# Patient Record
Sex: MALE | Race: BLACK OR AFRICAN AMERICAN | NOT HISPANIC OR LATINO | Employment: OTHER | URBAN - METROPOLITAN AREA
[De-identification: names, ages, dates, MRNs, and addresses within clinical notes are randomized per-mention and may not be internally consistent; named-entity substitution may affect disease eponyms.]

---

## 2017-11-13 ENCOUNTER — APPOINTMENT (INPATIENT)
Dept: RADIOLOGY | Facility: HOSPITAL | Age: 57
DRG: 640 | End: 2017-11-13
Payer: MEDICARE

## 2017-11-13 ENCOUNTER — APPOINTMENT (INPATIENT)
Dept: RADIOLOGY | Facility: HOSPITAL | Age: 57
DRG: 640 | End: 2017-11-13
Attending: PSYCHIATRY & NEUROLOGY
Payer: MEDICARE

## 2017-11-13 ENCOUNTER — APPOINTMENT (INPATIENT)
Dept: NON INVASIVE DIAGNOSTICS | Facility: HOSPITAL | Age: 57
DRG: 640 | End: 2017-11-13
Payer: MEDICARE

## 2017-11-13 ENCOUNTER — HOSPITAL ENCOUNTER (INPATIENT)
Facility: HOSPITAL | Age: 57
LOS: 3 days | Discharge: LEFT BEFORE DISCHARGE | DRG: 640 | End: 2017-11-16
Attending: EMERGENCY MEDICINE | Admitting: HOSPITALIST
Payer: MEDICARE

## 2017-11-13 ENCOUNTER — APPOINTMENT (INPATIENT)
Dept: DIALYSIS | Facility: HOSPITAL | Age: 57
DRG: 640 | End: 2017-11-13
Payer: MEDICARE

## 2017-11-13 ENCOUNTER — APPOINTMENT (EMERGENCY)
Dept: RADIOLOGY | Facility: HOSPITAL | Age: 57
DRG: 640 | End: 2017-11-13
Payer: MEDICARE

## 2017-11-13 DIAGNOSIS — Z99.2 ESRD ON HEMODIALYSIS (HCC): ICD-10-CM

## 2017-11-13 DIAGNOSIS — N18.6 ESRD ON HEMODIALYSIS (HCC): ICD-10-CM

## 2017-11-13 DIAGNOSIS — G93.40 ENCEPHALOPATHY: ICD-10-CM

## 2017-11-13 DIAGNOSIS — R06.00 DYSPNEA: ICD-10-CM

## 2017-11-13 DIAGNOSIS — J96.01 ACUTE RESPIRATORY FAILURE WITH HYPOXIA (HCC): ICD-10-CM

## 2017-11-13 DIAGNOSIS — E87.70 VOLUME OVERLOAD: Primary | ICD-10-CM

## 2017-11-13 PROBLEM — R77.8 ELEVATED TROPONIN: Status: ACTIVE | Noted: 2017-11-13

## 2017-11-13 PROBLEM — E87.5 HYPERKALEMIA: Status: ACTIVE | Noted: 2017-11-13

## 2017-11-13 PROBLEM — I16.0 HYPERTENSIVE URGENCY: Status: ACTIVE | Noted: 2017-11-13

## 2017-11-13 LAB
ALBUMIN SERPL BCP-MCNC: 3.4 G/DL (ref 3.5–5)
ALBUMIN SERPL BCP-MCNC: 3.6 G/DL (ref 3.5–5)
ALP SERPL-CCNC: 256 U/L (ref 46–116)
ALP SERPL-CCNC: 260 U/L (ref 46–116)
ALT SERPL W P-5'-P-CCNC: 425 U/L (ref 12–78)
ALT SERPL W P-5'-P-CCNC: 449 U/L (ref 12–78)
ANION GAP SERPL CALCULATED.3IONS-SCNC: 12 MMOL/L (ref 4–13)
ANION GAP SERPL CALCULATED.3IONS-SCNC: 15 MMOL/L (ref 4–13)
ANION GAP SERPL CALCULATED.3IONS-SCNC: 16 MMOL/L (ref 4–13)
APTT PPP: 45 SECONDS (ref 23–35)
APTT PPP: 47 SECONDS (ref 23–35)
ARTERIAL PATENCY WRIST A: YES
AST SERPL W P-5'-P-CCNC: 169 U/L (ref 5–45)
AST SERPL W P-5'-P-CCNC: 192 U/L (ref 5–45)
ATRIAL RATE: 98 BPM
BASE EXCESS BLDA CALC-SCNC: 4.8 MMOL/L
BASOPHILS # BLD AUTO: 0.02 THOUSANDS/ΜL (ref 0–0.1)
BASOPHILS # BLD AUTO: 0.04 THOUSANDS/ΜL (ref 0–0.1)
BASOPHILS NFR BLD AUTO: 0 % (ref 0–1)
BASOPHILS NFR BLD AUTO: 1 % (ref 0–1)
BILIRUB SERPL-MCNC: 1.21 MG/DL (ref 0.2–1)
BILIRUB SERPL-MCNC: 1.55 MG/DL (ref 0.2–1)
BUN SERPL-MCNC: 141 MG/DL (ref 5–25)
BUN SERPL-MCNC: 146 MG/DL (ref 5–25)
BUN SERPL-MCNC: 67 MG/DL (ref 5–25)
CALCIUM SERPL-MCNC: 10.1 MG/DL (ref 8.3–10.1)
CHLORIDE SERPL-SCNC: 100 MMOL/L (ref 100–108)
CHLORIDE SERPL-SCNC: 96 MMOL/L (ref 100–108)
CHLORIDE SERPL-SCNC: 99 MMOL/L (ref 100–108)
CO2 SERPL-SCNC: 23 MMOL/L (ref 21–32)
CO2 SERPL-SCNC: 24 MMOL/L (ref 21–32)
CO2 SERPL-SCNC: 28 MMOL/L (ref 21–32)
CREAT SERPL-MCNC: 18.6 MG/DL (ref 0.6–1.3)
CREAT SERPL-MCNC: 18.8 MG/DL (ref 0.6–1.3)
CREAT SERPL-MCNC: 9.88 MG/DL (ref 0.6–1.3)
EOSINOPHIL # BLD AUTO: 0.1 THOUSAND/ΜL (ref 0–0.61)
EOSINOPHIL # BLD AUTO: 0.34 THOUSAND/ΜL (ref 0–0.61)
EOSINOPHIL NFR BLD AUTO: 2 % (ref 0–6)
EOSINOPHIL NFR BLD AUTO: 5 % (ref 0–6)
ERYTHROCYTE [DISTWIDTH] IN BLOOD BY AUTOMATED COUNT: 19.6 % (ref 11.6–15.1)
ERYTHROCYTE [DISTWIDTH] IN BLOOD BY AUTOMATED COUNT: 19.7 % (ref 11.6–15.1)
EST. AVERAGE GLUCOSE BLD GHB EST-MCNC: 97 MG/DL
GFR SERPL CREATININE-BSD FRML MDRD: 3 ML/MIN/1.73SQ M
GFR SERPL CREATININE-BSD FRML MDRD: 3 ML/MIN/1.73SQ M
GFR SERPL CREATININE-BSD FRML MDRD: 6 ML/MIN/1.73SQ M
GLUCOSE SERPL-MCNC: 102 MG/DL (ref 65–140)
GLUCOSE SERPL-MCNC: 107 MG/DL (ref 65–140)
GLUCOSE SERPL-MCNC: 110 MG/DL (ref 65–140)
GLUCOSE SERPL-MCNC: 139 MG/DL (ref 65–140)
GLUCOSE SERPL-MCNC: 183 MG/DL (ref 65–140)
GLUCOSE SERPL-MCNC: 226 MG/DL (ref 65–140)
HBA1C MFR BLD: 5 % (ref 4.2–6.3)
HCO3 BLDA-SCNC: 27.1 MMOL/L (ref 22–28)
HCT VFR BLD AUTO: 31.4 % (ref 36.5–49.3)
HCT VFR BLD AUTO: 32.3 % (ref 36.5–49.3)
HGB BLD-MCNC: 10.3 G/DL (ref 12–17)
HGB BLD-MCNC: 10.5 G/DL (ref 12–17)
INR PPP: 1.06 (ref 0.86–1.16)
INR PPP: 1.15 (ref 0.86–1.16)
LYMPHOCYTES # BLD AUTO: 0.89 THOUSANDS/ΜL (ref 0.6–4.47)
LYMPHOCYTES # BLD AUTO: 1.03 THOUSANDS/ΜL (ref 0.6–4.47)
LYMPHOCYTES NFR BLD AUTO: 13 % (ref 14–44)
LYMPHOCYTES NFR BLD AUTO: 14 % (ref 14–44)
MAGNESIUM SERPL-MCNC: 3.3 MG/DL (ref 1.6–2.6)
MCH RBC QN AUTO: 29 PG (ref 26.8–34.3)
MCH RBC QN AUTO: 29.2 PG (ref 26.8–34.3)
MCHC RBC AUTO-ENTMCNC: 31.9 G/DL (ref 31.4–37.4)
MCHC RBC AUTO-ENTMCNC: 33.4 G/DL (ref 31.4–37.4)
MCV RBC AUTO: 88 FL (ref 82–98)
MCV RBC AUTO: 91 FL (ref 82–98)
MONOCYTES # BLD AUTO: 0.34 THOUSAND/ΜL (ref 0.17–1.22)
MONOCYTES # BLD AUTO: 0.43 THOUSAND/ΜL (ref 0.17–1.22)
MONOCYTES NFR BLD AUTO: 5 % (ref 4–12)
MONOCYTES NFR BLD AUTO: 6 % (ref 4–12)
NASAL CANNULA: 4
NEUTROPHILS # BLD AUTO: 5.5 THOUSANDS/ΜL (ref 1.85–7.62)
NEUTROPHILS # BLD AUTO: 5.66 THOUSANDS/ΜL (ref 1.85–7.62)
NEUTS SEG NFR BLD AUTO: 74 % (ref 43–75)
NEUTS SEG NFR BLD AUTO: 80 % (ref 43–75)
NRBC BLD AUTO-RTO: 0 /100 WBCS
NRBC BLD AUTO-RTO: 0 /100 WBCS
NT-PROBNP SERPL-MCNC: ABNORMAL PG/ML
O2 CT BLDA-SCNC: 13.9 ML/DL (ref 16–23)
OXYHGB MFR BLDA: 92 % (ref 94–97)
P AXIS: 82 DEGREES
PCO2 BLDA: 32 MM HG (ref 36–44)
PH BLDA: 7.55 [PH] (ref 7.35–7.45)
PHOSPHATE SERPL-MCNC: 4.4 MG/DL (ref 2.7–4.5)
PLATELET # BLD AUTO: 238 THOUSANDS/UL (ref 149–390)
PLATELET # BLD AUTO: 245 THOUSANDS/UL (ref 149–390)
PLATELET # BLD AUTO: 254 THOUSANDS/UL (ref 149–390)
PMV BLD AUTO: 10.5 FL (ref 8.9–12.7)
PMV BLD AUTO: 11 FL (ref 8.9–12.7)
PMV BLD AUTO: 11 FL (ref 8.9–12.7)
PO2 BLDA: 66.4 MM HG (ref 75–129)
POTASSIUM SERPL-SCNC: 3.9 MMOL/L (ref 3.5–5.3)
POTASSIUM SERPL-SCNC: 5.9 MMOL/L (ref 3.5–5.3)
POTASSIUM SERPL-SCNC: 6.5 MMOL/L (ref 3.5–5.3)
PR INTERVAL: 162 MS
PROT SERPL-MCNC: 7.9 G/DL (ref 6.4–8.2)
PROT SERPL-MCNC: 8.1 G/DL (ref 6.4–8.2)
PROTHROMBIN TIME: 13.8 SECONDS (ref 12.1–14.4)
PROTHROMBIN TIME: 14.7 SECONDS (ref 12.1–14.4)
QRS AXIS: 64 DEGREES
QRSD INTERVAL: 92 MS
QT INTERVAL: 342 MS
QTC INTERVAL: 436 MS
RBC # BLD AUTO: 3.55 MILLION/UL (ref 3.88–5.62)
RBC # BLD AUTO: 3.59 MILLION/UL (ref 3.88–5.62)
SODIUM SERPL-SCNC: 136 MMOL/L (ref 136–145)
SODIUM SERPL-SCNC: 138 MMOL/L (ref 136–145)
SODIUM SERPL-SCNC: 139 MMOL/L (ref 136–145)
SPECIMEN SOURCE: ABNORMAL
SPECIMEN SOURCE: NORMAL
T WAVE AXIS: 82 DEGREES
TROPONIN I BLD-MCNC: 0.07 NG/ML (ref 0–0.08)
TROPONIN I SERPL-MCNC: 0.09 NG/ML
TROPONIN I SERPL-MCNC: 0.09 NG/ML
TSH SERPL DL<=0.05 MIU/L-ACNC: 0.41 UIU/ML (ref 0.36–3.74)
VENTRICULAR RATE: 98 BPM
VIT B12 SERPL-MCNC: 1660 PG/ML (ref 100–900)
WBC # BLD AUTO: 6.86 THOUSAND/UL (ref 4.31–10.16)
WBC # BLD AUTO: 7.52 THOUSAND/UL (ref 4.31–10.16)

## 2017-11-13 PROCEDURE — 82607 VITAMIN B-12: CPT | Performed by: PHYSICIAN ASSISTANT

## 2017-11-13 PROCEDURE — 80053 COMPREHEN METABOLIC PANEL: CPT | Performed by: PHYSICIAN ASSISTANT

## 2017-11-13 PROCEDURE — 84443 ASSAY THYROID STIM HORMONE: CPT | Performed by: PHYSICIAN ASSISTANT

## 2017-11-13 PROCEDURE — 82948 REAGENT STRIP/BLOOD GLUCOSE: CPT

## 2017-11-13 PROCEDURE — 83880 ASSAY OF NATRIURETIC PEPTIDE: CPT | Performed by: EMERGENCY MEDICINE

## 2017-11-13 PROCEDURE — 84484 ASSAY OF TROPONIN QUANT: CPT

## 2017-11-13 PROCEDURE — 70498 CT ANGIOGRAPHY NECK: CPT

## 2017-11-13 PROCEDURE — 70551 MRI BRAIN STEM W/O DYE: CPT

## 2017-11-13 PROCEDURE — 71010 HB CHEST X-RAY 1 VIEW FRONTAL: CPT

## 2017-11-13 PROCEDURE — 84100 ASSAY OF PHOSPHORUS: CPT | Performed by: HOSPITALIST

## 2017-11-13 PROCEDURE — 70496 CT ANGIOGRAPHY HEAD: CPT

## 2017-11-13 PROCEDURE — 36415 COLL VENOUS BLD VENIPUNCTURE: CPT | Performed by: EMERGENCY MEDICINE

## 2017-11-13 PROCEDURE — 82805 BLOOD GASES W/O2 SATURATION: CPT | Performed by: PHYSICIAN ASSISTANT

## 2017-11-13 PROCEDURE — 85025 COMPLETE CBC W/AUTO DIFF WBC: CPT | Performed by: EMERGENCY MEDICINE

## 2017-11-13 PROCEDURE — 36415 COLL VENOUS BLD VENIPUNCTURE: CPT

## 2017-11-13 PROCEDURE — 85730 THROMBOPLASTIN TIME PARTIAL: CPT | Performed by: PHYSICIAN ASSISTANT

## 2017-11-13 PROCEDURE — 80053 COMPREHEN METABOLIC PANEL: CPT | Performed by: EMERGENCY MEDICINE

## 2017-11-13 PROCEDURE — 85025 COMPLETE CBC W/AUTO DIFF WBC: CPT | Performed by: PHYSICIAN ASSISTANT

## 2017-11-13 PROCEDURE — 80048 BASIC METABOLIC PNL TOTAL CA: CPT | Performed by: HOSPITALIST

## 2017-11-13 PROCEDURE — 92610 EVALUATE SWALLOWING FUNCTION: CPT

## 2017-11-13 PROCEDURE — 36600 WITHDRAWAL OF ARTERIAL BLOOD: CPT

## 2017-11-13 PROCEDURE — 85610 PROTHROMBIN TIME: CPT | Performed by: PHYSICIAN ASSISTANT

## 2017-11-13 PROCEDURE — 83735 ASSAY OF MAGNESIUM: CPT | Performed by: HOSPITALIST

## 2017-11-13 PROCEDURE — 83036 HEMOGLOBIN GLYCOSYLATED A1C: CPT | Performed by: HOSPITALIST

## 2017-11-13 PROCEDURE — 74000 HB X-RAY EXAM OF ABDOMEN (SINGLE ANTEROPOSTERIOR VIEW): CPT

## 2017-11-13 PROCEDURE — 84484 ASSAY OF TROPONIN QUANT: CPT | Performed by: HOSPITALIST

## 2017-11-13 PROCEDURE — 70450 CT HEAD/BRAIN W/O DYE: CPT

## 2017-11-13 PROCEDURE — 96365 THER/PROPH/DIAG IV INF INIT: CPT

## 2017-11-13 PROCEDURE — 71020 HB CHEST X-RAY 2VW FRONTAL&LATL: CPT | Performed by: EMERGENCY MEDICINE

## 2017-11-13 PROCEDURE — 85730 THROMBOPLASTIN TIME PARTIAL: CPT | Performed by: EMERGENCY MEDICINE

## 2017-11-13 PROCEDURE — 93005 ELECTROCARDIOGRAM TRACING: CPT | Performed by: EMERGENCY MEDICINE

## 2017-11-13 PROCEDURE — 93306 TTE W/DOPPLER COMPLETE: CPT

## 2017-11-13 PROCEDURE — 99285 EMERGENCY DEPT VISIT HI MDM: CPT

## 2017-11-13 PROCEDURE — 85049 AUTOMATED PLATELET COUNT: CPT | Performed by: HOSPITALIST

## 2017-11-13 PROCEDURE — 85610 PROTHROMBIN TIME: CPT | Performed by: EMERGENCY MEDICINE

## 2017-11-13 PROCEDURE — 84484 ASSAY OF TROPONIN QUANT: CPT | Performed by: EMERGENCY MEDICINE

## 2017-11-13 RX ORDER — ONDANSETRON 2 MG/ML
4 INJECTION INTRAMUSCULAR; INTRAVENOUS EVERY 6 HOURS PRN
Status: DISCONTINUED | OUTPATIENT
Start: 2017-11-13 | End: 2017-11-16 | Stop reason: HOSPADM

## 2017-11-13 RX ORDER — HYDRALAZINE HYDROCHLORIDE 20 MG/ML
5 INJECTION INTRAMUSCULAR; INTRAVENOUS EVERY 6 HOURS PRN
Status: DISCONTINUED | OUTPATIENT
Start: 2017-11-13 | End: 2017-11-16 | Stop reason: HOSPADM

## 2017-11-13 RX ORDER — HEPARIN SODIUM 5000 [USP'U]/ML
5000 INJECTION, SOLUTION INTRAVENOUS; SUBCUTANEOUS EVERY 8 HOURS SCHEDULED
Status: DISCONTINUED | OUTPATIENT
Start: 2017-11-13 | End: 2017-11-16 | Stop reason: HOSPADM

## 2017-11-13 RX ORDER — DEXTROSE MONOHYDRATE 25 G/50ML
25 INJECTION, SOLUTION INTRAVENOUS ONCE
Status: COMPLETED | OUTPATIENT
Start: 2017-11-13 | End: 2017-11-13

## 2017-11-13 RX ORDER — LABETALOL HYDROCHLORIDE 5 MG/ML
10 INJECTION, SOLUTION INTRAVENOUS EVERY 6 HOURS PRN
Status: DISCONTINUED | OUTPATIENT
Start: 2017-11-13 | End: 2017-11-16 | Stop reason: HOSPADM

## 2017-11-13 RX ORDER — ACETAMINOPHEN 325 MG/1
650 TABLET ORAL EVERY 8 HOURS SCHEDULED
Status: DISCONTINUED | OUTPATIENT
Start: 2017-11-13 | End: 2017-11-16 | Stop reason: HOSPADM

## 2017-11-13 RX ORDER — AMLODIPINE BESYLATE 10 MG/1
10 TABLET ORAL DAILY
Status: DISCONTINUED | OUTPATIENT
Start: 2017-11-13 | End: 2017-11-16 | Stop reason: HOSPADM

## 2017-11-13 RX ORDER — NICOTINE 21 MG/24HR
1 PATCH, TRANSDERMAL 24 HOURS TRANSDERMAL DAILY
Status: DISCONTINUED | OUTPATIENT
Start: 2017-11-13 | End: 2017-11-16 | Stop reason: HOSPADM

## 2017-11-13 RX ORDER — CLONIDINE HYDROCHLORIDE 0.1 MG/1
0.2 TABLET ORAL ONCE
Status: DISCONTINUED | OUTPATIENT
Start: 2017-11-13 | End: 2017-11-13

## 2017-11-13 RX ORDER — LABETALOL HYDROCHLORIDE 5 MG/ML
10 INJECTION, SOLUTION INTRAVENOUS ONCE
Status: COMPLETED | OUTPATIENT
Start: 2017-11-13 | End: 2017-11-13

## 2017-11-13 RX ORDER — CALCITRIOL 0.25 UG/1
1.5 CAPSULE, LIQUID FILLED ORAL
Status: DISCONTINUED | OUTPATIENT
Start: 2017-11-13 | End: 2017-11-16 | Stop reason: HOSPADM

## 2017-11-13 RX ORDER — AMLODIPINE BESYLATE 10 MG/1
10 TABLET ORAL DAILY
COMMUNITY

## 2017-11-13 RX ORDER — SENNOSIDES 8.6 MG
1 TABLET ORAL DAILY
Status: DISCONTINUED | OUTPATIENT
Start: 2017-11-13 | End: 2017-11-16 | Stop reason: HOSPADM

## 2017-11-13 RX ORDER — DOCUSATE SODIUM 100 MG/1
100 CAPSULE, LIQUID FILLED ORAL 2 TIMES DAILY
Status: DISCONTINUED | OUTPATIENT
Start: 2017-11-13 | End: 2017-11-16 | Stop reason: HOSPADM

## 2017-11-13 RX ORDER — NITROGLYCERIN 20 MG/100ML
5-200 INJECTION INTRAVENOUS
Status: DISCONTINUED | OUTPATIENT
Start: 2017-11-13 | End: 2017-11-13

## 2017-11-13 RX ADMIN — INSULIN LISPRO 1 UNITS: 100 INJECTION, SOLUTION INTRAVENOUS; SUBCUTANEOUS at 07:42

## 2017-11-13 RX ADMIN — LABETALOL HYDROCHLORIDE 10 MG: 5 INJECTION, SOLUTION INTRAVENOUS at 14:30

## 2017-11-13 RX ADMIN — HEPARIN SODIUM 5000 UNITS: 5000 INJECTION, SOLUTION INTRAVENOUS; SUBCUTANEOUS at 17:32

## 2017-11-13 RX ADMIN — NICOTINE 1 PATCH: 21 PATCH, EXTENDED RELEASE TRANSDERMAL at 17:32

## 2017-11-13 RX ADMIN — ACETAMINOPHEN 650 MG: 325 TABLET, FILM COATED ORAL at 06:17

## 2017-11-13 RX ADMIN — NITROGLYCERIN 50 MCG/MIN: 20 INJECTION INTRAVENOUS at 03:48

## 2017-11-13 RX ADMIN — CALCIUM ACETATE 2001 MG: 667 CAPSULE ORAL at 17:33

## 2017-11-13 RX ADMIN — ACETAMINOPHEN 650 MG: 325 TABLET, FILM COATED ORAL at 21:19

## 2017-11-13 RX ADMIN — HYDRALAZINE HYDROCHLORIDE 5 MG: 20 INJECTION INTRAMUSCULAR; INTRAVENOUS at 14:12

## 2017-11-13 RX ADMIN — CALCIUM GLUCONATE 1 G: 94 INJECTION, SOLUTION INTRAVENOUS at 06:15

## 2017-11-13 RX ADMIN — DEXTROSE MONOHYDRATE 25 G: 25 INJECTION, SOLUTION INTRAVENOUS at 06:11

## 2017-11-13 RX ADMIN — INSULIN HUMAN 10 UNITS: 100 INJECTION, SOLUTION PARENTERAL at 06:19

## 2017-11-13 RX ADMIN — FUROSEMIDE 100 MG: 10 INJECTION, SOLUTION INTRAMUSCULAR; INTRAVENOUS at 05:55

## 2017-11-13 RX ADMIN — CALCIUM ACETATE 2001 MG: 667 CAPSULE ORAL at 07:43

## 2017-11-13 RX ADMIN — AMLODIPINE BESYLATE 10 MG: 10 TABLET ORAL at 06:17

## 2017-11-13 RX ADMIN — HYDRALAZINE HYDROCHLORIDE 5 MG: 20 INJECTION INTRAMUSCULAR; INTRAVENOUS at 06:56

## 2017-11-13 RX ADMIN — HEPARIN SODIUM 5000 UNITS: 5000 INJECTION, SOLUTION INTRAVENOUS; SUBCUTANEOUS at 21:20

## 2017-11-13 RX ADMIN — HEPARIN SODIUM 5000 UNITS: 5000 INJECTION, SOLUTION INTRAVENOUS; SUBCUTANEOUS at 06:20

## 2017-11-13 NOTE — SPEECH THERAPY NOTE
Speech Language/Pathology  Speech/Language Pathology  Assessment    Patient Name: Dottie Neves  DKSTZ'A Date: 11/13/2017     Problem List  Patient Active Problem List   Diagnosis    Fluid overload    Hypertensive urgency    Hyperkalemia    Dyspnea    Acute hypoxemic respiratory failure (HCC)    DM [facility]di[facility] mellitus) ([facility] [address] [facility]    Elevated troponin    Encephalopathy     Past Medical History  Past Medical History:   Diagnosis Date    Dialysis patient ([facility] [address] )     Hypertension     Renal disorder      Dottie Neves is a 56 y o   male with a pertinent PMH of ESRD on HD and an EF of 45-50% who presents after receiving an emergency dialysis treatment for shortness of breath  The pt drove himself to the [facility] ED this morning with SOB after missing his last dialysis schedule treatment, which he normally has Monday, Wednesday, Friday  The pt began his dialysis treatment this morning at approximately 8:10am and finished at 11:00  The pt recovered in dialysis until 11:45 and was alert and oriented at that time  The pt was transported by a nurse to [facility]  After transport, the nurse noted that the pt could not help move himself from the stretcher to the bed  He was then noted to be confused and aphasic, answering all questions with "yes " A stroke alert was called  NIH stroke scale 6, the patient gaining points for orientation and aphasia as detailed below  The pt was immediately transported and a CT and CTA were obtained  No large vessel occlusion or hemorrhage identified, though pt did demonstrate flow restrictive disease right vertebral artery  Because the patient had high risk versus benefit for tPA, an MRI was performed  The MRI did not demonstrate acute infarction  No tPA was given  The patient was transported to medical step-down unit  Pt made NPO due to decreased mental status, SLP consulted         Reason for consult:  Determine safest and least restrictive diet  Change in mental status    Current diet:  NPO  Premorbid diet[de-identified]  Reg/thin  Previous VBS:  No  O2 requirement:  NC 3L  Voice/Speech:  WNL/ pt only stating "yea" during assessment  Social:  Lives at home  Follows commands: Yes                       Cognitive Status:  impaired  Oral [facility] exam:  Upper plate  Lower plate  Full symmetry    Items administered:  Puree, soft solid, hard solid, honey thick liquid, nectar thick liquid, thin liquids, meds whole/crushed in applesauce/water  Liquids were taken by straw/cup  Oral stage:  Lip closure: adequate  Mastication: mildly prolonged but functional  Bolus formation: adequate  Bolus control: adequate  Transfer: prompt  Oral residue: no  Pocketing: no    Pharyngeal stage:  Swallow promptness: prompt  Laryngeal rise: adequate  Wet voice: no  Throat clear: no  Cough: no  Secondary swallows: no  Audible swallows: no  No s/s aspiration    Esophageal stage:  No s/s reported    Summary:  Pt presents w/ functional oropharyngeal swallow skills for regular diet c thin liquids  Of note, prior to SLP entering, pt's girlfriend fed him lunch and reported he did well  No overt s/s penetration/aspiration noted during assessment  Of note, pt continues to have caial twitching and noted to cause expectoration of juice x1 during assessment  Educated girls friend on holding PO when pt is lethargic as well as if theres an increase in twitching       Recommendations:  Diet: regular  Liquid: thin  Meds: as tolerated  Positioning:Upright  Strategies: feed only when alert  Aspiration precautions    Results d/w:  Pt, family, RN    Consider consult w/:  Nutrition    F/U x1 to ensure cont tolerance

## 2017-11-13 NOTE — PROGRESS NOTES
Patient picked up from HD and taken to room 501  Upon transferring the patient from stretcher to bed, it became aware he was having trouble moving and communicating  Neuro eval done  Assessment included trouble speaking, weakness and aphasia  Dr Arlene Escobedo was notified immediately awaiting orders, possible stoke alert

## 2017-11-13 NOTE — ED PROCEDURE NOTE
PROCEDURE  CriticalCare Time  Performed by: Giovanna Santa  Authorized by: Giovanna Santa     Critical care provider statement:     Critical care time (minutes):  31    Critical care start time:  11/13/2017 2:53 AM    Critical care end time:  11/13/2017 7:00 AM    Critical care time was exclusive of:  Separately billable procedures and treating other patients and teaching time    Critical care was necessary to treat or prevent imminent or life-threatening deterioration of the following conditions:  Renal failure and metabolic crisis (Renal failure chronic-patient missed hemodialysis several sessions; metabolic crisis secondary to hyperkalemia)    Critical care was time spent personally by me on the following activities:  Obtaining history from patient or surrogate, development of treatment plan with patient or surrogate, discussions with consultants, evaluation of patient's response to treatment, examination of patient, ordering and performing treatments and interventions, ordering and review of laboratory studies, ordering and review of radiographic studies, re-evaluation of patient's condition and review of old charts    I assumed direction of critical care for this patient from another provider in my specialty: no    Comments:      Pulmonary edema noted on chest x-ray; patient's blood pressure is elevated in a renal failure patient; plan to administer intravenous nitroglycerin to treat the patient's market hypertension in the setting of pulmonary edema; plan hemodialysis urgently in the a m ; patient noted to be hyperkalemic, plan to treat with calcium gluconate, bicarbonate, insulin, and albuterol

## 2017-11-13 NOTE — CASE MANAGEMENT
Initial Clinical Review    Admission: Date/Time/Statement: 11/13/17 @ 0455     Orders Placed This Encounter   Procedures    Inpatient Admission (expected length of stay for this patient is greater than two midnights)     Standing Status:   Standing     Number of Occurrences:   1     Order Specific Question:   Admitting Physician     Answer:   Cheyenne Ralph     Order Specific Question:   Level of Care     Answer:   Med Surg [16]     Order Specific Question:   Estimated length of stay     Answer:   More than 2 Midnights     Order Specific Question:   Certification     Answer:   I certify that inpatient services are medically necessary for this patient for a duration of greater than two midnights  See H&P and MD Progress Notes for additional information about the patient's course of treatment  ED: Date/Time/Mode of Arrival:   ED Arrival Information     Expected Arrival Acuity Means of Arrival Escorted By Service Admission Type    - 11/13/2017 01:43 Emergent Walk-In Self General Medicine Emergency    Arrival Complaint    Shortness of Breath          Chief Complaint:   Chief Complaint   Patient presents with    Shortness of Breath     Pt present to ed with c/o sob and cp  Pt reports he missed dialysis on Friday  History of Illness: Sera Singh is a 56 y o  male with past medical history ESRD on HD M-W-F, noncompliant with medical management, HTN, DM   who presented to the emergency room with extreme shortness of breath and missed dialysis on Friday  The patient explained to the ER attending he missed dialysis cause he was released on parole, he was partying using drugs and alcohol so he he missed dialysis trying to avoid drug screen , upon my assessment he told me that on Friday when he went to the dialysis center " they did not have a room to take me for HD", anyway looks like the patient missed his dialysis which led to significant fluid overload and hypertensive urgency   He also complained of chest pain which usually happens when he fluid overloaded  He was hypoxic in high 80s on RA, tachypneic, placed on NRB  Blood pressure 206/93, troponin elevated potassium 6 5,   creatinine 18 6, proBNP [id number]  No pleural effusion on chest x-ray  EKG did not show specific hyperkalemia changes  Nephrology contacted they will proceed with dialysis in a m  Patient will be admitted to step-down 2    ER resident  finally obtained IV access and will proceed with hyperkalemia cocktail  10/36 note -  Asked to come to room for possible stroke  Patient is reportedly aphasic  He is moving all 4 extremities  However he is nonverbal   Nursing staff in hemodialysis reports that he was communicative prior to arrival to the floor  This appears to be acute change in his status as per report from nursing staff  Discussed with Neurology  Will activate a stroke alert     ED Vital Signs:   ED Triage Vitals   Temperature Pulse Respirations Blood Pressure SpO2   11/13/17 0153 11/13/17 0153 11/13/17 0153 11/13/17 0153 11/13/17 0153   98 4 °F (36 9 °C) 97 20 (!) 191/88 (!) 87 % RA sat       Temp Source Heart Rate Source Patient Position - Orthostatic VS BP Location FiO2 (%)   11/13/17 0153 11/13/17 0316 11/13/17 0153 11/13/17 0153 --   Oral Monitor Sitting Right arm       Pain Score       11/13/17 0153       Worst Possible Pain        Wt Readings from Last 1 Encounters:   11/13/17 68 kg (150 lb)       Vital Signs (abnormal):  to non rebreather mask - sat to 95% - weaned to 3 liters NC O2 sat 94%     Abnormal Labs/Diagnostic Test Results:      Ref Range & Units 11/13/17 0641 11/13/17 0207   Sodium 136 - 145 mmol/L 138  139CM    Potassium 3 5 - 5 3 mmol/L 5 9   6 5CM     Chloride 100 - 108 mmol/L 99   100    CO2 21 - 32 mmol/L 24  23    Anion Gap 4 - 13 mmol/L 15   16     BUN 5 - 25 mg/dL 146   141     Creatinine 0 60 - 1 30 mg/dL 18 80   18 60CM     Glucose 65 - 140 mg/dL 226   139CM    Calcium 8 3 - 10 1 mg/dL 10 1  10 1 eGFR ml/min/1 73sq m 3  3             Ref Range & Units 11/13/17 0207   WBC 4 31 - 10 16 Thousand/uL 7 52    RBC 3 88 - 5 62 Million/uL 3 55     Hemoglobin 12 0 - 17 0 g/dL 10 3     Hematocrit 36 5 - 49 3 % 32 3     MCV 82 - 98 fL 91    MCH 26 8 - 34 3 pg 29 0    MCHC 31 4 - 37 4 g/dL 31 9    RDW 11 6 - 15 1 % 19 7     MPV 8 9 - 12 7 fL 10 5    Platelets 551 - 085 Thousands/uL 238    nRBC /100 WBCs 0    Neutrophils Relative 43 - 75 % 74    Lymphocytes Relative 14 - 44 % 14    Monocytes Relative 4 - 12 % 6    Eosinophils Relative 0 - 6 % 5    Basophils Relative 0 - 1 % 1    Neutrophils Absolute 1 85 - 7 62 Thousands/µL 5 66    Lymphocytes Absolute 0 60 - 4 47 Thousands/µL 1 03    Monocytes Absolute 0 17 - 1 22 Thousand/µL 0 43    Eosinophils Absolute 0 00 - 0 61 Thousand/µL 0 34    Basophils Absolute 0 00 - 0 10 Thousands/µL 0 04          ,422  Trop 0 07-0 09-0 09  CXR - Bilateral perihilar infiltrates, most compatible with alveolar edema  CT Head - No acute disease  No intracranial hemorrhage or mass  CTA head &  Neck - Flow restrictive disease right vertebral artery  This is a nondominant vessel which does not fill at its origin, multifocal significant stenosis    Findings may be chronic      ECHO  - pending    MRI Brain  On 11/13    ED Treatment:   Medication Administration from 11/13/2017 0143 to 11/13/2017 1208       Date/Time Order Dose Route Action Action by Comments     11/13/2017 0652 nitroGLYcerin (TRIDIL) 50 mg in 250 mL infusion (premix) 0 mcg/min Intravenous Stopped Pablo Olson RN      11/13/2017 0348 nitroGLYcerin (TRIDIL) 50 mg in 250 mL infusion (premix) 50 mcg/min Intravenous [address] [place] [address]      11/13/2017 0555 furosemide (LASIX) 100 mg in dextrose 5 % 50 mL IVPB 100 mg Intravenous [address] [place] [address]      11/13/2017 0617 amLODIPine (NORVASC) tablet 10 mg 10 mg Oral Given Pablo Olson RN      11/13/2017 0743 calcium acetate (PHOSLO) capsule 2,001 mg 2,001 mg Oral Given Giuliano Troy RN      11/13/2017 7404 heparin (porcine) subcutaneous injection 5,000 Units 5,000 Units Subcutaneous Given Deisy Benjamin RN      11/13/2017 0617 acetaminophen (TYLENOL) tablet 650 mg 650 mg Oral Given Deisy Benjamin RN      11/13/2017 7203 hydrALAZINE (APRESOLINE) injection 5 mg 5 mg Intravenous Given Deisy Benjamin RN      11/13/2017 9354 insulin lispro (HumaLOG) 100 units/mL subcutaneous injection 1-5 Units 1 Units Subcutaneous Given Giuliano Troy RN      11/13/2017 0615 calcium gluconate 1 g in sodium chloride 0 9 % 100 mL IVPB 1 g [facility]venous [facility] [address] Deisy Benjamin [facility]      11/13/2017 2856 insulin regular (HumuLIN R,NovoLIN R) injection 10 Units 10 Units Subcutaneous Given Deisy Benjamin RN      11/13/2017 8983 dextrose 50 % IV solution 25 g 25 g Intravenous Given Deisy Benjamin RN           Past Medical/Surgical History:   Past Medical History:   Diagnosis Date    Dialysis patient ([place] [address] )     Hypertension     Renal disorder        Admitting Diagnosis: Shortness of breath [R06 02]  Dyspnea [R06 00]  Volume overload [E87 70]  ESRD on hemodialysis ([place] [address] ) [N18 6, Z99 2]  Acute respiratory failure with hypoxia ([place] [address] ) [J96 01]    Age/Sex: 56 y o  male    Assessment/Plan:   Plan for the Primary Problem(s):  · Fluid overload secondary to medical noncompliance  ? Patient will need urgent dialysis in a   given significant overload and hyperkalemia, nephrology notified  ? Will proceed with hyperkalemia treatment as patient got IV access  ? He will be admitted to step-down 2  ? Continue nitro drip, monitor trend of troponin most likely NSTEMI type 2 due to fluid overload  Will obtain echocardiogram  ? Continue NRB given hypoxia on admission, hope will resolve after dialysis  ? Monitor blood pressure continue amlodipine, hydralazine p r n   ?  Nitro drip was discontinued given it is not helping with blood pressure      Plan for Additional Problems:   · Diabetes  Will check hemoglobin A1c, continue Accu-Cheks, insulin sliding scale       Admission Orders:  Scheduled Meds:   acetaminophen 650 mg Oral Q8H [address]   alteplase      amLODIPine 10 mg Oral Daily   calcium acetate 2,001 mg Oral TID With Meals   docusate sodium 100 mg Oral BID   heparin (porcine) 5,000 Units Subcutaneous Q8H [address]   labetalol 10 mg Intravenous Once   nicotine 1 patch Transdermal Daily   senna 1 tablet Oral Daily     Continuous Infusions:  Tridil gtt - d/c'd on 11/13 @ 06:53  PRN Meds: calcitriol    hydrALAZINE 5 mg iv 11/13 x 1    ondansetron    Nursing orders - Telem - neuro checks q 15 - Continuous Pulse ox monitoring - Up and Oob as tolerated - Sequential compression device to le's  - Diet regular    Consult Nephrology  - Unit/Bed#Victoria Carlin Encounter: [id number]     ASSESSMENT and PLAN:  1  ESRD on HD (at [facility]): last HD missed  Seen and examined by me on HD today and again tomorrow  Will then continue [facility] HD per outpatient orders  He wants to move up here  Consult SW/CM regarding this  2  Access: LUE AVF well functioning  3  Hypertension: elevated on admission, likely d/t volume overload  Goal 3L UF today on HD  BP improving  On prn hydralazine, amlodipine 10mg daily  4  Anemia: Hgb at goal for ESRD  [facility] outpatient  5  Mineral and bone disease: continue calcitriol 1 5mcg after HD sessions, phoslo 3 tabs TID with meals  6  DM2: on insulin per primary team  7  Hyperkalemia - K 6 5 on admission, down to 5 9 with medical management, correct with 2K bath on HD today  He is on 1K bath at outpatient unit with only 3 hrs of HD per session  Studies show 1K bath unsafe and leads to higher risk of arrhythmia  Will avoid 1K bath and prefer more frequent and longer HD sessions instead  8  Azotemia without uremia - likely d/t noncompliance with HD  Correcting on HD  9  Hypermagnesemia - Mag 3 3, correct on HD  10   Shortness of breath - likely d/t volume overload - UF as above    Neurology  - Stroke alert

## 2017-11-13 NOTE — TREATMENT PLAN
Patient's BUN was 140s prior to HD treatment today  Down to 79 on repeat BMP  Dialysis dysequilibrium syndrome is on the differential  Per records, the patient had HD 11/8/17 and only missed HD 11/10/17  I worry that he is either getting incomplete HD at his outpatient unit due to incomplete treatments vs  Access issue or his BUN is elevated for some other reason ie catabolic state/bleed  Hgb stable so doubt bleed  No issues with HD access today  Will assess am labs  If BUN > 100, will plan for short HD session x 2 hrs with Qb 200ml/m, Qd 400ml/m and half amp mannitol prior to treatment  If BUN < 100 will hold off on further HD tomorrow  Will need daily assessment

## 2017-11-13 NOTE — PROGRESS NOTES
Patient back from MRI (unremarkable for acute ischemia) and re-evaluated  Patient able to answer yes/no questions otherwise non-verbal  Intermittent twitching noted  BP again accelerated in the 200's now 190 after hydralazine  Discussed with Dr Johanne Brito:    1 ) Acute encephalopathy of unknown origin suspect hypertensive encephalopathy vs large electrolyte shift vs hepatic encephalopathy vs hypercapnia - check ammonia level, ABG, give labetalol x 1 for blood pressure and if no improvement will start Cardene drip  NPO for now secondary to decreased mental status, speech evaluation  EEG ordered by neurology  Girlfriend updated at bedside  Sister updated via phone

## 2017-11-13 NOTE — PLAN OF CARE
Problem: SLP ADULT - SWALLOWING, IMPAIRED  Goal: Initial SLP swallow eval performed  Outcome: Completed Date Met: 11/13/17

## 2017-11-13 NOTE — H&P
History and Physical - [facility]    Patient Information: Anna Bravo 56 y o  male MRN: [id number]  Unit/Bed#: ED 03 Encounter: [id number]  Admitting Physician: Anish Constantino MD  PCP: Ismael Valles  Date of Admission:  11/13/17    Assessment/Plan:    Hospital Problem List:     Principal Problem:    Fluid overload  Active Problems:    Hypertensive urgency    Hyperkalemia    Dyspnea    Acute hypoxemic respiratory failure (HCC)    DM (diabetes mellitus) ([facility] [address] )    Elevated troponin      Plan for the Primary Problem(s):  · Fluid overload secondary to medical noncompliance  · Patient will need urgent dialysis in a m  given significant overload and hyperkalemia, nephrology notified  · Will proceed with hyperkalemia treatment as patient got IV access  · He will be admitted to step-down 2  · Continue nitro drip, monitor trend of troponin most likely NSTEMI type 2 due to fluid overload  Will obtain echocardiogram  · Continue NRB given hypoxia on admission, hope will resolve after dialysis  · Monitor blood pressure continue amlodipine, hydralazine p r n  · Nitro drip was discontinued given it is not helping with blood pressure  Plan for Additional Problems:   · Diabetes  Will check hemoglobin A1c, continue Accu-Cheks, insulin sliding scale    VTE Prophylaxis: Heparin  / sequential compression device   Code Status: full  POLST: There is no POLST form on file for this patient (pre-hospital)    Anticipated Length of Stay:  Patient will be admitted on an Inpatient basis with an anticipated length of stay of  > 2 midnights  Justification for Hospital Stay:  Nephrology consult    Total Time for Visit, including Counseling / Coordination of Care: 45 minutes  Greater than 50% of this total time spent on direct patient counseling and coordination of care      Chief Complaint:   Shortness of breath    History of Present Illness:    Anna Bravo is a 56 y o  male with past medical history ESRD on HD M-W-F, noncompliant with medical management, HTN, DM   who presented to the emergency room with extreme shortness of breath and missed dialysis on Friday  The patient explained to the ER attending he missed dialysis cause he was released on parole, he was partying using drugs and alcohol so he he missed dialysis trying to avoid drug screen , upon my assessment he told me that on Friday when he went to the dialysis center " they did not have a room to take me for HD", anyway looks like the patient missed his dialysis which led to significant fluid overload and hypertensive urgency   He also complained of chest pain which usually happens when he fluid overloaded  He was hypoxic in high 80s on RA, tachypneic, placed on NRB  Blood pressure 206/93, troponin elevated potassium 6 5,   creatinine 18 6, proBNP [id number]  No pleural effusion on chest x-ray  EKG did not show specific hyperkalemia changes  Nephrology contacted the will proceed with dialysis in a m  Patient will be admitted to step-down 2    ER resident  finally obtained IV access and will proceed with hyperkalemia cocktail  Review of Systems:    Review of Systems   Respiratory: Positive for shortness of breath  Past Medical and Surgical History:     Past Medical History:   Diagnosis Date    [facility]     Hypertension     Renal disorder        History reviewed  No pertinent surgical history  Meds/Allergies:    Prior to Admission medications    Medication Sig Start Date End Date Taking? Authorizing Provider   amLODIPine (NORVASC) 10 mg tablet Take 10 mg by mouth daily   Yes Historical Provider, MD   calcium acetate (PHOSLO) 667 mg capsule Take 2,001 mg by mouth 3 (three) times a day with meals   Yes Historical Provider, MD   UNKNOWN TO PATIENT    Yes Historical Provider, MD     I have reviewed home medications with a medical source (PCP, Pharmacy, other)      Allergies: No Known Allergies    Social History:     Marital Status:  Occupation: none  Patient Pre-hospital Living Situation: home  Patient Pre-hospital Level of Mobility: reg  Patient Pre-hospital Diet Restrictions: reg  Substance Use History:   History   Alcohol Use    Yes     History   Smoking Status    Current Every Day Smoker   Smokeless Tobacco    Current User     History   Drug Use       Family History:    non-contributory    Physical Exam:     Vitals:   Blood Pressure: (!) 191/95 (11/13/17 0445)  Pulse: 100 (11/13/17 0445)  Temperature: 98 4 °F (36 9 °C) (11/13/17 0153)  Temp Source: Oral (11/13/17 0153)  Respirations: (!) 26 (11/13/17 0445)  Height: 5' 6" (167 6 cm) (11/13/17 0153)  Weight - Scale: 68 kg (150 lb) (11/13/17 0153)  SpO2: 97 % (11/13/17 0445)    Physical Exam   Constitutional: He appears well-developed  He appears distressed  HENT:   Head: Normocephalic  Eyes: Pupils are equal, round, and reactive to light  Neck: Normal range of motion  Cardiovascular: Regular rhythm  Pulmonary/Chest: He is in respiratory distress  Abdominal: He exhibits no distension  Musculoskeletal: He exhibits no edema  Neurological: He is alert  Skin: Skin is warm  Psychiatric: He has a normal mood and affect  Additional Data:     Lab Results: I have personally reviewed pertinent reports          Results from last 7 days  Lab Units 11/13/17 0207   WBC Thousand/uL 7 52   HEMOGLOBIN g/dL 10 3*   HEMATOCRIT % 32 3*   PLATELETS Thousands/uL 238   NEUTROS PCT % 74   LYMPHS PCT % 14   MONOS PCT % 6   EOS PCT % 5       Results from last 7 days  Lab Units 11/13/17 0207   SODIUM mmol/L 139   POTASSIUM mmol/L 6 5*   CHLORIDE mmol/L 100   CO2 mmol/L 23   BUN mg/dL 141*   CREATININE mg/dL 18 60*   CALCIUM mg/dL 10 1   TOTAL PROTEIN g/dL 8 1   BILIRUBIN TOTAL mg/dL 1 21*   ALK PHOS U/L 256*   ALT U/L 449*   AST U/L 192*   GLUCOSE RANDOM mg/dL 139       Results from last 7 days  Lab Units 11/13/17  0207   INR  1 15       Imaging: I have personally reviewed pertinent reports  No results found  EKG, Pathology, and Other Studies Reviewed on Admission:   · EKG: sinus    Allscripts / Epic Records Reviewed: Yes     ** Please Note: This note has been constructed using a voice recognition system   **

## 2017-11-13 NOTE — ED NOTES
Pt brought directly to Dialysis, Marii GRADY on P5 made aware     Valeria Armas, YSABEL  11/13/17 4604

## 2017-11-13 NOTE — ED ATTENDING ATTESTATION
Ricky Giron MD, saw and evaluated the patient  I have discussed the patient with the resident/non-physician practitioner and agree with the resident's/non-physician practitioner's findings, Plan of Care, and MDM as documented in the resident's/non-physician practitioner's note, except where noted  All available labs and Radiology studies were reviewed  At this point I agree with the current assessment done in the Emergency Department  I have conducted an independent evaluation of this patient including a focused history of:    Emergency Department Note- Jaime Rothman 56 y o  male MRN: [id number]    Unit/Bed#: [facility] 501-01 Encounter: [id number]    Jaime Rothman is a 56 y o  male who presents with   Chief Complaint   Patient presents with    Shortness of Breath     Pt present to ed with c/o sob and cp  Pt reports he missed dialysis on Friday  History of Present Illness   HPI:  Jaime Rothman is a 56 y o  male who presents for evaluation of:  Having missed his hemodialysis on Friday  Patient notes worsening dyspnea over the last day  Patient denies associated chest pain  Review of Systems    Historical Information   Past Medical History:   Diagnosis Date    [facility]     Hypertension     Renal disorder      History reviewed  No pertinent surgical history    Social History   History   Alcohol Use    Yes     History   Drug Use     History   Smoking Status    Current Every Day Smoker   Smokeless Tobacco    Current User     Family History: non-contributory    Meds/Allergies   all medications and allergies reviewed  No Known Allergies    Objective   First Vitals:   Blood Pressure: (!) 191/88 (11/13/17 0153)  Pulse: 97 (11/13/17 0153)  Temperature: 98 4 °F (36 9 °C) (11/13/17 0153)  Temp Source: Oral (11/13/17 0153)  Respirations: 20 (11/13/17 0153)  Height: 5' 6" (167 6 cm) (11/13/17 0153)  Weight - Scale: 68 kg (150 lb) (11/13/17 0153)  SpO2: (!) 87 % (11/13/17 0153)    Current Vitals:   Blood Pressure: (!) 176/82 (17 1500)  Pulse: 90 (17 1500)  Temperature: 98 3 °F (36 8 °C) (17)  Temp Source: Oral (17)  Respirations: 20 (17 1500)  Height: 5' 6" (167 6 cm) (17 0153)  Weight - Scale: 68 kg (150 lb) (17 0153)  SpO2: 95 % (17)      Intake/Output Summary (Last 24 hours) at 17 1600  Last data filed at 17 1110   Gross per 24 hour   Intake              700 ml   Output             3500 ml   Net            -2800 ml       Invasive Devices     Peripheral Intravenous Line            Peripheral IV 17 Right Arm less than 1 day    Peripheral IV 17 Right Hand less than 1 day          Line            Hemodialysis AV Fistula Left -- days                Physical Exam   Constitutional: He is oriented to person, place, and time  He appears well-developed and well-nourished  HENT:   Head: Normocephalic and atraumatic  Eyes: Conjunctivae are normal  Pupils are equal, round, and reactive to light  Neck: Normal range of motion  Neck supple  Pulmonary/Chest: Respiratory distress: bilaterally  He has rales  Abdominal: Soft  Bowel sounds are normal    Musculoskeletal: Normal range of motion  He exhibits no deformity  Neurological: He is alert and oriented to person, place, and time  Psychiatric: He has a normal mood and affect  His behavior is normal  Judgment and thought content normal    Nursing note and vitals reviewed  Medical Decision Makin  Acute dyspnea: most likely secondary to pulmonary edema from missed HD  Plan to check CXR to r/o pulmonary edema; bmp r/o hyperkalemia  2  Chronic renal failure with HD MWF: missed HD on Friday      Recent Results (from the past 36 hour(s))   ECG 12 lead    Collection Time: 17  2:04 AM   Result Value Ref Range    Ventricular Rate 98 BPM    Atrial Rate 98 BPM    AR Interval 162 ms    QRSD Interval 92 ms    QT Interval 342 ms    QTC Interval 436 ms    P Axis 82 degrees QRS Axis 64 degrees    T Wave Axis 82 degrees   Comprehensive metabolic panel    Collection Time: 11/13/17  2:07 AM   Result Value Ref Range    Sodium 139 136 - 145 mmol/L    Potassium 6 5 (HH) 3 5 - 5 3 mmol/L    Chloride 100 100 - 108 mmol/L    CO2 23 21 - 32 mmol/L    Anion Gap 16 (H) 4 - 13 mmol/L     (H) 5 - 25 mg/dL    Creatinine 18 60 (H) 0 60 - 1 30 mg/dL    Glucose 139 65 - 140 mg/dL    Calcium 10 1 8 3 - 10 1 mg/dL     (H) 5 - 45 U/L     (H) 12 - 78 U/L    Alkaline Phosphatase 256 (H) 46 - 116 U/L    Total Protein 8 1 6 4 - 8 2 g/dL    Albumin 3 6 3 5 - 5 0 g/dL    Total Bilirubin 1 21 (H) 0 20 - 1 00 mg/dL    eGFR 3 ml/min/1 73sq m   CBC and differential    Collection Time: 11/13/17  2:07 AM   Result Value Ref Range    WBC 7 52 4 31 - 10 16 Thousand/uL    RBC 3 55 (L) 3 88 - 5 62 Million/uL    Hemoglobin 10 3 (L) 12 0 - 17 0 g/dL    Hematocrit 32 3 (L) 36 5 - 49 3 %    MCV 91 82 - 98 fL    MCH 29 0 26 8 - 34 3 pg    MCHC 31 9 31 4 - 37 4 g/dL    RDW 19 7 (H) 11 6 - 15 1 %    MPV 10 5 8 9 - 12 7 fL    Platelets 929 722 - 573 Thousands/uL    nRBC 0 /100 WBCs    Neutrophils Relative 74 43 - 75 %    Lymphocytes Relative 14 14 - 44 %    Monocytes Relative 6 4 - 12 %    Eosinophils Relative 5 0 - 6 %    Basophils Relative 1 0 - 1 %    Neutrophils Absolute 5 66 1 85 - 7 62 Thousands/µL    Lymphocytes Absolute 1 03 0 60 - 4 47 Thousands/µL    Monocytes Absolute 0 43 0 17 - 1 22 Thousand/µL    Eosinophils Absolute 0 34 0 00 - 0 61 Thousand/µL    Basophils Absolute 0 04 0 00 - 0 10 Thousands/µL   B-type natriuretic peptide    Collection Time: 11/13/17  2:07 AM   Result Value Ref Range    NT-proBNP 126,422 (H) <125 pg/mL   Protime-INR    Collection Time: 11/13/17  2:07 AM   Result Value Ref Range    Protime 14 7 (H) 12 1 - 14 4 seconds    INR 1 15 0 86 - 1 16   APTT    Collection Time: 11/13/17  2:07 AM   Result Value Ref Range    PTT 45 (H) 23 - 35 seconds   POCT troponin    Collection Time: 11/13/17  2:10 AM   Result Value Ref Range    POC Troponin I 0 07 0 00 - 0 08 ng/ml    Specimen Type VENOUS    Troponin I    Collection Time: 11/13/17  4:40 AM   Result Value Ref Range    Troponin I 0 09 (H) <=0 04 ng/mL   Basic metabolic panel    Collection Time: 11/13/17  6:41 AM   Result Value Ref Range    Sodium 138 136 - 145 mmol/L    Potassium 5 9 (H) 3 5 - 5 3 mmol/L    Chloride 99 (L) 100 - 108 mmol/L    CO2 24 21 - 32 mmol/L    Anion Gap 15 (H) 4 - 13 mmol/L     (H) 5 - 25 mg/dL    Creatinine 18 80 (H) 0 60 - 1 30 mg/dL    Glucose 226 (H) 65 - 140 mg/dL    Calcium 10 1 8 3 - 10 1 mg/dL    eGFR 3 ml/min/1 73sq m   Magnesium    Collection Time: 11/13/17  6:41 AM   Result Value Ref Range    Magnesium 3 3 (H) 1 6 - 2 6 mg/dL   Phosphorus    Collection Time: 11/13/17  6:41 AM   Result Value Ref Range    Phosphorus 4 4 2 7 - 4 5 mg/dL   Platelet count    Collection Time: 11/13/17  6:41 AM   Result Value Ref Range    Platelets 450 569 - 038 Thousands/uL    MPV 11 0 8 9 - 12 7 fL   Hemoglobin A1c    Collection Time: 11/13/17  6:41 AM   Result Value Ref Range    Hemoglobin A1C 5 0 4 2 - 6 3 %    EAG 97 mg/dl   Fingerstick Glucose (POCT)    Collection Time: 11/13/17  6:43 AM   Result Value Ref Range    POC Glucose 183 (H) 65 - 140 mg/dl   Troponin I    Collection Time: 11/13/17  8:17 AM   Result Value Ref Range    Troponin I 0 09 (H) <=0 04 ng/mL   Fingerstick Glucose (POCT)    Collection Time: 11/13/17 11:10 AM   Result Value Ref Range    POC Glucose 102 65 - 140 mg/dl   Fingerstick Glucose (POCT)    Collection Time: 11/13/17 12:31 PM   Result Value Ref Range    POC Glucose 110 65 - 140 mg/dl   APTT    Collection Time: 11/13/17  1:53 PM   Result Value Ref Range    PTT 47 (H) 23 - 35 seconds   Protime-INR    Collection Time: 11/13/17  1:53 PM   Result Value Ref Range    Protime 13 8 12 1 - 14 4 seconds    INR 1 06 0 86 - 1 16   CBC and differential    Collection Time: 11/13/17  1:54 PM   Result Value Ref Range WBC 6 86 4 31 - 10 16 Thousand/uL    RBC 3 59 (L) 3 88 - 5 62 Million/uL    Hemoglobin 10 5 (L) 12 0 - 17 0 g/dL    Hematocrit 31 4 (L) 36 5 - 49 3 %    MCV 88 82 - 98 fL    MCH 29 2 26 8 - 34 3 pg    MCHC 33 4 31 4 - 37 4 g/dL    RDW 19 6 (H) 11 6 - 15 1 %    MPV 11 0 8 9 - 12 7 fL    Platelets 396 256 - 697 Thousands/uL    nRBC 0 /100 WBCs    Neutrophils Relative 80 (H) 43 - 75 %    Lymphocytes Relative 13 (L) 14 - 44 %    Monocytes Relative 5 4 - 12 %    Eosinophils Relative 2 0 - 6 %    Basophils Relative 0 0 - 1 %    Neutrophils Absolute 5 50 1 85 - 7 62 Thousands/µL    Lymphocytes Absolute 0 89 0 60 - 4 47 Thousands/µL    Monocytes Absolute 0 34 0 17 - 1 22 Thousand/µL    Eosinophils Absolute 0 10 0 00 - 0 61 Thousand/µL    Basophils Absolute 0 02 0 00 - 0 10 Thousands/µL   Comprehensive metabolic panel    Collection Time: 11/13/17  1:56 PM   Result Value Ref Range    Sodium 136 136 - 145 mmol/L    Potassium 3 9 3 5 - 5 3 mmol/L    Chloride 96 (L) 100 - 108 mmol/L    CO2 28 21 - 32 mmol/L    Anion Gap 12 4 - 13 mmol/L    BUN 67 (H) 5 - 25 mg/dL    Creatinine 9 88 (H) 0 60 - 1 30 mg/dL    Glucose 107 65 - 140 mg/dL    Calcium 10 1 8 3 - 10 1 mg/dL     (H) 5 - 45 U/L     (H) 12 - 78 U/L    Alkaline Phosphatase 260 (H) 46 - 116 U/L    Total Protein 7 9 6 4 - 8 2 g/dL    Albumin 3 4 (L) 3 5 - 5 0 g/dL    Total Bilirubin 1 55 (H) 0 20 - 1 00 mg/dL    eGFR 6 ml/min/1 73sq m   TSH, 3rd generation with T4 reflex    Collection Time: 11/13/17  1:56 PM   Result Value Ref Range    TSH 3RD GENERATON 0 412 0 358 - 3 740 uIU/mL   Vitamin B12    Collection Time: 11/13/17  1:56 PM   Result Value Ref Range    Vitamin B-12 1,660 (H) 100 - 900 pg/mL   Blood gas, arterial    Collection Time: 11/13/17  3:03 PM   Result Value Ref Range    pH, Arterial 7 546 (H) 7 350 - 7 450    pCO2, Arterial 32 0 (L) 36 0 - 44 0 mm Hg    pO2, Arterial 66 4 (L) 75 0 - 129 0 mm Hg    HCO3, Arterial 27 1 22 0 - 28 0 mmol/L    Base Excess, Arterial 4 8 mmol/L    O2 Content, Arterial 13 9 (L) 16 0 - 23 0 mL/dL    O2 HGB,Arterial  92 0 (L) 94 0 - 97 0 %    SOURCE Radial, Right     MELANIE TEST Yes     Nasal Cannula 4      MRI brain wo contrast   Final Result      Limited diffusion MR demonstrates no evidence for recent ischemia  Study reviewed with Dr Lyubov Woo at time of data acquisition  Workstation performed: [id number]         XR abdomen 1 view kub   Final Result      Unremarkable examination  Workstation performed: [id number]         CTA stroke alert (head/neck)   Final Result      Flow restrictive disease right vertebral artery  This is a nondominant vessel which does not fill at its origin, multifocal significant stenosis  Findings may be chronic  No flow restrictive disease elsewhere  Study was reviewed with Dr Lyubov Woo at time of data acquisition  Workstation performed: [id number]         CT stroke alert brain   Final Result      No acute disease  No intracranial hemorrhage or mass  Findings were directly discussed with Dr Baldemar Vizcaino on 11/13/2017 12:53 PM          Workstation performed: [id number]         X-ray chest 2 views   Final Result   Bilateral perihilar infiltrates, most compatible with alveolar edema  Less likely would be alveolar pneumonia  Workstation performed: [id number]         XR chest 1 view    (Results Pending)         Portions of the record may have been created with voice recognition software  Occasional wrong word or "sound a like" substitutions may have occurred due to the inherent limitations of voice recognition software  Read the chart carefully and recognize, using context, where substitutions have occurred

## 2017-11-13 NOTE — ED NOTES
Per Dr Susanne Pacheco, will order the stoppage of Nitro and will give ordered medications instead       Magali Galaviz RN  11/13/17 [id number]

## 2017-11-13 NOTE — CONSULTS
Consultation - Neurology   Chris Webb 56 y o  male MRN: [id number]  Unit/Bed#: [facility] 501-01 Encounter: [id number]      Assessment/Plan   Assessment:  1)  Encephalopathy - appears toxic metabolic in nature, likely secondary to dialysis disequilibrium syndrome  MRI DWI did not demonstrate acute infarction or abnormality  Metabolic workup pending at this time  Will also obtain EEG to assess for focal seizure risk  2)  ESRD - on hemodialysis  General schedule:  Monday Wednesday Friday  Patient missed previous dialysis appointment prior to arrival at the emergency department this morning  3)  Hypertensive emergency     Stroke Alert Called: [id number]  Neurology Team at Bedside: 1233  NIHSS: 6  TPA: Not administered  MRI DWI negative for acute infarction  Due to patient's baseline platelet dysfunction and s/p dialysis, risk determined to be greater than benefit    Plan:  1)  Routine EEG  2)  BMP, B12, Ammonia, TSH   3)  Monitor respiratory status  4)  Monitor neuro exam and notify with changes       History of Present Illness     Reason for Consult / Principal Problem: Stroke alert   Hx and PE limited by: Aphasia, ence  HPI: Chris Webb is a 56 y o   male with a pertinent PMH of ESRD on HD and an EF of 45-50% who presents after receiving an emergency dialysis treatment for shortness of breath  The pt drove himself to the [facility] ED this morning with SOB after missing his last dialysis schedule treatment, which he normally has Monday, Wednesday, Friday  The pt began his dialysis treatment this morning at approximately 8:10am and finished at 11:00  The pt recovered in dialysis until 11:45 and was alert and oriented at that time  The pt was transported by a nurse to [facility]  After transport, the nurse noted that the pt could not help move himself from the stretcher to the bed  He was then noted to be confused and aphasic, answering all questions with "yes " A stroke alert was called    NIH stroke scale 6, the patient gaining points for orientation and aphasia as detailed below  The pt was immediately transported and a CT and CTA were obtained  No large vessel occlusion or hemorrhage identified, though pt did demonstrate flow restrictive disease right vertebral artery  Because the patient had high risk versus benefit for tPA, an MRI was performed  The MRI did not demonstrate acute infarction  No tPA was given  The patient was transported to medical step-down unit  Of note, the pt's BUN and Creatinine was 146/ 18 8 in the ED today  Inpatient consult to Neurology  Consult performed by: Mary Jane Holland  Consult ordered by: Marisol Mccann          Review of Systems   See HPI    Historical Information   Past Medical History:   Diagnosis Date    Dialysis patient ([facility] [address] )     Hypertension     Renal disorder      History reviewed  No pertinent surgical history  Social History   History   Alcohol Use    Yes     History   Drug Use     History   Smoking Status    Current Every Day Smoker   Smokeless Tobacco    Current User     Family History: non-contributory    Review of previous medical records was completed  Meds/Allergies   Scheduled Meds:  acetaminophen 650 mg Oral Q8H [address]   alteplase      amLODIPine 10 mg Oral Daily   calcium acetate 2,001 mg Oral TID With Meals   docusate sodium 100 mg Oral BID   heparin (porcine) 5,000 Units Subcutaneous Q8H [address]   labetalol 10 mg Intravenous Once   nicotine 1 patch Transdermal Daily   senna 1 tablet Oral Daily     Continuous Infusions:   PRN Meds: calcitriol    hydrALAZINE    ondansetron      No Known Allergies    Objective   Vitals:Blood pressure 148/78, pulse 96, temperature (!) 97 °F (36 1 °C), temperature source Tympanic, resp  rate 22, height 5' 6" (1 676 m), weight 68 kg (150 lb), SpO2 94 %  ,Body mass index is 24 21 kg/m²      Intake/Output Summary (Last 24 hours) at 11/13/17 1347  Last data filed at 11/13/17 1110   Gross per 24 hour   Intake              700 ml   Output 3500 ml   Net            -2800 ml       Invasive Devices: Invasive Devices     Peripheral Intravenous Line            Peripheral IV 11/13/17 Right Arm less than 1 day    Peripheral IV 11/13/17 Right Hand less than 1 day          Line            Hemodialysis AV Fistula Left -- days                Physical Exam   Constitutional: He appears well-developed and well-nourished  He appears distressed  HENT:   Head: Normocephalic and atraumatic  Nose: Nose normal    Mouth/Throat: No oropharyngeal exudate  Eyes: EOM are normal  Pupils are equal, round, and reactive to light  Right eye exhibits no discharge  Left eye exhibits no discharge  Scleral icterus is present  Cardiovascular: Normal rate, regular rhythm and normal heart sounds  Exam reveals no gallop and no friction rub  No murmur heard  Pulmonary/Chest: Effort normal  He has no wheezes  He has no rales  He exhibits no tenderness  Demonstrated mild snoring respirations when lying flat   Abdominal: Soft  Bowel sounds are normal    Musculoskeletal: He exhibits no edema, tenderness or deformity  Skin: Skin is warm and dry  He is not diaphoretic  No erythema  Psychiatric: His speech is normal    Nursing note and vitals reviewed  Neurologic Exam     Mental Status   Oriented to person  Disoriented to place  Disoriented to time  Attention: decreased  Concentration: decreased  Speech: speech is normal   Level of consciousness: arousable by verbal stimuli  Able to name object  Unable to read  Able to repeat  Abnormal comprehension  Cranial Nerves     CN III, IV, VI   Pupils are equal, round, and reactive to light  Extraocular motions are normal    CN III: no CN III palsy  CN VI: no CN VI palsy  Nystagmus: none   Ophthalmoparesis: none    CN VII   Facial expression full, symmetric       CN XII   CN XII normal    Tongue: not atrophic  Fasciculations: absent  Tongue deviation: none  CN VII: pt easily arousable to verbal stimuli   CN Xi: Unable to full strength but no obvious weakness or asymmetry       Motor Exam   Muscle bulk: normal  Overall muscle tone: normalPt moves all extremities equally x4  Sensory Exam   Pt withdraws and grimaces to pain equally on all extremities      Gait, Coordination, and Reflexes     Tremor   Resting tremor: present    Reflexes   Right plantar: normal  Left plantar: normal  Demonstrates frequent UE twitching movements b/l, R>L         NIHSS    1a Level of Consciousness: 2 = Not alert, requires repeated stimulation to attend   1b  LOC Questions: 1 = Answers one correctly   1c  LOC Commands: 2 = Obeys neither correctly   2  Best Gaze: 0 = Normal   3  Visual: 0 = No visual field loss   4  Facial Palsy: 0=Normal symmetric movement   5a  Motor Right Arm: 0=No drift, limb holds 90 (or 45) degrees for full 10 seconds   5b  Motor Left Arm: 0=No drift, limb holds 90 (or 45) degrees for full 10 seconds   6a  Motor Right Le=No drift, limb holds 90 (or 45) degrees for full 10 seconds   6b  Motor Left Le=No drift, limb holds 90 (or 45) degrees for full 10 seconds   7  Limb Ataxia:  0=Absent   8  Sensory: 0=Normal; no sensory loss   9  Best Language:  1=Mild to moderate aphasia; some obvious loss of fluency or facility of comprehension without significant limitation on ideas expressed or form of expression  10  Dysarthria: 0=Normal articulation   11  Extinction and Inattention (formerly Neglect): 0=No abnormality   Total Score: 6         Lab Results: I have personally reviewed pertinent reports       Recent Results (from the past 24 hour(s))   ECG 12 lead    Collection Time: 17  2:04 AM   Result Value Ref Range    Ventricular Rate 98 BPM    Atrial Rate 98 BPM    NM Interval 162 ms    QRSD Interval 92 ms    QT Interval 342 ms    QTC Interval 436 ms    P Axis 82 degrees    QRS Axis 64 degrees    T Wave Axis 82 degrees   Comprehensive metabolic panel    Collection Time: 17  2:07 AM   Result Value Ref Range Sodium 139 136 - 145 mmol/L    Potassium 6 5 (HH) 3 5 - 5 3 mmol/L    Chloride 100 100 - 108 mmol/L    CO2 23 21 - 32 mmol/L    Anion Gap 16 (H) 4 - 13 mmol/L     (H) 5 - 25 mg/dL    Creatinine 18 60 (H) 0 60 - 1 30 mg/dL    Glucose 139 65 - 140 mg/dL    Calcium 10 1 8 3 - 10 1 mg/dL     (H) 5 - 45 U/L     (H) 12 - 78 U/L    Alkaline Phosphatase 256 (H) 46 - 116 U/L    Total Protein 8 1 6 4 - 8 2 g/dL    Albumin 3 6 3 5 - 5 0 g/dL    Total Bilirubin 1 21 (H) 0 20 - 1 00 mg/dL    eGFR 3 ml/min/1 73sq m   CBC and differential    Collection Time: 11/13/17  2:07 AM   Result Value Ref Range    WBC 7 52 4 31 - 10 16 Thousand/uL    RBC 3 55 (L) 3 88 - 5 62 Million/uL    Hemoglobin 10 3 (L) 12 0 - 17 0 g/dL    Hematocrit 32 3 (L) 36 5 - 49 3 %    MCV 91 82 - 98 fL    MCH 29 0 26 8 - 34 3 pg    MCHC 31 9 31 4 - 37 4 g/dL    RDW 19 7 (H) 11 6 - 15 1 %    MPV 10 5 8 9 - 12 7 fL    Platelets 832 709 - 774 Thousands/uL    nRBC 0 /100 WBCs    Neutrophils Relative 74 43 - 75 %    Lymphocytes Relative 14 14 - 44 %    Monocytes Relative 6 4 - 12 %    Eosinophils Relative 5 0 - 6 %    Basophils Relative 1 0 - 1 %    Neutrophils Absolute 5 66 1 85 - 7 62 Thousands/µL    Lymphocytes Absolute 1 03 0 60 - 4 47 Thousands/µL    Monocytes Absolute 0 43 0 17 - 1 22 Thousand/µL    Eosinophils Absolute 0 34 0 00 - 0 61 Thousand/µL    Basophils Absolute 0 04 0 00 - 0 10 Thousands/µL   B-type natriuretic peptide    Collection Time: 11/13/17  2:07 AM   Result Value Ref Range    NT-proBNP 126,422 (H) <125 pg/mL   Protime-INR    Collection Time: 11/13/17  2:07 AM   Result Value Ref Range    Protime 14 7 (H) 12 1 - 14 4 seconds    INR 1 15 0 86 - 1 16   APTT    Collection Time: 11/13/17  2:07 AM   Result Value Ref Range    PTT 45 (H) 23 - 35 seconds   POCT troponin    Collection Time: 11/13/17  2:10 AM   Result Value Ref Range    POC Troponin I 0 07 0 00 - 0 08 ng/ml    Specimen Type VENOUS    Troponin I    Collection Time: 11/13/17  4:40 AM   Result Value Ref Range    Troponin I 0 09 (H) <=0 04 ng/mL   Basic metabolic panel    Collection Time: 11/13/17  6:41 AM   Result Value Ref Range    Sodium 138 136 - 145 mmol/L    Potassium 5 9 (H) 3 5 - 5 3 mmol/L    Chloride 99 (L) 100 - 108 mmol/L    CO2 24 21 - 32 mmol/L    Anion Gap 15 (H) 4 - 13 mmol/L     (H) 5 - 25 mg/dL    Creatinine 18 80 (H) 0 60 - 1 30 mg/dL    Glucose 226 (H) 65 - 140 mg/dL    Calcium 10 1 8 3 - 10 1 mg/dL    eGFR 3 ml/min/1 73sq m   Magnesium    Collection Time: 11/13/17  6:41 AM   Result Value Ref Range    Magnesium 3 3 (H) 1 6 - 2 6 mg/dL   Phosphorus    Collection Time: 11/13/17  6:41 AM   Result Value Ref Range    Phosphorus 4 4 2 7 - 4 5 mg/dL   Platelet count    Collection Time: 11/13/17  6:41 AM   Result Value Ref Range    Platelets 263 870 - 776 Thousands/uL    MPV 11 0 8 9 - 12 7 fL   Hemoglobin A1c    Collection Time: 11/13/17  6:41 AM   Result Value Ref Range    Hemoglobin A1C 5 0 4 2 - 6 3 %    EAG 97 mg/dl   Fingerstick Glucose (POCT)    Collection Time: 11/13/17  6:43 AM   Result Value Ref Range    POC Glucose 183 (H) 65 - 140 mg/dl   Troponin I    Collection Time: 11/13/17  8:17 AM   Result Value Ref Range    Troponin I 0 09 (H) <=0 04 ng/mL   Fingerstick Glucose (POCT)    Collection Time: 11/13/17 11:10 AM   Result Value Ref Range    POC Glucose 102 65 - 140 mg/dl   Fingerstick Glucose (POCT)    Collection Time: 11/13/17 12:31 PM   Result Value Ref Range    POC Glucose 110 65 - 140 mg/dl   APTT    Collection Time: 11/13/17  1:53 PM   Result Value Ref Range    PTT 47 (H) 23 - 35 seconds   Protime-INR    Collection Time: 11/13/17  1:53 PM   Result Value Ref Range    Protime 13 8 12 1 - 14 4 seconds    INR 1 06 0 86 - 1 16   CBC and differential    Collection Time: 11/13/17  1:54 PM   Result Value Ref Range    WBC 6 86 4 31 - 10 16 Thousand/uL    RBC 3 59 (L) 3 88 - 5 62 Million/uL    Hemoglobin 10 5 (L) 12 0 - 17 0 g/dL    Hematocrit 31 4 (L) 36 5 - 49 3 %    MCV 88 82 - 98 fL    MCH 29 2 26 8 - 34 3 pg    MCHC 33 4 31 4 - 37 4 g/dL    RDW 19 6 (H) 11 6 - 15 1 %    MPV 11 0 8 9 - 12 7 fL    Platelets 821 892 - 871 Thousands/uL    nRBC 0 /100 WBCs    Neutrophils Relative 80 (H) 43 - 75 %    Lymphocytes Relative 13 (L) 14 - 44 %    Monocytes Relative 5 4 - 12 %    Eosinophils Relative 2 0 - 6 %    Basophils Relative 0 0 - 1 %    Neutrophils Absolute 5 50 1 85 - 7 62 Thousands/µL    Lymphocytes Absolute 0 89 0 60 - 4 47 Thousands/µL    Monocytes Absolute 0 34 0 17 - 1 22 Thousand/µL    Eosinophils Absolute 0 10 0 00 - 0 61 Thousand/µL    Basophils Absolute 0 02 0 00 - 0 10 Thousands/µL   Comprehensive metabolic panel    Collection Time: 11/13/17  1:56 PM   Result Value Ref Range    Sodium 136 136 - 145 mmol/L    Potassium 3 9 3 5 - 5 3 mmol/L    Chloride 96 (L) 100 - 108 mmol/L    CO2 28 21 - 32 mmol/L    Anion Gap 12 4 - 13 mmol/L    BUN 67 (H) 5 - 25 mg/dL    Creatinine 9 88 (H) 0 60 - 1 30 mg/dL    Glucose 107 65 - 140 mg/dL    Calcium 10 1 8 3 - 10 1 mg/dL     (H) 5 - 45 U/L     (H) 12 - 78 U/L    Alkaline Phosphatase 260 (H) 46 - 116 U/L    Total Protein 7 9 6 4 - 8 2 g/dL    Albumin 3 4 (L) 3 5 - 5 0 g/dL    Total Bilirubin 1 55 (H) 0 20 - 1 00 mg/dL    eGFR 6 ml/min/1 73sq m   ]      Imaging Studies: I have personally reviewed pertinent reports  and I have personally reviewed pertinent films in PACS     MRI B: Limited diffusion MR demonstrates no evidence for recent ischemia  Study reviewed with Dr Claudia Rodriguez at time of data acquisition  CTA Head and Neck: Flow restrictive disease right vertebral artery  This is a nondominant vessel which does not fill at its origin, multifocal significant stenosis  Findings may be chronic  No flow restrictive disease elsewhere  Study was reviewed with Dr Claudia Rodriguez at time of data acquisition      EKG, Pathology, and Other Studies: I have personally reviewed pertinent reports      VTE Prophylaxis: Sequential compression device (Venodyne)  and Heparin    Code Status: Level 1 - Full Code  Advance Directive and Living Will:      Power of :    POLST:

## 2017-11-13 NOTE — ED PROVIDER NOTES
ASSESSMENT AND PLAN    Yola Durant is a 56 y o  male with a history of ESRD who presents with shortness of breath secondary to missed dialysis on Friday  He is currently tachypneic, saturating in low to mid 80s on room air, mildly tachycardic, and hypertensive to the 200s  On exam, he is bibasilar crackles, negative for JVD, no pain edema  The rest was physical exam is benign, including a normal neurologic exam, and he remains alert and oriented x3  Differential diagnosis also includes hypertensive urgency, but at this point I believe the patient should have resolution of his symptoms once he is able to get dialysis  However, based on his clinical exam and reasonable oxygen saturations on nasal cannula oxygen, I do not believe he needs immediate emergent dialysis at this time   -will check CMP, CBC  -EKG, troponin  -chest x-ray  -will hold IV fluids for now as the patient is volume overloaded on exam   -continue cardiac monitor, frequent vital signs  -will re-evaluate, plan is admission    ED COURSE    Labwork and Imaging reviewed - chest x-ray significant for mild signs of volume overload  Elevated creatinine, BNP noted  -I placed an ultrasound-guided 20 gauge IV in the patient's right medial upper arm   -I did speak with the Nephrology attending on call  The plan is to dialyze the patient early in the morning  In the meantime, it was recommended to give the patient 100 of Lasix prior to receiving dialysis  -for the patient's hyperkalemia, potassium is 6 5  Will give the patient calcium gluconate, insulin, as well as an amp of dextrose  Will recheck potassium after administered  -troponin of 0 07 noted  Delta troponin was 0 09  Possibly type 2 NSTEMI secondary to hypertensive urgency, versus possibly related to his chronic renal disease, need for dialysis  Will continue to trend troponins  -on re-evaluation, the patient's oxygenation did improve to the mid 90s initially with 5 L nasal cannula    However the patient continued to drop consistently to the high 80s, so he was started on 15 L non-rebreather, and has remained with an oxygen saturation in the mid 90s with this oxygenation   -at this point, the plan is to admit the patient to Medicine for further management of his volume overload, dialysis in the morning  He may need repeat stat dialysis tomorrow as well, and this is based on his laboratory findings  I discussed this plan with the patient, and he is agreeable to admission  I discussed this case with the slim admitting attending  History  Chief Complaint   Patient presents with    Shortness of Breath     Pt present to ed with c/o sob and cp  Pt reports he missed dialysis on Friday  53-year-old male with history of end-stage renal disease on hemodialysis, hypertension, presents with shortness of breath  Patient states that he skipped his dialysis session on Friday, and is normally has dialysis on M/W/F, and his dyspnea has gotten worse ever since  The patient states that he skipped dialysis because he recently was on parole, and went out partying and using alcohol and some drugs, and did not want his urine to be checked because he was concerned it might be positive for drugs  Patient states that he also has developed a cough over the last few weeks, which is also exacerbated at this time  Prior to arrival, he developed some chest pain, which he states usually happens when his volume overload gets particularly severe  He denies any fevers, chills, lightheadedness, dizziness, syncope, nausea, vomiting, abdominal pain, diarrhea, leg pain  He has no recent travel, no recent hospitalizations, no recent surgeries  Prior to Admission Medications   Prescriptions Last Dose Informant Patient Reported? Taking?    UNKNOWN TO PATIENT   Yes Yes   amLODIPine (NORVASC) 10 mg tablet   Yes Yes   Sig: Take 10 mg by mouth daily   calcium acetate (PHOSLO) 667 mg capsule   Yes Yes   Sig: Take 2,001 mg by mouth 3 (three) times a day with meals      Facility-Administered Medications: None       Past Medical History:   Diagnosis Date    Dialysis patient ([address] )     Hypertension     Renal disorder        History reviewed  No pertinent surgical history  History reviewed  No pertinent family history  I have reviewed and agree with the history as documented  Social History   Substance Use Topics    Smoking status: Current Every Day Smoker    Smokeless tobacco: Current User    Alcohol use Yes        Review of Systems   Constitutional: Negative for chills and fever  HENT: Negative for congestion and ear pain  Eyes: Negative for photophobia and visual disturbance  Respiratory: Positive for cough, chest tightness and shortness of breath  Cardiovascular: Negative for chest pain and palpitations  Gastrointestinal: Negative for abdominal pain, diarrhea, nausea and vomiting  Endocrine: Negative for polyphagia and polyuria  Genitourinary: Positive for decreased urine volume  Negative for flank pain  Musculoskeletal: Negative for neck pain and neck stiffness  Skin: Negative for pallor and rash  Neurological: Positive for tremors  Negative for light-headedness and headaches         Physical Exam  ED Triage Vitals   Temperature Pulse Respirations Blood Pressure SpO2   11/13/17 0153 11/13/17 0153 11/13/17 0153 11/13/17 0153 11/13/17 0153   98 4 °F (36 9 °C) 97 20 (!) 191/88 (!) 87 %      Temp Source Heart Rate Source Patient Position - Orthostatic VS BP Location FiO2 (%)   11/13/17 0153 11/13/17 0316 11/13/17 0153 11/13/17 0153 --   Oral Monitor Sitting Right arm       Pain Score       11/13/17 0153       Worst Possible Pain           Orthostatic Vital Signs  Vitals:    11/13/17 1412 11/13/17 1430 11/13/17 1500 11/13/17 1900   BP: (!) 202/87 (!) 197/91 (!) 176/82 139/62   Pulse:   90 95   Patient Position - Orthostatic VS:           Physical Exam   Constitutional:   Awake and alert, increased respiratory effort, moderate distress  Chronically ill-appearing  HENT:   Head: Atraumatic  Mouth/Throat: No oropharyngeal exudate  Eyes: Pupils are equal, round, and reactive to light  No scleral icterus  Neck: Normal range of motion  No JVD present  Cardiovascular: Normal rate, regular rhythm and normal heart sounds  No murmur heard  Pulmonary/Chest: No respiratory distress  He has no wheezes  He has rales ( bibasilar crackles are present)  Increased respiratory effort, tachypnea, difficulty completing sentences   Abdominal: Soft  He exhibits no distension  There is no tenderness  There is no guarding  Musculoskeletal: Normal range of motion  He exhibits no edema  Neurological:   Awake and alert, anxious appearing, 5/5 strength in all extremities bilaterally, sensation grossly intact bilaterally  There is a fine action tremor   Skin: Skin is warm and dry  No rash noted         ED Medications  Medications   amLODIPine (NORVASC) tablet 10 mg (10 mg Oral Given 11/13/17 0617)   calcium acetate (PHOSLO) capsule 2,001 mg (2,001 mg Oral Given 11/13/17 1733)   docusate sodium (COLACE) capsule 100 mg (100 mg Oral Not Given 11/13/17 1733)   senna (SENOKOT) tablet 8 6 mg (8 6 mg Oral Not Given 11/13/17 1431)   ondansetron (ZOFRAN) injection 4 mg (not administered)   nicotine (NICODERM CQ) 21 mg/24 hr TD 24 hr patch 1 patch (1 patch Transdermal Medication Applied 11/13/17 1732)   heparin (porcine) subcutaneous injection 5,000 Units (5,000 Units Subcutaneous Given 11/13/17 2120)   acetaminophen (TYLENOL) tablet 650 mg (650 mg Oral Given 11/13/17 2119)   hydrALAZINE (APRESOLINE) injection 5 mg (5 mg Intravenous Given 11/13/17 1412)   calcitriol (ROCALTROL) capsule 1 5 mcg (not administered)   alteplase (ACTIVASE) 100 mg injection **AcuDose Override Pull** (  Not Given 11/13/17 1424)   labetalol (NORMODYNE) injection 10 mg (not administered)   furosemide (LASIX) 100 mg in dextrose 5 % 50 mL IVPB (0 mg Intravenous Stopped 11/13/17 9362)   calcium gluconate 1 g in sodium chloride 0 9 % 100 mL IVPB (0 g Intravenous Stopped 11/13/17 4997)   insulin regular (HumuLIN R,NovoLIN R) injection 10 Units (10 Units Subcutaneous Given 11/13/17 0619)   dextrose 50 % IV solution 25 g (25 g Intravenous Given 11/13/17 0611)   labetalol (NORMODYNE) injection 10 mg (10 mg Intravenous Given 11/13/17 1430)       Diagnostic Studies  Results Reviewed     Procedure Component Value Units Date/Time    Fingerstick Glucose (POCT) [[id number]]  (Normal) Collected:  11/13/17 1110    Lab Status:  Final result Updated:  11/13/17 1113     POC Glucose 102 mg/dl     Troponin I [[id number]]  (Abnormal) Collected:  11/13/17 0817    Lab Status:  Final result Specimen:  Blood from Arm, Left Updated:  11/13/17 0849     Troponin I 0 09 (H) ng/mL     Narrative:         [employer] Chemistry analyzer 99% cutoff is > 0 04 ng/mL in network labs    o cTnI 99% cutoff is useful only when applied to patients in the clinical setting of myocardial ischemia  o cTnI 99% cutoff should be interpreted in the context of clinical history, ECG findings and possibly cardiac imaging to establish correct diagnosis  o cTnI 99% cutoff may be suggestive but clearly not indicative of a coronary event without the clinical setting of myocardial ischemia      Hemoglobin A1c [[id number]]  (Normal) Collected:  11/13/17 0641    Lab Status:  Final result Specimen:  Blood from Arm, Right Updated:  11/13/17 0733     Hemoglobin A1C 5 0 %      EAG 97 mg/dl     Basic metabolic panel [[id number]]  (Abnormal) Collected:  11/13/17 0641    Lab Status:  Final result Specimen:  Blood from Arm, Right Updated:  11/13/17 0725     Sodium 138 mmol/L      Potassium 5 9 (H) mmol/L      Chloride 99 (L) mmol/L      CO2 24 mmol/L      Anion Gap 15 (H) mmol/L       (H) mg/dL      Creatinine 18 80 (H) mg/dL      Glucose 226 (H) mg/dL      Calcium 10 1 mg/dL      eGFR 3 ml/min/1 73sq m     Narrative:         National Kidney Disease Education Program recommendations are as follows:  GFR calculation is accurate only with a steady state creatinine  Chronic Kidney disease less than 60 ml/min/1 73 sq  meters  Kidney failure less than 15 ml/min/1 73 sq  meters  Magnesium [[id number]]  (Abnormal) Collected:  11/13/17 0641    Lab Status:  Final result Specimen:  Blood from Arm, Right Updated:  11/13/17 0725     Magnesium 3 3 (H) mg/dL     Phosphorus [[id number]]  (Normal) Collected:  11/13/17 0641    Lab Status:  Final result Specimen:  Blood from Arm, Right Updated:  11/13/17 0725     Phosphorus 4 4 mg/dL     Platelet count [[id number]]  (Normal) Collected:  11/13/17 0641    Lab Status:  Final result Specimen:  Blood from Arm, Right Updated:  11/13/17 0709     Platelets 173 Thousands/uL      MPV 11 0 fL     Fingerstick Glucose (POCT) [[id number]]  (Abnormal) Collected:  11/13/17 0643    Lab Status:  Final result Updated:  11/13/17 0646     POC Glucose 183 (H) mg/dl     Troponin I [[id number]]  (Abnormal) Collected:  11/13/17 0440    Lab Status:  Final result Specimen:  Blood from Arm, Right Updated:  11/13/17 0512     Troponin I 0 09 (H) ng/mL     Narrative:         [employer] Chemistry analyzer 99% cutoff is > 0 04 ng/mL in network labs    o cTnI 99% cutoff is useful only when applied to patients in the clinical setting of myocardial ischemia  o cTnI 99% cutoff should be interpreted in the context of clinical history, ECG findings and possibly cardiac imaging to establish correct diagnosis  o cTnI 99% cutoff may be suggestive but clearly not indicative of a coronary event without the clinical setting of myocardial ischemia      Comprehensive metabolic panel [[id number]]  (Abnormal) Collected:  11/13/17 0207    Lab Status:  Final result Specimen:  Blood from Hand, Right Updated:  11/13/17 0350     Sodium 139 mmol/L      Potassium 6 5 (HH) mmol/L      Chloride 100 mmol/L      CO2 23 mmol/L      Anion Gap 16 (H) mmol/L       (H) mg/dL Creatinine 18 60 (H) mg/dL      Glucose 139 mg/dL      Calcium 10 1 mg/dL       (H) U/L       (H) U/L      Alkaline Phosphatase 256 (H) U/L      Total Protein 8 1 g/dL      Albumin 3 6 g/dL      Total Bilirubin 1 21 (H) mg/dL      eGFR 3 ml/min/1 73sq m     Narrative:         National Kidney Disease Education Program recommendations are as follows:  GFR calculation is accurate only with a steady state creatinine  Chronic Kidney disease less than 60 ml/min/1 73 sq  meters  Kidney failure less than 15 ml/min/1 73 sq  meters  B-type natriuretic peptide [[id number]]  (Abnormal) Collected:  11/13/17 0207    Lab Status:  Final result Specimen:  Blood from Hand, Right Updated:  11/13/17 0318     NT-proBNP 126,422 (H) pg/mL     Protime-INR [[id number]]  (Abnormal) Collected:  11/13/17 0207    Lab Status:  Final result Specimen:  Blood from Hand, Right Updated:  11/13/17 0244     Protime 14 7 (H) seconds      INR 1 15    APTT [[id number]]  (Abnormal) Collected:  11/13/17 0207    Lab Status:  Final result Specimen:  Blood from Hand, Right Updated:  11/13/17 0244     PTT 45 (H) seconds     Narrative: Therapeutic Heparin Range = 60-90 seconds    POCT troponin [[id number]]  (Normal) Collected:  11/13/17 0210    Lab Status:  Final result Updated:  11/13/17 0223     POC Troponin I 0 07 ng/ml      Specimen Type VENOUS    Narrative:         [employer] i-Stat handheld analyzer 99% cutoff is > 0 08ng/mL in [facility] Emergency Departments    o cTnI 99% cutoff is useful only when applied to patients in the clinical setting of myocardial ischemia  o cTnI 99% cutoff should be interpreted in the context of clinical history, ECG findings and possibly cardiac imaging to establish correct diagnosis  o cTnI 99% cutoff may be suggestive but clearly not indicative of a coronary event without the clinical setting of myocardial ischemia      CBC and differential [[id number]]  (Abnormal) Collected:  11/13/17 0207    Lab Status:  Final result Specimen:  Blood from Hand, Right Updated:  11/13/17 0214     WBC 7 52 Thousand/uL      RBC 3 55 (L) Million/uL      Hemoglobin 10 3 (L) g/dL      Hematocrit 32 3 (L) %      MCV 91 fL      MCH 29 0 pg      MCHC 31 9 g/dL      RDW 19 7 (H) %      MPV 10 5 fL      Platelets 163 Thousands/uL      nRBC 0 /100 WBCs      Neutrophils Relative 74 %      Lymphocytes Relative 14 %      Monocytes Relative 6 %      Eosinophils Relative 5 %      Basophils Relative 1 %      Neutrophils Absolute 5 66 Thousands/µL      Lymphocytes Absolute 1 03 Thousands/µL      Monocytes Absolute 0 43 Thousand/µL      Eosinophils Absolute 0 34 Thousand/µL      Basophils Absolute 0 04 Thousands/µL                  MRI brain wo contrast   Final Result by Dorene Spring MD (11/13 1413)      Limited diffusion MR demonstrates no evidence for recent ischemia  Study reviewed with Dr Kianna Alfonso at time of data acquisition  Workstation performed: [id number]         XR abdomen 1 view kub   Final Result by Fide Mcconnell DO (11/13 1348)      Unremarkable examination  Workstation performed: [id number]         CTA stroke alert (head/neck)   Final Result by Dorene Spring MD (11/13 1328)      Flow restrictive disease right vertebral artery  This is a nondominant vessel which does not fill at its origin, multifocal significant stenosis  Findings may be chronic  No flow restrictive disease elsewhere  Study was reviewed with Dr Kianna Alfonso at time of data acquisition  Workstation performed: [id number]         CT stroke alert brain   Final Result by Dorene Spring MD (11/13 130)      No acute disease  No intracranial hemorrhage or mass  Findings were directly discussed with Dr Aj Garcia on 11/13/2017 12:53 PM          Workstation performed: [id number]         X-ray chest 2 views   Final Result by Jas Andrew DO (11/13 4161)   Bilateral perihilar infiltrates, most compatible with alveolar edema  Less likely would be alveolar pneumonia  Workstation performed: [id number]         XR chest 1 view    (Results Pending)         Procedures  ECG 12 Lead Documentation  Date/Time: 11/13/2017 2:59 AM  Performed by: Homero Reddy  Authorized by: Hermann Groves     ECG reviewed by me, the ED Provider: yes    Patient location:  ED  Previous ECG:     Previous ECG:  Unavailable    Comparison to cardiac monitor: No    Interpretation:     Interpretation: abnormal    Comments:      Normal sinus rhythm with a heart rate 98, T-wave inversions in lead aVL, no other ST T wave changes, QRS amplitude in leads V5 and V4 consistent with LVH    There is no prior EKG to compare to            Phone Consults  ED Phone Contact    ED Course  ED Course              NIH Stroke Scale    Flowsheet Row Most Recent Value   Level of Consciousness (1a )  1 Filed at: 11/13/2017 1305   LOC Questions (1b )  0 [month of birth and name] Filed at: 11/13/2017 1305   LOC Commands (1c )  0 [hand grasp and stick out tongue] Filed at: 11/13/2017 1305   Best Gaze (2 )  0 Filed at: 11/13/2017 1305   Visual (3 )  0 Filed at: 11/13/2017 1305   Facial Palsy (4 )  0 Filed at: 11/13/2017 1305   Motor Arm, Left (5a )  1 Filed at: 11/13/2017 1305   Motor Arm, Right (5b )  1 Filed at: 11/13/2017 1305   Motor Leg, Left (6a )  2 Filed at: 11/13/2017 1305   Motor Leg, Right (6b )  2 Filed at: 11/13/2017 1305   Limb Ataxia (7 )  0 Filed at: 11/13/2017 1305   Sensory (8 )  0 [withdraws to painful stimuli] Filed at: 11/13/2017 1305   Best Language (9 )  2 Filed at: 11/13/2017 1305   Dysarthria (10 )  1 Filed at: 11/13/2017 1305   Extinction and Inattention (11 ) (Formerly Neglect)  0 Filed at: 11/13/2017 1305   Total  10 Filed at: 11/13/2017 1305                        MDM  CritCare Time    Disposition  Final diagnoses:   Volume overload   ESRD on hemodialysis (HCC)   Dyspnea   Acute respiratory failure with hypoxia ([address] )     Time reflects when diagnosis was documented in both MDM as applicable and the Disposition within this note     Time User Action Codes Description Comment    11/13/2017  4:56 AM Judy List Add [E87 70] Volume overload     11/13/2017  4:57 AM Judy List Add [N18 6,  Z99 2] ESRD on hemodialysis ([facility] )     11/13/2017  4:57 AM Judy List Add [R06 00] Dyspnea     11/13/2017  4:57 AM Judy List Add [J96 01] Acute respiratory failure with hypoxia ([facility] )     11/13/2017 12:45 PM Elena Clay Add [G93 40] Encephalopathy       ED Disposition     ED Disposition Condition Comment    Admit  Case was discussed with [facility] attending and the patient's admission status was agreed to be Admission Status: inpatient status to the service of Dr Adarsh Devi   Follow-up Information    None       Current Discharge Medication List      CONTINUE these medications which have NOT CHANGED    Details   amLODIPine (NORVASC) 10 mg tablet Take 10 mg by mouth daily      calcium acetate (PHOSLO) 667 mg capsule Take 2,001 mg by mouth 3 (three) times a day with meals      UNKNOWN TO PATIENT            No discharge procedures on file  ED Provider  Attending physically available and evaluated Mildred Cuff  I managed the patient along with the ED Attending      Electronically Signed by         Snow Correa MD  Resident  11/15/17 2034

## 2017-11-13 NOTE — PROGRESS NOTES
Post-Admit check    Patient seen on hemodialysis  Feels better  SOB resolved  Denies chest pain  Gen: NAD  Heart: RRR  Lungs: CTA b/l  Ext: no edema b/l    1 ) SOB secondary to volume overload from missed HD treatments - better on HD  Nephrology following   2 ) Hyperkalemia - corrected on HD  3 ) DM type 2 -  Hb A1c 5 0 - not on meds as outpatient  Will disconitnue insulin coverage  Glucose elevated as patient received dextrose for hyperkalemia treatment  4 ) Troponin elevation likely secondary to volume overload - monitor    5 ) HTN - better with HD

## 2017-11-13 NOTE — PROGRESS NOTES
PRN labetolol added to [facility] patients BP still very high but okay to be in the 115-010'D systolic per Dr Alia Cordova

## 2017-11-13 NOTE — CONSULTS
Consultation - Nephrology   Gerri Gerardo 56 y o  male MRN: [id number]  Unit/Bed#: [facility] Encounter: [id number]    ASSESSMENT and PLAN:  1  ESRD on HD (at [facility]): last HD missed  Seen and examined by me on HD today and again tomorrow  Will then continue MWF HD per outpatient orders  He wants to move up here  Consult SW/CM regarding this  2  Access: LUE AVF well functioning  3  Hypertension: elevated on admission, likely d/t volume overload  Goal 3L UF today on HD  BP improving  On prn hydralazine, amlodipine 10mg daily  4  Anemia: Hgb at goal for ESRD  [facility] outpatient  5  Mineral and bone disease: continue calcitriol 1 5mcg after HD sessions, phoslo 3 tabs TID with meals  6  DM2: on insulin per primary team  7  Hyperkalemia - K 6 5 on admission, down to 5 9 with medical management, correct with 2K bath on HD today  He is on 1K bath at outpatient unit with only 3 hrs of HD per session  Studies show 1K bath unsafe and leads to higher risk of arrhythmia  Will avoid 1K bath and prefer more frequent and longer HD sessions instead  8  Azotemia without uremia - likely d/t noncompliance with HD  Correcting on HD  9  Hypermagnesemia - Mag 3 3, correct on HD  10  Shortness of breath - likely d/t volume overload - UF as above    SUMMARY OF RECOMMENDATIONS:  Being dialyzed today, and again will plan HD tomorrow as appears to be underdialyzed based on history provided by patient and labs  HISTORY OF PRESENT ILLNESS:  Requesting Physician: Pari Ayala MD  Reason for Consult: ESRD on HD    Gerri Gerardo is a 56y o  year old male who was admitted to [facility] after presenting with chest pain and shortness of breath  A renal consultation is requested today for assistance in the management of ESRD  Gerri Gerardo is a known ESRD patient who undergoes maintenance hemodialysis at [facility] on MWF      Per record review, the patient was released on parole and had been using drugs and alcohol leading to missed dialysis this past Friday  Today is Monday  The patient does not admit to this  He states that he was having some financial difficulty and was trying to find a unit "up here"  He complains of shortness of breath and chest pain which she experiences with volume overload  He is not clear with his story and seems to be a somewhat unreliable historian  He states he has been on dialysis since 2004  He thinks that his end-stage renal disease likely related hypertension plus or minus diabetes plus or minus Prilosec use  He goes to dialysis in [place]  He receives dialysis Monday Wednesday Fridays  He is essentially aneuric  He denies any issues with blood pressure control or with his left upper extremity AV fistula  He states he feels a little better than on admission  Regarding his diabetes, his blood sugars have significantly improved since being on dialysis  PAST MEDICAL HISTORY:  Past Medical History:   Diagnosis Date    Dialysis patient ([address] )     Hypertension     Renal disorder        PAST SURGICAL HISTORY:  History reviewed  No pertinent surgical history  ALLERGIES:  No Known Allergies    SOCIAL HISTORY:  History   Alcohol Use    Yes     History   Drug Use     History   Smoking Status    Current Every Day Smoker   Smokeless Tobacco    Current User       FAMILY HISTORY:  History reviewed  No pertinent family history      MEDICATIONS:    Current Facility-Administered Medications:     acetaminophen (TYLENOL) tablet 650 mg, 650 mg, Oral, Q8H [address], Katelyn Real MD, 650 mg at 11/13/17 0617    amLODIPine (NORVASC) tablet 10 mg, 10 mg, Oral, Daily, Katelyn Real MD, 10 mg at 11/13/17 0617    calcitriol (ROCALTROL) capsule 1 5 mcg, 1 5 mcg, Oral, After Dialysis, Karla Kramer DO    calcium acetate (PHOSLO) capsule 2,001 mg, 2,001 mg, Oral, TID With Meals, Katelyn Real MD, 2,001 mg at 11/13/17 0743    docusate sodium (COLACE) capsule 100 mg, 100 mg, Oral, BID, Emely Stallion Alisa Shah MD    heparin (porcine) subcutaneous injection 5,000 Units, 5,000 Units, Subcutaneous, Q8H [address], 5,000 Units at 11/13/17 0620 **AND** Platelet count, , , Once, Evan Veras MD    hydrALAZINE (APRESOLINE) injection 5 mg, 5 mg, Intravenous, Q6H PRN, Evan Veras MD, 5 mg at 11/13/17 0656    insulin lispro (HumaLOG) 100 units/mL subcutaneous injection 1-5 Units, 1-5 Units, Subcutaneous, TID AC, 1 Units at 11/13/17 0742 **AND** Fingerstick Glucose (POCT), , , TID AC, Evan Veras MD    insulin lispro (HumaLOG) 100 units/mL subcutaneous injection 1-5 Units, 1-5 Units, Subcutaneous, HS, Evan Veras MD    nicotine (NICODERM CQ) 21 mg/24 hr TD 24 hr patch 1 patch, 1 patch, Transdermal, Daily, Evan Veras MD    ondansetron [facility]) injection 4 mg, 4 mg, Intravenous, Q6H PRN, Evan Veras MD    senna (SENOKOT) tablet 8 6 mg, 1 tablet, Oral, Daily, Evan Veras MD    Current Outpatient Prescriptions:     amLODIPine (NORVASC) 10 mg tablet, Take 10 mg by mouth daily, Disp: , Rfl:     calcium acetate (PHOSLO) 667 mg capsule, Take 2,001 mg by mouth 3 (three) times a day with meals, Disp: , Rfl:     UNKNOWN TO PATIENT, , Disp: , Rfl:     REVIEW OF SYSTEMS:  General: No fevers, chills  Cardiovascular: +chest pain, shortness of breath  No palpitations, leg edema  Respiratory: No cough, sputum production + shortness of breath  Gastrointestinal: +nausea, vomiting  No abdominal pain, diarrhea  Genitourinary: No hematuria  Makes little urine  Sometimes hurts to urinate      PHYSICAL EXAM:  Current Weight: Weight - Scale: 68 kg (150 lb)  First Weight: Weight - Scale: 68 kg (150 lb)  Vitals:    11/13/17 0645 11/13/17 0800 11/13/17 0830 11/13/17 0900   BP: (!) 195/94  (!) 184/100 148/72   Pulse: 98 105 105 (!) 108   Resp: (!) 25 22     Temp:  (!) 97 1 °F (36 2 °C)     TempSrc:  Tympanic     SpO2: 95% 99%     Weight:       Height:           Intake/Output Summary (Last 24 hours) at 11/13/17 0928  Last data filed at 11/13/17 0810   Gross per 24 hour   Intake              400 ml   Output                0 ml   Net              400 ml     General: NAD, tearful  Skin: warm, dry  Eyes: anicteric  ENT: MMM, +NRB mask  Neck: supple  Chest: clear anteriorly  CVS: +s1s2, no friction rub  Abdomen: soft, ND, NT  Extremities: no pedal edema  Neuro: awake, alert  Psych: pleasant but tearful at times    Invasive Devices:      Lab Results:     Results from last 7 days  Lab Units 11/13/17  0641 11/13/17  0207   WBC Thousand/uL  --  7 52   HEMOGLOBIN g/dL  --  10 3*   HEMATOCRIT %  --  32 3*   PLATELETS Thousands/uL 254 238   SODIUM mmol/L 138 139   POTASSIUM mmol/L 5 9* 6 5*   CHLORIDE mmol/L 99* 100   CO2 mmol/L 24 23   BUN mg/dL 146* 141*   CREATININE mg/dL 18 80* 18 60*   CALCIUM mg/dL 10 1 10 1   MAGNESIUM mg/dL 3 3*  --    PHOSPHORUS mg/dL 4 4  --    ALBUMIN g/dL  --  3 6   TOTAL PROTEIN g/dL  --  8 1   BILIRUBIN TOTAL mg/dL  --  1 21*   ALK PHOS U/L  --  256*   ALT U/L  --  449*   AST U/L  --  192*   GLUCOSE RANDOM mg/dL 226* 139     Lab Results   Component Value Date    CALCIUM 10 1 11/13/2017    PHOS 4 4 11/13/2017

## 2017-11-14 ENCOUNTER — GENERIC CONVERSION - ENCOUNTER (OUTPATIENT)
Dept: OTHER | Facility: OTHER | Age: 57
End: 2017-11-14

## 2017-11-14 ENCOUNTER — APPOINTMENT (INPATIENT)
Dept: NEUROLOGY | Facility: AMBULATORY SURGERY CENTER | Age: 57
DRG: 640 | End: 2017-11-14
Payer: MEDICARE

## 2017-11-14 LAB
AMMONIA PLAS-SCNC: 42 UMOL/L (ref 11–35)
ANION GAP SERPL CALCULATED.3IONS-SCNC: 9 MMOL/L (ref 4–13)
BUN SERPL-MCNC: 84 MG/DL (ref 5–25)
CALCIUM SERPL-MCNC: 9.7 MG/DL (ref 8.3–10.1)
CHLORIDE SERPL-SCNC: 95 MMOL/L (ref 100–108)
CO2 SERPL-SCNC: 31 MMOL/L (ref 21–32)
CREAT SERPL-MCNC: 13.3 MG/DL (ref 0.6–1.3)
ERYTHROCYTE [DISTWIDTH] IN BLOOD BY AUTOMATED COUNT: 19.7 % (ref 11.6–15.1)
GFR SERPL CREATININE-BSD FRML MDRD: 4 ML/MIN/1.73SQ M
GLUCOSE SERPL-MCNC: 156 MG/DL (ref 65–140)
HCT VFR BLD AUTO: 32 % (ref 36.5–49.3)
HGB BLD-MCNC: 10.4 G/DL (ref 12–17)
MAGNESIUM SERPL-MCNC: 2.9 MG/DL (ref 1.6–2.6)
MCH RBC QN AUTO: 29 PG (ref 26.8–34.3)
MCHC RBC AUTO-ENTMCNC: 32.5 G/DL (ref 31.4–37.4)
MCV RBC AUTO: 89 FL (ref 82–98)
PHOSPHATE SERPL-MCNC: 5.6 MG/DL (ref 2.7–4.5)
PLATELET # BLD AUTO: 199 THOUSANDS/UL (ref 149–390)
PMV BLD AUTO: 10.3 FL (ref 8.9–12.7)
POTASSIUM SERPL-SCNC: 5.1 MMOL/L (ref 3.5–5.3)
RBC # BLD AUTO: 3.59 MILLION/UL (ref 3.88–5.62)
SODIUM SERPL-SCNC: 135 MMOL/L (ref 136–145)
WBC # BLD AUTO: 6.78 THOUSAND/UL (ref 4.31–10.16)

## 2017-11-14 PROCEDURE — 80048 BASIC METABOLIC PNL TOTAL CA: CPT | Performed by: INTERNAL MEDICINE

## 2017-11-14 PROCEDURE — 85027 COMPLETE CBC AUTOMATED: CPT | Performed by: PHYSICIAN ASSISTANT

## 2017-11-14 PROCEDURE — 82140 ASSAY OF AMMONIA: CPT | Performed by: PHYSICIAN ASSISTANT

## 2017-11-14 PROCEDURE — C9113 INJ PANTOPRAZOLE SODIUM, VIA: HCPCS | Performed by: INTERNAL MEDICINE

## 2017-11-14 PROCEDURE — 95816 EEG AWAKE AND DROWSY: CPT

## 2017-11-14 PROCEDURE — 83735 ASSAY OF MAGNESIUM: CPT | Performed by: PHYSICIAN ASSISTANT

## 2017-11-14 PROCEDURE — 84100 ASSAY OF PHOSPHORUS: CPT | Performed by: PHYSICIAN ASSISTANT

## 2017-11-14 RX ORDER — SIMETHICONE 80 MG
80 TABLET,CHEWABLE ORAL EVERY 6 HOURS PRN
Status: DISCONTINUED | OUTPATIENT
Start: 2017-11-14 | End: 2017-11-16 | Stop reason: HOSPADM

## 2017-11-14 RX ORDER — PANTOPRAZOLE SODIUM 40 MG/1
40 INJECTION, POWDER, FOR SOLUTION INTRAVENOUS EVERY 12 HOURS SCHEDULED
Status: DISCONTINUED | OUTPATIENT
Start: 2017-11-14 | End: 2017-11-16 | Stop reason: HOSPADM

## 2017-11-14 RX ORDER — DIPHENHYDRAMINE HYDROCHLORIDE 50 MG/ML
25 INJECTION INTRAMUSCULAR; INTRAVENOUS EVERY 6 HOURS PRN
Status: DISCONTINUED | OUTPATIENT
Start: 2017-11-14 | End: 2017-11-16 | Stop reason: HOSPADM

## 2017-11-14 RX ADMIN — HEPARIN SODIUM 5000 UNITS: 5000 INJECTION, SOLUTION INTRAVENOUS; SUBCUTANEOUS at 13:29

## 2017-11-14 RX ADMIN — PANTOPRAZOLE SODIUM 40 MG: 40 INJECTION, POWDER, FOR SOLUTION INTRAVENOUS at 22:41

## 2017-11-14 RX ADMIN — SENNOSIDES 8.6 MG: 8.6 TABLET, FILM COATED ORAL at 08:07

## 2017-11-14 RX ADMIN — AMLODIPINE BESYLATE 10 MG: 10 TABLET ORAL at 08:07

## 2017-11-14 RX ADMIN — CALCIUM ACETATE 2001 MG: 667 CAPSULE ORAL at 07:12

## 2017-11-14 RX ADMIN — DOCUSATE SODIUM 100 MG: 100 CAPSULE, LIQUID FILLED ORAL at 17:00

## 2017-11-14 RX ADMIN — ACETAMINOPHEN 650 MG: 325 TABLET, FILM COATED ORAL at 05:00

## 2017-11-14 RX ADMIN — SIMETHICONE CHEW TAB 80 MG 80 MG: 80 TABLET ORAL at 11:26

## 2017-11-14 RX ADMIN — DOCUSATE SODIUM 100 MG: 100 CAPSULE, LIQUID FILLED ORAL at 08:07

## 2017-11-14 RX ADMIN — ACETAMINOPHEN 650 MG: 325 TABLET, FILM COATED ORAL at 13:29

## 2017-11-14 RX ADMIN — CALCIUM ACETATE 2001 MG: 667 CAPSULE ORAL at 11:26

## 2017-11-14 RX ADMIN — NICOTINE 1 PATCH: 21 PATCH, EXTENDED RELEASE TRANSDERMAL at 08:06

## 2017-11-14 RX ADMIN — DIPHENHYDRAMINE HYDROCHLORIDE 25 MG: 50 INJECTION, SOLUTION INTRAMUSCULAR; INTRAVENOUS at 13:53

## 2017-11-14 RX ADMIN — HEPARIN SODIUM 5000 UNITS: 5000 INJECTION, SOLUTION INTRAVENOUS; SUBCUTANEOUS at 05:01

## 2017-11-14 RX ADMIN — SIMETHICONE CHEW TAB 80 MG 80 MG: 80 TABLET ORAL at 16:50

## 2017-11-14 RX ADMIN — CALCIUM ACETATE 2001 MG: 667 CAPSULE ORAL at 16:47

## 2017-11-14 NOTE — PROGRESS NOTES
Progress Note - Neurology   Lisbeth Munguia 56 y o  male MRN: [id number]  Unit/Bed#: [facility] 501-01 Encounter: [id number]    Assessment:  1)  Encephalopathy - significantly improved  Very likely metabolic in origin  Patient has numerous electrolyte imbalances, as well as metabolic acidosis  Potential for Dialysis Disequilibrium Syndrome  MRI DWI did not demonstrate abnormality  EEG demonstrates recurrent EMG artifact without clear epileptiform discharge suggest frequent myoclonic or muscle jerks without cortical discharge  2)  ESRD on HD - MWF schedule, pt has been intermittently compliant  Per nephrology,  Will not dialyze today 2/2 to improved BUN  3)  R vertebral artery stenosis   4)  HTN - on Amlodipine    Plan:  1)  Metabolic management per Internal Medicine and nephrology  2)  Continue ASA and atorvastatin  3)  Monitor neuro exam and notify with changes       Subjective: Today on exam, the pt's cognition has improved significantly  He is alert, oriented and interactive  He reports continued difficulty with speech and word finding, as well as "shaking in my hands and head, and I can't control my arms and legs " Also reports mild pain in L shoulder and left hip  Pt  denies fever, chills, CP, SOB, abdominal pain, N/V, difficulty with bowel or bladder, HA , changes in vision, and numbness  Pt had notable tic-like movements throughout his body, equally b/l, more noticeable in UE and head  Significantly tremulous in UE,  resting, action and intention tremor noted         ROS:  See Subjective     Medications:   Scheduled Meds:  acetaminophen 650 mg Oral Q8H [address]   amLODIPine 10 mg Oral Daily   calcium acetate 2,001 mg Oral TID With Meals   docusate sodium 100 mg Oral BID   heparin (porcine) 5,000 Units Subcutaneous Q8H [address]   nicotine 1 patch Transdermal Daily   senna 1 tablet Oral Daily     Continuous Infusions:   PRN Meds: calcitriol    hydrALAZINE    labetalol    ondansetron      Vitals: Blood pressure 165/81, pulse 89, temperature 98 8 °F (37 1 °C), resp  rate 18, height 5' 6" (1 676 m), weight 68 kg (150 lb), SpO2 96 %  ,Body mass index is 24 21 kg/m²  Physical Exam:   Physical Exam   Constitutional: He is oriented to person, place, and time  He appears well-developed and well-nourished  No distress  HENT:   Head: Normocephalic and atraumatic  Nose: Nose normal    Mouth/Throat: No oropharyngeal exudate  Eyes: Right eye exhibits no discharge  Left eye exhibits no discharge  Scleral icterus is present  Neck: Normal range of motion  Neck supple  Pulmonary/Chest: Effort normal  No respiratory distress  Musculoskeletal: He exhibits no edema, tenderness or deformity  Neurological: He is oriented to person, place, and time  He has an abnormal Finger-Nose-Finger Test    Skin: Skin is warm and dry  No rash noted  He is not diaphoretic  No erythema  No pallor  Psychiatric: He has a normal mood and affect  His behavior is normal  Judgment and thought content normal    Nursing note and vitals reviewed  Neurologic Exam     Mental Status   Oriented to person, place, and time  Follows 3 step commands  Attention: normal    Level of consciousness: alert  Knowledge: good  Able to perform simple calculations  Able to name object  Able to read  Able to repeat  Normal comprehension  Oriented x3 with exception of stating he was in [place] instead of [place]      Cranial Nerves   Cranial nerves II through XII intact       Motor Exam   Muscle bulk: normal  Overall muscle tone: normal    Strength   Right deltoid: 4/5  Left deltoid: 4/5  Right biceps: 4/5  Left biceps: 4/5  Right triceps: 4/5  Left triceps: 4/5  Right interossei: 4/5  Left interossei: 4/5  Right iliopsoas: 4/5  Left iliopsoas: 4/5  Right quadriceps: 4/5  Left quadriceps: 4/5  Right hamstring: 3/5  Left hamstring: 3/5  Right anterior tibial: 4/5  Left anterior tibial: 4/5  Right gastroc: 3/5  Left gastroc: 3/5    Sensory Exam   Light touch normal      Gait, Coordination, and Reflexes     Coordination   Finger to nose coordination: abnormal    Tremor   Resting tremor: present  Intention tremor: present  Action tremor: left arm and right arm    Reflexes   Right plantar: normal  Left plantar: normal  Hand flapping tremor noted b/l        Lab, Imaging and other studies: I have personally reviewed pertinent reports       Recent Results (from the past 24 hour(s))   Fingerstick Glucose (POCT)    Collection Time: 11/13/17 11:10 AM   Result Value Ref Range    POC Glucose 102 65 - 140 mg/dl   Fingerstick Glucose (POCT)    Collection Time: 11/13/17 12:31 PM   Result Value Ref Range    POC Glucose 110 65 - 140 mg/dl   APTT    Collection Time: 11/13/17  1:53 PM   Result Value Ref Range    PTT 47 (H) 23 - 35 seconds   Protime-INR    Collection Time: 11/13/17  1:53 PM   Result Value Ref Range    Protime 13 8 12 1 - 14 4 seconds    INR 1 06 0 86 - 1 16   CBC and differential    Collection Time: 11/13/17  1:54 PM   Result Value Ref Range    WBC 6 86 4 31 - 10 16 Thousand/uL    RBC 3 59 (L) 3 88 - 5 62 Million/uL    Hemoglobin 10 5 (L) 12 0 - 17 0 g/dL    Hematocrit 31 4 (L) 36 5 - 49 3 %    MCV 88 82 - 98 fL    MCH 29 2 26 8 - 34 3 pg    MCHC 33 4 31 4 - 37 4 g/dL    RDW 19 6 (H) 11 6 - 15 1 %    MPV 11 0 8 9 - 12 7 fL    Platelets 003 602 - 457 Thousands/uL    nRBC 0 /100 WBCs    Neutrophils Relative 80 (H) 43 - 75 %    Lymphocytes Relative 13 (L) 14 - 44 %    Monocytes Relative 5 4 - 12 %    Eosinophils Relative 2 0 - 6 %    Basophils Relative 0 0 - 1 %    Neutrophils Absolute 5 50 1 85 - 7 62 Thousands/µL    Lymphocytes Absolute 0 89 0 60 - 4 47 Thousands/µL    Monocytes Absolute 0 34 0 17 - 1 22 Thousand/µL    Eosinophils Absolute 0 10 0 00 - 0 61 Thousand/µL    Basophils Absolute 0 02 0 00 - 0 10 Thousands/µL   Comprehensive metabolic panel    Collection Time: 11/13/17  1:56 PM   Result Value Ref Range    Sodium 136 136 - 145 mmol/L    Potassium 3 9 3 5 - 5 3 mmol/L    Chloride 96 (L) 100 - 108 mmol/L    CO2 28 21 - 32 mmol/L    Anion Gap 12 4 - 13 mmol/L    BUN 67 (H) 5 - 25 mg/dL    Creatinine 9 88 (H) 0 60 - 1 30 mg/dL    Glucose 107 65 - 140 mg/dL    Calcium 10 1 8 3 - 10 1 mg/dL     (H) 5 - 45 U/L     (H) 12 - 78 U/L    Alkaline Phosphatase 260 (H) 46 - 116 U/L    Total Protein 7 9 6 4 - 8 2 g/dL    Albumin 3 4 (L) 3 5 - 5 0 g/dL    Total Bilirubin 1 55 (H) 0 20 - 1 00 mg/dL    eGFR 6 ml/min/1 73sq m   TSH, 3rd generation with T4 reflex    Collection Time: 11/13/17  1:56 PM   Result Value Ref Range    TSH 3RD GENERATON 0 412 0 358 - 3 740 uIU/mL   Vitamin B12    Collection Time: 11/13/17  1:56 PM   Result Value Ref Range    Vitamin B-12 1,660 (H) 100 - 900 pg/mL   Blood gas, arterial    Collection Time: 11/13/17  3:03 PM   Result Value Ref Range    pH, Arterial 7 546 (H) 7 350 - 7 450    pCO2, Arterial 32 0 (L) 36 0 - 44 0 mm Hg    pO2, Arterial 66 4 (L) 75 0 - 129 0 mm Hg    HCO3, Arterial 27 1 22 0 - 28 0 mmol/L    Base Excess, Arterial 4 8 mmol/L    O2 Content, Arterial 13 9 (L) 16 0 - 23 0 mL/dL    O2 HGB,Arterial  92 0 (L) 94 0 - 97 0 %    SOURCE Radial, Right     MELANIE TEST Yes     Nasal Cannula 4    Basic metabolic panel    Collection Time: 11/14/17  4:54 AM   Result Value Ref Range    Sodium 135 (L) 136 - 145 mmol/L    Potassium 5 1 3 5 - 5 3 mmol/L    Chloride 95 (L) 100 - 108 mmol/L    CO2 31 21 - 32 mmol/L    Anion Gap 9 4 - 13 mmol/L    BUN 84 (H) 5 - 25 mg/dL    Creatinine 13 30 (H) 0 60 - 1 30 mg/dL    Glucose 156 (H) 65 - 140 mg/dL    Calcium 9 7 8 3 - 10 1 mg/dL    eGFR 4 ml/min/1 73sq m   Ammonia    Collection Time: 11/14/17  4:54 AM   Result Value Ref Range    Ammonia 42 (H) 11 - 35 umol/L   Magnesium    Collection Time: 11/14/17  4:54 AM   Result Value Ref Range    Magnesium 2 9 (H) 1 6 - 2 6 mg/dL   Phosphorus    Collection Time: 11/14/17  4:54 AM   Result Value Ref Range    Phosphorus 5 6 (H) 2 7 - 4 5 mg/dL   CBC    Collection Time: 11/14/17  4:54 AM   Result Value Ref Range    WBC 6 78 4 31 - 10 16 Thousand/uL    RBC 3 59 (L) 3 88 - 5 62 Million/uL    Hemoglobin 10 4 (L) 12 0 - 17 0 g/dL    Hematocrit 32 0 (L) 36 5 - 49 3 %    MCV 89 82 - 98 fL    MCH 29 0 26 8 - 34 3 pg    MCHC 32 5 31 4 - 37 4 g/dL    RDW 19 7 (H) 11 6 - 15 1 %    Platelets 349 848 - 536 Thousands/uL    MPV 10 3 8 9 - 12 7 fL   ]      VTE Prophylaxis: Sequential compression device (Venodyne)  and Heparin    Counseling / Coordination of Care  Total time spent today 25 minutes  Greater than 50% of total time was spent with the patient and / or family counseling and / or coordination of care  A description of the counseling / coordination of care: The patient was seen and examined by myself the attending physician  The chart was reviewed thoroughly, including laboratory studies  The project team was consulted  The patient was counseled in the room

## 2017-11-14 NOTE — PROGRESS NOTES
[facility] Internal [facility] Progress Note  Patient: Mildred Wolfe 56 y o  male   MRN: [id number]  PCP: Albino Khan  Unit/Bed#: [facility] 501-01 Encounter: [id number]  Date Of Visit: 11/14/17    Assessment:    Principal Problem:    Fluid overload  Active Problems:    Hypertensive urgency    Hyperkalemia    Dyspnea    Acute hypoxemic respiratory failure (HCC)    DM (diabetes mellitus) (HCC)    Elevated troponin    Encephalopathy      Plan:    Fluid overload in the setting noncompliant patient apparently 1 dialysis  Presented with hyperkalemia, treated medically  Had dialysis done  Potassium has come down to 3 9    Stroke alert was called possibly related to metabolic encephalopathy, related to dialysis dysequilibrium, electrolytes exchange  Girl friend says he has some twitching of his body  MRI did not show a stroke  Systolic blood pressure is stable  Continue with aspirin and atorvastatin  Eeg was done today,    Follow B12 is with in normal limits, ammonia levels is 42 and elevated, TSH 0 412  Continue to monitor him as he is improving  Neurology on board    ESRD on Dialysis: MWF, no dialuysis today  BUN dropped to 67   Will continue to monitor    Myoclonus patient is having spasms, of his muscles and twitches overtime  He has no fever, no signs of infection  He is conversing normally  MRI of the brain was negative  This might be metabolic related? EEG did not show seizure like, but muscle twitches  Calcium is normal  BUn is dropped   Potassium is improved  Other electrolytes are normal limits  Ammonia is elevated to 42  Will give benadryl to see if this might help reduce the spasms patient is experiencing  HTN: well controlled     Hyponatremia          VTE Pharmacologic Prophylaxis:   Pharmacologic: Heparin  Mechanical VTE Prophylaxis in Place: Yes    Patient Centered Rounds: I have performed bedside rounds with nursing staff today      Discussions with Specialists or Other Care Team Provider: Nephrology, neurology    Education and Discussions with Family / Patient: patient and girlfriend at bedside    Time Spent for Care: 45 minutes  More than 50% of total time spent on counseling and coordination of care as described above  Current Length of Stay: 1 day(s)    Current Patient Status: Inpatient   Certification Statement: The patient will continue to require additional inpatient hospital stay due to Encephalopathy resolving now with spasms    Discharge Plan / Estimated Discharge Date: possibly next few days    Code Status: Level 1 - Full Code      Subjective:   I am doing well  Objective:     Vitals:   Temp (24hrs), Av 6 °F (37 °C), Min:97 °F (36 1 °C), Max:99 3 °F (37 4 °C)    HR:  [] 89  Resp:  [18-32] 18  BP: (126-209)/() 165/81  SpO2:  [93 %-99 %] 96 %  Body mass index is 24 21 kg/m²  Input and Output Summary (last 24 hours): Intake/Output Summary (Last 24 hours) at 17 0820  Last data filed at 17 0758   Gross per 24 hour   Intake              640 ml   Output             3600 ml   Net            -2960 ml       Physical Exam:     Physical Exam   Constitutional: He is oriented to person, place, and time  He appears well-developed and well-nourished  No distress  HENT:   Head: Normocephalic and atraumatic  Right Ear: External ear normal    Left Ear: External ear normal    Nose: Nose normal    Mouth/Throat: Oropharynx is clear and moist  No oropharyngeal exudate  Eyes: Conjunctivae and EOM are normal  Pupils are equal, round, and reactive to light  Right eye exhibits no discharge  No scleral icterus  Neck: Normal range of motion  Neck supple  No JVD present  No tracheal deviation present  No thyromegaly present  Cardiovascular: Normal rate, regular rhythm, normal heart sounds and intact distal pulses  Exam reveals no gallop and no friction rub  No murmur heard  Pulmonary/Chest: Effort normal and breath sounds normal  No stridor  No respiratory distress   He has no wheezes  He has no rales  He exhibits no tenderness  Abdominal: Soft  Bowel sounds are normal  He exhibits no distension and no mass  There is no tenderness  There is no rebound and no guarding  Musculoskeletal: Normal range of motion  He exhibits no edema, tenderness or deformity  Lymphadenopathy:     He has no cervical adenopathy  Neurological: He is alert and oriented to person, place, and time  He has normal reflexes  No cranial nerve deficit  He exhibits normal muscle tone  Coordination abnormal    Skin: Skin is warm and dry  No rash noted  No erythema  No pallor  Psychiatric: He has a normal mood and affect  His behavior is normal  Judgment and thought content normal    Nursing note and vitals reviewed  Additional Data:     Labs:      Results from last 7 days  Lab Units 11/14/17  0454 11/13/17  1354   WBC Thousand/uL 6 78 6 86   HEMOGLOBIN g/dL 10 4* 10 5*   HEMATOCRIT % 32 0* 31 4*   PLATELETS Thousands/uL 199 245   NEUTROS PCT %  --  80*   LYMPHS PCT %  --  13*   MONOS PCT %  --  5   EOS PCT %  --  2       Results from last 7 days  Lab Units 11/14/17  0454 11/13/17  1356   SODIUM mmol/L 135* 136   POTASSIUM mmol/L 5 1 3 9   CHLORIDE mmol/L 95* 96*   CO2 mmol/L 31 28   BUN mg/dL 84* 67*   CREATININE mg/dL 13 30* 9 88*   CALCIUM mg/dL 9 7 10 1   TOTAL PROTEIN g/dL  --  7 9   BILIRUBIN TOTAL mg/dL  --  1 55*   ALK PHOS U/L  --  260*   ALT U/L  --  425*   AST U/L  --  169*   GLUCOSE RANDOM mg/dL 156* 107       Results from last 7 days  Lab Units 11/13/17  1353   INR  1 06       * I Have Reviewed All Lab Data Listed Above  * Additional Pertinent Lab Tests Reviewed:  [facility] [address] Admission Reviewed    Imaging:    Imaging Reports Reviewed Today Include:    Imaging Personally Reviewed by Myself Includes:      Recent Cultures (last 7 days):           Last 24 Hours Medication List:     acetaminophen 650 mg Oral Q8H [address]   amLODIPine 10 mg Oral Daily   calcium acetate 2,001 mg Oral TID With Meals   docusate sodium 100 mg Oral BID   heparin (porcine) 5,000 Units Subcutaneous Q8H [address]   nicotine 1 patch Transdermal Daily   senna 1 tablet Oral Daily        Today, Patient Was Seen By: Pb Miller MD    ** Please Note: This note has been constructed using a voice recognition system   **

## 2017-11-14 NOTE — PLAN OF CARE
Problem: DISCHARGE PLANNING - CARE MANAGEMENT  Goal: Discharge to post-acute care or home with appropriate resources  INTERVENTIONS:  - Conduct assessment to determine patient/family and health care team treatment goals, and need for post-acute services based on payer coverage, community resources, and patient preferences, and barriers to discharge  - Address psychosocial, clinical, and financial barriers to discharge as identified in assessment in conjunction with the patient/family and health care team  - Arrange appropriate level of post-acute services according to patient's   needs and preference and payer coverage in collaboration with the physician and health care team  - Communicate with and update the patient/family, physician, and health care team regarding progress on the discharge plan  - Arrange appropriate transportation to post-acute venues  - Coordinate resumption of outpatient HD at [facility]    Outcome: Progressing

## 2017-11-14 NOTE — PROCEDURES
ELECTROENCEPHALOGRAM    Patient Name:  Magali Tapia  MRN: [id number]   :  1960 File #: [id number]   Age: 56 y o  Encounter #: [id number]   Date performed: 2017 Referring Provider: Geronimo Grajeda MD          Report date: 2017          Study type: awake and drowsy EEG    ICD 10 diagnosis: Transient alteration of awareness R40 4 and Encephalopathy, unspecified G93 40    Patient History:  EEG is requested to assess for seizures and/or classification of epilepsy  Patient is 56 y o  male Patient was admitted to hospital after shortness breath, in the setting of missing his hemodialysis session  During transportation the patient was unable to move himself from the stretcher to the bed, noted to be confused and aphasic, and responding to all questions with "yes "  EEG to rule out seizures  Current AEDs:  Medications include:   acetaminophen 650 mg Oral Q8H [address]   amLODIPine 10 mg Oral Daily   calcium acetate 2,001 mg Oral TID With Meals   docusate sodium 100 mg Oral BID   heparin (porcine) 5,000 Units Subcutaneous Q8H [address]   nicotine 1 patch Transdermal Daily   senna 1 tablet Oral Daily       Description of Procedure: The EEG was performed with electrodes applied using the International 10-20 System  Additional electrodes used included EOG, ECG and T1/T2 electrodes  A single lead ECG channel is also present  At least 16 channels are reviewed at a time and formatted into longitudinal bipolar, transverse bipolar, and referential (to common reference or calculated common reference) montages  The EEG was recorded with the patient awake and drowsy  The recording was technically satisfactory  EEG was recorded from 08:53 to 09:26  Findings:   Background Activity: The background is grossly symmetric with respect to voltages and activities    During wakefulness, the background is well-organized with anterior low amplitude beta activity and low to low-moderate amplitude posterior alpha-beta activity and central midline theta activity  There is a symmetric 8-8 5 Hz posterior dominant rhythm that attenuates with eye opening  Drowsiness is characterized by attentuation of the alpha rhythm, prominent anterior beta activity, central theta activity, roving eye movements and vertex waves  Activation Procedures:   Hyperventilation was not performed  Stepped photic stimulation from 1 to 30 fps was performed and produced no abnormality  Other findings: The single lead ECG shows a regular and sinus rhythm  During this study, there is frequent EMG artifact that causes spike-like deflections on the EEG  Interpretation: This is a normal 33 minutes awake and drowsy EEG  Recurrent EMG artifact on the EEG without clear epileptiform discharge suggest frequent myoclonic or muscle jerks without cortical discharge, clinical correlation is recommended        MD Mag[facility]  [address] [place] [address]

## 2017-11-14 NOTE — PROGRESS NOTES
NEPHROLOGY PROGRESS NOTE   Magali Tapia 56 y o  male MRN: [id number]  Unit/Bed#: [facility] 501-01 Encounter: [id number]      ASSESSMENT and PLAN:  1  ESRD: on HD MWF at [facility] in [place]   -patient was admitted with     -he had missed his Friday treatment last week and also admits that he often signs of treatment early because he needs to make it to work   -BUN dropped to 67 post HD and he became confused   -BUN 84 today and patient seems a little better so will hold HD today and reassess tomorrow   2  Encephalopathy: per neurology likely metabolic encephalopathy and dialysis dysequilibrium  He had only missed 1 HD treatment but admits to not staying for full treatments so likely was very underdialyzed as an outpatient   -imaging negative for stroke   3  Access: LUE AVF working well   4  Hypertension: BP improved with volume removal on HD   5  Anemia: hgb at goal    -continue outpatient Mircera   6  Hyperkalemia: resolved with HD    -continue renal/lowK diet   7  Mineral Bone Disease of CKD: phos 5 6 and continue phoslo with meals    -continue renal diet   8  Hypermagnesemia: trending down to 2 9 today         SUBJECTIVE:  Patient thinks he is doing a little better and states he can now talk better today  He still has abnormal hand movements and states it is hard for him to hold things       OBJECTIVE:  Current Weight: Weight - Scale: 68 kg (150 lb)  Vitals:    11/14/17 1018   BP: 144/68   Pulse: 88   Resp: 20   Temp: 97 5 °F (36 4 °C)   SpO2: 93%       Intake/Output Summary (Last 24 hours) at 11/14/17 1048  Last data filed at 11/14/17 0758   Gross per 24 hour   Intake              640 ml   Output             3600 ml   Net            -2960 ml     General: no acute distress   Skin: no rash   Eyes: sclera anicteric   ENT: moist mucous membranes   Neck: supple,symmetric   Chest: clear to auscultation    CVS: regular rate and rhythm   Abdomen: soft, non-tender, non-distended   Extremities: no edema   Neuro: alert and oriented but with some abnormal hand and facial movements     Medications:  Scheduled Meds:  acetaminophen 650 mg Oral Q8H [address]   amLODIPine 10 mg Oral Daily   calcium acetate 2,001 mg Oral TID With Meals   docusate sodium 100 mg Oral BID   heparin (porcine) 5,000 Units Subcutaneous Q8H [address]   nicotine 1 patch Transdermal Daily   senna 1 tablet Oral Daily       PRN Meds: calcitriol    hydrALAZINE    labetalol    ondansetron    Laboratory Results:  Lab Results   Component Value Date    WBC 6 78 11/14/2017    HGB 10 4 (L) 11/14/2017    HCT 32 0 (L) 11/14/2017    MCV 89 11/14/2017     11/14/2017     Lab Results   Component Value Date    GLUCOSE 156 (H) 11/14/2017    CALCIUM 9 7 11/14/2017     (L) 11/14/2017    K 5 1 11/14/2017    CO2 31 11/14/2017    CL 95 (L) 11/14/2017    BUN 84 (H) 11/14/2017    CREATININE 13 30 (H) 11/14/2017     Lab Results   Component Value Date    CALCIUM 9 7 11/14/2017    PHOS 5 6 (H) 11/14/2017

## 2017-11-14 NOTE — SOCIAL WORK
Met with the patient to complete assessment and explain role of CM  The patient reports he lives alone in a second floor apt with 6 ATILIO and 13-14 steps with left rail  Prior to admissions he was independent with all care-no DME and he drives  The patient reports he is employed and he goes to HD at [facility]  His schedule is MWF chair time 6pm  Patient has a renal social worker, Ginny Mckinnon, and patient has asked CM to be in contact with her  He is concerned about finances and transport to HD if he cannot drive  The patient has no Advance Directive or POA  His family contact is his sister, Jerome Batista [phone]  The patient also consents to CM speaking to his girlfriend, Marc Herrera [phone], who lives in [place] and visits daily  Discussed patient with Dr Suzette Brooks and requested OT and PT maryaals  CM reviewed d/c planning process including the following: identifying help at home, patient preference for d/c planning needs, Discharge Lounge, [facility] Meds to Bed program, availability of treatment team to discuss questions or concerns patient and/or family may have regarding understanding medications and recognizing signs and symptoms once discharged  CM also encouraged patient to follow up with all recommended appointments after discharge  Patient advised of importance for patient and family to participate in managing patients medical well being  Discharge checklist discussed with patient and family

## 2017-11-15 ENCOUNTER — APPOINTMENT (INPATIENT)
Dept: DIALYSIS | Facility: HOSPITAL | Age: 57
DRG: 640 | End: 2017-11-15
Attending: INTERNAL MEDICINE
Payer: MEDICARE

## 2017-11-15 LAB
ANION GAP SERPL CALCULATED.3IONS-SCNC: 13 MMOL/L (ref 4–13)
BASOPHILS # BLD AUTO: 0.03 THOUSANDS/ΜL (ref 0–0.1)
BASOPHILS NFR BLD AUTO: 0 % (ref 0–1)
BUN SERPL-MCNC: 100 MG/DL (ref 5–25)
CALCIUM SERPL-MCNC: 9.9 MG/DL (ref 8.3–10.1)
CHLORIDE SERPL-SCNC: 96 MMOL/L (ref 100–108)
CO2 SERPL-SCNC: 26 MMOL/L (ref 21–32)
CREAT SERPL-MCNC: 15.5 MG/DL (ref 0.6–1.3)
EOSINOPHIL # BLD AUTO: 0.26 THOUSAND/ΜL (ref 0–0.61)
EOSINOPHIL NFR BLD AUTO: 4 % (ref 0–6)
ERYTHROCYTE [DISTWIDTH] IN BLOOD BY AUTOMATED COUNT: 19.5 % (ref 11.6–15.1)
GFR SERPL CREATININE-BSD FRML MDRD: 4 ML/MIN/1.73SQ M
GLUCOSE SERPL-MCNC: 110 MG/DL (ref 65–140)
HCT VFR BLD AUTO: 32.7 % (ref 36.5–49.3)
HGB BLD-MCNC: 10.6 G/DL (ref 12–17)
LYMPHOCYTES # BLD AUTO: 0.59 THOUSANDS/ΜL (ref 0.6–4.47)
LYMPHOCYTES NFR BLD AUTO: 9 % (ref 14–44)
MCH RBC QN AUTO: 28.8 PG (ref 26.8–34.3)
MCHC RBC AUTO-ENTMCNC: 32.4 G/DL (ref 31.4–37.4)
MCV RBC AUTO: 89 FL (ref 82–98)
MONOCYTES # BLD AUTO: 0.74 THOUSAND/ΜL (ref 0.17–1.22)
MONOCYTES NFR BLD AUTO: 11 % (ref 4–12)
NEUTROPHILS # BLD AUTO: 5.34 THOUSANDS/ΜL (ref 1.85–7.62)
NEUTS SEG NFR BLD AUTO: 76 % (ref 43–75)
NRBC BLD AUTO-RTO: 0 /100 WBCS
PLATELET # BLD AUTO: 216 THOUSANDS/UL (ref 149–390)
PMV BLD AUTO: 10.4 FL (ref 8.9–12.7)
POTASSIUM SERPL-SCNC: 6 MMOL/L (ref 3.5–5.3)
RBC # BLD AUTO: 3.68 MILLION/UL (ref 3.88–5.62)
SODIUM SERPL-SCNC: 135 MMOL/L (ref 136–145)
WBC # BLD AUTO: 6.98 THOUSAND/UL (ref 4.31–10.16)

## 2017-11-15 PROCEDURE — 80048 BASIC METABOLIC PNL TOTAL CA: CPT | Performed by: INTERNAL MEDICINE

## 2017-11-15 PROCEDURE — G8978 MOBILITY CURRENT STATUS: HCPCS

## 2017-11-15 PROCEDURE — 85025 COMPLETE CBC W/AUTO DIFF WBC: CPT | Performed by: INTERNAL MEDICINE

## 2017-11-15 PROCEDURE — C9113 INJ PANTOPRAZOLE SODIUM, VIA: HCPCS | Performed by: INTERNAL MEDICINE

## 2017-11-15 PROCEDURE — G8979 MOBILITY GOAL STATUS: HCPCS

## 2017-11-15 PROCEDURE — 97163 PT EVAL HIGH COMPLEX 45 MIN: CPT

## 2017-11-15 RX ORDER — GUAIFENESIN 600 MG
600 TABLET, EXTENDED RELEASE 12 HR ORAL EVERY 12 HOURS SCHEDULED
Status: DISCONTINUED | OUTPATIENT
Start: 2017-11-15 | End: 2017-11-16 | Stop reason: HOSPADM

## 2017-11-15 RX ADMIN — HEPARIN SODIUM 5000 UNITS: 5000 INJECTION, SOLUTION INTRAVENOUS; SUBCUTANEOUS at 22:34

## 2017-11-15 RX ADMIN — PANTOPRAZOLE SODIUM 40 MG: 40 INJECTION, POWDER, FOR SOLUTION INTRAVENOUS at 22:35

## 2017-11-15 RX ADMIN — PANTOPRAZOLE SODIUM 40 MG: 40 INJECTION, POWDER, FOR SOLUTION INTRAVENOUS at 12:01

## 2017-11-15 RX ADMIN — ONDANSETRON 4 MG: 2 INJECTION INTRAMUSCULAR; INTRAVENOUS at 15:49

## 2017-11-15 RX ADMIN — CALCIUM ACETATE 2001 MG: 667 CAPSULE ORAL at 12:00

## 2017-11-15 RX ADMIN — HEPARIN SODIUM 5000 UNITS: 5000 INJECTION, SOLUTION INTRAVENOUS; SUBCUTANEOUS at 05:15

## 2017-11-15 RX ADMIN — CALCIUM ACETATE 2001 MG: 667 CAPSULE ORAL at 17:33

## 2017-11-15 RX ADMIN — SENNOSIDES 8.6 MG: 8.6 TABLET, FILM COATED ORAL at 12:01

## 2017-11-15 RX ADMIN — HEPARIN SODIUM 5000 UNITS: 5000 INJECTION, SOLUTION INTRAVENOUS; SUBCUTANEOUS at 13:40

## 2017-11-15 RX ADMIN — DOCUSATE SODIUM 100 MG: 100 CAPSULE, LIQUID FILLED ORAL at 17:33

## 2017-11-15 RX ADMIN — AMLODIPINE BESYLATE 10 MG: 10 TABLET ORAL at 12:00

## 2017-11-15 RX ADMIN — ACETAMINOPHEN 650 MG: 325 TABLET, FILM COATED ORAL at 13:39

## 2017-11-15 RX ADMIN — NICOTINE 1 PATCH: 21 PATCH, EXTENDED RELEASE TRANSDERMAL at 12:03

## 2017-11-15 RX ADMIN — DOCUSATE SODIUM 100 MG: 100 CAPSULE, LIQUID FILLED ORAL at 12:01

## 2017-11-15 RX ADMIN — ACETAMINOPHEN 650 MG: 325 TABLET, FILM COATED ORAL at 22:35

## 2017-11-15 RX ADMIN — GUAIFENESIN 600 MG: 600 TABLET, EXTENDED RELEASE ORAL at 22:34

## 2017-11-15 RX ADMIN — ACETAMINOPHEN 650 MG: 325 TABLET, FILM COATED ORAL at 05:15

## 2017-11-15 RX ADMIN — CALCIUM ACETATE 2001 MG: 667 CAPSULE ORAL at 07:13

## 2017-11-15 NOTE — PLAN OF CARE
Problem: PHYSICAL THERAPY ADULT  Goal: Performs mobility at highest level of function for planned discharge setting  See evaluation for individualized goals  Treatment/Interventions: Functional transfer training, Elevations, LE strengthening/ROM, Therapeutic exercise, Endurance training, Patient/family training, Gait training, Spoke to nursing, Spoke to case management  Equipment Recommended: (S) Mila Redder (however pt refusing)       See flowsheet documentation for full assessment, interventions and recommendations  Outcome: Progressing  Prognosis: Good  Problem List: Decreased strength, Decreased endurance, Impaired balance, Decreased mobility, Impaired judgement, Decreased cognition  Assessment: Pt is 56 y o  male seen for PT evaluation s/p admit to [facility] on 11/13/2017 w/ Fluid overload  PT consulted to assess pt's functional mobility and d/c needs  Order placed for PT eval and tx, w/ up and OOB as tolerated order  Comorbidities affecting pt's physical performance at time of assessment include: ESRD on HD (M,W,F), HTN, and (+) current smoker  PTA, pt was independent w/ all functional mobility w/o AD, has 6 + 14 ATILIO with (L) rail, and works full time  Personal factors affecting pt at time of IE include: inaccessible home environment, inability to navigate community distances, decreased cognition, need to use assistive device however pt refusing, limited home support, positive fall history, impulsivity and limited insight into impairments  Please find objective findings from PT assessment regarding body systems outlined above with impairments and limitations including weakness, significantly impaired balance (several episodes of scissoring and multi-directional LOBs that required mod(A) from PT), decreased endurance, gait deviations, decreased activity tolerance, decreased safety awareness, impaired judgement, fall risk and decreased cognition   The following objective measures performed on IE also reveal limitations: Barthel Index: 75/100  Pt's clinical presentation is currently unstable/unpredictable seen in pt's presentation of reports of dizziness, impulsivity, decreased safety awareness/lack of insight re: deficits/impairments, and fluctuating vitals (SaO2 % on room air throughout (pt on room air at baseline) and BP: 178/89 with reports of dizziness)  Pt to benefit from continued PT tx to address deficits as defined above and maximize level of functional independent mobility and consistency  From PT/mobility standpoint, recommendation at time of d/c would be IP rehab pending progress in order to facilitate return to PLOF  In addition, would recommend use of RW however pt adamantly refusing to trial at this time  Discussed with RN and CM  If pt does return home, recommend home PT  Barriers to Discharge: Inaccessible home environment, Decreased caregiver support     Recommendation: Post acute IP rehab     PT - OK to Discharge: (S) Yes (if to STR when medically appropriate)    See flowsheet documentation for full assessment

## 2017-11-15 NOTE — PROGRESS NOTES
[facility] Progress Note  Patient: Xochitl Swift 56 y o  male   MRN: [id number]  PCP: Hai Cruise  Unit/Bed#: [facility] 501-01 Encounter: [id number]  Date Of Visit: 11/15/17    Assessment:    Principal Problem:    Fluid overload  Active Problems:    Hypertensive urgency    Hyperkalemia    Dyspnea    Acute hypoxemic respiratory failure (HCC)    DM (diabetes mellitus) ([address] )    Elevated troponin    Encephalopathy      Plan:    1  Acute hypoxemic respiratory failure with Fluid overload, resolved, in the setting of noncompliant with hemodialysis and missing his dialysis; with hyperkalemia; with end-stage renal disease on hemodialysis:  Patient had hemodialysis today  Monitor patient's BMP  I spoke to the nephrologist and she is concerned about the patient's elevated BUN  2   Myoclonic jerks, resolved; and encephalopathy, resolved, likely metabolic in origin, likely due to electrolyte imbalances as well as 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:  Neurology on board  EEG did not reveal any epileptiform discharge  MRI was negative  3   Hypertension; with previous hypertensive urgency, resolved:  Continue blood pressure medication  4   Right vertebral artery stenosis:  Continue aspirin and atorvastatin as per Neurology  5   Hyponatremia, likely due to the 1st problem:  Monitor patient's BMP  Continue with dialysis  6   Anemia, likely of chronic kidney disease, presently stable improving; presently no active bleeding  From a discussion with the patient, he told me that he had the 4 units of packed RBCs blood transfusion in the last 3 to 4 months  According to him, he was worked up for possibility of GI bleeding  This was done in a hospital in [place]  He told me he had EGD and colonoscopy there and he was told that he had normal findings and no source of bleeding was found  When asked, he told me that the past, he had some dark stools    Presently, patient denies any blood in the stools  With the elevated BUN, I will rule out possibility of GI bleeding  I will send stools for occult blood 1st   If this is positive, we will have GI to be on board  7   Low normal TSH:  Check free T4     8   Diabetes mellitus type 2, with occasional hyperglycemia:  A1c was normal at 5 0%  For now, will have the patient on insulin sliding scale  9   There was report that he soaked his dentures in Mr  clean and peroxide last Friday because he ran out of his regular cleaning liquid  He had burning in his mouth at that point but this has resolved already  He told me that he did not ingest the Mr  clean and peroxide  Our colleague, spoke to poison Control and that no intervention is warranted at this time:  At this point in time, continue with Protonix  10   It is important to note, that according the patient's nurse, patient is very anxious with telemetry wires hooked on him, thus will discontinue telemetry  Will transfer the patient to medical-surgical floor  Presently, patient is on normal sinus rhythm on telemetry  VTE Pharmacologic Prophylaxis:   Pharmacologic: Heparin  Mechanical: Mechanical VTE prophylaxis in place  Discussions with Specialists or Other Care Team Provider:  Patient's nurse  Case management  Nephrologist     Education and Discussions with Family / Patient:  Patient  I called patient's sister, Adry Gomez, on her cell phone and left a voicemail message for call back  I also tried calling her on the telephone number that was provided to me by our , however, it was the wrong number  I called the telephone number twice ([phone]) and the personal answered me told me that it is the wrong number and it is a restaurant not the hospital where the patient's sister works  Time Spent for Care: Approximately 35 mins     More than 50% of total time spent on counseling and coordination of care as described above      Current Length of Stay: 2 day(s)    Current Patient Status: Inpatient   Certification Statement: The patient will continue to require additional inpatient hospital stay due to Above findings and plans  Discharge Plan:  None yet  Needs rehabilitation facility placement on discharge  On discussion with the patient, he is okay with this  Code Status: Level 1 - Full Code      Subjective:   Presently, patient feels fine and a lot better  Patient does not have any myoclonic jerks anymore  Patient is now coherent  Patient denies any more shortness of breath  Patient denies any pains at this point  Patient denies any active bleeding or any GI bleeding or any blood in the stools  No nausea or vomiting  Patient's only complaint today is that he feels generally weak  Otherwise no other complaints  Objective:     Vitals:   Temp (24hrs), Av 3 °F (36 8 °C), Min:97 °F (36 1 °C), Max:98 8 °F (37 1 °C)    HR:  [86-93] 91  Resp:  [15-20] 18  BP: (133-181)/(65-85) 154/72  SpO2:  [95 %-96 %] 95 %  Body mass index is 24 21 kg/m²  Input and Output Summary (last 24 hours): Intake/Output Summary (Last 24 hours) at 11/15/17 1634  Last data filed at 11/15/17 1227   Gross per 24 hour   Intake             1392 ml   Output             1475 ml   Net              -83 ml       Physical Exam:     Physical Exam   Constitutional: He is oriented to person, place, and time  No distress  HENT:   Head: Normocephalic and atraumatic  Eyes: Right eye exhibits no discharge  Left eye exhibits no discharge  No scleral icterus  Neck: No JVD present  No tracheal deviation present  Cardiovascular: Normal rate, regular rhythm and normal heart sounds  Exam reveals no friction rub  No murmur heard  Pulmonary/Chest: Effort normal and breath sounds normal  No stridor  No respiratory distress  He has no wheezes  He has no rales  Abdominal: Soft  Bowel sounds are normal  He exhibits no distension  There is no tenderness  There is no rebound and no guarding  Musculoskeletal: He exhibits no edema, tenderness or deformity  Neurological: He is alert and oriented to person, place, and time  No cranial nerve deficit  No more myoclonic jerks noted  Skin: Skin is warm  No rash noted  He is not diaphoretic  No erythema  No pallor  Psychiatric: He has a normal mood and affect  His behavior is normal  Thought content normal    Vitals reviewed  Additional Data:     Labs:      Results from last 7 days  Lab Units 11/15/17  0506   WBC Thousand/uL 6 98   HEMOGLOBIN g/dL 10 6*   HEMATOCRIT % 32 7*   PLATELETS Thousands/uL 216   NEUTROS PCT % 76*   LYMPHS PCT % 9*   MONOS PCT % 11   EOS PCT % 4       Results from last 7 days  Lab Units 11/15/17  0506  11/13/17  1356   SODIUM mmol/L 135*  < > 136   POTASSIUM mmol/L 6 0*  < > 3 9   CHLORIDE mmol/L 96*  < > 96*   CO2 mmol/L 26  < > 28   BUN mg/dL 100*  < > 67*   CREATININE mg/dL 15 50*  < > 9 88*   CALCIUM mg/dL 9 9  < > 10 1   TOTAL PROTEIN g/dL  --   --  7 9   BILIRUBIN TOTAL mg/dL  --   --  1 55*   ALK PHOS U/L  --   --  260*   ALT U/L  --   --  425*   AST U/L  --   --  169*   GLUCOSE RANDOM mg/dL 110  < > 107   < > = values in this interval not displayed  Results from last 7 days  Lab Units 11/13/17  1353   INR  1 06       * I Have Reviewed All Lab Data Listed Above  * Additional Pertinent Lab Tests Reviewed: [facility] Admission Reviewed    Imaging:    Imaging Reports Reviewed Today Include:  Diagnostic imaging studies that were done on this admission  Imaging Personally Reviewed by Myself Includes:  None      Cultures:   Blood Culture: No results found for: BLOODCX  Urine Culture: No results found for: URINECX  Sputum Culture: No components found for: SPUTUMCX  Wound Culture: No results found for: WOUNDCULT    Last 24 Hours Medication List:     acetaminophen 650 mg Oral Q8H [address]   amLODIPine 10 mg Oral Daily   calcium acetate 2,001 mg Oral TID With Meals   docusate sodium 100 mg Oral BID   heparin (porcine) 5,000 Units Subcutaneous Q8H [address]   nicotine 1 patch Transdermal Daily   pantoprazole 40 mg Intravenous Q12H [address]   senna 1 tablet Oral Daily        Today, Patient Was Seen By: Irma Coe MD    ** Please Note: Dragon 360 Dictation voice to text software may have been used in the creation of this document   **

## 2017-11-15 NOTE — SOCIAL WORK
Cm received call from pt's sister Adry Gomez  Advised pt is currently resting however therapy recommending inpatient rehab  Per Adry Gomez she would be willing to have pt come stay with her if he needs additional support  Requesting update from MD  Information given to Dr Valencia Sosa to f/u with sister

## 2017-11-15 NOTE — H&P
Asked to see patient  He is now concerned that all his symptoms and problems may have been related to an event on Friday  He states he soaked his dentures in "Mr Clean" and "peroxide" on Friday, because he ran out of his regular cleaning liquid  He noted some burning in his mouth immediately after this, as well as burning in his throat and burning when he passes stool  Some of this has actually resolved, where he says the burning in his mouth and throat is not longer there  He denies any nausea/vomiting/hematemesis/hemoptysis/PRPR/melena  He does complain of some "belching", which has been ongoing for "some days"  I did ask the patient if he actually drank the liquid that he soaked his dentures in, and he said no  Physical:    Oropharynx: poor oral hygiene, dentures are not in  Gums are pale, no erythema, oozing or blood  Abdomen - SNT, BS present  A/P  This is very unlikely to be related to his symptoms  No interventions overnight  Will defer to day team regarding GI evaluation if symptoms of belching persist      I did call poison control and they said that no intervention was warranted at this time  I will start protonix for dyspeptic symptoms

## 2017-11-15 NOTE — PHYSICAL THERAPY NOTE
PHYSICAL THERAPY EVALUATION NOTE        Patient's Name: Lenora Mosher    Admitting Diagnosis  Shortness of breath [R06 02]  Dyspnea [R06 00]  Volume overload [E87 70]  ESRD on hemodialysis ([place] [address] ) [N18 6, Z99 2]  Acute respiratory failure with hypoxia ([place] [address] ) [J96 01]    Problem List  Patient Active Problem List   Diagnosis    Fluid overload    Hypertensive urgency    Hyperkalemia    Dyspnea    Acute hypoxemic respiratory failure ([place] [address] )    DM (diabetes mellitus) ([place] [address] )    Elevated troponin    Encephalopathy       Past Medical History  Past Medical History:   Diagnosis Date    Dialysis patient ([place] [address] )     Hypertension     Renal disorder        Past Surgical History  History reviewed  No pertinent surgical history  11/15/17 1420   Note Type   Note type Eval only   Pain Assessment   Pain Assessment No/denies pain   Pain Score No Pain   Home Living   Type of Home Apartment  (2nd floor)   Home Layout One level;Stairs to enter with rails  (6 ATILIO + 14 steps in the house with (L) rail )   Bathroom Shower/Tub Walk-in shower  (also has tub however uses walk-in)   [facility] (None)   Prior Function   Level of [place] Independent with ADLs and functional mobility   Lives With Alone   Receives Help From (Girlfriend PRN)   ADL Assistance Independent   IADLs Independent   Falls in the last 6 months 1 to 4   Vocational 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)   Restrictions/Precautions   Weight Bearing Precautions Per Order No   Other Precautions Impulsive;Cognitive; Fall Risk;Telemetry  (decreased O2 sats; disorganized thinking/poor insight/safety)   General   Additional Pertinent History Pt is a 56 y o  male admitted on 11/13 with fluid overload and confusion 2/2 missing a scheduled dialysis treatment   He was tachycardic and hypoxic on arrival  Neuro was consulted for encephalopathy, likely toxic metabolic 2/2 dialysis disequilibrium syndrome  Brain MRI was (-) and EEG was normal    Family/Caregiver Present No   Cognition   Overall Cognitive Status Impaired   Arousal/Participation Alert  (and cooperative although easily agitated)   Orientation Level Oriented X4   Memory Decreased short term memory   Following Commands Follows all commands and directions without difficulty   Comments PT AO x4 however intermittently confused throughout  He demonstrates poor safety awareness and lack of insight re: deficits/impairments  RUE Assessment   RUE Assessment WFL  (however pt reports feeling weak 2/2 HD/hospitalization)   LUE Assessment   LUE Assessment WFL  (however pt reports feeling weak 2/2 HD/hospitalization)   RLE Assessment   RLE Assessment WFL  (however pt reports feeling weak 2/2 HD/hospitalization)   LLE Assessment   LLE Assessment WFL  (however pt reports feeling weak 2/2 HD/hospitalization)   Coordination   Movements are Fluid and Coordinated 1   Sensation WFL   Light Touch   RLE Light Touch Grossly intact   LLE Light Touch Grossly intact   Sharp/Dull   RLE Sharp/Dull Not tested   LLE Sharp/Dull Not tested   Proprioception   RLE Proprioception Grossly intact   LLE Proprioception Grossly Intact   Bed Mobility   Rolling R 7  Independent   Rolling L 7  Independent   Supine to Sit 5  Supervision   Sit to Supine 5  Supervision   Transfers   Sit to Stand 5  Supervision   Additional items Assist x 1;Verbal cues; Impulsive   Stand to Sit 5  Supervision   Additional items Assist x 1; Impulsive;Verbal cues   Ambulation/Elevation   Gait pattern Improper Weight shift;Narrow NAVI; Forward Flexion;Decreased foot clearance  (several episodes of scissoring & multidirectional LOBs)   Gait Assistance 3  Moderate assist  (Min(A) to Mod(A) x1)   Additional items Assist x 2;Verbal cues; Tactile cues  ((A) x1 + additional (A) for lines/tele)   Assistive Device None  (pt adamantly refusing AD)   Distance 100 feet   Stair Management Assistance Not tested Balance   Static Sitting Good   Dynamic Sitting Good   Static Standing Fair -   Dynamic Standing Fair -   Ambulatory Poor +  (ranged fair-/poor+)   Activity Tolerance   Activity Tolerance Patient limited by fatigue  (pt had dialysis earlier today)   Medical Staff Made Aware Discussed with case management   Nurse Made Aware Yes - YSABEL Rizzo present and aware  Assessment   Prognosis Good   Problem List Decreased strength;Decreased endurance; Impaired balance;Decreased mobility; Impaired judgement;Decreased cognition   Assessment Pt is 56 y o  male seen for PT evaluation s/p admit to [facility] on 11/13/2017 w/ Fluid overload  PT consulted to assess pt's functional mobility and d/c needs  Order placed for PT eval and tx, w/ up and OOB as tolerated order  Comorbidities affecting pt's physical performance at time of assessment include: ESRD on HD (M,W,F), HTN, and (+) current smoker  PTA, pt was independent w/ all functional mobility w/o AD, has 6 + 14 ATILIO with (L) rail, and works full time  Personal factors affecting pt at time of IE include: inaccessible home environment, inability to navigate community distances, decreased cognition, need to use assistive device however pt refusing, limited home support, positive fall history, impulsivity and limited insight into impairments  Please find objective findings from PT assessment regarding body systems outlined above with impairments and limitations including weakness, significantly impaired balance (several episodes of scissoring and multi-directional LOBs that required mod(A) from PT), decreased endurance, gait deviations, decreased activity tolerance, decreased safety awareness, impaired judgement, fall risk and decreased cognition  The following objective measures performed on IE also reveal limitations: Barthel Index: 75/100   Pt's clinical presentation is currently unstable/unpredictable seen in pt's presentation of reports of dizziness, impulsivity, decreased safety awareness/lack of insight re: deficits/impairments, and fluctuating vitals (SaO2 % on room air throughout (pt on room air at baseline) and BP: 178/89 with reports of dizziness)  Pt to benefit from continued PT tx to address deficits as defined above and maximize level of functional independent mobility and consistency  From PT/mobility standpoint, recommendation at time of d/c would be IP rehab pending progress in order to facilitate return to PLOF  In addition, would recommend use of RW however pt adamantly refusing to trial at this time  Discussed with RN and CM  If pt does return home, recommend home PT  Barriers to Discharge Inaccessible home environment;Decreased caregiver support   Goals   Patient Goals "To go home " Pt also expressed desire NOT to use assistive device  STG Expiration Date 11/25/17   Short Term Goal #1 Pt will be able to (1) transfer supine<>sit mod(I), (2) transfer sit<>stand mod(I), (3) ambukate 300 feet without AD (I) or with LRAD mod(I) with improved gait pattern and VSS, (4) increase dynamic standing balance to fair+, and (5) PT to assess stairs - pt must negotiate 6+14 steps with rail and mod(I)   Treatment Day 0   Plan   Treatment/Interventions Functional transfer training;Elevations;LE strengthening/ROM; Therapeutic exercise; Endurance training;Patient/family training;Gait training;Spoke to nursing;Spoke to case management   PT Frequency 5x/wk   Recommendation   Recommendation Post acute IP rehab   Equipment Recommended Walker  (however pt refusing)   PT - OK to Discharge Yes  (if to STR when medically appropriate)   Barthel Index   Feeding 10   Bathing 5   Grooming Score 5   Dressing Score 5   Bladder Score 10   Bowels Score 10   Toilet Use Score 5   Transfers (Bed/Chair) Score 10   Mobility (Level Surface) Score 10   Stairs Score 5   Barthel Index Score 75       Henry Vogel, PT 11/15/17 3:35 PM

## 2017-11-15 NOTE — SOCIAL WORK
Dr Radha Barrett informed CM that he just met with the patient who may be agreeable to snf rehab  CM met with the patient who says he does not like the idea of rehab but will go if he needs to  He does feel that if he can get his HD under control he will fell better and be able to discharge home  At patient's request provided him snf list for local [place] facilities and those in [place]  Patient will review with his girlfriend and his sister and inform CM of choices

## 2017-11-15 NOTE — PLAN OF CARE
Problem: Potential for Falls  Goal: Patient will remain free of falls  INTERVENTIONS:  - Assess patient frequently for physical needs  -  Identify cognitive and physical deficits and behaviors that affect risk of falls  -  [place] fall precautions as indicated by assessment   - Educate patient/family on patient safety including physical limitations  - Instruct patient to call for assistance with activity based on assessment  - Modify environment to reduce risk of injury  - Consider OT/PT consult to assist with strengthening/mobility   Outcome: Progressing      Problem: DISCHARGE PLANNING - CARE MANAGEMENT  Goal: Discharge to post-acute care or home with appropriate resources  INTERVENTIONS:  - Conduct assessment to determine patient/family and health care team treatment goals, and need for post-acute services based on payer coverage, community resources, and patient preferences, and barriers to discharge  - Address psychosocial, clinical, and financial barriers to discharge as identified in assessment in conjunction with the patient/family and health care team  - Arrange appropriate level of post-acute services according to patient's   needs and preference and payer coverage in collaboration with the physician and health care team  - Communicate with and update the patient/family, physician, and health care team regarding progress on the discharge plan  - Arrange appropriate transportation to post-acute venues  - Coordinate resumption of outpatient HD at [facility]     Outcome: Progressing

## 2017-11-15 NOTE — PROGRESS NOTES
Progress Note - Nephrology   Sera Singh 56 y o  male MRN: [id number]  Unit/Bed#: [facility] 501-01 Encounter: [id number]      Assessment / Plan:  1  ESRD on HD MWF at [facility]) - concern for DDS after dialysis 11/13  While his large drop in BUN and neuro changes may be consistent with DDS, typically DDS is seen from BUN drop > 175 with improvement in symptoms within a few hours  Moreover, usual symptoms include nausea, vomiting, headache in setting of symptoms related to cerebral edema  Other symptoms include disorientation, restlessness, blurred vision and asterixis  The symptoms are self limited and should resolve once BUN rises again  Will defer dialysis today  I have reviewed his outpatient record and discovered he has adequate clearance on HD with Kt/V 1 7 as of early October 2017, with BUN 98 down to 17 post dialysis consistent with URR 83  He likely has very quick clearance of BUN with individual sessions  I wonder if he has an underlying catabolic state, ? underlying bleeding  I have concern as to why his BUN is so high despite these adequate HD sessions based on URR and Kt/V  Performed 2hr HD session with Qb 200ml/m, Qd 400ml/m today and seen by me on HD today   -He again did not feel well at the end of dialysis  -would rule out GIB or other hypercatabolic state for rapid rise in BUN   pre HD today   -will assess daily need for HD       2  HTN - BP relatively well controlled today on dialysis while on amlodipine 10mg daily, prn hydralazine/labetalol     3  Mild hyponatremia - sNa 135, correct on dialysis    4  Hyperkalemia - ensure patient on low K diet  Will correct with HD sessions  5  CKD BMD - continue calcitriol 1 5mcg after HD sessions, phoslo 3 tabs TID with meals    6  Mild anemia - Hgb at goal, will avoid AUBRIE in light of this     -The the patient expressed various financial concerns regarding his ability to get to work on time and at his dialysis sessions    He becomes very tearful when he discusses  I believe he would benefit from case management and social work consults  Subjective:   Patient seen examined on dialysis approximately 9:30 a m     Blood pressure 134/66, blood flow 200 mL minute, dialysis flow 4 mL minute, UF goal 1 L  Patient tolerated dialysis well  He did stay at the end of dialysis that he felt loopy and off  He was still verbal       Objective:     Vitals: Blood pressure (!) 171/82, pulse 91, temperature 98 °F (36 7 °C), temperature source Tympanic, resp  rate 16, height 5' 6" (1 676 m), weight 68 kg (150 lb), SpO2 96 %  ,Body mass index is 24 21 kg/m²  Temp (24hrs), Av 2 °F (36 8 °C), Min:97 °F (36 1 °C), Max:98 8 °F (37 1 °C)      Weight (last 2 days)     Date/Time   Weight    17 0153  68 (150)                Intake/Output Summary (Last 24 hours) at 11/15/17 1111  Last data filed at 11/15/17 0945   Gross per 24 hour   Intake              940 ml   Output             1475 ml   Net             -535 ml     I/O last 24 hours: In: 3625 [P O :1160;  I V :500]  Out: 1475 [Other:1475]        Physical Exam:   Gen: NAD  Neck: supple  CV: +s1s2, no friction rub  Pulm: clear b/l  Abdomen: soft, ND, NT  Ext: no pedal edema  Neuro: awake, alert, no obvious tremor today  Skin: warm, dry    Invasive Devices     Peripheral Intravenous Line            Peripheral IV 17 Right Arm 2 days    Peripheral IV 17 Right Hand 2 days          Line            Hemodialysis AV Fistula Left -- days                Medications:    Scheduled Meds:  acetaminophen 650 mg Oral Q8H [facility]   amLODIPine 10 mg Oral Daily   calcium acetate 2,001 mg Oral TID With Meals   docusate sodium 100 mg Oral BID   heparin (porcine) 5,000 Units Subcutaneous Q8H [facility]   nicotine 1 patch Transdermal Daily   pantoprazole 40 mg Intravenous Q12H [facility]   senna 1 tablet Oral Daily       PRN Meds: calcitriol    diphenhydrAMINE    hydrALAZINE    labetalol    ondansetron    simethicone    Continuous Infusions:         LAB RESULTS:        Results from last 7 days  Lab Units 11/15/17  0506 11/14/17  0454 11/13/17  1356 11/13/17  1354 11/13/17  0641 11/13/17  0207   WBC Thousand/uL 6 98 6 78  --  6 86  --  7 52   HEMOGLOBIN g/dL 10 6* 10 4*  --  10 5*  --  10 3*   HEMATOCRIT % 32 7* 32 0*  --  31 4*  --  32 3*   PLATELETS Thousands/uL 216 199  --  245 254 238   NEUTROS PCT % 76*  --   --  80*  --  74   LYMPHS PCT % 9*  --   --  13*  --  14   MONOS PCT % 11  --   --  5  --  6   EOS PCT % 4  --   --  2  --  5   SODIUM mmol/L 135* 135* 136  --  138 139   POTASSIUM mmol/L 6 0* 5 1 3 9  --  5 9* 6 5*   CHLORIDE mmol/L 96* 95* 96*  --  99* 100   CO2 mmol/L 26 31 28  --  24 23   BUN mg/dL 100* 84* 67*  --  146* 141*   CREATININE mg/dL 15 50* 13 30* 9 88*  --  18 80* 18 60*   CALCIUM mg/dL 9 9 9 7 10 1  --  10 1 10 1   ALBUMIN g/dL  --   --  3 4*  --   --  3 6   TOTAL PROTEIN g/dL  --   --  7 9  --   --  8 1   BILIRUBIN TOTAL mg/dL  --   --  1 55*  --   --  1 21*   ALK PHOS U/L  --   --  260*  --   --  256*   ALT U/L  --   --  425*  --   --  449*   AST U/L  --   --  169*  --   --  192*   GLUCOSE RANDOM mg/dL 110 156* 107  --  226* 139   MAGNESIUM mg/dL  --  2 9*  --   --  3 3*  --    PHOSPHORUS mg/dL  --  5 6*  --   --  4 4  --        CUTURES:  No results found for: Harinder Castro              Portions of the record may have been created with voice recognition software  Occasional wrong word or "sound a like" substitutions may have occurred due to the inherent limitations of voice recognition software  Read the chart carefully and recognize, using context, where substitutions have occurred  If you have any questions, please contact the dictating provider

## 2017-11-15 NOTE — PROGRESS NOTES
Pt transported to dialysis on tele box  okay'd by Marcie Bailey MD to be transported without nurse accompaniment

## 2017-11-16 ENCOUNTER — APPOINTMENT (INPATIENT)
Dept: DIALYSIS | Facility: HOSPITAL | Age: 57
DRG: 640 | End: 2017-11-16
Attending: INTERNAL MEDICINE
Payer: MEDICARE

## 2017-11-16 VITALS
OXYGEN SATURATION: 96 % | WEIGHT: 150 LBS | DIASTOLIC BLOOD PRESSURE: 73 MMHG | HEIGHT: 66 IN | HEART RATE: 86 BPM | SYSTOLIC BLOOD PRESSURE: 147 MMHG | BODY MASS INDEX: 24.11 KG/M2 | RESPIRATION RATE: 17 BRPM | TEMPERATURE: 86 F

## 2017-11-16 PROBLEM — E87.70 FLUID OVERLOAD: Status: RESOLVED | Noted: 2017-11-13 | Resolved: 2017-11-16

## 2017-11-16 PROBLEM — G93.40 ENCEPHALOPATHY: Status: RESOLVED | Noted: 2017-11-13 | Resolved: 2017-11-16

## 2017-11-16 PROBLEM — R06.00 DYSPNEA: Status: RESOLVED | Noted: 2017-11-13 | Resolved: 2017-11-16

## 2017-11-16 LAB
ANION GAP SERPL CALCULATED.3IONS-SCNC: 10 MMOL/L (ref 4–13)
BASOPHILS # BLD AUTO: 0.05 THOUSANDS/ΜL (ref 0–0.1)
BASOPHILS NFR BLD AUTO: 1 % (ref 0–1)
BUN SERPL-MCNC: 79 MG/DL (ref 5–25)
CALCIUM SERPL-MCNC: 9.1 MG/DL (ref 8.3–10.1)
CHLORIDE SERPL-SCNC: 98 MMOL/L (ref 100–108)
CO2 SERPL-SCNC: 27 MMOL/L (ref 21–32)
CREAT SERPL-MCNC: 13.5 MG/DL (ref 0.6–1.3)
EOSINOPHIL # BLD AUTO: 0.29 THOUSAND/ΜL (ref 0–0.61)
EOSINOPHIL NFR BLD AUTO: 6 % (ref 0–6)
ERYTHROCYTE [DISTWIDTH] IN BLOOD BY AUTOMATED COUNT: 19.6 % (ref 11.6–15.1)
GFR SERPL CREATININE-BSD FRML MDRD: 4 ML/MIN/1.73SQ M
GLUCOSE SERPL-MCNC: 73 MG/DL (ref 65–140)
HCT VFR BLD AUTO: 32.6 % (ref 36.5–49.3)
HGB BLD-MCNC: 10.6 G/DL (ref 12–17)
LYMPHOCYTES # BLD AUTO: 0.99 THOUSANDS/ΜL (ref 0.6–4.47)
LYMPHOCYTES NFR BLD AUTO: 19 % (ref 14–44)
MCH RBC QN AUTO: 28.9 PG (ref 26.8–34.3)
MCHC RBC AUTO-ENTMCNC: 32.5 G/DL (ref 31.4–37.4)
MCV RBC AUTO: 89 FL (ref 82–98)
MONOCYTES # BLD AUTO: 0.37 THOUSAND/ΜL (ref 0.17–1.22)
MONOCYTES NFR BLD AUTO: 7 % (ref 4–12)
NEUTROPHILS # BLD AUTO: 3.44 THOUSANDS/ΜL (ref 1.85–7.62)
NEUTS SEG NFR BLD AUTO: 67 % (ref 43–75)
NRBC BLD AUTO-RTO: 0 /100 WBCS
PLATELET # BLD AUTO: 230 THOUSANDS/UL (ref 149–390)
POTASSIUM SERPL-SCNC: 5.6 MMOL/L (ref 3.5–5.3)
RBC # BLD AUTO: 3.67 MILLION/UL (ref 3.88–5.62)
SODIUM SERPL-SCNC: 135 MMOL/L (ref 136–145)
T4 FREE SERPL-MCNC: 1.04 NG/DL (ref 0.76–1.46)
WBC # BLD AUTO: 5.15 THOUSAND/UL (ref 4.31–10.16)

## 2017-11-16 PROCEDURE — 84439 ASSAY OF FREE THYROXINE: CPT | Performed by: INTERNAL MEDICINE

## 2017-11-16 PROCEDURE — 85025 COMPLETE CBC W/AUTO DIFF WBC: CPT | Performed by: INTERNAL MEDICINE

## 2017-11-16 PROCEDURE — C9113 INJ PANTOPRAZOLE SODIUM, VIA: HCPCS | Performed by: INTERNAL MEDICINE

## 2017-11-16 PROCEDURE — 80048 BASIC METABOLIC PNL TOTAL CA: CPT | Performed by: INTERNAL MEDICINE

## 2017-11-16 RX ORDER — MANNITOL 20 G/100ML
12.5 INJECTION, SOLUTION INTRAVENOUS ONCE
Status: COMPLETED | OUTPATIENT
Start: 2017-11-16 | End: 2017-11-16

## 2017-11-16 RX ADMIN — SENNOSIDES 8.6 MG: 8.6 TABLET, FILM COATED ORAL at 08:18

## 2017-11-16 RX ADMIN — AMLODIPINE BESYLATE 10 MG: 10 TABLET ORAL at 08:18

## 2017-11-16 RX ADMIN — ACETAMINOPHEN 650 MG: 325 TABLET, FILM COATED ORAL at 13:14

## 2017-11-16 RX ADMIN — GUAIFENESIN 600 MG: 600 TABLET, EXTENDED RELEASE ORAL at 08:19

## 2017-11-16 RX ADMIN — MANNITOL 12.5 G: 20 INJECTION, SOLUTION INTRAVENOUS at 09:45

## 2017-11-16 RX ADMIN — ACETAMINOPHEN 650 MG: 325 TABLET, FILM COATED ORAL at 05:26

## 2017-11-16 RX ADMIN — PANTOPRAZOLE SODIUM 40 MG: 40 INJECTION, POWDER, FOR SOLUTION INTRAVENOUS at 08:18

## 2017-11-16 RX ADMIN — DOCUSATE SODIUM 100 MG: 100 CAPSULE, LIQUID FILLED ORAL at 08:18

## 2017-11-16 RX ADMIN — NICOTINE 1 PATCH: 21 PATCH, EXTENDED RELEASE TRANSDERMAL at 08:19

## 2017-11-16 RX ADMIN — CALCIUM ACETATE 2001 MG: 667 CAPSULE ORAL at 12:08

## 2017-11-16 NOTE — PHYSICAL THERAPY NOTE
Physical Therapy Cancellation Note      Pt off the unit for HD   Will cont to follow as able           Alana Leong, PTA

## 2017-11-16 NOTE — OCCUPATIONAL THERAPY NOTE
Occupational Therapy Evaluation Cancel Note    OT evaluation cancelled at this time  Pt is currently not appropriate to see per RN  Pt is agitated, reporting he will be leaving AMA  Occupational therapy will continue to re-evaluate patient status and resume treatment as patient is medially appropriate

## 2017-11-16 NOTE — PROGRESS NOTES
Progress Note - Nephrology   Khanh Young 56 y o  male MRN: [id number]  [facility] Encounter: [id number]      Assessment / Plan:  1  ESRD on HD MWF at [facility]([place]) - concern for DDS after dialysis   While his large drop in BUN and neuro changes may be consistent with DDS, typically DDS is seen from BUN drop > 175 with improvement in symptoms within a few hours  -as K high, dialyzed again today with slightly high blood flow  Given 12 5g mannitol  Patient tolerated this well  -will assess daily for IHD needs  -would rule out GIB or other hypercatabolic state for rapid rise in BUN  F/u hemeoccult per primary team     2  HTN - BP relatively well controlled today on dialysis while on amlodipine 10mg daily, prn hydralazine/labetalol     3  Mild hyponatremia - sNa 135, correct on dialysis     4  Hyperkalemia - ensure patient on low K diet  Will correct with HD sessions  2K bath today      5  CKD BMD - continue calcitriol 1 5mcg after HD sessions, phoslo 3 tabs TID with meals     6  Mild anemia - Hgb at goal, will avoid AUBRIE in light of this     -The the patient expressed various financial concerns regarding his ability to get to work on time and at his dialysis sessions   Stephania Camacho believe he would benefit from case management and social work consults  Subjective:   Patient seen examined by me on dialysis approximately 10:20 a m     Blood pressure 163/82, blood flow 250 mL minute, dialysis flow 5 mL minute, UF goal 0  Patient received 12 5 g mannitol at the start of dialysis  He tolerated dialysis well today  He felt well at the end of it  Objective:     Vitals: Blood pressure 147/73, pulse 86, temperature (!) 86 °F (30 °C), resp  rate 17, height 5' 6" (1 676 m), weight 68 kg (150 lb), SpO2 96 %  ,Body mass index is 24 21 kg/m²  Temp (24hrs), Av 3 °F (35 7 °C), Min:86 °F (30 °C), Max:98 8 °F (37 1 °C)      Weight (last 2 days)     None            Intake/Output Summary (Last 24 hours) at 11/16/17 1223  Last data filed at 11/16/17 1123   Gross per 24 hour   Intake             1092 ml   Output              500 ml   Net              592 ml     I/O last 24 hours: In: 2072 [P O :1072;  I V :1000]  Out: 1975 [Other:1975]        Physical Exam:   Gen: NAD  Neck: supple  CV: +s1s2, no friction rub  Pulm: clear b/l  Abdomen: soft, ND, NT  Ext: no edema  Neuro: awake, alert, no tremor  Skin: warm, dry    Invasive Devices     Peripheral Intravenous Line            Peripheral IV 11/13/17 Right Arm 3 days    Peripheral IV 11/13/17 Right Hand 3 days          Line            Hemodialysis AV Fistula Left -- days                Medications:    Scheduled Meds:  acetaminophen 650 mg Oral Q8H [address]   amLODIPine 10 mg Oral Daily   calcium acetate 2,001 mg Oral TID With Meals   docusate sodium 100 mg Oral BID   guaiFENesin 600 mg Oral Q12H [address]   heparin (porcine) 5,000 Units Subcutaneous Q8H [address]   nicotine 1 patch Transdermal Daily   pantoprazole 40 mg Intravenous Q12H [address]   senna 1 tablet Oral Daily       PRN Meds: calcitriol    diphenhydrAMINE    hydrALAZINE    labetalol    ondansetron    simethicone    Continuous Infusions:         LAB RESULTS:        Results from last 7 days  Lab Units 11/16/17  0649 11/16/17  0616 11/15/17  0506 11/14/17  0454 11/13/17  1356 11/13/17  1354 11/13/17  0641 11/13/17  0207   WBC Thousand/uL  --  5 15 6 98 6 78  --  6 86  --  7 52   HEMOGLOBIN g/dL  --  10 6* 10 6* 10 4*  --  10 5*  --  10 3*   HEMATOCRIT %  --  32 6* 32 7* 32 0*  --  31 4*  --  32 3*   PLATELETS Thousands/uL  --  230 216 199  --  245 254 238   NEUTROS PCT %  --  67 76*  --   --  80*  --  74   LYMPHS PCT %  --  19 9*  --   --  13*  --  14   MONOS PCT %  --  7 11  --   --  5  --  6   EOS PCT %  --  6 4  --   --  2  --  5   SODIUM mmol/L 135*  --  135* 135* 136  --  138 139   POTASSIUM mmol/L 5 6*  --  6 0* 5 1 3 9  --  5 9* 6 5*   CHLORIDE mmol/L 98*  --  96* 95* 96*  --  99* 100   CO2 mmol/L 27  --  26 31 28  --  24 23 BUN mg/dL 79*  --  100* 84* 67*  --  146* 141*   CREATININE mg/dL 13 50*  --  15 50* 13 30* 9 88*  --  18 80* 18 60*   CALCIUM mg/dL 9 1  --  9 9 9 7 10 1  --  10 1 10 1   ALBUMIN g/dL  --   --   --   --  3 4*  --   --  3 6   TOTAL PROTEIN g/dL  --   --   --   --  7 9  --   --  8 1   BILIRUBIN TOTAL mg/dL  --   --   --   --  1 55*  --   --  1 21*   ALK PHOS U/L  --   --   --   --  260*  --   --  256*   ALT U/L  --   --   --   --  425*  --   --  449*   AST U/L  --   --   --   --  169*  --   --  192*   GLUCOSE RANDOM mg/dL 73  --  110 156* 107  --  226* 139   MAGNESIUM mg/dL  --   --   --  2 9*  --   --  3 3*  --    PHOSPHORUS mg/dL  --   --   --  5 6*  --   --  4 4  --        CUTURES:  No results found for: Candy Cheek              Portions of the record may have been created with voice recognition software  Occasional wrong word or "sound a like" substitutions may have occurred due to the inherent limitations of voice recognition software  Read the chart carefully and recognize, using context, where substitutions have occurred  If you have any questions, please contact the dictating provider

## 2017-11-16 NOTE — SOCIAL WORK
Cm reviewed patient during care coordination rounds  Patient is not medically stable for d/c today  Patient was in dialysis today  Upon return to floor and arrival of patient's reported girlfriend, patient wants to leave the hospital against medical advisement  Cm met with patient to discuss patient's discharge plan  Per patient he wants to present to [place] welfare office to see if patient can receive rental assistance  Cm working on providing list to patient for support  Patient stated that he can not stay here since he needs to find a place to live in the [place] area  Cm reminded patient that he can stay with his sister in [place] or with his girlfriend  Per patient he does not want that type of support and wants to try to find his own living arrangements  Cm questioned patient about where he will receive dialysis since he currently receives it through [facility] in [address] [place] [address]  Patient stated that while he is in the area, he will go to [facility] since he has been there in the past  Patient to be transported home against medical advisement

## 2017-11-17 NOTE — DISCHARGE SUMMARY
[facility]      Patient Information: Roz Morgan 56 y o  male MRN: [id number]  Unit/Bed#: PPHP 923-01 Encounter: [id number]    Discharging Physician / Practitioner: Colleen Serrato MD  PCP: Cari Honeycutt  Admission Date: 11/13/2017  Discharge Date: 11/16/17      Reason for Admission:  Fluid overload secondary to missed hemodialysis session    Discharge Diagnoses:     Principal Problem (Resolved):    Fluid overload    Active/Chronic Problems:    Hypertensive urgency    Hyperkalemia    DM (diabetes mellitus)     ESRD on hemodialysis    Resolved Problems:    Acute Metabolic Encephalopathy     Hypoxic respiratory failure    Encephalopathy        Consultations During Hospital Stay:  · Nephrology  · Neurology      Hospital Course:     Roz Morgan is a 56 y o  male patient who originally presented to the hospital on 11/13/2017 due to the shortness of breath from fluid overload due to a missed hemodialysis session  According to the patient, he told the emergency room physician that he missed his dialysis session as he was recently released on oral from being institutionalized and wanted to [facility] by going to a party where he ended up using alcohol and drugs  He was worried that a drug screen during dialysis would  the substances in his body  He was initially hypoxic which was relieved with resuming dialysis in the inpatient setting  He also notes that although he is from [place], he was looking for new housing here in the [place] area and case management was working on setting him up for dialysis here  The patient also had some elevated hypertension secondary to his missed dialysis which improved with fluid removal   He apparently had some myoclonic jerks for which Neurology was consulted and an EEG was done which was unremarkable for seizure-like activity  An MRI of the brain was also unremarkable for acute etiology    He was noted to have a history of right vertebral artery stenosis for which his aspirin and Lipitor were continued  He was heavily counseled on compliance to all his medications especially his blood pressure control regimen  His metabolic encephalopathy on admission resolved with continued hemodialysis  On 11/16, the patient stated that he would sign out against medical advice as he had to [address] on" fully realizing that he has not been set up with outpatient dialysis here in [place]  The patient adamantly stated that he would come back to the ER the following day for more dialysis and "to be admitted again if I have to be  Patient was thoroughly explained the risks of signing out against medical advice including increased risk of bodily harm and/or possible death which he expressed understanding to  Nursing staff was told to have him sign the [facility] liability papers which he agreed to do prior to leaving  Condition at Discharge: stable       Discharge Day Visit / Exam:     Vitals: Blood Pressure: 147/73 (11/16/17 1123)  Pulse: 86 (11/16/17 1123)  Temperature: (!) 86 °F (30 °C) (11/16/17 1000)  Temp Source: Tympanic (11/16/17 0855)  Respirations: 17 (11/16/17 0855)  Height: 5' 6" (167 6 cm) (11/13/17 0153)  Weight - Scale: 68 kg (150 lb) (11/13/17 0153)  SpO2: 96 % (11/16/17 0813)      Physical exam - I had a face-to-face encounter with the patient on day of discharge  Discussion with Patient and/or Family:  The patient has been advised to return to the ER immediately if any symptoms recur or worsen  Discharge instructions/Information to Patient and/or Family:   See after visit summary for information provided to patient and/or family  Provisions for Follow-Up Care:  See after visit summary for information related to follow-up care and any pertinent home health orders          Disposition:  Home - signed out [address]        Discharge Statement:  I spent 32 minutes discharging the patient  This time was spent on the day of discharge  I had direct contact with the patient on the day of discharge  Greater than 50% of the total time was spent examining patient, answering all patient questions, arranging and discussing plan of care with patient as well as directly providing post-discharge instructions  Additional time then spent on discharge activities  Discharge Medications:  See after visit summary for reconciled discharge medications provided to patient and family        ** Please Note: This note has been constructed using a voice recognition system **

## 2018-01-11 NOTE — PROCEDURES
Procedures by Narayan Correa MD at  2017  1:31 PM      Author:  Narayan Correa MD Service:  Neurology Author Type:  Physician    Filed:  2017  1:45 PM Date of Service:  2017  1:31 PM Status:  Signed    :  Narayan Correa MD (Physician)        Procedure Orders:       1  EEG awake or drowsy routine [[id number]] ordered by Jeanne Jones PA-C at [facility]                  ELECTROENCEPHALOGRAM    Patient Name:  Wendy Vicente  MRN: [id number]   :  1960 File #: Angele Gaucher    Age: 56 y o  Encounter #: [id number]   Date performed: 2017 Referring Provider: Meila Mccall MD          Report date: 2017          Study type: awake and drowsy EEG    ICD 10 diagnosis: Transient alteration of awareness R40 4 and Encephalopathy, unspecified G93 40    Patient History:  EEG is requested to assess for seizures and/or classification of epilepsy  Patient is 56 y o  male Patient was admitted to hospital after shortness breath, in the setting of missing his hemodialysis session  During transportation the patient  was unable to move himself from the stretcher to the bed, noted to be confused and aphasic, and responding to all questions with yes   EEG to rule out seizures  Current AEDs:  Medications include:   acetaminophen 650 mg Oral Q8H [address]   amLODIPine 10 mg Oral Daily   calcium acetate 2,001 mg Oral TID With Meals   docusate sodium 100 mg Oral BID   heparin (porcine) 5,000 Units Subcutaneous Q8H [address]   nicotine 1 patch Transdermal Daily   senna 1 tablet Oral Daily       Description of Procedure: The EEG was performed with electrodes applied using the International 10-20 System  Additional electrodes used included EOG, ECG and T1/T2 electrodes  A single lead ECG channel is also  present  At least 16 channels are reviewed at a time  and formatted into longitudinal bipolar, transverse bipolar, and referential (to common reference or calculated common reference) montages     The EEG was recorded  with the patient awake and drowsy  The recording was technically satisfactory  EEG was recorded from 08:53 to 09:26  Findings:   Background Activity: The background is grossly symmetric with respect to voltages and activities  During wakefulness, the background is well-organized with anterior low amplitude beta activity and low  to low-moderate amplitude posterior alpha-beta activity and central midline theta activity  There is a symmetric  8-8 5 Hz posterior dominant rhythm that attenuates with eye opening  Drowsiness is characterized by attentuation of the alpha rhythm, prominent anterior beta activity, central theta activity, roving eye movements and vertex waves  Activation Procedures:   Hyperventilation was not performed  Stepped photic stimulation from 1 to 30 fps was performed and produced no abnormality  Other findings: The single lead ECG shows a regular and sinus rhythm  During this study, there is frequent EMG artifact that causes spike-like deflections on the EEG  Interpretation: This is a normal 33 minutes awake and drowsy EEG  Recurrent EMG artifact on the EEG without clear epileptiform discharge suggest frequent myoclonic or muscle jerks without cortical discharge, clinical correlation is recommended  Karsten Ramsey MD  [facility]  Kathy HEATH    Nov 14 2017  1:45PM [facility] Standard Time

## 2018-03-11 ENCOUNTER — APPOINTMENT (OUTPATIENT)
Dept: DIALYSIS | Facility: HOSPITAL | Age: 58
End: 2018-03-11
Payer: MEDICARE

## 2018-03-11 ENCOUNTER — HOSPITAL ENCOUNTER (OUTPATIENT)
Facility: HOSPITAL | Age: 58
Setting detail: OBSERVATION
Discharge: HOME | End: 2018-03-12
Attending: EMERGENCY MEDICINE | Admitting: INTERNAL MEDICINE
Payer: MEDICARE

## 2018-03-11 DIAGNOSIS — D63.1 ANEMIA IN CHRONIC KIDNEY DISEASE, ON CHRONIC DIALYSIS (HCC): ICD-10-CM

## 2018-03-11 DIAGNOSIS — N25.81 SECONDARY HYPERPARATHYROIDISM OF RENAL ORIGIN (HCC): ICD-10-CM

## 2018-03-11 DIAGNOSIS — N18.5 BENIGN HYPERTENSION WITH CKD (CHRONIC KIDNEY DISEASE) STAGE V (HCC): ICD-10-CM

## 2018-03-11 DIAGNOSIS — Z99.2 ESRD (END STAGE RENAL DISEASE) ON DIALYSIS (HCC): ICD-10-CM

## 2018-03-11 DIAGNOSIS — N18.6 ESRD (END STAGE RENAL DISEASE) ON DIALYSIS (HCC): ICD-10-CM

## 2018-03-11 DIAGNOSIS — E87.5 HYPERKALEMIA: Chronic | ICD-10-CM

## 2018-03-11 DIAGNOSIS — I12.0 BENIGN HYPERTENSION WITH CKD (CHRONIC KIDNEY DISEASE) STAGE V (HCC): ICD-10-CM

## 2018-03-11 DIAGNOSIS — N18.6 ESRD (END STAGE RENAL DISEASE) (HCC): Primary | ICD-10-CM

## 2018-03-11 DIAGNOSIS — N18.6 ANEMIA IN CHRONIC KIDNEY DISEASE, ON CHRONIC DIALYSIS (HCC): ICD-10-CM

## 2018-03-11 DIAGNOSIS — Z99.2 ANEMIA IN CHRONIC KIDNEY DISEASE, ON CHRONIC DIALYSIS (HCC): ICD-10-CM

## 2018-03-11 LAB
ANION GAP SERPL CALCULATED.3IONS-SCNC: 14 MMOL/L (ref 4–13)
ATRIAL RATE: 79 BPM
BUN SERPL-MCNC: 127 MG/DL (ref 5–25)
CALCIUM SERPL-MCNC: 8.5 MG/DL (ref 8.3–10.1)
CHLORIDE SERPL-SCNC: 107 MMOL/L (ref 100–108)
CO2 SERPL-SCNC: 18 MMOL/L (ref 21–32)
CREAT SERPL-MCNC: 18.7 MG/DL (ref 0.6–1.3)
GFR SERPL CREATININE-BSD FRML MDRD: 3 ML/MIN/1.73SQ M
GLUCOSE SERPL-MCNC: 73 MG/DL (ref 65–140)
P AXIS: 74 DEGREES
POTASSIUM SERPL-SCNC: 6.8 MMOL/L (ref 3.5–5.3)
PR INTERVAL: 184 MS
QRS AXIS: 79 DEGREES
QRSD INTERVAL: 102 MS
QT INTERVAL: 392 MS
QTC INTERVAL: 449 MS
SODIUM SERPL-SCNC: 139 MMOL/L (ref 136–145)
T WAVE AXIS: 70 DEGREES
VENTRICULAR RATE: 79 BPM

## 2018-03-11 PROCEDURE — 99220 PR INITIAL OBSERVATION CARE/DAY 70 MINUTES: CPT | Performed by: INTERNAL MEDICINE

## 2018-03-11 PROCEDURE — 99214 OFFICE O/P EST MOD 30 MIN: CPT | Performed by: INTERNAL MEDICINE

## 2018-03-11 PROCEDURE — 99284 EMERGENCY DEPT VISIT MOD MDM: CPT

## 2018-03-11 PROCEDURE — 93005 ELECTROCARDIOGRAM TRACING: CPT

## 2018-03-11 PROCEDURE — 93010 ELECTROCARDIOGRAM REPORT: CPT | Performed by: INTERNAL MEDICINE

## 2018-03-11 PROCEDURE — 80048 BASIC METABOLIC PNL TOTAL CA: CPT | Performed by: EMERGENCY MEDICINE

## 2018-03-11 PROCEDURE — 36415 COLL VENOUS BLD VENIPUNCTURE: CPT | Performed by: EMERGENCY MEDICINE

## 2018-03-11 RX ORDER — HEPARIN SODIUM 5000 [USP'U]/ML
5000 INJECTION, SOLUTION INTRAVENOUS; SUBCUTANEOUS EVERY 8 HOURS SCHEDULED
Status: DISCONTINUED | OUTPATIENT
Start: 2018-03-11 | End: 2018-03-12 | Stop reason: HOSPADM

## 2018-03-11 RX ORDER — AMLODIPINE BESYLATE 10 MG/1
10 TABLET ORAL DAILY
Status: DISCONTINUED | OUTPATIENT
Start: 2018-03-11 | End: 2018-03-12 | Stop reason: HOSPADM

## 2018-03-11 RX ORDER — CINACALCET 30 MG/1
30 TABLET, FILM COATED ORAL 2 TIMES DAILY WITH MEALS
Status: DISCONTINUED | OUTPATIENT
Start: 2018-03-11 | End: 2018-03-12 | Stop reason: HOSPADM

## 2018-03-11 RX ORDER — CINACALCET 30 MG/1
30 TABLET, FILM COATED ORAL
COMMUNITY
Start: 2016-12-27

## 2018-03-11 RX ORDER — GABAPENTIN 300 MG/1
300 CAPSULE ORAL DAILY
Status: DISCONTINUED | OUTPATIENT
Start: 2018-03-11 | End: 2018-03-12 | Stop reason: HOSPADM

## 2018-03-11 RX ORDER — GABAPENTIN 100 MG/1
300 CAPSULE ORAL DAILY
COMMUNITY
End: 2019-04-16 | Stop reason: SDUPTHER

## 2018-03-11 RX ADMIN — GABAPENTIN 300 MG: 300 CAPSULE ORAL at 18:45

## 2018-03-11 RX ADMIN — AMLODIPINE BESYLATE 10 MG: 10 TABLET ORAL at 18:45

## 2018-03-11 RX ADMIN — CALCIUM GLUCONATE 1 G: 98 INJECTION, SOLUTION INTRAVENOUS at 15:07

## 2018-03-11 RX ADMIN — CINACALCET HYDROCHLORIDE 30 MG: 30 TABLET, COATED ORAL at 18:45

## 2018-03-11 RX ADMIN — HEPARIN SODIUM 5000 UNITS: 5000 INJECTION, SOLUTION INTRAVENOUS; SUBCUTANEOUS at 16:02

## 2018-03-11 RX ADMIN — HEPARIN SODIUM 5000 UNITS: 5000 INJECTION, SOLUTION INTRAVENOUS; SUBCUTANEOUS at 21:15

## 2018-03-11 RX ADMIN — CALCIUM ACETATE 2001 MG: 667 CAPSULE ORAL at 18:46

## 2018-03-11 NOTE — ASSESSMENT & PLAN NOTE
Patient denies any history of diabetes but has noted history on chart  None on a medications  Check Accu-Cheks with sliding scale coverage

## 2018-03-11 NOTE — CONSULTS
Consultation - Nephrology   Oleh Claude 56 y o  male MRN: [id number]  Unit/Bed#: [facility] 794-63 Encounter: [id number]    ASSESSMENT AND PLAN:  Patient is 79-year-old male with ESRD on HD on MWF schedule, presented for dialysis and as he was not feeling well with shakiness  We are consulted for dialysis management  ESRD on HD on MWF schedule at [facility] in [place]  - emergency dialysis today and again will do dialysis regular session tomorrow  - clinically patient seems euvolemic   - goal UF to outpatient dry weight    Access, upper extremity AV fistula with good bruit and thrill present    Severe life-threatening hyperkalemia, serum potassium 6 8  - likely secondary to change in schedule and missing dialysis for three days  Patient's last dialysis was on Thursday which was cut short for 2 5 hours only  - will do emergency dialysis with 2K bath for 2 hours today  Low bicarb likely secondary to metabolic acidosis in the setting of ESRD  - dialysis today  Monitor BMP in a m  Zaina hCristiansen Severe azotemia  - continue to closely monitor  If persistent, check stool occult to rule out GI bleed  CKD anemia, check hemoglobin in a m  if patient is here  Hypertension, uncontrolled  - continue to monitor blood pressure after dialysis  Status post 2 L UF removal   Also remains on amlodipine  CKD BMD, continue phosphorus binders PhosLo with each meal   Secondary hyperparathyroidism, continue Sensipar  Overall stable from Nephrology standpoint for discharge  Patient is here, will do dialysis again tomorrow  HISTORY OF PRESENT ILLNESS:  Requesting Physician: Jasmyn Pierre MD  Reason for Consult:  ESRD    Oleh Claude is a 56y o  year old male who was admitted to [facility] after presenting with generalized weakness, shakiness  A renal consultation is requested today for assistance in the management of dialysis  Old medical records were reviewed    Patient goes for dialysis on Monday, Wednesday, Friday schedule at [facility]  Due to when to stone issues schedule was changed and he had last dialysis on Thursday which was actually cut short to 2 5 hours due to some chores patient had to do at home  Since then patient did not have any dialysis  He started feeling shaky, generalized weakness and he says that any time he has this symptoms it is likely secondary to increase in his creatinine  Due to these symptoms he presented to the hospital for need for dialysis  Upon arrival he was found to have severe life-threatening hyperkalemia along with severe azotemia and emergency dialysis was done  At the time of my encounter, patient denies any chest pain or shortness of breath  Denies any, nausea, vomiting or diarrhea  Denies any abdominal pain  Denies any lightheadedness or dizziness  PAST MEDICAL HISTORY:  Past Medical History:   Diagnosis Date    [facility]ialysis patient ([facility] [address] )     Hypertension     Renal disorder        PAST SURGICAL HISTORY:  History reviewed  No pertinent surgical history  ALLERGIES:  No Known Allergies    SOCIAL HISTORY:  History   Alcohol Use    Yes     History   Drug Use No     History   Smoking Status    Current Every Day Smoker    Packs/day: 1 00    Types: Cigarettes   Smokeless Tobacco    Current User       FAMILY HISTORY:  History reviewed  No pertinent family history      MEDICATIONS:    Current Facility-Administered Medications:     amLODIPine (NORVASC) tablet 10 mg, 10 mg, Oral, Daily, Cedric Santana, , 10 mg at 03/11/18 1845    calcium acetate (PHOSLO) capsule 2,001 mg, 2,001 mg, Oral, TID With Meals, Refugia Eddie, DO, 2,001 mg at 03/11/18 1846    cinacalcet (SENSIPAR) tablet 30 mg, 30 mg, Oral, BID With Meals, Cedric Santana DO, 30 mg at 03/11/18 1845    gabapentin (NEURONTIN) capsule 300 mg, 300 mg, Oral, Daily, Cderic Santana DO, 300 mg at 03/11/18 1845    heparin (porcine) subcutaneous injection 5,000 Units, 5,000 Units, Subcutaneous, Q8H [address] **AND** Platelet count, , , Once, Yoon Barriga DO    REVIEW OF SYSTEMS:  Complete 10 point review of systems were obtained and discussed in length with the patient  Complete review of systems were negative / unremarkable except mentioned in the HPI section  PHYSICAL EXAM:  Current Weight: Weight - Scale: 71 7 kg (158 lb)  First Weight: Weight - Scale: 71 7 kg (158 lb)  Vitals:    03/11/18 1843   BP: (!) 181/86   Pulse: 83   Resp: 17   Temp: 97 5 °F (36 4 °C)   SpO2: 100%       Intake/Output Summary (Last 24 hours) at 03/11/18 1900  Last data filed at 03/11/18 1800   Gross per 24 hour   Intake              500 ml   Output             2500 ml   Net            -2000 ml       Physical Examination:  General:  Lying in bed, no acute distress  Eyes:  Mild conjunctival pallor present  ENT:  External examination of ears and nose unremarkable  Neck:  Supple, no JVD present  Respiratory:  Bilateral air entry present, no wheezing or crackles bilaterally  CVS:  S1, S2 present  GI:  Soft, nontender, nondistended  CNS:  Active alert oriented x3, asterixis present  Extremities:  No edema lower extremities  Psych:  Conscious, coherent, oriented  Skin:  No rash in both legs    Invasive Devices:      Lab Results:     Results from last 7 days  Lab Units 03/11/18  1306   SODIUM mmol/L 139   POTASSIUM mmol/L 6 8*   CHLORIDE mmol/L 107   CO2 mmol/L 18*   BUN mg/dL 127*   CREATININE mg/dL 18 70*   CALCIUM mg/dL 8 5   GLUCOSE RANDOM mg/dL 73       Other Studies:   No results found for this or any previous visit  Results for orders placed during the hospital encounter of 11/13/17   X-ray chest 2 views    Narrative CHEST - DUAL ENERGY    INDICATION:  Shortness of breath  COMPARISON:  None    VIEWS:  PA (including soft tissue/bone algorithms) and lateral projections    IMAGES:  4    FINDINGS:         The heart is enlarged  Atherosclerotic changes in the aorta  Lungs are well aerated  Blanche and pulmonary vessels are prominent  Perihilar alveolar infiltrates  Visualized osseous structures appear within normal limits for the patient's age  Impression Bilateral perihilar infiltrates, most compatible with alveolar edema  Less likely would be alveolar pneumonia  Workstation performed: [id number]       No results found for this or any previous visit  No results found for this or any previous visit  No results found for this or any previous visit  No results found for this or any previous visit  Portions of the record may have been created with voice recognition software  Occasional wrong word or "sound a like" substitutions may have occurred due to the inherent limitations of voice recognition software  Read the chart carefully and recognize, using context, where substitutions have occurred

## 2018-03-11 NOTE — ASSESSMENT & PLAN NOTE
See above, resume dialysis today  Nephrology consult with potential need for repeat dialysis in a hilary Rivas

## 2018-03-11 NOTE — H&P
H&P- Harley Maher 1960, 56 y o  male MRN: [id number]    Unit/Bed#: JOSEPH Encounter: [id number]    Primary Care Provider: Rishabh He   Date and time admitted to hospital: 3/11/2018 11:23 AM                    * Hyperkalemia   Assessment & Plan     61-year-old male who presents with having missed, at least, 1 round of dialysis  Patient complains of difficulty with moving limbs as well as stuttering speech  Hyperkalemia noted on initial evaluation  One amp calcium gluconate given  Urgent dialysis initiated today  Consult Nephrology          Fluid overload, unspecified   Assessment & Plan     See above, resume dialysis today  Nephrology consult with potential need for repeat dialysis in a m             DM (diabetes mellitus) ([facility] )   Assessment & Plan     Patient denies any history of diabetes but has noted history on chart  None on a medications  Check Accu-Cheks with sliding scale coverage                    [facility]     Patient Information: Harley Maher 56 y o  male MRN: [id number]  Unit/Bed#: ED 21 Encounter: [id number]  Admitting Physician: Lawanda Horton DO  PCP: Rishabh He  Date of Admission:  03/11/18     Assessment/Plan:     Hospital Problem List:      Principal Problem:    Hyperkalemia  Active Problems:    Fluid overload, unspecified    ESRD (end stage renal disease) on dialysis ([facility] )    DM (diabetes mellitus) ([facility] )        Plan for the Primary Problem(s):  · Urgent dialysis today with potential repeat in a m      Plan for Additional Problems:   · Repeat potassium in a m      VTE Prophylaxis: Heparin  / foot pump applied   Code Status:  Full  POLST: There is no POLST form on file for this patient (pre-hospital)     Anticipated Length of Stay:  Patient will be admitted on an Observation basis with an anticipated length of stay of  1 midnights     Justification for Hospital Stay:  Malignant hyperkalemia     Total Time for Visit, including Counseling / Coordination of Care: 30 minutes  Greater than 50% of this total time spent on direct patient counseling and coordination of care      Chief Complaint:   I know that my creatinine is too high      History of Present Illness:     Darek King is a 56 y o  male who presents with known history of end-stage renal disease requiring dialysis  Patient unable to give history as to what actually was reason for his kidney failure  He states that he normally case dialysis Tuesday/Thursday/Saturday  He states that on Thursday he had cut his dialysis short secondary to other obligations  He then did not get his dialysis on Saturday stating that he had to [place] care of my home  He comes in today with complaints of increased stuttering and leg weakness which she states are classic symptoms uremia for him  He denies any other complaints on full review of systems        Review of Systems:     Review of Systems   Neurological:        Increased stuttering and leg weakness/numbness  All other systems reviewed and are negative         Past Medical and Surgical History:      Medical History        Past Medical History:   Diagnosis Date    [facility]ialysis patient ([facility] )      Hypertension      Renal disorder              Surgical History   History reviewed  No pertinent surgical history         Meds/Allergies:             Prior to Admission medications    Medication Sig Start Date End Date Taking?  Authorizing Provider   amLODIPine (NORVASC) 10 mg tablet Take 10 mg by mouth daily     Yes Historical Provider, MD   calcium acetate (PHOSLO) 667 mg capsule Take 2,001 mg by mouth 3 (three) times a day with meals     Yes Historical Provider, MD   cinacalcet (SENSIPAR) 30 mg tablet 30 mg 12/27/16   Yes Historical Provider, MD   gabapentin (NEURONTIN) 100 mg capsule Take 300 mg by mouth daily       Yes Historical Provider, MD      I have reviewed home medications with patient personally      Allergies: No Known Allergies     Social History: Marital Status:       Substance Use History:       History   Alcohol Use    Yes           History   Smoking Status    Current Every Day Smoker    Packs/day: 1 00    Types: Cigarettes   Smokeless Tobacco    Current User          History   Drug Use No         Family History:     History reviewed  No pertinent family history      Physical Exam:      Vitals:   Blood Pressure: 164/79 (03/11/18 1400)  Pulse: 78 (03/11/18 1400)  Temperature: 97 9 °F (36 6 °C) (03/11/18 1121)  Temp Source: Oral (03/11/18 1121)  Respirations: 18 (03/11/18 1121)  Height: 5' 6" (167 6 cm) (03/11/18 1121)  Weight - Scale: 71 7 kg (158 lb) (03/11/18 1121)  SpO2: 100 % (03/11/18 1400)     Physical Exam   Constitutional: He is oriented to person, place, and time  56-year-old [facility] American male  Appears comfortable in general   HENT:   Head: Normocephalic  Neck: Normal range of motion  Pulmonary/Chest: Effort normal and breath sounds normal    Abdominal: Soft  Musculoskeletal: Normal range of motion  Bruit noted over left AV graft site   Neurological: He is alert and oriented to person, place, and time  Skin: Skin is warm and dry  Psychiatric: He has a normal mood and affect  His behavior is normal  Thought content normal                Additional Data:      Lab Results: I have personally reviewed pertinent reports               Results from last 7 days  Lab Units 03/11/18  1306   SODIUM mmol/L 139   POTASSIUM mmol/L 6 8*   CHLORIDE mmol/L 107   CO2 mmol/L 18*   BUN mg/dL 127*   CREATININE mg/dL 18 70*   CALCIUM mg/dL 8 5   GLUCOSE RANDOM mg/dL 73             Imaging: I have personally reviewed pertinent reports        No results found      EKG, Pathology, and Other Studies Reviewed on Admission:   · EKG:  Reviewed     Allscripts / Epic Records Reviewed: Yes      ** Please Note: This note has been constructed using a voice recognition system   **

## 2018-03-11 NOTE — ED ATTENDING ATTESTATION
Renetta Betancourt DO, saw and evaluated the patient  I have discussed the patient with the resident/non-physician practitioner and agree with the resident's/non-physician practitioner's findings, Plan of Care, and MDM as documented in the resident's/non-physician practitioner's note, except where noted  All available labs and Radiology studies were reviewed  At this point I agree with the current assessment done in the Emergency Department  I have conducted an independent evaluation of this patient including a focused history and a physical exam     ED Note - Travis Pfeiffer 56 y o  male MRN: [id number]  Unit/Bed#: ED 21 Encounter: [id number]    History of Present Illness   HPI  Travis Pfeiffer is a 56 y o  male who presents stating that he needs dialysis  States that he has been cutting his sessions short due buying a new house  He is ESRD on HD (M/W/F) but did go on Thursday for a short session but not yesterday  He states that he feels that the toxins are building up  He is pending renal transplant and is following at [facility]  He is otherwise asymptomatic  REVIEW OF SYSTEMS  See HPI for further details  12 systems reviewed and otherwise negative except as noted  Historical Information     PAST MEDICAL HISTORY  Past Medical History:   Diagnosis Date    [facility]     Hypertension     Renal disorder        FAMILY HISTORY  History reviewed  No pertinent family history  SOCIAL HISTORY  Social History     Social History    Marital status:      Spouse name: N/A    Number of children: N/A    Years of education: N/A     Social History Main Topics    Smoking status: Current Every Day Smoker     Packs/day: 1 00     Types: Cigarettes    Smokeless tobacco: Current User    Alcohol use Yes    Drug use: No    Sexual activity: Not Asked     Other Topics Concern    None     Social History Narrative    None       SURGICAL HISTORY  History reviewed  No pertinent surgical history    Meds/Allergies CURRENT MEDICATIONS  No current facility-administered medications for this encounter  Current Outpatient Prescriptions:     amLODIPine (NORVASC) 10 mg tablet, Take 10 mg by mouth daily, Disp: , Rfl:     calcium acetate (PHOSLO) 667 mg capsule, Take 2,001 mg by mouth 3 (three) times a day with meals, Disp: , Rfl:     cinacalcet (SENSIPAR) 30 mg tablet, 30 mg, Disp: , Rfl:     gabapentin (NEURONTIN) 100 mg capsule, Take 300 mg by mouth daily  , Disp: , Rfl:     (Not in a hospital admission)    ALLERGIES  No Known Allergies  Objective     PHYSICAL EXAM    VITAL SIGNS: Blood pressure (!) 186/85, pulse 83, temperature 97 9 °F (36 6 °C), temperature source Oral, resp  rate 18, height 5' 6" (1 676 m), weight 71 7 kg (158 lb), SpO2 100 %  Constitutional:  Appears well developed and well nourished, no acute distress, non-toxic appearance   Eyes:  PERRL, EOMI, conjunctivae pink, sclerae non-icteric    HENT:  Normocephalic/Atraumatic, no rhinorrhea, mucous membranes moist  Neck: normal range of motion, no tenderness, supple   Respiratory:  No respiratory distress, normal breath sounds   Cardiovascular:  Normal rate, normal rhythm    GI:  Soft, non-tender, non-distended   :  No CVAT, no flank ecchymosis  Musculoskeletal:  No swelling or edema, no tenderness, no deformities  Integument:  Pink, warm, dry, Well hydrated, no rash, no erythema, no bullae   Lymphatic:  No cervical/ tonsillar/ submandibular lymphadenopathy noted   Neurologic:  Awake, Alert & oriented x 3, CN 2-12 intact, no focal neurological deficits  Psychiatric:  Speech and behavior appropriate       ED COURSE and MDM:    Assessment/Plan   Assessment:  Danilo Dumont is a 56 y o  male presents for dialysis  Plan:  Labs, nephrology consult, disposition as appropriate  Portions of the record may have been created with voice recognition software   Occasional wrong word or "sound a like" substitutions may have occurred due to the inherent limitations of voice recognition software       ED Provider  Electronically Signed by

## 2018-03-11 NOTE — ASSESSMENT & PLAN NOTE
72-year-old male who presents with having missed, at least, 1 round of dialysis  Patient complains of difficulty with moving limbs as well as stuttering speech  Hyperkalemia noted on initial evaluation  One amp calcium gluconate given  Urgent dialysis initiated today    Consult Nephrology

## 2018-03-11 NOTE — ED PROVIDER NOTES
History  Chief Complaint   Patient presents with    Dialysis - Asymptomatic     Pt skipped his Friday Dialysis and came in today for dialysis     61-year-old male with end-stage renal disease on dialysis  presents to the emergency department after leaving dialysis early on Thursday, patient has been missing and cutting his dialysis appointments short recently due to recent construction work on a new house and moving into a new house  Patient states he has no symptoms at this time however knows that he needs more dialysis  He denies any shortness of breath or chest pain denies any nausea or vomiting denies any headaches or vision changes and feels otherwise well his only complaint is that the toxins are building up  ROS negative  History provided by:  Patient   used: No        Prior to Admission Medications   Prescriptions Last Dose Informant Patient Reported? Taking? amLODIPine (NORVASC) 10 mg tablet   Yes Yes   Sig: Take 10 mg by mouth daily   calcium acetate (PHOSLO) 667 mg capsule   Yes Yes   Sig: Take 2,001 mg by mouth 3 (three) times a day with meals   cinacalcet (SENSIPAR) 30 mg tablet   Yes Yes   Si mg   gabapentin (NEURONTIN) 100 mg capsule   Yes Yes   Sig: Take 300 mg by mouth daily        Facility-Administered Medications: None       Past Medical History:   Diagnosis Date    [facility] ([facility] )     Hypertension     Renal disorder        History reviewed  No pertinent surgical history  History reviewed  No pertinent family history  I have reviewed and agree with the history as documented  Social History   Substance Use Topics    Smoking status: Current Every Day Smoker     Packs/day: 1 00     Types: Cigarettes    Smokeless tobacco: Current User    Alcohol use Yes        Review of Systems   Constitutional: Negative for chills, fatigue and fever  HENT: Negative for sore throat  Eyes: Negative for visual disturbance  Respiratory: Negative for shortness of breath  Cardiovascular: Negative for chest pain  Gastrointestinal: Negative for abdominal pain, blood in stool, constipation, diarrhea, nausea and vomiting  Genitourinary: Negative for difficulty urinating, dysuria and hematuria  Musculoskeletal: Negative for arthralgias  Skin: Negative for rash  Neurological: Negative for syncope, weakness and headaches  Hematological: Negative for adenopathy  Psychiatric/Behavioral: Negative for agitation and behavioral problems  All other systems reviewed and are negative  Physical Exam  ED Triage Vitals [03/11/18 1121]   Temperature Pulse Respirations Blood Pressure SpO2   97 9 °F (36 6 °C) 83 18 (!) 186/85 100 %      Temp Source Heart Rate Source Patient Position - Orthostatic VS BP Location FiO2 (%)   Oral Monitor Sitting Right arm --      Pain Score       No Pain           Orthostatic Vital Signs  Vitals:    03/11/18 1700 03/11/18 1730 03/11/18 1745 03/11/18 1843   BP: 167/86 163/92 162/84 (!) 181/86   Pulse: 85 86 82 83   Patient Position - Orthostatic VS:   Lying Lying       Physical Exam   Constitutional: He is oriented to person, place, and time  He appears well-developed and well-nourished  HENT:   Head: Normocephalic and atraumatic  Eyes: Conjunctivae and EOM are normal  No scleral icterus  Neck: Normal range of motion  Neck supple  Cardiovascular: Normal rate and regular rhythm  No murmur heard  Pulmonary/Chest: Effort normal and breath sounds normal    Abdominal: Soft  Bowel sounds are normal  There is no tenderness  Musculoskeletal: Normal range of motion  Neurological: He is alert and oriented to person, place, and time  Skin: Skin is warm and dry  Psychiatric: He has a normal mood and affect  His behavior is normal    Nursing note and vitals reviewed        ED Medications  Medications   heparin (porcine) subcutaneous injection 5,000 Units (not administered)   amLODIPine (NORVASC) tablet 10 mg (10 mg Oral Given 3/11/18 1845)   calcium acetate (PHOSLO) capsule 2,001 mg (2,001 mg Oral Given 3/11/18 1846)   cinacalcet (SENSIPAR) tablet 30 mg (30 mg Oral Given 3/11/18 1845)   gabapentin (NEURONTIN) capsule 300 mg (300 mg Oral Given 3/11/18 1845)   calcium gluconate 1 g in sodium chloride 0 9 % 100 mL IVPB (0 g Intravenous Stopped 3/11/18 1840)       Diagnostic Studies  Results Reviewed     Procedure Component Value Units Date/Time    Platelet count [[id number]]     Lab Status:  No result Specimen:  Blood     Basic metabolic panel [[id number]]  (Abnormal) Collected:  03/11/18 1306    Lab Status:  Final result Specimen:  Blood from Arm, Right Updated:  03/11/18 1353     Sodium 139 mmol/L      Potassium 6 8 (HH) mmol/L      Chloride 107 mmol/L      CO2 18 (L) mmol/L      Anion Gap 14 (H) mmol/L       (H) mg/dL      Creatinine 18 70 (H) mg/dL      Glucose 73 mg/dL      Calcium 8 5 mg/dL      eGFR 3 ml/min/1 73sq m     Narrative:         National Kidney Disease Education Program recommendations are as follows:  GFR calculation is accurate only with a steady state creatinine  Chronic Kidney disease less than 60 ml/min/1 73 sq  meters  Kidney failure less than 15 ml/min/1 73 sq  meters  No orders to display         Procedures  Procedures      Phone Consults  ED Phone Contact    ED Course  ED Course as of Mar 11 1935   Dyllan Sams Mar 11, 2018   1333 Spoke with Nephrology and on last patient's BMP is significant for severe hyperkalemia, he is not in need of emergent dialysis      1355 Potassium: (!!) 6 8   46 Called Nephrology regarding patient's potassium level, he will check with dialysis nurses regarding logistics of emergent dialysis and if the patient requires at this time      1359 BUN: (!) 127   1359 Creatinine: (!) 18 70                               MDM  Number of Diagnoses or Management Options  [facility] established and worsening  Diagnosis management comments: 56-year-old male with end-stage renal on dialysis will obtain a BMP to evaluate electrolyte status  Patient found to have a potassium of 6 8, discussed patient with Nephrology and agreed that patient will be admitted for dialysis today and likely tomorrow  Will also give patient 1 gram of any calcium gluconate for his potassium  Patient will be admitted observation to  IM for dialysis         Amount and/or Complexity of Data Reviewed  Clinical lab tests: ordered and reviewed  Review and summarize past medical records: yes  Discuss the patient with other providers: yes      CritCare Time    Disposition  Final diagnoses:   ESRD (end stage renal disease) ([facility] )     Time reflects when diagnosis was documented in both MDM as applicable and the Disposition within this note     Time User Action Codes Description Comment    3/11/2018  2:09 PM Lewis Khan Add [N18 6] ESRD (end stage renal disease) ([facility] )     3/11/2018  7:08 PM Mitchell Estela Add [N18 6,  D63 1,  Z99 2] Anemia in chronic kidney disease, on chronic dialysis ([facility] )     3/11/2018  7:08 PM Karly Morillo [N18 6,  D63 1,  Z99 2] Anemia in chronic kidney disease, on chronic dialysis ([facility] )     3/11/2018  7:08 PM Sandeep Estela Modify [N18 6,  D63 1,  Z99 2] Anemia in chronic kidney disease, on chronic dialysis ([facility] )     3/11/2018  7:08 PM Mitchell Todd Add [I12 0,  N18 5] Benign hypertension with CKD (chronic kidney disease) stage V ([facility] )     3/11/2018  7:08 PM Mitchell Estela Modify [N18 6,  D63 1,  Z99 2] Anemia in chronic kidney disease, on chronic dialysis ([facility] )     3/11/2018  7:08 PM Mitchell Estela Modify [I12 0,  N18 5] Benign hypertension with CKD (chronic kidney disease) stage V ([facility] )     3/11/2018  7:08 PM Mitchell Estela Modify [N18 6,  D63 1,  Z99 2] Anemia in chronic kidney disease, on chronic dialysis ([facility] )     3/11/2018  7:08 PM Sandeep Todd Modify [I12 0,  N18 5] Benign hypertension with CKD (chronic kidney disease) stage V ([place] [address] )     3/11/2018  7:08 PM Evangelista Vasquez Add [E87 5] Hyperkalemia     3/11/2018  7:08 PM Erven Kurk Modify [N18 6,  D63 1,  Z99 2] Anemia in chronic kidney disease, on chronic dialysis ([place] [address] )     3/11/2018  7:08 PM Erven Kurk Modify [I12 0,  N18 5] Benign hypertension with CKD (chronic kidney disease) stage V ([place] [address] )     3/11/2018  7:08 PM SurejaSobiaEvangelista Modify [E87 5] Hyperkalemia     3/11/2018  7:08 PM  Evangelista Sandoval Modify [N18 6,  D63 1,  Z99 2] Anemia in chronic kidney disease, on chronic dialysis ([place] [address] )     3/11/2018  7:08 PM Erven Kurk Modify [I12 0,  N18 5] Benign hypertension with CKD (chronic kidney disease) stage V ([place] [address] )     3/11/2018  7:08 PM SurejaSobiaEvangelista Modify [E87 5] Hyperkalemia     3/11/2018  7:08 PM Erven Kurk Modify [N18 6,  D63 1,  Z99 2] Anemia in chronic kidney disease, on chronic dialysis ([place] [address] )     3/11/2018  7:08 PM Erven Kurk Modify [I12 0,  N18 5] Benign hypertension with CKD (chronic kidney disease) stage V ([place] [address] )     3/11/2018  7:08 PM SureSobia yatesval Modify [E87 5] Hyperkalemia     3/11/2018  7:08 PM Evangelista Vasquez Add [N25 81] Secondary hyperparathyroidism of renal origin ([place] [address] )     3/11/2018  7:08 PM Erven Kurk Modify [N18 6,  D63 1,  Z99 2] Anemia in chronic kidney disease, on chronic dialysis ([place] [address] )     3/11/2018  7:08 PM Erven Kurk Modify [I12 0,  N18 5] Benign hypertension with CKD (chronic kidney disease) stage V ([place] [address] )     3/11/2018  7:08 PM Evangelista Vasquez Modify [E87 5] Hyperkalemia     3/11/2018  7:08 PM Sasha Vasquez Modify [N25 81] Secondary hyperparathyroidism of renal origin ([address] )     3/11/2018  7:35 PM Joe Morales, 7719  35 [facility] [C28 922] Dialysis (juvenile) of retina (with detachment)     3/11/2018  7:35 PM Spike Worley Remove [H33 049] Dialysis (juvenile) of retina (with detachment)       ED Disposition     ED Disposition Condition Comment    Admit  Case was discussed with Dr Brando Long and the patient's admission status was agreed to be Admission Status: observation status to the service of Dr Adair Guzman     Follow-up Information    None       Current Discharge Medication List      CONTINUE these medications which have NOT CHANGED    Details   amLODIPine (NORVASC) 10 mg tablet Take 10 mg by mouth daily      calcium acetate (PHOSLO) 667 mg capsule Take 2,001 mg by mouth 3 (three) times a day with meals      cinacalcet (SENSIPAR) 30 mg tablet 30 mg      gabapentin (NEURONTIN) 100 mg capsule Take 300 mg by mouth daily             No discharge procedures on file  ED Provider  Attending physically available and evaluated Yara Fountain I managed the patient along with the ED Attending      Electronically Signed by         Lorenzo Cordova MD  03/11/18 6397

## 2018-03-11 NOTE — PROGRESS NOTES
Progress Note - Basil Phelps 1960, 56 y o  male MRN: [id number]    Unit/Bed#: ED 21 Encounter: [id number]    Primary Care Provider: Javi Hernandez   Date and time admitted to hospital: 3/11/2018 11:23 AM        * Hyperkalemia   Assessment & Plan    53-year-old male who presents with having missed, at least, 1 round of dialysis  Patient complains of difficulty with moving limbs as well as stuttering speech  Hyperkalemia noted on initial evaluation  One amp calcium gluconate given  Urgent dialysis initiated today  Consult Nephrology        Fluid overload, unspecified   Assessment & Plan    See above, resume dialysis today  Nephrology consult with potential need for repeat dialysis in a m         DM (diabetes mellitus) [facility])   Assessment & Plan    Patient denies any history of diabetes but has noted history on chart  None on a medications  Check Accu-Cheks with sliding scale coverage  History and Physical - [facility]    Patient Information: Basil Phelps 56 y o  male MRN: [id number]  Unit/Bed#: ED 21 Encounter: [id number]  Admitting Physician: Keena Rogers DO  PCP: Javi Hernandez  Date of Admission:  03/11/18    Assessment/Plan:    Hospital Problem List:     Principal Problem:    Hyperkalemia  Active Problems:    Fluid overload, unspecified    ESRD (end stage renal disease) on dialysis ([facility] )    DM (diabetes mellitus) ([facility] )      Plan for the Primary Problem(s):  · Urgent dialysis today with potential repeat in a m  Plan for Additional Problems:   · Repeat potassium in a m  VTE Prophylaxis: Heparin  / foot pump applied   Code Status:  Full  POLST: There is no POLST form on file for this patient (pre-hospital)    Anticipated Length of Stay:  Patient will be admitted on an Observation basis with an anticipated length of stay of  1 midnights     Justification for Hospital Stay:  Malignant hyperkalemia    Total Time for Visit, including Counseling / Coordination of Care: 30 minutes  Greater than 50% of this total time spent on direct patient counseling and coordination of care  Chief Complaint:   I know that my creatinine is too high  History of Present Illness:    Starr Viveros is a 56 y o  male who presents with known history of end-stage renal disease requiring dialysis  Patient unable to give history as to what actually was reason for his kidney failure  He states that he normally case dialysis Tuesday/Thursday/Saturday  He states that on Thursday he had cut his dialysis short secondary to other obligations  He then did not get his dialysis on Saturday stating that he had to [place] care of my home  He comes in today with complaints of increased stuttering and leg weakness which she states are classic symptoms uremia for him  He denies any other complaints on full review of systems       Review of Systems:    Review of Systems   Neurological:        Increased stuttering and leg weakness/numbness  All other systems reviewed and are negative  Past Medical and Surgical History:     Past Medical History:   Diagnosis Date    [facility]     Hypertension     Renal disorder        History reviewed  No pertinent surgical history  Meds/Allergies:    Prior to Admission medications    Medication Sig Start Date End Date Taking? Authorizing Provider   amLODIPine (NORVASC) 10 mg tablet Take 10 mg by mouth daily   Yes Historical Provider, MD   calcium acetate (PHOSLO) 667 mg capsule Take 2,001 mg by mouth 3 (three) times a day with meals   Yes Historical Provider, MD   cinacalcet (SENSIPAR) 30 mg tablet 30 mg 12/27/16  Yes Historical Provider, MD   gabapentin (NEURONTIN) 100 mg capsule Take 300 mg by mouth daily     Yes Historical Provider, MD     I have reviewed home medications with patient personally      Allergies: No Known Allergies    Social History:     Marital Status:      Substance Use History:   History   Alcohol Use    Yes     History   Smoking Status    Current Every Day Smoker    Packs/day: 1 00    Types: Cigarettes   Smokeless Tobacco    Current User     History   Drug Use No       Family History:    History reviewed  No pertinent family history  Physical Exam:     Vitals:   Blood Pressure: 164/79 (03/11/18 1400)  Pulse: 78 (03/11/18 1400)  Temperature: 97 9 °F (36 6 °C) (03/11/18 1121)  Temp Source: Oral (03/11/18 1121)  Respirations: 18 (03/11/18 1121)  Height: 5' 6" (167 6 cm) (03/11/18 1121)  Weight - Scale: 71 7 kg (158 lb) (03/11/18 1121)  SpO2: 100 % (03/11/18 1400)    Physical Exam   Constitutional: He is oriented to person, place, and time  16-year-old anda American male  Appears comfortable in general   HENT:   Head: Normocephalic  Neck: Normal range of motion  Pulmonary/Chest: Effort normal and breath sounds normal    Abdominal: Soft  Musculoskeletal: Normal range of motion  Bruit noted over left AV graft site   Neurological: He is alert and oriented to person, place, and time  Skin: Skin is warm and dry  Psychiatric: He has a normal mood and affect  His behavior is normal  Thought content normal            Additional Data:     Lab Results: I have personally reviewed pertinent reports  Results from last 7 days  Lab Units 03/11/18  1306   SODIUM mmol/L 139   POTASSIUM mmol/L 6 8*   CHLORIDE mmol/L 107   CO2 mmol/L 18*   BUN mg/dL 127*   CREATININE mg/dL 18 70*   CALCIUM mg/dL 8 5   GLUCOSE RANDOM mg/dL 73           Imaging: I have personally reviewed pertinent reports  No results found  EKG, Pathology, and Other Studies Reviewed on Admission:   · EKG:  Reviewed    [facility] Yes     ** Please Note: This note has been constructed using a voice recognition system   **

## 2018-03-12 ENCOUNTER — APPOINTMENT (OUTPATIENT)
Dept: DIALYSIS | Facility: HOSPITAL | Age: 58
End: 2018-03-12
Attending: INTERNAL MEDICINE
Payer: MEDICARE

## 2018-03-12 VITALS
OXYGEN SATURATION: 93 % | BODY MASS INDEX: 24.23 KG/M2 | RESPIRATION RATE: 18 BRPM | WEIGHT: 150.79 LBS | HEART RATE: 77 BPM | TEMPERATURE: 98.7 F | DIASTOLIC BLOOD PRESSURE: 86 MMHG | HEIGHT: 66 IN | SYSTOLIC BLOOD PRESSURE: 154 MMHG

## 2018-03-12 LAB
ANION GAP SERPL CALCULATED.3IONS-SCNC: 11 MMOL/L (ref 4–13)
BUN SERPL-MCNC: 76 MG/DL (ref 5–25)
CALCIUM SERPL-MCNC: 8.3 MG/DL (ref 8.3–10.1)
CHLORIDE SERPL-SCNC: 102 MMOL/L (ref 100–108)
CO2 SERPL-SCNC: 26 MMOL/L (ref 21–32)
CREAT SERPL-MCNC: 12.4 MG/DL (ref 0.6–1.3)
ERYTHROCYTE [DISTWIDTH] IN BLOOD BY AUTOMATED COUNT: 16.6 % (ref 11.6–15.1)
GFR SERPL CREATININE-BSD FRML MDRD: 5 ML/MIN/1.73SQ M
GLUCOSE SERPL-MCNC: 118 MG/DL (ref 65–140)
HCT VFR BLD AUTO: 32.9 % (ref 36.5–49.3)
HGB BLD-MCNC: 10.8 G/DL (ref 12–17)
MCH RBC QN AUTO: 29.2 PG (ref 26.8–34.3)
MCHC RBC AUTO-ENTMCNC: 32.8 G/DL (ref 31.4–37.4)
MCV RBC AUTO: 89 FL (ref 82–98)
PLATELET # BLD AUTO: 205 THOUSANDS/UL (ref 149–390)
PMV BLD AUTO: 11 FL (ref 8.9–12.7)
POTASSIUM SERPL-SCNC: 4.5 MMOL/L (ref 3.5–5.3)
RBC # BLD AUTO: 3.7 MILLION/UL (ref 3.88–5.62)
SODIUM SERPL-SCNC: 139 MMOL/L (ref 136–145)
WBC # BLD AUTO: 4 THOUSAND/UL (ref 4.31–10.16)

## 2018-03-12 PROCEDURE — 99217 PR OBSERVATION CARE DISCHARGE MANAGEMENT: CPT | Performed by: PHYSICIAN ASSISTANT

## 2018-03-12 PROCEDURE — 90935 HEMODIALYSIS ONE EVALUATION: CPT | Performed by: INTERNAL MEDICINE

## 2018-03-12 PROCEDURE — G0257 UNSCHED DIALYSIS ESRD PT HOS: HCPCS

## 2018-03-12 PROCEDURE — 85027 COMPLETE CBC AUTOMATED: CPT | Performed by: INTERNAL MEDICINE

## 2018-03-12 PROCEDURE — 80048 BASIC METABOLIC PNL TOTAL CA: CPT | Performed by: INTERNAL MEDICINE

## 2018-03-12 RX ADMIN — CINACALCET HYDROCHLORIDE 30 MG: 30 TABLET, COATED ORAL at 10:49

## 2018-03-12 RX ADMIN — CALCIUM ACETATE 2001 MG: 667 CAPSULE ORAL at 11:07

## 2018-03-12 RX ADMIN — CALCIUM ACETATE 2001 MG: 667 CAPSULE ORAL at 06:19

## 2018-03-12 RX ADMIN — GABAPENTIN 300 MG: 300 CAPSULE ORAL at 10:49

## 2018-03-12 RX ADMIN — AMLODIPINE BESYLATE 10 MG: 10 TABLET ORAL at 10:49

## 2018-03-12 NOTE — DISCHARGE SUMMARY
Discharge Summary - [facility] Internal Medicine    Patient Information: Cleve Kramer 56 y o  male MRN: [id number]  Unit/Bed#: CW2 213-02 Encounter: [id number]    Discharging Physician / Practitioner: Doreen Peralta PA-C  PCP: Manisha Gomez  Admission Date: 3/11/2018  Discharge Date: 03/12/18    Disposition:     Home     Reason for Admission: hyperkalemia, fluid overload    Discharge Diagnoses:     Principal Problem:    Hyperkalemia  Active Problems:    Fluid overload, unspecified    DM (diabetes mellitus) ([facility] )    ESRD (end stage renal disease) on dialysis ([facility] )    Benign hypertension with CKD (chronic kidney disease) stage V ([place] [address] )    Secondary hyperparathyroidism of renal origin ([place] [address] )    Anemia in chronic kidney disease, on chronic dialysis ([place] [address] )  Resolved Problems:    * No resolved hospital problems  *      Consultations During Hospital Stay:  · Nephrology    Procedures Performed:     · none    Significant Findings / Test Results:     · Potassium 6 8    Incidental Findings:   · none     Test Results Pending at Discharge (will require follow up):   · none     Outpatient Tests Requested:  · none    Complications:  none    Hospital Course:     Cleve Kramer is a 56 y o  male patient who originally presented to the hospital on 3/11/2018 due to leg weakness and stuttering  The patient has a known history of end-stage renal disease requiring dialysis   Patient unable to give history as to what actually was reason for his kidney failure   He states that he normally case dialysis Tuesday/Thursday/Saturday  [facility] states that on Thursday he had cut his dialysis short secondary to other obligations  [facility] then did not get his dialysis on Saturday stating that he had to [facility] of my Uma Pall comes in today with complaints of increased stuttering and leg weakness which she states are classic symptoms uremia for him      On admission Nephrology was consulted and felt the patient needed emergency dialysis on the day of admission 3/11/18  The patient also received a regular dialysis session prior to discharge 3/12/18  Repeat labs showed patient's hyperkalemia resolved and was noted to be 4 5  Patient was seen by Nephrology prior to discharge and felt he was stable for discharge home  The patient was instructed to call for a follow up appointment with his PCP and nephrology  The patient will need to arrange for transport to [facility] as an outpatient  Condition at Discharge: good     Discharge Day Visit / Exam:     * Please refer to separate progress note for these details *    Discussion with Family: n/a      Discharge instructions/Information to patient and family:   See after visit summary for information provided to patient and family  Provisions for Follow-Up Care:  See after visit summary for information related to follow-up care and any pertinent home health orders  Planned Readmission: no    Discharge Statement:  I spent 25 minutes discharging the patient  This time was spent on the day of discharge  I had direct contact with the patient on the day of discharge  Greater than 50% of the total time was spent examining patient, answering all patient questions, arranging and discussing plan of care with patient as well as directly providing post-discharge instructions  Additional time then spent on discharge activities  Discharge Medications:  See after visit summary for reconciled discharge medications provided to patient and family  ** Please Note: This note has been constructed using a voice recognition system   **

## 2018-03-12 NOTE — SOCIAL WORK
CM met with patient and explained cm role  Pt alert and oriented  Pt reports he lives in a 2 story home w/family w/4 kiah  Pt denies DME, VNA, and rehab  Pt reports good support at home, reports being independent PTA, he drives and has transport w/family for d/c  Pts pharmacy is [facility] in [address] [place]  Pt denies hx/admission for drug/etoh and psych/mental health  Pt reports he was unable to get to his dialysis appointment due to him needing to get furniture and a home in the [facility]  Pts new address is [address] [place] [address]    CM reviewed d/c planning process including the following: identifying help at home, patient preference for d/c planning needs, Discharge Lounge, [facility] Meds to Bed program, availability of treatment team to discuss questions or concerns patient and/or family may have regarding understanding medications and recognizing signs and symptoms once discharged  CM also encouraged patient to follow up with all recommended appointments after discharge  Patient advised of importance for patient and family to participate in managing patients medical well being

## 2018-03-12 NOTE — CASE MANAGEMENT
Initial Clinical Review    Admission: Date/Time/Statement: 03/11/2018 @ 1419  Orders Placed This Encounter   Procedures    Place in Observation (expected length of stay for this patient is less than two midnights)     Standing Status:   Standing     Number of Occurrences:   1     Order Specific Question:   Admitting Physician     Answer:   Jose Farrar [[id number]]     Order Specific Question:   Level of Care     Answer:   Med Surg [16]         ED: Date/Time/Mode of Arrival:   ED Arrival Information     Expected Arrival Acuity Means of Arrival Escorted By Service Admission Type    - 3/11/2018 11:18 Urgent Walk-In Self General Medicine Urgent    Arrival Complaint    dizzy          Chief Complaint:   Chief Complaint   Patient presents with    Dialysis - Asymptomatic     Pt skipped his Friday Dialysis and came in today for dialysis       History of Illness:   Harleen Roth is a 56 y  o  male who presents with known history of end-stage renal disease requiring dialysis   Patient unable to give history as to what actually was reason for his kidney failure   He states that he normally case dialysis Tuesday/Thursday/Saturday  [facility] states that on Thursday he had cut his dialysis short secondary to other obligations  [facility] then did not get his dialysis on Saturday stating that he had to [place] care of my Venessa Lozano comes in today with complaints of increased stuttering and leg weakness which she states are classic symptoms uremia for him      ED Vital Signs:   ED Triage Vitals [03/11/18 1121]   Temperature Pulse Respirations Blood Pressure SpO2   97 9 °F (36 6 °C) 83 18 (!) 186/85 100 %      Temp Source Heart Rate Source Patient Position - Orthostatic VS BP Location FiO2 (%)   Oral Monitor Sitting Right arm --      Pain Score       No Pain        Wt Readings from Last 1 Encounters:   03/12/18 68 4 kg (150 lb 12 7 oz)     Abnormal Labs/Diagnostic Test Results:   SODIUM mmol/L 139   POTASSIUM mmol/L 6 8*   CHLORIDE mmol/L 107   CO2 mmol/L 18*   BUN mg/dL 127*   CREATININE mg/dL 18 70*   CALCIUM mg/dL 8 5   GLUCOSE RANDOM mg/dL 73     EC, Normal sinus rhythm, Possible Left atrial enlargement, Septal infarct , age undetermined  Abnormal ECG    ED Treatment:   Medication Administration from 2018 1118 to 2018 1829       Date/Time Order Dose Route Action Action by Comments     2018 1507 calcium gluconate 1 g in sodium chloride 0 9 % 100 mL IVPB 1 g Intravenous Given Monserrat eSwell RN      2018 1602 heparin (porcine) subcutaneous injection 5,000 Units 5,000 Units Subcutaneous Given by Other Ariel Lester RN           Past Medical/Surgical History: Active Ambulatory Problems     Diagnosis Date Noted    Fluid overload, unspecified 2017    Hypertensive urgency 2017    Hyperkalemia 2017    Acute hypoxemic respiratory failure ([facility] ) 2017    DM (diabetes mellitus) ([place] [address] ) 2015    Elevated troponin 2017     Resolved Ambulatory Problems     Diagnosis Date Noted    Dyspnea 2017    Encephalopathy 2017     Past Medical History:   Diagnosis Date    Dialysis patient ([place] [address] )     Hypertension     Renal disorder        Admitting Diagnosis: ESRD (end stage renal disease) ([place] [address] ) [N18 6]  Illness, unspecified [R69]    Age/Sex: 56 y o  male    Assessment/Plan:   * Hyperkalemia   Assessment & Plan     82-year-old male who presents with having missed, at least, 1 round of dialysis  Patient complains of difficulty with moving limbs as well as stuttering speech  Hyperkalemia noted on initial evaluation  One amp calcium gluconate given  Urgent dialysis initiated today  Consult Nephrology          Fluid overload, unspecified   Assessment & Plan     See above, resume dialysis today  Nephrology consult with potential need for repeat dialysis in a m             DM (diabetes mellitus) ([facility] )   Assessment & Plan     Patient denies any history of diabetes but has noted history on chart   None on a medications  Check Accu-Cheks with sliding scale coverage        Hyperkalemia  Active Problems:    Fluid overload, unspecified    ESRD (end stage renal disease) on dialysis ([facility])    DM [facility]diabetes mellitus) ([facility] )        Plan for the Primary Problem(s):  · Urgent dialysis today with potential repeat in a m      Plan for Additional Problems:   · Repeat potassium in a m      VTE Prophylaxis: Heparin  / foot pump applied   Anticipated Length of Stay: Emerald Noel will be admitted on an Observation basis with an anticipated length of stay of  1 midnights    Justification for Hospital Stay:  Malignant hyperkalemia       Admission Orders:  Scheduled Meds:   Current Facility-Administered Medications:  amLODIPine 10 mg Oral Daily   calcium acetate 2,001 mg Oral TID With Meals   cinacalcet 30 mg Oral BID With Meals   gabapentin 300 mg Oral Daily   heparin (porcine) 5,000 Units Subcutaneous Q8H [facility]     dialysis    -----------------------------------------------------------------------------------------------------------------------------    03/11/2018  Consult Nephrology  Patient is 16-year-old male with ESRD on HD on MWF schedule, presented for dialysis and as he was not feeling well with shakiness  We are consulted for dialysis management      ESRD on HD on MWF schedule at [facility] [place]  - emergency dialysis today and again will do dialysis regular session tomorrow  - clinically patient seems euvolemic   - goal UF to outpatient dry weight     Access, upper extremity AV fistula with good bruit and thrill present     Severe life-threatening hyperkalemia, serum potassium 6 8  - likely secondary to change in schedule and missing dialysis for three days  Patient's last dialysis was on Thursday which was cut short for 2 5 hours only  - will do emergency dialysis with 2K bath for 2 hours today      Low bicarb likely secondary to metabolic acidosis in the setting of ESRD  - dialysis today    Monitor BMP in a m      Severe azotemia  - continue to closely monitor  If persistent, check stool occult to rule out GI bleed      CKD anemia, check hemoglobin in a m  if patient is here      Hypertension, uncontrolled  - continue to monitor blood pressure after dialysis  Status post 2 L UF removal   Also remains on amlodipine      CKD BMD, continue phosphorus binders PhosLo with each meal   Secondary hyperparathyroidism, continue Sensipar      Overall stable from Nephrology standpoint for discharge  Patient is here, will do dialysis again tomorrow

## 2018-03-12 NOTE — PROGRESS NOTES
NEPHROLOGY PROGRESS NOTE   Clarissa De Leon 56 y o  male MRN: [id number]  Unit/Bed#: CW2 213-02 Encounter: [id number]  Reason for  nsult:  End-stage renal disease    ASSESSMENT/PLAN:  1  End-stage renal disease, maintenance hemodialysis Monday Wednesday Friday, [facility]  2  Hyperkalemia, resolved post hemodialysis  3  Metabolic acidosis, secondary to renal failure, improved, post hemodialysis  4  Anemia of chronic disease, hemoglobin stable at 10 8  5  Mineral bone disorder  6  Hypertension, continue with outpatient BP medications    · Overall patient is clinically improved  · Electrolytes improved  · Anticipated discharge post hemodialysis today, patient needs to arrange transfer to [facility] as an outpatient    SUBJECTIVE:  Seen and examined  Patient awake alert  Currently on hemodialysis  Denies any chest pain, shortness of breath  Denies any abdominal pain  Anxious to return to work  Review of Systems    OBJECTIVE:  Current Weight: Weight - Scale: 68 4 kg (150 lb 12 7 oz)  Vitals:    03/12/18 0840 03/12/18 0845 03/12/18 0930 03/12/18 1000   BP: (!) 175/89  (!) 172/95 (!) 173/88   BP Location:       Pulse: 83 77 84 83   Resp:       Temp:       TempSrc:       SpO2:       Weight:       Height:           Intake/Output Summary (Last 24 hours) at 03/12/18 1007  Last data filed at 03/12/18 0840   Gross per 24 hour   Intake              700 ml   Output             2500 ml   Net            -1800 ml       Physical Exam   Constitutional: He is oriented to person, place, and time  He appears well-developed  No distress  HENT:   Head: Normocephalic  Mouth/Throat: Oropharynx is clear and moist    Eyes: Conjunctivae are normal  No scleral icterus  Neck: Neck supple  No JVD present  Cardiovascular: Normal rate, regular rhythm and normal heart sounds  Pulmonary/Chest: Effort normal and breath sounds normal  No respiratory distress  He has no wheezes  He has no rales  Abdominal: Soft  There is no tenderness  Musculoskeletal: He exhibits no edema  Neurological: He is alert and oriented to person, place, and time  Skin: Skin is warm and dry  No rash noted  Psychiatric: He has a normal mood and affect  His behavior is normal    Nursing note and vitals reviewed        Medications:    Current Facility-Administered Medications:     amLODIPine (NORVASC) tablet 10 mg, 10 mg, Oral, Daily, Cedric Santana DO, 10 mg at 03/11/18 1845    calcium acetate (PHOSLO) capsule 2,001 mg, 2,001 mg, Oral, TID With Meals, Daria Crawford DO, 2,001 mg at 03/12/18 0619    cinacalcet (SENSIPAR) tablet 30 mg, 30 mg, Oral, BID With Meals, Cedric Santana DO, 30 mg at 03/11/18 1845    gabapentin (NEURONTIN) capsule 300 mg, 300 mg, Oral, Daily, Cedric Santana DO, 300 mg at 03/11/18 1845    heparin (porcine) subcutaneous injection 5,000 Units, 5,000 Units, Subcutaneous, Q8H [address], 5,000 Units at 03/11/18 2115 **AND** [CANCELED] Platelet count, , , Once, Daria Crawford DO    Laboratory Results:    Results from last 7 days  Lab Units 03/12/18  0850 03/11/18  1306   WBC Thousand/uL 4 00*  --    HEMOGLOBIN g/dL 10 8*  --    HEMATOCRIT % 32 9*  --    PLATELETS Thousands/uL 205  --    SODIUM mmol/L 139 139   POTASSIUM mmol/L 4 5 6 8*   CHLORIDE mmol/L 102 107   CO2 mmol/L 26 18*   BUN mg/dL 76* 127*   CREATININE mg/dL 12 40* 18 70*   CALCIUM mg/dL 8 3 8 5   GLUCOSE RANDOM mg/dL 118 73

## 2018-03-12 NOTE — CASE MANAGEMENT
Continued Stay Review    Date: 03/12/2018    Vital Signs: BP (!) 172/95   Pulse 84   Temp 98 7 °F (37 1 °C) (Oral)   Resp 18   Ht 5' 6" (1 676 m)   Wt 68 4 kg (150 lb 12 7 oz)   SpO2 93%   BMI 24 34 kg/m²     Medications:   Scheduled Meds:   Current Facility-Administered Medications:  amLODIPine 10 mg Oral Daily   calcium acetate 2,001 mg Oral TID With Meals   cinacalcet 30 mg Oral BID With Meals   gabapentin 300 mg Oral Daily   heparin (porcine) 5,000 Units Subcutaneous Q8H [address]     Abnormal Labs/Diagnostic Results:    Ref Range & Units 3/12/18 0850 3/11/18 1306    Sodium 136 - 145 mmol/L 139  139     Potassium 3 5 - 5 3 mmol/L 4 5  6 8CM      Chloride 100 - 108 mmol/L 102  107     CO2 21 - 32 mmol/L 26  18      Anion Gap 4 - 13 mmol/L 11  14      BUN 5 - 25 mg/dL 76   127      Creatinine 0 60 - 1 30 mg/dL 12 40   18 70CM     Comments: Standardized to IDMS reference method    Glucose 65 - 140 mg/dL 118  73CM    Comments:    If the patient is fasting, the ADA then defines impaired fasting glucose as > 100 mg/dL and diabetes as > or equal to 123 mg/dL  Specimen collection should occur prior to Sulfasalazine administration due to the potential for falsely depressed results  Specimen collection should occur prior to Sulfapyridine administration due to the potential for falsely elevated results  Calcium 8 3 - 10 1 mg/dL 8 3  8 5     eGFR ml/min/1 73sq m 5  3        Age/Sex: 56 y o  male     Assessment/Plan: 0940,     Discharge Plan:   03/12/18 1223  Discharge patient Once     Discharge Disposition: Home/Self Care    Expected Discharge Date: 03/12/18              -------------------------------------------------------------------------------------------------------------------------      03/12/2018  Progress Note Nephrology  ASSESSMENT/PLAN:  1  End-stage renal disease, maintenance hemodialysis Monday Wednesday Friday, [facility]  2  Hyperkalemia, resolved post hemodialysis  3   Metabolic acidosis, secondary to renal failure, improved, post hemodialysis  4  Anemia of chronic disease, hemoglobin stable at 10 8  5  Mineral bone disorder  6   Hypertension, continue with outpatient BP medications     · Overall patient is clinically improved  · Electrolytes improved  · Anticipated discharge post hemodialysis today, patient needs to arrange transfer to [facility] as an outpatient

## 2018-04-17 ENCOUNTER — HOSPITAL ENCOUNTER (INPATIENT)
Facility: HOSPITAL | Age: 58
LOS: 4 days | Discharge: HOME | DRG: 981 | End: 2018-04-21
Attending: EMERGENCY MEDICINE | Admitting: INTERNAL MEDICINE
Payer: MEDICARE

## 2018-04-17 ENCOUNTER — TELEPHONE (OUTPATIENT)
Dept: CARDIOLOGY CLINIC | Facility: CLINIC | Age: 58
End: 2018-04-17

## 2018-04-17 ENCOUNTER — APPOINTMENT (EMERGENCY)
Dept: RADIOLOGY | Facility: HOSPITAL | Age: 58
DRG: 981 | End: 2018-04-17
Payer: MEDICARE

## 2018-04-17 DIAGNOSIS — N18.6 ESRD (END STAGE RENAL DISEASE) ON DIALYSIS (HCC): ICD-10-CM

## 2018-04-17 DIAGNOSIS — I48.0 PAROXYSMAL ATRIAL FIBRILLATION (HCC): ICD-10-CM

## 2018-04-17 DIAGNOSIS — I48.91 ATRIAL FIBRILLATION WITH RAPID VENTRICULAR RESPONSE (HCC): Primary | ICD-10-CM

## 2018-04-17 DIAGNOSIS — Z99.2 ESRD (END STAGE RENAL DISEASE) ON DIALYSIS (HCC): ICD-10-CM

## 2018-04-17 DIAGNOSIS — I47.1 PAROXYSMAL SVT (SUPRAVENTRICULAR TACHYCARDIA) (HCC): ICD-10-CM

## 2018-04-17 PROBLEM — D63.1 ANEMIA OF RENAL DISEASE: Status: ACTIVE | Noted: 2018-04-17

## 2018-04-17 PROBLEM — J20.8 ACUTE BRONCHITIS DUE TO OTHER SPECIFIED ORGANISMS: Status: ACTIVE | Noted: 2018-04-17

## 2018-04-17 PROBLEM — N18.9 ANEMIA OF RENAL DISEASE: Status: ACTIVE | Noted: 2018-04-17

## 2018-04-17 LAB
ABO GROUP BLD: NORMAL
ALBUMIN SERPL BCP-MCNC: 3.3 G/DL (ref 3.5–5)
ALP SERPL-CCNC: 89 U/L (ref 46–116)
ALT SERPL W P-5'-P-CCNC: 15 U/L (ref 12–78)
ANION GAP SERPL CALCULATED.3IONS-SCNC: 16 MMOL/L (ref 4–13)
AST SERPL W P-5'-P-CCNC: 36 U/L (ref 5–45)
BASE EXCESS BLDA CALC-SCNC: -3 MMOL/L (ref -2–3)
BASOPHILS # BLD AUTO: 0.03 THOUSANDS/ΜL (ref 0–0.1)
BASOPHILS NFR BLD AUTO: 1 % (ref 0–1)
BILIRUB SERPL-MCNC: 0.47 MG/DL (ref 0.2–1)
BLD GP AB SCN SERPL QL: NEGATIVE
BUN SERPL-MCNC: 125 MG/DL (ref 5–25)
CA-I BLD-SCNC: 1 MMOL/L (ref 1.12–1.32)
CALCIUM SERPL-MCNC: 8.9 MG/DL (ref 8.3–10.1)
CHLORIDE SERPL-SCNC: 103 MMOL/L (ref 100–108)
CO2 SERPL-SCNC: 20 MMOL/L (ref 21–32)
CREAT SERPL-MCNC: 18.1 MG/DL (ref 0.6–1.3)
EOSINOPHIL # BLD AUTO: 0.29 THOUSAND/ΜL (ref 0–0.61)
EOSINOPHIL NFR BLD AUTO: 6 % (ref 0–6)
ERYTHROCYTE [DISTWIDTH] IN BLOOD BY AUTOMATED COUNT: 16.2 % (ref 11.6–15.1)
GFR SERPL CREATININE-BSD FRML MDRD: 3 ML/MIN/1.73SQ M
GLUCOSE SERPL-MCNC: 129 MG/DL (ref 65–140)
GLUCOSE SERPL-MCNC: 130 MG/DL (ref 65–140)
HCO3 BLDA-SCNC: 20.3 MMOL/L (ref 24–30)
HCT VFR BLD AUTO: 21.8 % (ref 36.5–49.3)
HCT VFR BLD CALC: 19 % (ref 36.5–49.3)
HGB BLD-MCNC: 7.4 G/DL (ref 12–17)
HGB BLDA-MCNC: 6.5 G/DL (ref 12–17)
LYMPHOCYTES # BLD AUTO: 0.91 THOUSANDS/ΜL (ref 0.6–4.47)
LYMPHOCYTES NFR BLD AUTO: 19 % (ref 14–44)
MCH RBC QN AUTO: 29.4 PG (ref 26.8–34.3)
MCHC RBC AUTO-ENTMCNC: 33.9 G/DL (ref 31.4–37.4)
MCV RBC AUTO: 87 FL (ref 82–98)
MONOCYTES # BLD AUTO: 0.57 THOUSAND/ΜL (ref 0.17–1.22)
MONOCYTES NFR BLD AUTO: 12 % (ref 4–12)
NEUTROPHILS # BLD AUTO: 2.89 THOUSANDS/ΜL (ref 1.85–7.62)
NEUTS SEG NFR BLD AUTO: 62 % (ref 43–75)
NRBC BLD AUTO-RTO: 0 /100 WBCS
PCO2 BLD: 21 MMOL/L (ref 21–32)
PCO2 BLD: 28.8 MM HG (ref 42–50)
PH BLD: 7.46 [PH] (ref 7.3–7.4)
PLATELET # BLD AUTO: 151 THOUSANDS/UL (ref 149–390)
PMV BLD AUTO: 9.3 FL (ref 8.9–12.7)
PO2 BLD: 50 MM HG (ref 35–45)
POTASSIUM BLD-SCNC: 4.9 MMOL/L (ref 3.5–5.3)
POTASSIUM SERPL-SCNC: 4.8 MMOL/L (ref 3.5–5.3)
PROT SERPL-MCNC: 6.8 G/DL (ref 6.4–8.2)
RBC # BLD AUTO: 2.52 MILLION/UL (ref 3.88–5.62)
RH BLD: POSITIVE
SAO2 % BLD FROM PO2: 88 % (ref 95–98)
SODIUM BLD-SCNC: 138 MMOL/L (ref 136–145)
SODIUM SERPL-SCNC: 139 MMOL/L (ref 136–145)
SPECIMEN EXPIRATION DATE: NORMAL
SPECIMEN SOURCE: ABNORMAL
TROPONIN I SERPL-MCNC: 0.05 NG/ML
WBC # BLD AUTO: 4.69 THOUSAND/UL (ref 4.31–10.16)

## 2018-04-17 PROCEDURE — 86901 BLOOD TYPING SEROLOGIC RH(D): CPT | Performed by: EMERGENCY MEDICINE

## 2018-04-17 PROCEDURE — P9016 RBC LEUKOCYTES REDUCED: HCPCS

## 2018-04-17 PROCEDURE — 85014 HEMATOCRIT: CPT

## 2018-04-17 PROCEDURE — 30233N1 TRANSFUSION OF NONAUTOLOGOUS RED BLOOD CELLS INTO PERIPHERAL VEIN, PERCUTANEOUS APPROACH: ICD-10-PCS | Performed by: INTERNAL MEDICINE

## 2018-04-17 PROCEDURE — 84132 ASSAY OF SERUM POTASSIUM: CPT

## 2018-04-17 PROCEDURE — 86900 BLOOD TYPING SEROLOGIC ABO: CPT | Performed by: EMERGENCY MEDICINE

## 2018-04-17 PROCEDURE — 82803 BLOOD GASES ANY COMBINATION: CPT

## 2018-04-17 PROCEDURE — 86920 COMPATIBILITY TEST SPIN: CPT

## 2018-04-17 PROCEDURE — 82330 ASSAY OF CALCIUM: CPT

## 2018-04-17 PROCEDURE — 36430 TRANSFUSION BLD/BLD COMPNT: CPT

## 2018-04-17 PROCEDURE — 36415 COLL VENOUS BLD VENIPUNCTURE: CPT | Performed by: EMERGENCY MEDICINE

## 2018-04-17 PROCEDURE — 71045 X-RAY EXAM CHEST 1 VIEW: CPT

## 2018-04-17 PROCEDURE — 86850 RBC ANTIBODY SCREEN: CPT | Performed by: EMERGENCY MEDICINE

## 2018-04-17 PROCEDURE — 84295 ASSAY OF SERUM SODIUM: CPT

## 2018-04-17 PROCEDURE — 82947 ASSAY GLUCOSE BLOOD QUANT: CPT

## 2018-04-17 PROCEDURE — 85025 COMPLETE CBC W/AUTO DIFF WBC: CPT | Performed by: EMERGENCY MEDICINE

## 2018-04-17 PROCEDURE — 93005 ELECTROCARDIOGRAM TRACING: CPT

## 2018-04-17 PROCEDURE — 96374 THER/PROPH/DIAG INJ IV PUSH: CPT

## 2018-04-17 PROCEDURE — 84484 ASSAY OF TROPONIN QUANT: CPT | Performed by: EMERGENCY MEDICINE

## 2018-04-17 PROCEDURE — 80053 COMPREHEN METABOLIC PANEL: CPT | Performed by: EMERGENCY MEDICINE

## 2018-04-17 RX ORDER — METOPROLOL TARTRATE 5 MG/5ML
5 INJECTION INTRAVENOUS ONCE
Status: COMPLETED | OUTPATIENT
Start: 2018-04-17 | End: 2018-04-17

## 2018-04-17 RX ORDER — ONDANSETRON 2 MG/ML
4 INJECTION INTRAMUSCULAR; INTRAVENOUS EVERY 6 HOURS PRN
Status: DISCONTINUED | OUTPATIENT
Start: 2018-04-17 | End: 2018-04-19

## 2018-04-17 RX ORDER — MAGNESIUM HYDROXIDE/ALUMINUM HYDROXICE/SIMETHICONE 120; 1200; 1200 MG/30ML; MG/30ML; MG/30ML
5 SUSPENSION ORAL EVERY 6 HOURS PRN
Status: DISCONTINUED | OUTPATIENT
Start: 2018-04-17 | End: 2018-04-21 | Stop reason: HOSPADM

## 2018-04-17 RX ORDER — DILTIAZEM HYDROCHLORIDE 5 MG/ML
10 INJECTION INTRAVENOUS ONCE
Status: COMPLETED | OUTPATIENT
Start: 2018-04-17 | End: 2018-04-17

## 2018-04-17 RX ORDER — AMLODIPINE BESYLATE 10 MG/1
10 TABLET ORAL DAILY
Status: DISCONTINUED | OUTPATIENT
Start: 2018-04-18 | End: 2018-04-21 | Stop reason: HOSPADM

## 2018-04-17 RX ORDER — ASPIRIN 325 MG
325 TABLET ORAL ONCE
Status: DISCONTINUED | OUTPATIENT
Start: 2018-04-17 | End: 2018-04-17

## 2018-04-17 RX ORDER — GABAPENTIN 300 MG/1
300 CAPSULE ORAL DAILY
Status: DISCONTINUED | OUTPATIENT
Start: 2018-04-18 | End: 2018-04-21 | Stop reason: HOSPADM

## 2018-04-17 RX ORDER — CINACALCET 30 MG/1
30 TABLET, FILM COATED ORAL
Status: DISCONTINUED | OUTPATIENT
Start: 2018-04-18 | End: 2018-04-21 | Stop reason: HOSPADM

## 2018-04-17 RX ORDER — DOCUSATE SODIUM 100 MG/1
100 CAPSULE, LIQUID FILLED ORAL 2 TIMES DAILY PRN
Status: DISCONTINUED | OUTPATIENT
Start: 2018-04-17 | End: 2018-04-21 | Stop reason: HOSPADM

## 2018-04-17 RX ORDER — NICOTINE 21 MG/24HR
1 PATCH, TRANSDERMAL 24 HOURS TRANSDERMAL DAILY
Status: DISCONTINUED | OUTPATIENT
Start: 2018-04-18 | End: 2018-04-21 | Stop reason: HOSPADM

## 2018-04-17 RX ORDER — ALBUTEROL SULFATE 2.5 MG/3ML
2.5 SOLUTION RESPIRATORY (INHALATION) EVERY 4 HOURS PRN
Status: DISCONTINUED | OUTPATIENT
Start: 2018-04-17 | End: 2018-04-21

## 2018-04-17 RX ADMIN — DILTIAZEM HYDROCHLORIDE 5 MG/HR: 5 INJECTION INTRAVENOUS at 22:56

## 2018-04-17 RX ADMIN — DILTIAZEM HYDROCHLORIDE 10 MG: 5 INJECTION INTRAVENOUS at 22:57

## 2018-04-17 RX ADMIN — METOPROLOL TARTRATE 5 MG: 5 INJECTION, SOLUTION INTRAVENOUS at 21:07

## 2018-04-17 RX ADMIN — METOPROLOL TARTRATE 5 MG: 5 INJECTION, SOLUTION INTRAVENOUS at 20:15

## 2018-04-17 RX ADMIN — DILTIAZEM HYDROCHLORIDE 10 MG: 5 INJECTION INTRAVENOUS at 22:20

## 2018-04-17 NOTE — ED NOTES
Attempted to obtain IV access  Unsuccessful after 2 tries   Dr Nory Chacon at bedside with Kaushal Daniel RN  04/17/18 9994

## 2018-04-17 NOTE — ED NOTES
Attempted for IV access with no success  Joi GRADY will attempt as well  Dr Yoon Sewell aware IV access wasn't able to be obtained  Patient does have patent EJ to left side of neck        Alexdamon Martinez, RN  04/17/18 8909

## 2018-04-17 NOTE — ED ATTENDING ATTESTATION
Anirudh Briceno DO, saw and evaluated the patient  I have discussed the patient with the resident/non-physician practitioner and agree with the resident's/non-physician practitioner's findings, Plan of Care, and MDM as documented in the resident's/non-physician practitioner's note, except where noted  All available labs and Radiology studies were reviewed  At this point I agree with the current assessment done in the Emergency Department  I have conducted an independent evaluation of this patient a history and physical is as follows:    55 yo male presents for evaluation of tachycardia, vomiting, generalized weakness  His nephrologist called and told him that he likely needs 2U PRBCs  Symptoms generalized, constant, moderate severity  No a/e factors  ESRD on HD T,Th,Sat    Called EMS earlier, then vomited and felt better  Called again just PTA  EMS states he looked much worse  Imp: multiple c/o, likely anemic  Plan: labs, transfuse if needed  Reassess        Critical Care Time  CritCare Time    Procedures

## 2018-04-17 NOTE — TELEPHONE ENCOUNTER
Pt called requesting o/v  Left message stating ambulance just left his house  Had irregular heart beat, felt nauseous then threw up and heart beat went back to normal      Please call Pt and schedule new pt appt for irregular heart beat      C/b # [phone]

## 2018-04-18 ENCOUNTER — APPOINTMENT (INPATIENT)
Dept: NON INVASIVE DIAGNOSTICS | Facility: HOSPITAL | Age: 58
DRG: 981 | End: 2018-04-18
Payer: MEDICARE

## 2018-04-18 ENCOUNTER — APPOINTMENT (INPATIENT)
Dept: DIALYSIS | Facility: HOSPITAL | Age: 58
DRG: 981 | End: 2018-04-18
Payer: MEDICARE

## 2018-04-18 LAB
ABO GROUP BLD BPU: NORMAL
ABO GROUP BLD BPU: NORMAL
AMMONIA PLAS-SCNC: 25 UMOL/L (ref 11–35)
ANION GAP SERPL CALCULATED.3IONS-SCNC: 16 MMOL/L (ref 4–13)
ANION GAP SERPL CALCULATED.3IONS-SCNC: 7 MMOL/L (ref 4–13)
ATRIAL RATE: 174 BPM
BASOPHILS # BLD AUTO: 0.02 THOUSANDS/ΜL (ref 0–0.1)
BASOPHILS # BLD AUTO: 0.03 THOUSANDS/ΜL (ref 0–0.1)
BASOPHILS NFR BLD AUTO: 1 % (ref 0–1)
BASOPHILS NFR BLD AUTO: 1 % (ref 0–1)
BPU ID: NORMAL
BPU ID: NORMAL
BUN SERPL-MCNC: 123 MG/DL (ref 5–25)
BUN SERPL-MCNC: 47 MG/DL (ref 5–25)
CALCIUM SERPL-MCNC: 8.5 MG/DL (ref 8.3–10.1)
CALCIUM SERPL-MCNC: 8.9 MG/DL (ref 8.3–10.1)
CHLORIDE SERPL-SCNC: 106 MMOL/L (ref 100–108)
CHLORIDE SERPL-SCNC: 98 MMOL/L (ref 100–108)
CO2 SERPL-SCNC: 20 MMOL/L (ref 21–32)
CO2 SERPL-SCNC: 32 MMOL/L (ref 21–32)
CREAT SERPL-MCNC: 18.5 MG/DL (ref 0.6–1.3)
CREAT SERPL-MCNC: 9.77 MG/DL (ref 0.6–1.3)
CROSSMATCH: NORMAL
CROSSMATCH: NORMAL
EOSINOPHIL # BLD AUTO: 0.23 THOUSAND/ΜL (ref 0–0.61)
EOSINOPHIL # BLD AUTO: 0.28 THOUSAND/ΜL (ref 0–0.61)
EOSINOPHIL NFR BLD AUTO: 6 % (ref 0–6)
EOSINOPHIL NFR BLD AUTO: 6 % (ref 0–6)
ERYTHROCYTE [DISTWIDTH] IN BLOOD BY AUTOMATED COUNT: 15.4 % (ref 11.6–15.1)
ERYTHROCYTE [DISTWIDTH] IN BLOOD BY AUTOMATED COUNT: 15.9 % (ref 11.6–15.1)
GFR SERPL CREATININE-BSD FRML MDRD: 3 ML/MIN/1.73SQ M
GFR SERPL CREATININE-BSD FRML MDRD: 6 ML/MIN/1.73SQ M
GLUCOSE SERPL-MCNC: 118 MG/DL (ref 65–140)
GLUCOSE SERPL-MCNC: 127 MG/DL (ref 65–140)
HCT VFR BLD AUTO: 23.4 % (ref 36.5–49.3)
HCT VFR BLD AUTO: 27.3 % (ref 36.5–49.3)
HGB BLD-MCNC: 8 G/DL (ref 12–17)
HGB BLD-MCNC: 9.4 G/DL (ref 12–17)
LYMPHOCYTES # BLD AUTO: 0.82 THOUSANDS/ΜL (ref 0.6–4.47)
LYMPHOCYTES # BLD AUTO: 1.08 THOUSANDS/ΜL (ref 0.6–4.47)
LYMPHOCYTES NFR BLD AUTO: 20 % (ref 14–44)
LYMPHOCYTES NFR BLD AUTO: 25 % (ref 14–44)
MAGNESIUM SERPL-MCNC: 2.7 MG/DL (ref 1.6–2.6)
MCH RBC QN AUTO: 29.4 PG (ref 26.8–34.3)
MCH RBC QN AUTO: 29.5 PG (ref 26.8–34.3)
MCHC RBC AUTO-ENTMCNC: 34.2 G/DL (ref 31.4–37.4)
MCHC RBC AUTO-ENTMCNC: 34.4 G/DL (ref 31.4–37.4)
MCV RBC AUTO: 85 FL (ref 82–98)
MCV RBC AUTO: 86 FL (ref 82–98)
MONOCYTES # BLD AUTO: 0.31 THOUSAND/ΜL (ref 0.17–1.22)
MONOCYTES # BLD AUTO: 0.36 THOUSAND/ΜL (ref 0.17–1.22)
MONOCYTES NFR BLD AUTO: 7 % (ref 4–12)
MONOCYTES NFR BLD AUTO: 9 % (ref 4–12)
NEUTROPHILS # BLD AUTO: 2.62 THOUSANDS/ΜL (ref 1.85–7.62)
NEUTROPHILS # BLD AUTO: 2.7 THOUSANDS/ΜL (ref 1.85–7.62)
NEUTS SEG NFR BLD AUTO: 61 % (ref 43–75)
NEUTS SEG NFR BLD AUTO: 64 % (ref 43–75)
NRBC BLD AUTO-RTO: 0 /100 WBCS
NRBC BLD AUTO-RTO: 0 /100 WBCS
PLATELET # BLD AUTO: 143 THOUSANDS/UL (ref 149–390)
PLATELET # BLD AUTO: 150 THOUSANDS/UL (ref 149–390)
PMV BLD AUTO: 10.1 FL (ref 8.9–12.7)
PMV BLD AUTO: 9.7 FL (ref 8.9–12.7)
POTASSIUM SERPL-SCNC: 3.6 MMOL/L (ref 3.5–5.3)
POTASSIUM SERPL-SCNC: 4.6 MMOL/L (ref 3.5–5.3)
QRS AXIS: 57 DEGREES
QRSD INTERVAL: 94 MS
QT INTERVAL: 336 MS
QTC INTERVAL: 496 MS
RBC # BLD AUTO: 2.71 MILLION/UL (ref 3.88–5.62)
RBC # BLD AUTO: 3.2 MILLION/UL (ref 3.88–5.62)
SODIUM SERPL-SCNC: 137 MMOL/L (ref 136–145)
SODIUM SERPL-SCNC: 142 MMOL/L (ref 136–145)
T WAVE AXIS: 206 DEGREES
TSH SERPL DL<=0.05 MIU/L-ACNC: 0.86 UIU/ML (ref 0.36–3.74)
UNIT DISPENSE STATUS: NORMAL
UNIT DISPENSE STATUS: NORMAL
UNIT PRODUCT CODE: NORMAL
UNIT PRODUCT CODE: NORMAL
UNIT RH: NORMAL
UNIT RH: NORMAL
VENTRICULAR RATE: 131 BPM
VIT B12 SERPL-MCNC: 1358 PG/ML (ref 100–900)
WBC # BLD AUTO: 4.05 THOUSAND/UL (ref 4.31–10.16)
WBC # BLD AUTO: 4.41 THOUSAND/UL (ref 4.31–10.16)

## 2018-04-18 PROCEDURE — 93325 DOPPLER ECHO COLOR FLOW MAPG: CPT | Performed by: INTERNAL MEDICINE

## 2018-04-18 PROCEDURE — 85025 COMPLETE CBC W/AUTO DIFF WBC: CPT | Performed by: INTERNAL MEDICINE

## 2018-04-18 PROCEDURE — 82140 ASSAY OF AMMONIA: CPT | Performed by: INTERNAL MEDICINE

## 2018-04-18 PROCEDURE — 36415 COLL VENOUS BLD VENIPUNCTURE: CPT | Performed by: INTERNAL MEDICINE

## 2018-04-18 PROCEDURE — 93308 TTE F-UP OR LMTD: CPT | Performed by: INTERNAL MEDICINE

## 2018-04-18 PROCEDURE — 99232 SBSQ HOSP IP/OBS MODERATE 35: CPT | Performed by: INTERNAL MEDICINE

## 2018-04-18 PROCEDURE — 99221 1ST HOSP IP/OBS SF/LOW 40: CPT | Performed by: INTERNAL MEDICINE

## 2018-04-18 PROCEDURE — 99223 1ST HOSP IP/OBS HIGH 75: CPT | Performed by: INTERNAL MEDICINE

## 2018-04-18 PROCEDURE — 93308 TTE F-UP OR LMTD: CPT

## 2018-04-18 PROCEDURE — 93010 ELECTROCARDIOGRAM REPORT: CPT | Performed by: INTERNAL MEDICINE

## 2018-04-18 PROCEDURE — 82607 VITAMIN B-12: CPT | Performed by: INTERNAL MEDICINE

## 2018-04-18 PROCEDURE — 83735 ASSAY OF MAGNESIUM: CPT | Performed by: INTERNAL MEDICINE

## 2018-04-18 PROCEDURE — 5A1D70Z PERFORMANCE OF URINARY FILTRATION, INTERMITTENT, LESS THAN 6 HOURS PER DAY: ICD-10-PCS | Performed by: INTERNAL MEDICINE

## 2018-04-18 PROCEDURE — 99285 EMERGENCY DEPT VISIT HI MDM: CPT

## 2018-04-18 PROCEDURE — 94762 N-INVAS EAR/PLS OXIMTRY CONT: CPT

## 2018-04-18 PROCEDURE — 80048 BASIC METABOLIC PNL TOTAL CA: CPT | Performed by: INTERNAL MEDICINE

## 2018-04-18 PROCEDURE — 84443 ASSAY THYROID STIM HORMONE: CPT | Performed by: INTERNAL MEDICINE

## 2018-04-18 PROCEDURE — 93321 DOPPLER ECHO F-UP/LMTD STD: CPT | Performed by: INTERNAL MEDICINE

## 2018-04-18 PROCEDURE — 94760 N-INVAS EAR/PLS OXIMETRY 1: CPT

## 2018-04-18 RX ADMIN — GABAPENTIN 300 MG: 300 CAPSULE ORAL at 08:27

## 2018-04-18 RX ADMIN — CINACALCET HYDROCHLORIDE 30 MG: 30 TABLET, COATED ORAL at 08:27

## 2018-04-18 RX ADMIN — NICOTINE 1 PATCH: 14 PATCH, EXTENDED RELEASE TRANSDERMAL at 16:59

## 2018-04-18 RX ADMIN — CALCIUM ACETATE 2001 MG: 667 CAPSULE ORAL at 16:58

## 2018-04-18 RX ADMIN — CALCIUM ACETATE 2001 MG: 667 CAPSULE ORAL at 08:26

## 2018-04-18 RX ADMIN — AMLODIPINE BESYLATE 10 MG: 10 TABLET ORAL at 08:27

## 2018-04-18 RX ADMIN — METOPROLOL TARTRATE 25 MG: 25 TABLET ORAL at 22:22

## 2018-04-18 NOTE — ASSESSMENT & PLAN NOTE
Patient comes in with paroxysmal atrial fibrillation with heart rate approximately in the 130s  Probably this is secondary to mild pulmonary hypertension from an ongoing bronchitis  That said, the patient was given Norvasc for the control of blood pressure  Likewise, he was started on Cardizem 10 mg intravenous initially ordered x1 then given another dose while waiting for the drip  With the drip at 5 mg per hour, the patient's heart rate is currently in the 60s

## 2018-04-18 NOTE — ED PROVIDER NOTES
History  Chief Complaint   Patient presents with    Dizziness     patient states that he was dizzy and palpitations earlier and called 911 but vomited x 1 and felt better after and refused EMS  EMS states that patient was awake and talkative and VSS  Patient then states he got a phone call appox 40 min ago from doctor that told him that he will need a tranfusion  Patient states he continued with dizziness and started with chest burning  Denies nausea at this time  This is a 73-year-old male with a history of end-stage renal disease on dialysis  presenting to the emergency department for evaluation of fatigue and apparently is low hemoglobin  The patient underwent a full run of dialysis yesterday  He was generally fatigued and lightheaded today as well as he is having some palpitations  Earlier in the day he called EMS for the symptoms and had nausea with 1 episode of vomiting and his symptoms improved at that time and so he declined coming to the emergency department  He has since gotten call from his outpatient dialysis and his hemoglobin was low that he may need blood  His symptoms returned and so he called EMS again to come to the hospital   Of note when EMS came back does have the noted that is state was which deteriorated compared to when he was 1st evaluated by them            Prior to Admission Medications   Prescriptions Last Dose Informant Patient Reported? Taking?    amLODIPine (NORVASC) 10 mg tablet 2018 at Unknown time  Yes Yes   Sig: Take 10 mg by mouth daily   calcium acetate (PHOSLO) 667 mg capsule 2018 at Unknown time  Yes Yes   Sig: Take 2,001 mg by mouth 3 (three) times a day with meals   cinacalcet (SENSIPAR) 30 mg tablet 2018 at Unknown time  Yes Yes   Si mg   gabapentin (NEURONTIN) 100 mg capsule 2018 at Unknown time  Yes Yes   Sig: Take 300 mg by mouth daily        Facility-Administered Medications: None       Past Medical History: Diagnosis Date    Dialysis patient [facility])     Hypertension     Renal disorder        Past Surgical History:   Procedure Laterality Date    CHOLECYSTECTOMY         History reviewed  No pertinent family history  I have reviewed and agree with the history as documented  Social History   Substance Use Topics    Smoking status: Current Every Day Smoker     Packs/day: 1 00     Types: Cigarettes    Smokeless tobacco: Never Used    Alcohol use No        Review of Systems   Constitutional: Positive for fatigue  Negative for appetite change, chills and fever  HENT: Negative for sneezing and sore throat  Eyes: Negative for visual disturbance  Respiratory: Negative for cough, choking, chest tightness, shortness of breath and wheezing  Cardiovascular: Positive for chest pain and palpitations  Gastrointestinal: Negative for abdominal pain, constipation, diarrhea, nausea and vomiting  Genitourinary: Negative for difficulty urinating and dysuria  Neurological: Negative for dizziness, weakness, light-headedness, numbness and headaches  All other systems reviewed and are negative  Physical Exam  ED Triage Vitals   Temperature Pulse Respirations Blood Pressure SpO2   04/17/18 1751 04/17/18 1744 04/17/18 1744 04/17/18 1744 04/17/18 1744   (!) 97 2 °F (36 2 °C) (!) 130 16 116/65 97 %      Temp Source Heart Rate Source Patient Position - Orthostatic VS BP Location FiO2 (%)   04/17/18 1751 04/17/18 1744 04/17/18 1744 04/17/18 1744 --   Oral Monitor Lying Right arm       Pain Score       04/17/18 1744       7           Orthostatic Vital Signs  Vitals:    04/19/18 0917 04/19/18 0927 04/19/18 1100 04/19/18 1454   BP: 99/58 102/55 113/58 105/55   Pulse: (!) 144 65 68 69   Patient Position - Orthostatic VS: Lying  Lying Lying       Physical Exam   Constitutional: He is oriented to person, place, and time  He appears well-developed and well-nourished  No distress     The patient appears uncomfortable sitting in the stretcher with his eyes closed and a soft voice   HENT:   Head: Normocephalic and atraumatic  Mouth/Throat: Oropharynx is clear and moist    Eyes: EOM are normal  Pupils are equal, round, and reactive to light  Neck: No JVD present  No tracheal deviation present  Cardiovascular: Normal heart sounds and intact distal pulses  An irregularly irregular rhythm present  Tachycardia present  Exam reveals no gallop and no friction rub  No murmur heard  Pulmonary/Chest: Effort normal and breath sounds normal  No respiratory distress  He has no wheezes  He has no rales  Abdominal: Soft  Bowel sounds are normal  He exhibits no distension  There is no tenderness  There is no rebound and no guarding  Neurological: He is alert and oriented to person, place, and time  No cranial nerve deficit  He exhibits normal muscle tone  Skin: Skin is warm and dry  He is not diaphoretic  No pallor  Psychiatric: He has a normal mood and affect  His behavior is normal    Nursing note and vitals reviewed        ED Medications  Medications   albuterol inhalation solution 2 5 mg (not administered)   amLODIPine (NORVASC) tablet 10 mg (10 mg Oral Given 4/19/18 0900)   calcium acetate (PHOSLO) capsule 2,001 mg (2,001 mg Oral Given 4/19/18 1730)   cinacalcet (SENSIPAR) tablet 30 mg (30 mg Oral Given 4/19/18 0800)   gabapentin (NEURONTIN) capsule 300 mg (300 mg Oral Given 4/19/18 0904)   nicotine (NICODERM CQ) 14 mg/24hr TD 24 hr patch 1 patch (1 patch Transdermal Medication Applied 4/19/18 0902)   docusate sodium (COLACE) capsule 100 mg ( Oral MAR Unhold 4/18/18 2213)   aluminum-magnesium hydroxide-simethicone (MYLANTA) 200-200-20 mg/5 mL oral suspension 5 mL ( Oral MAR Unhold 4/18/18 2213)   epoetin cecily (EPOGEN,PROCRIT) injection 4,000 Units ( Intravenous MAR Unhold 4/18/18 2213)   metoprolol succinate (TOPROL-XL) 24 hr tablet 25 mg (25 mg Oral Given 4/19/18 1736)   heparin (porcine) subcutaneous injection 5,000 Units (not administered)   amiodarone 150 mg in dextrose 5 % 100 mL IV bolus (not administered)   amiodarone tablet 200 mg (200 mg Oral Given 4/19/18 1737)   diltiazem (CARDIZEM) injection 10 mg (not administered)   metoprolol (LOPRESSOR) injection 5 mg (5 mg Intravenous Given 4/17/18 2015)   metoprolol (LOPRESSOR) injection 5 mg (5 mg Intravenous Given 4/17/18 2107)   diltiazem (CARDIZEM) injection 10 mg (10 mg Intravenous Given 4/17/18 2220)   diltiazem (CARDIZEM) injection 10 mg (10 mg Intravenous Given 4/17/18 2257)   diltiazem (CARDIZEM) injection 15 mg (15 mg Intravenous Given 4/19/18 0920)       Diagnostic Studies  Results Reviewed     Procedure Component Value Units Date/Time    Basic metabolic panel [[id number]]  (Abnormal) Collected:  04/18/18 0548    Lab Status:  Final result Specimen:  Blood from Arm, Right Updated:  04/18/18 0616     Sodium 142 mmol/L      Potassium 4 6 mmol/L      Chloride 106 mmol/L      CO2 20 (L) mmol/L      Anion Gap 16 (H) mmol/L       (H) mg/dL      Creatinine 18 50 (H) mg/dL      Glucose 127 mg/dL      Calcium 8 5 mg/dL      eGFR 3 ml/min/1 73sq m     Narrative:         National Kidney Disease Education Program recommendations are as follows:  GFR calculation is accurate only with a steady state creatinine  Chronic Kidney disease less than 60 ml/min/1 73 sq  meters  Kidney failure less than 15 ml/min/1 73 sq  meters      Magnesium [[id number]]  (Abnormal) Collected:  04/18/18 0548    Lab Status:  Final result Specimen:  Blood from Arm, Right Updated:  04/18/18 0616     Magnesium 2 7 (H) mg/dL     CBC and differential [[id number]]  (Abnormal) Collected:  04/18/18 0548    Lab Status:  Final result Specimen:  Blood from Arm, Right Updated:  04/18/18 0606     WBC 4 41 Thousand/uL      RBC 2 71 (L) Million/uL      Hemoglobin 8 0 (L) g/dL      Hematocrit 23 4 (L) %      MCV 86 fL      MCH 29 5 pg      MCHC 34 2 g/dL      RDW 15 9 (H) %      MPV 9 7 fL      Platelets 807 (L) Thousands/uL nRBC 0 /100 WBCs      Neutrophils Relative 61 %      Lymphocytes Relative 25 %      Monocytes Relative 7 %      Eosinophils Relative 6 %      Basophils Relative 1 %      Neutrophils Absolute 2 70 Thousands/µL      Lymphocytes Absolute 1 08 Thousands/µL      Monocytes Absolute 0 31 Thousand/µL      Eosinophils Absolute 0 28 Thousand/µL      Basophils Absolute 0 03 Thousands/µL     Troponin I [[id number]]  (Abnormal) Collected:  04/17/18 1747    Lab Status:  Final result Specimen:  Blood from Arm, Right Updated:  04/17/18 1846     Troponin I 0 05 (H) ng/mL     Narrative:         [employer] Chemistry analyzer 99% cutoff is > 0 04 ng/mL in network labs    o cTnI 99% cutoff is useful only when applied to patients in the clinical setting of myocardial ischemia  o cTnI 99% cutoff should be interpreted in the context of clinical history, ECG findings and possibly cardiac imaging to establish correct diagnosis  o cTnI 99% cutoff may be suggestive but clearly not indicative of a coronary event without the clinical setting of myocardial ischemia  Comprehensive metabolic panel [[id number]]  (Abnormal) Collected:  04/17/18 1747    Lab Status:  Final result Specimen:  Blood from Arm, Right Updated:  04/17/18 1833     Sodium 139 mmol/L      Potassium 4 8 mmol/L      Chloride 103 mmol/L      CO2 20 (L) mmol/L      Anion Gap 16 (H) mmol/L       (H) mg/dL      Creatinine 18 10 (H) mg/dL      Glucose 129 mg/dL      Calcium 8 9 mg/dL      AST 36 U/L      ALT 15 U/L      Alkaline Phosphatase 89 U/L      Total Protein 6 8 g/dL      Albumin 3 3 (L) g/dL      Total Bilirubin 0 47 mg/dL      eGFR 3 ml/min/1 73sq m     Narrative:         National Kidney Disease Education Program recommendations are as follows:  GFR calculation is accurate only with a steady state creatinine  Chronic Kidney disease less than 60 ml/min/1 73 sq  meters  Kidney failure less than 15 ml/min/1 73 sq  meters      CBC and differential [[id number]]  (Abnormal) Collected:  04/17/18 1747    Lab Status:  Final result Specimen:  Blood from Arm, Right Updated:  04/17/18 1810     WBC 4 69 Thousand/uL      RBC 2 52 (L) Million/uL      Hemoglobin 7 4 (L) g/dL      Hematocrit 21 8 (L) %      MCV 87 fL      MCH 29 4 pg      MCHC 33 9 g/dL      RDW 16 2 (H) %      MPV 9 3 fL      Platelets 241 Thousands/uL      nRBC 0 /100 WBCs      Neutrophils Relative 62 %      Lymphocytes Relative 19 %      Monocytes Relative 12 %      Eosinophils Relative 6 %      Basophils Relative 1 %      Neutrophils Absolute 2 89 Thousands/µL      Lymphocytes Absolute 0 91 Thousands/µL      Monocytes Absolute 0 57 Thousand/µL      Eosinophils Absolute 0 29 Thousand/µL      Basophils Absolute 0 03 Thousands/µL     POCT Blood Gas (CG8+) [[id number]]  (Abnormal) Collected:  04/17/18 1753    Lab Status:  Final result Updated:  04/17/18 1757     ph, Tyree ISTAT 7 457 (HH)     pCO2, Tyree i-STAT 28 8 (LL) mm HG      pO2, Tyree i-STAT 50 0 (H) mm HG      BE, i-STAT -3 (L) mmol/L      HCO3, Tyree i-STAT 20 3 (L) mmol/L      CO2, i-STAT 21 mmol/L      O2 Sat, i-STAT 88 (L) %      SODIUM, I-STAT 138 mmol/l      Potassium, i-STAT 4 9 mmol/L      Calcium, Ionized i-STAT 1 00 (L) mmol/L      Hct, i-STAT 19 (L) %      Hgb, i-STAT 6 5 (LL) g/dl      Glucose, i-STAT 130 mg/dl      Specimen Type VENOUS                 XR chest 1 view portable   Final Result by Mary Méndez DO (04/17 1854)      Stable bilateral airspace disease suspicious for pulmonary edema  Bilateral pneumonia not excluded  Workstation performed: [id number]               Procedures  Procedures      Phone Consults  ED Phone Contact    ED Course  ED Course as of Apr 19 1854   Tue Apr 17, 2018   [id number] Patient is persistently tachycardic though resting comfortably in bed    Given the fact this is most likely secondary to acute anemia will administer blood, re-evaluate, then administer rate reducing medications at that time if still indicated    2004 Tachycardic despite blood administration, will administer 5 lopressor IV and reassess                                MDM  Number of Diagnoses or Management Options  Atrial fibrillation with rapid ventricular response [facility]):   Diagnosis management comments: 47-year-old male with a history of end-stage renal disease now in AFib status post hemodialysis with possible anemia as an underlying cause  Will check stat labs including CBC and chemistry  Likely initiate resuscitation with packed red blood cells and closely monitor the heart rate response to this  If there is no response to resuscitation with blood may initiate rate slowing medications at that time  CritCare Time    Disposition  Final diagnoses:   Atrial fibrillation with rapid ventricular response ([facility] )     Time reflects when diagnosis was documented in both MDM as applicable and the Disposition within this note     Time User Action Codes Description Comment    4/17/2018  9:59 PM Jazmyn, [address] [place] [address] [I48 91] Atrial fibrillation with rapid ventricular response ([facility] )     4/17/2018 10:42 PM Rubi Armas Add [N18 6,  Z99 2] ESRD (end stage renal disease) on dialysis ([facility] )     4/17/2018 10:42 PM Rubi Armas Modify [N18 6,  Z99 2] ESRD (end stage renal disease) on dialysis ([facility] )     4/19/2018 11:26 AM Wendy Abdullahi Add [I47 1] Paroxysmal SVT (supraventricular tachycardia) [facility])       ED Disposition     ED Disposition Condition Comment    Admit  Case was discussed with ROSITA and the patient's admission status was agreed to be Admission Status: inpatient status to the service of Dr Arthur Holliday           Follow-up Information     Follow up With Specialties Details Why Contact Info    Adra Flair  Schedule an appointment as soon as possible for a visit in 1 week(s)  [phone] Thanh Sami          Current Discharge Medication List      START taking these medications    Details   albuterol (PROVENTIL HFA,VENTOLIN HFA) 90 mcg/act inhaler Inhale 2 puffs every 6 (six) hours as needed for wheezing  Qty: 1 Inhaler, Refills: 0    Associated Diagnoses: ESRD (end stage renal disease) on dialysis ([facility])      metoprolol tartrate (LOPRESSOR) 25 mg tablet Take 1 tablet (25 mg total) by mouth every 12 (twelve) hours  Qty: 60 tablet, Refills: 0    Associated Diagnoses: ESRD (end stage renal disease) on dialysis ([facility])      nicotine (NICODERM CQ) 14 mg/24hr TD 24 hr patch Place 1 patch on the skin daily  Qty: 28 patch, Refills: 0    Associated Diagnoses: ESRD (end stage renal disease) on dialysis ([facility] )         CONTINUE these medications which have NOT CHANGED    Details   amLODIPine (NORVASC) 10 mg tablet Take 10 mg by mouth daily      calcium acetate (PHOSLO) 667 mg capsule Take 2,001 mg by mouth 3 (three) times a day with meals      cinacalcet (SENSIPAR) 30 mg tablet 30 mg      gabapentin (NEURONTIN) 100 mg capsule Take 300 mg by mouth daily               Outpatient Discharge Orders  Discharge Diet     Activity as tolerated     No strenuous exercise     Call provider for:  persistent nausea or vomiting     Call provider for:  severe uncontrolled pain     Call provider for: active or persistent bleeding     Call provider for:  difficulty breathing, headache or visual disturbances     Call provider for:  persistent dizziness or light-headedness     Call provider for:  hives     Call provider for:  extreme fatigue         ED Provider  Attending physically available and evaluated Magali Tapia I managed the patient along with the ED Attending      Electronically Signed by         José Miguel Richey MD  04/19/18 5847

## 2018-04-18 NOTE — CASE MANAGEMENT
Initial Clinical Review    Admission: Date/Time/Statement: 4/17/18 @ 2200     Orders Placed This Encounter   Procedures    Inpatient Admission (expected length of stay for this patient is greater than two midnights)     Standing Status:   Standing     Number of Occurrences:   1     Order Specific Question:   Admitting Physician     Answer:   Nuris De Los Santos [[id number]]     Order Specific Question:   Level of Care     Answer:   Level 2 Stepdown / HOT [14]     Order Specific Question:   Estimated length of stay     Answer:   More than 2 Midnights     Order Specific Question:   Certification     Answer:   I certify that inpatient services are medically necessary for this patient for a duration of greater than two midnights  See H&P and MD Progress Notes for additional information about the patient's course of treatment  ED: Date/Time/Mode of Arrival:   ED Arrival Information     Expected Arrival Acuity Means of Arrival Escorted By Service Admission Type    - 4/17/2018 17:20 Emergent Ambulance [facility] [address] [place] [address] Emergency    Arrival Complaint    Dizziness        Chief Complaint:   Chief Complaint   Patient presents with    Dizziness     patient states that he was dizzy and palpitations earlier and called 911 but vomited x 1 and felt better after and refused EMS  EMS states that patient was awake and talkative and VSS  Patient then states he got a phone call appox 40 min ago from doctor that told him that he will need a tranfusion  Patient states he continued with dizziness and started with chest burning  Denies nausea at this time  History of Illness: Joseph Johnson is a 56 y o  male who has a past medical history significant for diabetes mellitus, benign essential hypertension with chronic kidney disease stage 5/end-stage renal disease and currently on hemodialysis  He also has chronic anemia due to kidney disease  Secondary hyperparathyroidism due to chronic kidney disease   Patient was recently seen by Nephrology where he gets hemodialysis regularly and it was of note that his hemoglobin is currently at 7 4 when he usually is at 10 8  That said, the patient is also complaining of dizziness and somewhat with mild shortness of breath  The patient was sent to the emergency room because they thought that he needs blood transfusion for which 2 units were ordered  Despite that, the patient was still with some shortness of breath and noted to was that his heart rate is elevated at 130  He was given metoprolol 5 mg intravenously x2 with no improvement  Patient then states that he smokes approximately 1/2 packs per day since he was age 17  He used to smoke heavily  No sputum production however there is intermittent cough with note of rhonchi and wheezes  At this point the patient was given some nebulizations with improvement of bronchodilation    Also with the start of Cardizem, his heart rate has gone down to 60s with note of resurgence of normal sinus rhythm      ED Vital Signs:   ED Triage Vitals   Temperature Pulse Respirations Blood Pressure SpO2   04/17/18 1751 04/17/18 1744 04/17/18 1744 04/17/18 1744 04/17/18 1744   (!) 97 2 °F (36 2 °C) (!) 130 16 116/65 97 %      Temp Source Heart Rate Source Patient Position - Orthostatic VS BP Location FiO2 (%)   04/17/18 1751 04/17/18 1744 04/17/18 1744 04/17/18 1744 --   Oral Monitor Lying Right arm       Pain Score       04/17/18 1744       7        Wt Readings from Last 1 Encounters:   04/18/18 69 7 kg (153 lb 10 6 oz)     Vital Signs (abnormal):   04/18/18 0200  --   122  14  137/84  --  96 %  --  Lying   04/18/18 0100  --   120  15  119/57  --  99 %  None (Room air)  Lying   04/17/18 2300  --   124   24  113/56  --  96 %  None (Room air)  Lying   04/17/18 2215  98 2 °F (36 8 °C)   130  16  116/72  --  100 %  --  --   04/17/18 2200  98 2 °F (36 8 °C)   132   23  120/70  --  98 %  None (Room air)  Lying   04/17/18 2145  --   130  22  121/74  --  97 %  None (Room air)  --   04/17/18 2130  --   130  22  125/74  --  97 %  --  --   04/17/18 2115  --   128  18  118/72  --  96 %  None (Room air)  Lying   04/17/18 2100  --   132  17  128/80  --  96 %  None (Room air)  Lying   04/17/18 2045  --   132  17  125/77  --  95 %  None (Room air)  Lying   04/17/18 2000  --   138  17  122/72  --  97 %  None (Room air)  Lying   04/17/18 1945  98 1 °F (36 7 °C)   138  13  123/69  --  97 %  None (Room air)  Lying   04/17/18 1930  98 2 °F (36 8 °C)   136  15  131/77  --  98 %  None (Room air)  Lying   04/17/18 1923  98 2 °F (36 8 °C)   137  18  125/82  --  --  --  --   04/17/18 1919  --   136  18  125/82  --  97 %  None (Room air)  Lying   04/17/18 1828  --   136  18  120/68  --  100 %  None (Room air)  Lying   04/17/18 1751   97 2 °F (36 2 °C)  --  --  --  --  --  --  --   04/17/18 1744  --   130  16  116/65  --  97 %  None (Room air)  Lying     Abnormal Labs:   Ref Range & Units 04/17/18 1753   ph, Tyree ISTAT 7 300 - 7 400 7 457     pCO2, Tyree i-STAT 42 0 - 50 0 mm HG 28 8     pO2, Tyree i-STAT 35 0 - 45 0 mm HG 50 0     BE, i-STAT -2 - 3 mmol/L -3     HCO3, Tyree i-STAT 24 0 - 30 0 mmol/L 20 3     O2 Sat, i-STAT 95 - 98 % 88     Calcium, Ionized i-STAT 1 12 - 1 32 mmol/L 1 00     Hct, i-STAT 36 5 - 49 3 % 19     Hgb, i-STAT 12 0 - 17 0 g/dl 6 5     Specimen Type  VENOUS       4/17 4/18   CO2 20 20   Anion Gap 16 16    123   Creatinine 18 10 18 50   Albumin 3 3    Magnesium  2 7     RBC 2 52 2 71   Hemoglobin 7 4 8 0   Hematocrit 21 8 23 4   RDW 16 2 15 9   Platelets 439 310     Trop 0 05    Diagnostic Test Results:     4/17 CXR - Stable bilateral airspace disease suspicious for pulmonary edema  Bilateral pneumonia not excluded      4/17 EKG - pending     4/18 Cardiac Echo - results pending     ED Treatment:   Medication Administration from 04/17/2018 1720 to 04/18/2018 0741    Date/Time Order Dose Route Action   04/17/2018 2015 metoprolol (LOPRESSOR) injection 5 mg 5 mg Intravenous Given   04/17/2018 2107 metoprolol (LOPRESSOR) injection 5 mg 5 mg Intravenous Given   04/17/2018 2220 diltiazem (CARDIZEM) injection 10 mg 10 mg Intravenous Given   04/18/2018 0209 diltiazem (CARDIZEM) 125 mg in sodium chloride 0 9 % 125 mL infusion 12 5 mg/hr Intravenous Rate/Dose Change   04/18/2018 0121 diltiazem (CARDIZEM) 125 mg in sodium chloride 0 9 % 125 mL infusion 10 mg/hr Intravenous Rate/Dose Change   04/18/2018 0100 diltiazem (CARDIZEM) 125 mg in sodium chloride 0 9 % 125 mL infusion 7 5 mg/hr Intravenous Rate/Dose Change   04/17/2018 2256 diltiazem (CARDIZEM) 125 mg in sodium chloride 0 9 % 125 mL infusion 5 mg/hr Intravenous New Bag   04/17/2018 2257 diltiazem (CARDIZEM) injection 10 mg 10 mg Intravenous Given        Past Medical/Surgical History:    Active Ambulatory Problems     Diagnosis Date Noted    Fluid overload, unspecified 11/13/2017    Hypertensive urgency 11/13/2017    Hyperkalemia 11/13/2017    Acute hypoxemic respiratory failure ([facility] ) 11/13/2017    DM (diabetes mellitus) ([facility] ) 02/09/2015    Elevated troponin 11/13/2017    ESRD (end stage renal disease) on dialysis ([facility] ) 03/11/2018    Benign hypertension with CKD (chronic kidney disease) stage V [facility] ) 03/11/2018    Secondary hyperparathyroidism of renal origin ([facility] ) 03/11/2018    Anemia in chronic kidney disease, on chronic dialysis ([facility] ) 03/11/2018     Resolved Ambulatory Problems     Diagnosis Date Noted    Dyspnea 11/13/2017    Encephalopathy 11/13/2017     Past Medical History:   Diagnosis Date    Dialysis patient ([place] [address] )     Hypertension     Renal disorder      Admitting Diagnosis: Dizziness [R42]  ESRD (end stage renal disease) on dialysis ([facility] ) [N18 6, Z99 2]  Atrial fibrillation with rapid ventricular response ([facility] ) [I48 91]    Age/Sex: 56 y o  male    Assessment/Plan:          * Paroxysmal atrial fibrillation ([facility] )   Assessment & Plan     Patient comes in with paroxysmal atrial fibrillation with heart rate approximately in the 130s  Probably this is secondary to mild pulmonary hypertension from an ongoing bronchitis  That said, the patient was given Norvasc for the control of blood pressure  Likewise, he was started on Cardizem 10 mg intravenous initially ordered x1 then given another dose while waiting for the drip  With the drip at 5 mg per hour, the patient's heart rate is currently in the 60s          Anemia of renal disease   Assessment & Plan     Patient was sent here by Nephrology due to worsening anemia presumably secondary to anemia of chronic disease and chronic renal failure, the patient was given 2 units of packed RBCs and we are going to recheck hemoglobin           Acute bronchitis due to other specified organisms   Assessment & Plan     Noted patient is actively smoking approximately 1/2 packs of cigarettes per day  He has been complaining of cough for the past 4 days  Currently he has mild wheezes and rhonchi  Patient will be given nebulizations to bronchodilator 8 and hopefully this will also alleviate any stresses on cardiac activity and would alleviate atrial fibrillation           ESRD (end stage renal disease) on dialysis [facility])   Assessment & Plan     Consult to Nephrology for end-stage renal disease and dialysis          VTE Prophylaxis: Pharmacologic VTE Prophylaxis contraindicated due to End-stage renal disease  / sequential compression device   Code Status: Level 1 - Full Code as discussed with patient  Anticipated Length of Stay:  Patient will be admitted on an Inpatient basis with an anticipated length of stay of  greater than 2 midnights  Justification for Hospital Stay: Please see detailed plans noted above      Admission Orders:  Scheduled Meds:   Current Facility-Administered Medications:  albuterol 2 5 mg Nebulization Q4H PRN Mariluz Chand MD    aluminum-magnesium hydroxide-simethicone 5 mL Oral Q6H PRN Mariluz Chand MD    amLODIPine 10 mg Oral Daily Mariluz Chand MD calcium acetate 2,001 mg Oral TID With Meals Taya Wilkins MD    cinacalcet 30 mg Oral Daily With Breakfast Taya Wilkins MD    diltiazem 1-15 mg/hr Intravenous Titrated Taya Wilkins MD Last Rate: Stopped (04/18/18 1109)   docusate sodium 100 mg Oral BID PRN Taya Wilkins MD    epoetin cecily 4,000 Units Intravenous After Dialysis Beatriz Matson DO    gabapentin 300 mg Oral Daily Taya Wilkins MD    nicotine 1 patch Transdermal Daily Taya Wilkins MD    ondansetron 4 mg Intravenous Q6H PRN Taya Wilkins MD      Continuous Infusions:   diltiazem 1-15 mg/hr Last Rate: Stopped (04/18/18 1109)     PRN Meds:   albuterol    aluminum-magnesium hydroxide-simethicone    docusate sodium    epoetin cecily    ondansetron    MICU - Level 2 Stepdown/HOT  Tele   SCDs  OOB as jillian   Daily wt  Respiratory protocol   Cons Cardiology   Cons Nephrology   Diet Damon/CHO Controlled; Consistent Carbohydrate Diet Level 2 (5 carb servings/75 grams CHO/meal); Renal (dialysis)  Hemodialysis MWF   Transfuse 2 Units PRBCs   Cardiac echo   ____________________________  4/18 Nephrology Consult   1  End stage renal disease, maintenance hemodialysis Monday Wednesday Friday, [facility] unit in [place]  2  Atrial fibrillation with RVR  3  Acute on chronic anemia, status post PRBC transfusion  4  Suspecting component of volume overload, challenge dry weight with hemodialysis  5  Previous ejection fraction 10% with diastolic dysfunction  6  Hypertension, stable  7   Secondary hyperparathyroidism     Plan:  · Plan for hemodialysis today, challenge dry weight  · Blood pressure stable  · Heart rate improved with diltiazem  · Records from outpatient hemodialysis facility  · Resume AUBRIE therapy, check iron stores

## 2018-04-18 NOTE — ASSESSMENT & PLAN NOTE
Patient is status post transfusion secondary to worsening anemia as per Nephrology recommendation, hemoglobin 7 4 yesterday normally around 10  His hemoglobin today is stable around 8, no clear indication for transfusion at this time unless otherwise advised by nephrologist  CBC in the morning  Will check Hemoccult for completion

## 2018-04-18 NOTE — RESPIRATORY THERAPY NOTE
RT Protocol Note  Lenora Mosher 56 y o  male MRN: [id number]  Unit/Bed#: [facility] 2 Encounter: [id number]    Assessment    Principal Problem:    Paroxysmal atrial fibrillation ([place] [address] )  Active Problems:    ESRD (end stage renal disease) on dialysis ([place] [address] )    Acute bronchitis due to other specified organisms    Anemia of renal disease      Home Pulmonary Medications:  Prn udns       Past Medical History:   Diagnosis Date    Dialysis patient ([place] [address] )     Hypertension     Renal disorder      Social History     Social History    Marital status:      Spouse name: N/A    Number of children: N/A    Years of education: N/A     Social History Main Topics    Smoking status: Current Every Day Smoker     Packs/day: 1 00     Types: Cigarettes    Smokeless tobacco: Current User    Alcohol use No    Drug use: No    Sexual activity: Not Asked     Other Topics Concern    None     Social History Narrative    None       Subjective         Objective    Physical Exam:   Assessment Type: (P) Assess only  General Appearance: (P) Drowsy  Respiratory Pattern: (P) Normal  Chest Assessment: (P) Chest expansion symmetrical  Bilateral Breath Sounds: (P) Diminished    Vitals:  Blood pressure 126/66, pulse 66, temperature 98 2 °F (36 8 °C), resp  rate 18, weight 71 7 kg (158 lb), SpO2 (P) 99 %  Imaging and other studies: I have personally reviewed pertinent reports  Plan    Respiratory Plan: (P) No distress/Pulmonary history        Resp Comments: (P) Pt not in any respiratory distress at this time  Pt states he had udns last week for bronchitis  Was given prn udn in er  Will continue with prn udns at this time and moniter pt for need of escalation of care

## 2018-04-18 NOTE — SOCIAL WORK
CM met pt and KARLA Hameed at bedside and made aware of CM role at d/c  Pt is agreeable for CM to ask Chris Walker CM questions  Pt denies having a LW or POA  Primary contact is sister [phone]  Pt reported that he lives with his Maria Esther Daughters in a 2 story house with 4 ATILIO, 13 steps to the 2nd flr bathroom and bedroom and 13 steps to the basement  Pt was IPTA with all ADL's, drive and employed  Pt has no DME  Pt denies hx with HHC, STR, alc, drug and psych tx  Ph[facility] is [facility]  Pt reported that he just moved to [place], [place]  Pt is a Hemodialysis pt and home clinic is [address] [place] [address]  Pt is agreeable for CM to call [address] [place] [address] clinic to inquire if they started processing his transfer to a clinic near where he lives  CM received a call from [facility] from [address] [place] [address] , [address] [place] [address] that they started the paperwork for pt to transfer to another HD clinic near where he lives but they do not have an accepting clinic at this time  Pt is requesting for a 3rd shift HD tx  CM will follow  CM reviewed d/c planning process including the following: identifying help at home, patient preference for d/c planning needs, Discharge Lounge, [facility] Meds to Bed program, availability of treatment team to discuss questions or concerns patient and/or family may have regarding understanding medications and recognizing signs and symptoms once discharged  CM also encouraged patient to follow up with all recommended appointments after discharge  Patient advised of importance for patient and family to participate in managing patients medical well being

## 2018-04-18 NOTE — ASSESSMENT & PLAN NOTE
Noted patient is actively smoking approximately 1/2 packs of cigarettes per day  He has been complaining of cough for the past 4 days  Currently he has mild wheezes and rhonchi  Patient will be given nebulizations to bronchodilator 8 and hopefully this will also alleviate any stresses on cardiac activity and would alleviate atrial fibrillation

## 2018-04-18 NOTE — ASSESSMENT & PLAN NOTE
Patient was sent here by Nephrology due to worsening anemia presumably secondary to anemia of chronic disease and chronic renal failure, the patient was given 2 units of packed RBCs and we are going to recheck hemoglobin

## 2018-04-18 NOTE — ED NOTES
Spoke with Dr Deya Banegas about pts rhythm change due to cardizem bolus briefly but returned to original rate  Nurse ordered to rebolus pt before starting trevin Mcgill RN  04/17/18 4480

## 2018-04-18 NOTE — CONSULTS
[address],  [address] [place] [address]  [phone]                                              Cardiology Consult  Magali Tapia 56 y o  male   YOB: 1960 MRN: [id number]  Unit/Bed#: MICU 08 Encounter: [id number]      Physician Requesting Consult: Rena Curtis MD  Reason for Consult / Principal Problem: atrial fibrillation     Assessment and Plan  1  SVT  · Echo - 11/2017 - LVEF 55%, no regional wall motion abnormality, severe LVH, grade 1 diastolic dysfunction, mildly dilated LA, no significant valvular abnormalities  · Echo - 4/2017 - ([facility]) - LVEF 55-60%, no significant abnormality  · Nuclear SPECT - 8 5 METS, was stopped early due to hypertension, partially reversible apical inferior defect  · Cardiac cath - 7/2014 - Normal coronaries  2  H/o SVT  3  Hypertension  4  Diabetes Mellitus Type 2   5  ESRD on HD  · Currently being evaluated for renal transplant at [address] [place] [address]  6  Anemia 2/2 CKD  · S/p 2 U PRBC  7  H/o cocaine use  · Quit many years ago    Outpatient cardiologist:  Dr Eleazar Dahl ([address] [place] [address]) / Victoria Quinteros    Plan  Supraventricular tachycardia  · He has a known history of SVT per available records  · Converted back to sinus rhythm with Cardizem drip  · He was on Coreg at one point per chart records, bu is currently not on any AV blockers  · Would start metoprolol 25 bid      History of Present Illness   HPI: Magali Tapia is a 56y o  year old male who presents with dizziness and palpitations  73-year-old gentleman came into the ED yesterday with complaints of dizziness and palpitations  He had received dialysis the day prior, and had been complaining of feeling fatigued and nauseated during the day  He contacted and the dialysis center, and given that his recent hemoglobin was 7 4, the were concerns that he needed transfusion  And as a result he was sent to the ED  No prior episodes of similar palpitations or dizziness      On arrival to the ED, he was noted to be tachycardic and thought to be AFib  This was thought to be driven by his anemia, and as a result he was transfused with blood  His heart rate continued to be in the 130s to 150s, hence he was given additional doses of IV metoprolol followed by IV Cardizem and eventually Cardizem drip  After initiating Cardizem drip, he converted to normal sinus rhythm  He was eventually taken off his Cardizem drip around noon  He does note having had some mild chest pain overnight, which he says continued through the night and eventually result this morning after went for a bath  He is unable to provide much additional history regarding the same  Past Medical History:   Diagnosis Date    Dialysis patient ([facility] [address] )     Hypertension     Renal disorder      Past Surgical History:   Procedure Laterality Date    CHOLECYSTECTOMY       History reviewed  No pertinent family history    Meds/Allergies   all current active meds have been reviewed and current meds: Current Facility-Administered Medications   Medication Dose Route Frequency    albuterol inhalation solution 2 5 mg  2 5 mg Nebulization Q4H PRN    aluminum-magnesium hydroxide-simethicone (MYLANTA) 200-200-20 mg/5 mL oral suspension 5 mL  5 mL Oral Q6H PRN    amLODIPine (NORVASC) tablet 10 mg  10 mg Oral Daily    calcium acetate (PHOSLO) capsule 2,001 mg  2,001 mg Oral TID With Meals    cinacalcet (SENSIPAR) tablet 30 mg  30 mg Oral Daily With Breakfast    diltiazem (CARDIZEM) 125 mg in sodium chloride 0 9 % 125 mL infusion  1-15 mg/hr Intravenous Titrated    docusate sodium (COLACE) capsule 100 mg  100 mg Oral BID PRN    epoetin cecily (EPOGEN,PROCRIT) injection 4,000 Units  4,000 Units Intravenous After Dialysis    gabapentin (NEURONTIN) capsule 300 mg  300 mg Oral Daily    nicotine (NICODERM CQ) 14 mg/24hr TD 24 hr patch 1 patch  1 patch Transdermal Daily    ondansetron (ZOFRAN) injection 4 mg  4 mg Intravenous Q6H PRN Prescriptions Prior to Admission   Medication    amLODIPine (NORVASC) 10 mg tablet    calcium acetate (PHOSLO) 667 mg capsule    cinacalcet (SENSIPAR) 30 mg tablet    gabapentin (NEURONTIN) 100 mg capsule     No Known Allergies  Social History     Social History    Marital status:      Spouse name: N/A    Number of children: N/A    Years of education: N/A     Social History Main Topics    Smoking status: Current Every Day Smoker     Packs/day: 1 00     Types: Cigarettes    Smokeless tobacco: Never Used    Alcohol use No    Drug use: No    Sexual activity: Not Asked     Other Topics Concern    None     Social History Narrative    None         Review of Systems  c/o palpitations+, No c/o shortness of breath, c/o dizziness or light-headedness+, No c/o abdominal pain, no c/o nausea/vomitting  All other systems negative    Vitals:    04/18/18 1100 04/18/18 1130 04/18/18 1200 04/18/18 1230   BP: 145/56 147/77 143/79 (!) 180/87   BP Location:       Pulse: 73 72 72 77   Resp:       Temp:       TempSrc:       SpO2:       Weight:         Orthostatic Blood Pressures    Flowsheet Row Most Recent Value   Blood Pressure   180/87 filed at 04/18/2018 1230   Patient Position - Orthostatic VS  Sitting filed at 04/18/2018 0955        Body mass index is 24 8 kg/m²  Wt Readings from Last 5 Encounters:   04/18/18 69 7 kg (153 lb 10 6 oz)   03/12/18 68 4 kg (150 lb 12 7 oz)   11/13/17 68 kg (150 lb)     I/O last 3 completed shifts: In: [place]: -       Physical Exam    Constitutional:  Benita Umana appears well, alert and oriented x 3, pleasant and cooperative   No acute distress   HEENT:    Normocephalic, atraumatic   Neck:  Supple, No JVD   Lungs:  Clear to auscultation bilaterally, respirations unlabored    Chest Wall:  No tenderness or deformity    Heart::  Regular rate, Regular rhythm, S1 and S2 normal, no murmur, rub or gallop    Abdomen:  Soft, non-tender, non-distended   Neurological:  Awake, alert, oriented x3  Non-focal exam    Extremities:  No pedal edema, No calf tenderness         Labs:    Results from last 7 days  Lab Units 04/18/18  0548 04/17/18  1753 04/17/18  1747   WBC Thousand/uL 4 41  --  4 69   HEMOGLOBIN g/dL 8 0*  --  7 4*   I STAT HEMOGLOBIN g/dl  --  6 5*  --    HEMATOCRIT % 23 4*  --  21 8*   RDW % 15 9*  --  16 2*   PLATELETS Thousands/uL 143*  --  151       Results from last 7 days  Lab Units 04/18/18  0548 04/17/18  1753 04/17/18  1747   SODIUM mmol/L 142  --  139   POTASSIUM mmol/L 4 6  --  4 8   CHLORIDE mmol/L 106  --  103   CO2 mmol/L 20*  --  20*   MAGNESIUM mg/dL 2 7*  --   --    BUN mg/dL 123*  --  125*   CREATININE mg/dL 18 50*  --  18 10*   CALCIUM mg/dL 8 5  --  8 9   TOTAL PROTEIN g/dL  --   --  6 8   BILIRUBIN TOTAL mg/dL  --   --  0 47   AST U/L  --   --  36   ALT U/L  --   --  15   ALK PHOS U/L  --   --  89   GLUCOSE RANDOM mg/dL 127  --  129   GLUCOSE, ISTAT mg/dl  --  130  --        Results from last 7 days  Lab Units 04/17/18  1747   TROPONIN I ng/mL 0 05*                   EKG: SVT  Telemetry: NSR, HR 75  Imaging: Xr Chest 1 View Portable    Result Date: 4/17/2018  Narrative: CHEST INDICATION:   sob  COMPARISON:  11/13/2017 EXAM PERFORMED/VIEWS:  XR CHEST PORTABLE FINDINGS: Heart shadow is enlarged but unchanged from prior exam  Airspace disease within both lung fields, similar to the prior examination  No effusions  No pneumothorax  Osseous structures appear within normal limits for patient age  Impression: Stable bilateral airspace disease suspicious for pulmonary edema  Bilateral pneumonia not excluded   Workstation performed: [id number]       Cardiac testing:   Results for orders placed during the hospital encounter of 11/13/17   Echo complete with contrast if indicated    Narrative [address]  [facility] [place], [address] [place] [address]  [phone]    Transthoracic Echocardiogram  2D, M-mode, Doppler, and Color Doppler    Study date:  2017    Patient: Lisa Serrato  MR number: [id number]  Account number: [de-identified]  : 1960  Age: 56 years  Gender: Male  Status: Inpatient  Location: Bedside  Height: 66 in  Weight: 149 6 lb  BP: 148/ 78 mmHg    Indications: Hypertensive Heart Disease    Diagnoses: I12 0 - Hypertensive chronic kidney disease with stage 5 chronic kidney disease or end stage renal disease    Sonographer:  Patric Cantu RDCS  Primary Physician:  Judith Smart  Referring Physician:  Junior Nava MD  Group:  [id number]  Interpreting Physician:  Lisandra Cleary MD    SUMMARY    LEFT VENTRICLE:  Systolic function was normal  Ejection fraction was estimated to be 55 %  There were no regional wall motion abnormalities  Wall thickness was markedly increased  There was severe concentric hypertrophy  Doppler parameters were consistent with abnormal left ventricular relaxation (grade 1 diastolic dysfunction)  Doppler parameters were consistent with elevated ventricular end-diastolic filling pressure  LEFT ATRIUM:  The atrium was mildly dilated  MITRAL VALVE:  There was trace regurgitation  HISTORY: PRIOR HISTORY: End Stage Renal Disease, Hypertension, Diabetes Mellitus II    PROCEDURE: The procedure was performed at the bedside  This was a routine study  The transthoracic approach was used  The study included complete 2D imaging, M-mode, complete spectral Doppler, and color Doppler  The heart rate was 93 bpm,  at the start of the study  Images were obtained from the parasternal, apical, subcostal, and suprasternal notch acoustic windows  Image quality was adequate  LEFT VENTRICLE: Size was normal  Systolic function was normal  Ejection fraction was estimated to be 55 %  There were no regional wall motion abnormalities  Wall thickness was markedly increased  There was severe concentric hypertrophy    DOPPLER: Doppler parameters were consistent with abnormal left ventricular relaxation (grade 1 diastolic dysfunction)  Doppler parameters were consistent with elevated ventricular end-diastolic filling pressure  RIGHT VENTRICLE: The size was normal  Systolic function was normal  Wall thickness was normal     LEFT ATRIUM: The atrium was mildly dilated  RIGHT ATRIUM: Size was normal     MITRAL VALVE: Valve structure was normal  There was normal leaflet separation  DOPPLER: The transmitral velocity was within the normal range  There was no evidence for stenosis  There was trace regurgitation  AORTIC VALVE: The valve was trileaflet  Leaflets exhibited normal thickness and normal cuspal separation  DOPPLER: Transaortic velocity was within the normal range  There was no evidence for stenosis  There was no regurgitation  TRICUSPID VALVE: The valve structure was normal  There was normal leaflet separation  DOPPLER: The transtricuspid velocity was within the normal range  There was no evidence for stenosis  There was no regurgitation  PULMONIC VALVE: Leaflets exhibited normal thickness, no calcification, and normal cuspal separation  DOPPLER: The transpulmonic velocity was within the normal range  There was no regurgitation  PERICARDIUM: There was no pericardial effusion  The pericardium was normal in appearance  AORTA: The root exhibited normal size      SYSTEM MEASUREMENT TABLES    2D  %FS: 29 36 %  Ao Diam: 3 34 cm  EDV(Teich): 159 24 ml  EF Biplane: 45 61 %  EF(Teich): 55 59 %  ESV(Teich): 70 72 ml  IVSd: 1 49 cm  LA Area: 26 75 cm2  LA Diam: 3 7 cm  LVEDV MOD A2C: 278 3 ml  LVEDV MOD A4C: 283 89 ml  LVEDV MOD BP: 284 15 ml  LVEF MOD A2C: 46 72 %  LVEF MOD A4C: 43 77 %  LVESV MOD A2C: 148 28 ml  LVESV MOD A4C: 159 64 ml  LVESV MOD BP: 154 53 ml  LVIDd: 5 69 cm  LVIDs: 4 02 cm  LVLd A2C: 10 36 cm  LVLd A4C: 10 07 cm  LVLs A2C: 8 87 cm  LVLs A4C: 8 97 cm  LVPWd: 1 42 cm  RA Area: 16 76 cm2  RVIDd: 4 57 cm  SV MOD A2C: 130 01 ml  SV MOD A4C: 124 25 ml  SV(Teich): 88 51 ml    MM  TAPSE: 2 53 cm    PW  AVC: 324 2 ms  E': 0 06 m/s  E/E': 18 24  MV A Fuad: 1 27 m/s  MV Dec [facility]: 4 94 m/s2  MV DecT: 204 31 ms  MV E Fuad: 1 01 m/s  MV E/A Ratio: 0 8  MV PHT: 59 25 ms  MVA By PHT: 3 71 cm2    [facility] Accredited Echocardiography Laboratory    Prepared and electronically signed by    Kasie Sotomayor MD  Signed [id number] 17:21:56       No results found for this or any previous visit  No results found for this or any previous visit  No results found for this or any previous visit  Counseling / Coordination of Care  Total floor / unit time spent today 35 minutes  Greater than 50% of total time was spent with the patient and / or family counseling and / or coordination of care  A description of the counseling / coordination of care: Obtained history, performed physical examination, discussed laboratory and imaging results, and explained further plan of care  Mimi Harvey

## 2018-04-18 NOTE — H&P
H&P- Jessica Weeks 1960, 56 y o  male MRN: [id number]    Unit/Bed#: SPR 2 Encounter: [id number]    Primary Care Provider: Deepa Johnston   Date and time admitted to hospital: 4/17/2018  5:20 PM        * Paroxysmal atrial fibrillation [facility])   Assessment & Plan    Patient comes in with paroxysmal atrial fibrillation with heart rate approximately in the 130s  Probably this is secondary to mild pulmonary hypertension from an ongoing bronchitis  That said, the patient was given Norvasc for the control of blood pressure  Likewise, he was started on Cardizem 10 mg intravenous initially ordered x1 then given another dose while waiting for the drip  With the drip at 5 mg per hour, the patient's heart rate is currently in the 60s  Anemia of renal disease   Assessment & Plan    Patient was sent here by Nephrology due to worsening anemia presumably secondary to anemia of chronic disease and chronic renal failure, the patient was given 2 units of packed RBCs and we are going to recheck hemoglobin  Acute bronchitis due to other specified organisms   Assessment & Plan    Noted patient is actively smoking approximately 1/2 packs of cigarettes per day  He has been complaining of cough for the past 4 days  Currently he has mild wheezes and rhonchi  Patient will be given nebulizations to bronchodilator 8 and hopefully this will also alleviate any stresses on cardiac activity and would alleviate atrial fibrillation  ESRD (end stage renal disease) on dialysis [facility])   Assessment & Plan    Consult to Nephrology for end-stage renal disease and dialysis                  VTE Prophylaxis: Pharmacologic VTE Prophylaxis contraindicated due to End-stage renal disease  / sequential compression device   Code Status: Level 1 - Full Code as discussed with patient  POLST: There is no POLST form on file for this patient (pre-hospital)    Anticipated Length of Stay:  Patient will be admitted on an Inpatient basis with an anticipated length of stay of  greater than 2 midnights  Justification for Hospital Stay: Please see detailed plans noted above  Chief Complaint:     Heart racing with dizziness  History of Present Illness:  Taina Cruz is a 56 y o  male who has a past medical history significant for diabetes mellitus, benign essential hypertension with chronic kidney disease stage 5/end-stage renal disease and currently on hemodialysis  He also has chronic anemia due to kidney disease  Secondary hyperparathyroidism due to chronic kidney disease  Patient was recently seen by Nephrology where he gets hemodialysis regularly and it was of note that his hemoglobin is currently at 7 4 when he usually is at 10 8  That said, the patient is also complaining of dizziness and somewhat with mild shortness of breath  The patient was sent to the emergency room because they thought that he needs blood transfusion for which 2 units were ordered  Despite that, the patient was still with some shortness of breath and noted to was that his heart rate is elevated at 130  He was given metoprolol 5 mg intravenously x2 with no improvement  Patient then states that he smokes approximately 1/2 packs per day since he was age 17  He used to smoke heavily  No sputum production however there is intermittent cough with note of rhonchi and wheezes  At this point the patient was given some nebulizations with improvement of bronchodilation  Also with the start of Cardizem, his heart rate has gone down to 60s with note of resurgence of normal sinus rhythm  Currently, patient is sleeping and very comfortable  Initially he was anxious  There is improvement in his well being      Review of Systems:    Constitutional:  Denies fever or chills   Eyes:  Denies change in visual acuity   HENT:  Denies nasal congestion or sore throat   Respiratory:  Noted cough with mild shortness of breath   Cardiovascular:  Denies chest pain or edema   GI:  Denies abdominal pain, nausea, vomiting, bloody stools or diarrhea   :  Denies dysuria   Musculoskeletal:  Denies back pain or joint pain   Integument:  Denies rash   Neurologic:  Denies headache, focal weakness or sensory changes   Endocrine:  Denies polyuria or polydipsia   Lymphatic:  Denies swollen glands   Psychiatric:  Denies depression or anxiety     Past Medical and Surgical History:   Past Medical History:   Diagnosis Date    Dialysis patient ([facility] [address] )     Hypertension     Renal disorder      Past Surgical History:   Procedure Laterality Date    CHOLECYSTECTOMY         Meds/Allergies:  amLODIPine (NORVASC) 10 mg tablet Take 10 mg by mouth daily Lukas Banegas MD Reordered   Ordered as: amLODIPine (NORVASC) tablet 10 mg - 10 mg, Oral, Daily, First dose on Wed 4/18/18 at [facility] for systolic blood pressure less than (mmHg): 110   calcium acetate (PHOSLO) 667 mg capsule Take 2,001 mg by mouth 3 (three) times a day with meals Lukas Banegas MD Reordered   Ordered as: calcium acetate (PHOSLO) capsule 2,001 mg - 2,001 mg, Oral, 3 times daily with meals, First dose on Wed 4/18/18 at 0730 Take With Food    cinacalcet (SENSIPAR) 30 mg tablet 30 mg Lukas Banegas MD Reordered   Ordered as: cinacalcet (SENSIPAR) tablet 30 mg - 30 mg, Oral, Daily with breakfast, First dose on Wed 4/18/18 at [facility] [address] whole; do not crush, chew or split     gabapentin (NEURONTIN) 100 mg capsule Take 300 mg by mouth daily   Lukas Banegas MD Reordered   Ordered as: gabapentin (NEURONTIN) capsule 300 mg - 300 mg, Oral, Daily, First dose on Wed 4/18/18 at 0900 [facility]          Allergies: No Known Allergies  History:  Marital Status:    Occupation: unemployed  Patient Pre-hospital Living Situation: home  Patient Pre-hospital Level of Mobility: mobile  Patient Pre-hospital Diet Restrictions: renal  Substance Use History:   History   Alcohol Use No     History   Smoking Status    Current Every Day Smoker    Packs/day: 1 00    Types: Cigarettes   Smokeless Tobacco    Current User     History   Drug Use No       Family History:  History reviewed  No pertinent family history  Physical Exam:     Vitals:   Blood Pressure: 124/66 (04/18/18 0459)  Pulse: 66 (04/18/18 0459)  Temperature: 98 2 °F (36 8 °C) (04/18/18 0005)  Temp Source: Oral (04/17/18 2200)  Respirations: 13 (04/18/18 0459)  Weight - Scale: 71 7 kg (158 lb) (04/17/18 1744)  SpO2: 93 % (04/18/18 0459)    Constitutional:  Well developed, well nourished, no acute distress, non-toxic appearance   Eyes:  PERRL, conjunctiva normal   HENT:  Atraumatic, external ears normal, nose normal, oropharynx moist, no pharyngeal exudates  Neck- normal range of motion, no tenderness, supple   Respiratory:  No respiratory distress, normal breath sounds, no rales, no wheezing   Cardiovascular:  Normal rate, normal rhythm, initially irregularly tachycardic, no murmurs, no gallops, no rubs   GI:  Soft, nondistended, normal bowel sounds, nontender, no organomegaly, no mass, no rebound, no guarding   :  No costovertebral angle tenderness   Musculoskeletal:  No edema, no tenderness, no deformities  Back- no tenderness  Integument:  Well hydrated, no rash   Lymphatic:  No lymphadenopathy noted   Neurologic:  Alert &awake, communicative, CN 2-12 normal, normal motor function, normal sensory function, no focal deficits noted   Psychiatric:  Speech and behavior appropriate       Lab Results: I have personally reviewed pertinent reports          Results from last 7 days  Lab Units 04/17/18 1753 04/17/18  1747   WBC Thousand/uL  --  4 69   HEMOGLOBIN g/dL  --  7 4*   I STAT HEMOGLOBIN g/dl 6 5*  --    HEMATOCRIT %  --  21 8*   PLATELETS Thousands/uL  --  151   NEUTROS PCT %  --  62   LYMPHS PCT %  --  19   MONOS PCT %  --  12   EOS PCT %  --  6       Results from last 7 days  Lab Units 04/17/18  1753 04/17/18  1747   SODIUM mmol/L  --  139   POTASSIUM mmol/L --  4 8   CHLORIDE mmol/L  --  103   CO2 mmol/L  --  20*   BUN mg/dL  --  125*   CREATININE mg/dL  --  18 10*   CALCIUM mg/dL  --  8 9   TOTAL PROTEIN g/dL  --  6 8   BILIRUBIN TOTAL mg/dL  --  0 47   ALK PHOS U/L  --  89   ALT U/L  --  15   AST U/L  --  36   GLUCOSE RANDOM mg/dL  --  129   GLUCOSE, ISTAT mg/dl 130  --            EKG: now sinus rhythm (initially atrial fibrillation) no acute St changes    Imaging: I have personally reviewed pertinent reports  Xr Chest 1 View Portable    Result Date: 4/17/2018  Narrative: CHEST INDICATION:   sob  COMPARISON:  11/13/2017 EXAM PERFORMED/VIEWS:  XR CHEST PORTABLE FINDINGS: Heart shadow is enlarged but unchanged from prior exam  Airspace disease within both lung fields, similar to the prior examination  No effusions  No pneumothorax  Osseous structures appear within normal limits for patient age  Impression: Stable bilateral airspace disease suspicious for pulmonary edema  Bilateral pneumonia not excluded  Workstation performed: [id number]         ** Please Note: Dragon 360 Dictation voice to text software was used in the creation of this document   **

## 2018-04-18 NOTE — ASSESSMENT & PLAN NOTE
Patient with known history of supraventricular tachycardia as per Cardiology note  Presented with SVT/atrial fibrillation with rapid ventricular response  Placed on Cardizem drip, converted in sinus rate with heart rate around 80  Discontinue Cardizem drip  Start metoprolol 25 mg p o  b i d  as per Cardiology recommendation  Continue with telemetry monitoring  Patient with workup as an outpatient including normal cardiac catheterization in 2014

## 2018-04-18 NOTE — PLAN OF CARE
Problem: DISCHARGE PLANNING - CARE MANAGEMENT  Goal: Discharge to post-acute care or home with appropriate resources  INTERVENTIONS:  - Conduct assessment to determine patient/family and health care team treatment goals, and need for post-acute services based on payer coverage, community resources, and patient preferences, and barriers to discharge  - Address psychosocial, clinical, and financial barriers to discharge as identified in assessment in conjunction with the patient/family and health care team  - Arrange appropriate level of post-acute services according to patient's   needs and preference and payer coverage in collaboration with the physician and health care team  - Communicate with and update the patient/family, physician, and health care team regarding progress on the discharge plan  - Arrange appropriate transportation to post-acute venues  Assist pt will go home when medically clear for discharge  Outcome: Progressing

## 2018-04-18 NOTE — ASSESSMENT & PLAN NOTE
During exam today wheezing or rhonchi noted  Patient is resting, denies cough at this present time  Continue with nebulizers p r n

## 2018-04-18 NOTE — PROGRESS NOTES
Progress Note- post admission check - Anna Bravo 1960, 56 y o  male MRN: [id number]    Unit/Bed#: MICU 08 Encounter: [id number]    Primary Care Provider: Ismael Valles   Date and time admitted to hospital: 4/17/2018  5:20 PM        Anemia of renal disease   Assessment & Plan    Patient is status post transfusion secondary to worsening anemia as per Nephrology recommendation, hemoglobin 7 4 yesterday normally around 10  His hemoglobin today is stable around 8, no clear indication for transfusion at this time unless otherwise advised by nephrologist  CBC in the morning  Will check Hemoccult for completion        Acute bronchitis due to other specified organisms   Assessment & Plan    During exam today wheezing or rhonchi noted  Patient is resting, denies cough at this present time  Continue with nebulizers p r n          ESRD (end stage renal disease) on dialysis [facility])   Assessment & Plan    Continue dialysis as per Nephrology recommendation  Last treatment today, well tolerated        * Paroxysmal atrial fibrillation [facility])   Assessment & Plan    Patient with known history of supraventricular tachycardia as per Cardiology note  Presented with SVT/atrial fibrillation with rapid ventricular response  Placed on Cardizem drip, converted in sinus rate with heart rate around 80  Discontinue Cardizem drip  Start metoprolol 25 mg p o  b i d  as per Cardiology recommendation  Continue with telemetry monitoring  Patient with workup as an outpatient including normal cardiac catheterization in 2014

## 2018-04-18 NOTE — CONSULTS
Consultation - Nephrology   Gerri Gerardo 56 y o  male MRN: [id number]  Unit/Bed#: MICU 08 Encounter: [id number]    Assessment/Plan:  1  End stage renal disease, maintenance hemodialysis Monday Wednesday Friday, [facility] unit in [place]  2  Atrial fibrillation with RVR  3  Acute on chronic anemia, status post PRBC transfusion  4  Suspecting component of volume overload, challenge dry weight with hemodialysis  5  Previous ejection fraction 59% with diastolic dysfunction  6  Hypertension, stable  7  Secondary hyperparathyroidism    Plan:  · Plan for hemodialysis today, challenge dry weight  · Blood pressure stable  · Heart rate improved with diltiazem  · Records from outpatient hemodialysis facility  · Resume AUBRIE therapy, check iron stores     History of Present Illness   Physician Requesting Consult: Maricel Collins MD  Reason for Consult / Principal Problem:  ESRD  HPI: Gerri Gerardo is a 56y o  year old male who presents with anemia, worsening  Patient is a 61-year-old male with a known medical history of end-stage renal disease, maintenance hemodialysis Monday Wednesday Friday, diabetes, hypertension who presents with worsening anemia  Patient was also complaining of dizziness, lightheadedness as well as some mild shortness of breath  Patient was given 2 units of PRBCs  Shortness of breath is improved today although still with mild cough, no significant sputum production  No reported fever  Does complain of occasional chest discomfort which is sleeping  No abdominal pain, appetite is been marginal   Of note patient recently moved to [facility], has been trying to go to dialysis at his [place] facility however many times misses or only stays on dialysis 1-2 hours      History obtained from chart review and the patient    Review of Systems    Review of Systems: pertinent findings as noted above otherwise negative    Historical Information   Patient Active Problem List   Diagnosis    Fluid overload, unspecified    Hypertensive urgency    Hyperkalemia    Acute hypoxemic respiratory failure (HCC)    DM (diabetes mellitus) (HCC)    Elevated troponin    ESRD (end stage renal disease) on dialysis (HCC)    Benign hypertension with CKD (chronic kidney disease) stage V (HCC)    Secondary hyperparathyroidism of renal origin ([place] [address] )    Anemia in chronic kidney disease, on chronic dialysis ([place] [address] )    Paroxysmal atrial fibrillation ([place] [address] )    Acute bronchitis due to other specified organisms    Anemia of renal disease     Past Medical History:   Diagnosis Date    Dialysis patient ([place] [address] )     Hypertension     Renal disorder      Past Surgical History:   Procedure Laterality Date    CHOLECYSTECTOMY       Social History   History   Alcohol Use No     History   Drug Use No     History   Smoking Status    Current Every Day Smoker    Packs/day: 1 00    Types: Cigarettes   Smokeless Tobacco    Never Used     History reviewed  No pertinent family history      Meds/Allergies   current meds:   Current Facility-Administered Medications   Medication Dose Route Frequency    albuterol inhalation solution 2 5 mg  2 5 mg Nebulization Q4H PRN    aluminum-magnesium hydroxide-simethicone (MYLANTA) 200-200-20 mg/5 mL oral suspension 5 mL  5 mL Oral Q6H PRN    amLODIPine (NORVASC) tablet 10 mg  10 mg Oral Daily    calcium acetate (PHOSLO) capsule 2,001 mg  2,001 mg Oral TID With Meals    cinacalcet (SENSIPAR) tablet 30 mg  30 mg Oral Daily With Breakfast    diltiazem (CARDIZEM) 125 mg in sodium chloride 0 9 % 125 mL infusion  1-15 mg/hr Intravenous Titrated    docusate sodium (COLACE) capsule 100 mg  100 mg Oral BID PRN    gabapentin (NEURONTIN) capsule 300 mg  300 mg Oral Daily    nicotine (NICODERM CQ) 14 mg/24hr TD 24 hr patch 1 patch  1 patch Transdermal Daily    ondansetron (ZOFRAN) injection 4 mg  4 mg Intravenous Q6H PRN       No Known Allergies      Objective   /67 (BP Location: Right arm)   Pulse 61   Temp 97 5 °F (36 4 °C) (Tympanic)   Resp 15   Wt 69 7 kg (153 lb 10 6 oz)   SpO2 95%   BMI 24 80 kg/m²     Intake/Output Summary (Last 24 hours) at 04/18/18 0959  Last data filed at 04/18/18 9166   Gross per 24 hour   Intake              936 ml   Output                0 ml   Net              936 ml       Current Weight: Weight - Scale: 69 7 kg (153 lb 10 6 oz)    Physical Exam   Constitutional: He is oriented to person, place, and time  No distress  HENT:   Head: Normocephalic  Eyes: Conjunctivae are normal    Neck: Neck supple  Cardiovascular: Normal rate  An irregular rhythm present  Pulmonary/Chest: Effort normal  He has rales in the right lower field and the left lower field  Abdominal: Soft  He exhibits no distension  Musculoskeletal: He exhibits no edema  Neurological: He is alert and oriented to person, place, and time  Skin: Skin is warm and dry  Lab Results:      Results from last 7 days  Lab Units 04/18/18  0548   WBC Thousand/uL 4 41   HEMOGLOBIN g/dL 8 0*   HEMATOCRIT % 23 4*   PLATELETS Thousands/uL 143*       Results from last 7 days  Lab Units 04/18/18  0548   SODIUM mmol/L 142   POTASSIUM mmol/L 4 6   CHLORIDE mmol/L 106   CO2 mmol/L 20*   BUN mg/dL 123*   CREATININE mg/dL 18 50*   GLUCOSE RANDOM mg/dL 127   CALCIUM mg/dL 8 5     NEPHROLOGY HEMODIALYSIS PROCEDURE NOTE    Seen and examined on hemodialysis  Resting comfortably        QB:  400  Dialyzer:  160  Sodium: 138  Potassium: 3  Bicarbonate: 35  Ultrafiltration: 2  Medications given on HD: Epogen      Physical Exam:    Vitals:    04/18/18 1030   BP: 130/62   Pulse: 65   Resp:    Temp:    SpO2:      General:  No distress currently on hemodialysis  CVS:  Irregular  Lungs:  Clear  Abdomen:  Soft  Access:  AV fistula  Extremities:  Trace edema  Neuro:  Nonfocal

## 2018-04-19 LAB
ATRIAL RATE: 69 BPM
P AXIS: 58 DEGREES
PR INTERVAL: 190 MS
QRS AXIS: 65 DEGREES
QRSD INTERVAL: 94 MS
QT INTERVAL: 442 MS
QTC INTERVAL: 473 MS
T WAVE AXIS: 46 DEGREES
VENTRICULAR RATE: 69 BPM

## 2018-04-19 PROCEDURE — 99232 SBSQ HOSP IP/OBS MODERATE 35: CPT | Performed by: INTERNAL MEDICINE

## 2018-04-19 PROCEDURE — 94760 N-INVAS EAR/PLS OXIMETRY 1: CPT

## 2018-04-19 PROCEDURE — 94762 N-INVAS EAR/PLS OXIMTRY CONT: CPT

## 2018-04-19 PROCEDURE — 93005 ELECTROCARDIOGRAM TRACING: CPT | Performed by: INTERNAL MEDICINE

## 2018-04-19 PROCEDURE — 93010 ELECTROCARDIOGRAM REPORT: CPT | Performed by: INTERNAL MEDICINE

## 2018-04-19 PROCEDURE — 99223 1ST HOSP IP/OBS HIGH 75: CPT | Performed by: INTERNAL MEDICINE

## 2018-04-19 RX ORDER — DILTIAZEM HYDROCHLORIDE 5 MG/ML
15 INJECTION INTRAVENOUS ONCE
Status: COMPLETED | OUTPATIENT
Start: 2018-04-19 | End: 2018-04-19

## 2018-04-19 RX ORDER — LORAZEPAM 2 MG/ML
0.5 INJECTION INTRAMUSCULAR ONCE AS NEEDED
Status: COMPLETED | OUTPATIENT
Start: 2018-04-19 | End: 2018-04-20

## 2018-04-19 RX ORDER — ALBUTEROL SULFATE 90 UG/1
2 AEROSOL, METERED RESPIRATORY (INHALATION) EVERY 6 HOURS PRN
Qty: 1 INHALER | Refills: 0 | Status: SHIPPED | OUTPATIENT
Start: 2018-04-19 | End: 2018-04-21

## 2018-04-19 RX ORDER — DILTIAZEM HYDROCHLORIDE 5 MG/ML
INJECTION INTRAVENOUS
Status: COMPLETED
Start: 2018-04-19 | End: 2018-04-19

## 2018-04-19 RX ORDER — NICOTINE 21 MG/24HR
1 PATCH, TRANSDERMAL 24 HOURS TRANSDERMAL DAILY
Qty: 28 PATCH | Refills: 0 | Status: SHIPPED | OUTPATIENT
Start: 2018-04-19 | End: 2018-04-21

## 2018-04-19 RX ORDER — DILTIAZEM HYDROCHLORIDE 5 MG/ML
10 INJECTION INTRAVENOUS ONCE
Status: COMPLETED | OUTPATIENT
Start: 2018-04-19 | End: 2018-04-19

## 2018-04-19 RX ORDER — AMIODARONE HYDROCHLORIDE 200 MG/1
200 TABLET ORAL
Status: DISCONTINUED | OUTPATIENT
Start: 2018-04-19 | End: 2018-04-20

## 2018-04-19 RX ORDER — HEPARIN SODIUM 5000 [USP'U]/ML
5000 INJECTION, SOLUTION INTRAVENOUS; SUBCUTANEOUS EVERY 8 HOURS SCHEDULED
Status: DISCONTINUED | OUTPATIENT
Start: 2018-04-19 | End: 2018-04-21 | Stop reason: HOSPADM

## 2018-04-19 RX ORDER — METOPROLOL SUCCINATE 25 MG/1
25 TABLET, EXTENDED RELEASE ORAL
Status: DISCONTINUED | OUTPATIENT
Start: 2018-04-19 | End: 2018-04-21 | Stop reason: HOSPADM

## 2018-04-19 RX ORDER — AMIODARONE HYDROCHLORIDE 200 MG/1
200 TABLET ORAL ONCE
Status: COMPLETED | OUTPATIENT
Start: 2018-04-19 | End: 2018-04-19

## 2018-04-19 RX ADMIN — METOPROLOL TARTRATE 25 MG: 25 TABLET ORAL at 09:00

## 2018-04-19 RX ADMIN — DILTIAZEM HYDROCHLORIDE 10 MG: 5 INJECTION INTRAVENOUS at 18:57

## 2018-04-19 RX ADMIN — NICOTINE 1 PATCH: 14 PATCH, EXTENDED RELEASE TRANSDERMAL at 09:02

## 2018-04-19 RX ADMIN — DILTIAZEM HYDROCHLORIDE 15 MG: 5 INJECTION INTRAVENOUS at 09:20

## 2018-04-19 RX ADMIN — AMIODARONE HYDROCHLORIDE 200 MG: 200 TABLET ORAL at 20:47

## 2018-04-19 RX ADMIN — CALCIUM ACETATE 2001 MG: 667 CAPSULE ORAL at 17:30

## 2018-04-19 RX ADMIN — CINACALCET HYDROCHLORIDE 30 MG: 30 TABLET, COATED ORAL at 08:00

## 2018-04-19 RX ADMIN — CALCIUM ACETATE 2001 MG: 667 CAPSULE ORAL at 11:15

## 2018-04-19 RX ADMIN — AMIODARONE HYDROCHLORIDE 200 MG: 200 TABLET ORAL at 17:37

## 2018-04-19 RX ADMIN — AMIODARONE HYDROCHLORIDE 150 MG: 50 INJECTION, SOLUTION INTRAVENOUS at 18:00

## 2018-04-19 RX ADMIN — HEPARIN SODIUM 5000 UNITS: 5000 INJECTION, SOLUTION INTRAVENOUS; SUBCUTANEOUS at 21:44

## 2018-04-19 RX ADMIN — METOPROLOL SUCCINATE 25 MG: 25 TABLET, EXTENDED RELEASE ORAL at 17:36

## 2018-04-19 RX ADMIN — AMLODIPINE BESYLATE 10 MG: 10 TABLET ORAL at 09:00

## 2018-04-19 RX ADMIN — GABAPENTIN 300 MG: 300 CAPSULE ORAL at 09:04

## 2018-04-19 RX ADMIN — ONDANSETRON 4 MG: 2 INJECTION INTRAMUSCULAR; INTRAVENOUS at 09:26

## 2018-04-19 NOTE — ASSESSMENT & PLAN NOTE
Patient hemoglobin found to be below 8 at the time of admission, he received blood transfusion  Hemoglobin is currently stable, today 9 8  Continue with Epogen and iron infusion as per Nephrology recommendation  CBC daily

## 2018-04-19 NOTE — ASSESSMENT & PLAN NOTE
Patient with known history of supraventricular tachycardia as per Cardiology note  Presented with SVT/atrial fibrillation with rapid ventricular response  Originally he was placed on Cardizem drip, converted in sinus rate with heart rate in the 80s, was stable overnight and early morning he had a new episode of SVT with rapid ventricular response that has been repeating on and off throughout the morning  For recurrent SVT cardiology is now consulted electrophysiology, awaiting recommendations  For now remains on beta-blockers  Continue with telemetry monitoring

## 2018-04-19 NOTE — PROGRESS NOTES
NEPHROLOGY PROGRESS NOTE   Isidro Zhang 56 y o  male MRN: [id number]  Unit/Bed#: -01 Encounter: [id number]  Reason for Consult: ESRD    Assessment/Plan:  1  End stage renal disease, [facility] [address] [facility] [place]  2  Atrial fibrillation with RVR  3  Acute on chronic anemia, status post PRBC transfusion  4  Suspecting component of volume overload, challenge dry weight with hemodialysis  5  Previous ejection fraction 81% with diastolic dysfunction  6  Hypertension, stable  7  Secondary hyperparathyroidism     PLAN:  · Persistent atrial fibrillation with RVR, Cardiology following  · Additional Cardizem and metoprolol given today  · Blood pressure appears stable  · Plan for hemodialysis tomorrow  · Volume status appears stable    SUBJECTIVE:  Seen and examined  Patient complaining of palpitation some chest burning  Heart rate 140s  Discussed with nursing  Recent was given metoprolol and diltiazem  Note mild shortness of breath with elevated heart rate  No abdominal pain  Review of Systems    OBJECTIVE:  Current Weight: Weight - Scale: 70 1 kg (154 lb 9 6 oz)  Vitals:    04/19/18 0316 04/19/18 0729 04/19/18 0917 04/19/18 0927   BP: 122/65 156/76 99/58 102/55   BP Location: Right arm Right arm Right arm    Pulse: 75 71 (!) 144 65   Resp: 18 18 (!) 24 20   Temp: 98 6 °F (37 °C) 97 9 °F (36 6 °C)     TempSrc: Oral Oral     SpO2: 96% 95%  97%   Weight:       Height:           Intake/Output Summary (Last 24 hours) at 04/19/18 1001  Last data filed at 04/18/18 1330   Gross per 24 hour   Intake             1026 ml   Output             2502 ml   Net            -1476 ml       Physical Exam   Constitutional: No distress  HENT:   Head: Normocephalic  Eyes: Conjunctivae are normal    Neck: Neck supple  Cardiovascular: Normal rate  An irregularly irregular rhythm present  Pulmonary/Chest: Effort normal and breath sounds normal    Abdominal: Soft  Medications:    Current Facility-Administered Medications:     albuterol inhalation solution 2 5 mg, 2 5 mg, Nebulization, Q4H PRN, Junaid Myers MD    aluminum-magnesium hydroxide-simethicone (MYLANTA) 200-200-20 mg/5 mL oral suspension 5 mL, 5 mL, Oral, Q6H PRN, Junaid Myers MD    amLODIPine (NORVASC) tablet 10 mg, 10 mg, Oral, Daily, Junaid Myers MD, 10 mg at 04/19/18 0900    calcium acetate (PHOSLO) capsule 2,001 mg, 2,001 mg, Oral, TID With Meals, Junaid Myers MD, 2,001 mg at 04/18/18 1658    cinacalcet (SENSIPAR) tablet 30 mg, 30 mg, Oral, Daily With Breakfast, Junaid Myers MD, 30 mg at 04/19/18 0800    docusate sodium (COLACE) capsule 100 mg, 100 mg, Oral, BID PRN, Junaid Myers MD    epoetin cecily (EPOGEN,PROCRIT) injection 4,000 Units, 4,000 Units, Intravenous, After Dialysis, Amaya Kellogg,     gabapentin (NEURONTIN) capsule 300 mg, 300 mg, Oral, Daily, Junaid Myers MD, 300 mg at 04/19/18 0904    metoprolol tartrate (LOPRESSOR) tablet 25 mg, 25 mg, Oral, Q12H [facility], Belkys Campbell MD, 25 mg at 04/19/18 0900    nicotine (NICODERM CQ) 14 mg/24hr TD 24 hr patch 1 patch, 1 patch, Transdermal, Daily, Junaid Myers MD, 1 patch at 04/19/18 0902    ondansetron [facility]) injection 4 mg, 4 mg, Intravenous, Q6H PRN, Junaid Myers MD, 4 mg at 04/19/18 0926    Laboratory Results:    Results from last 7 days  Lab Units 04/18/18  1935 04/18/18  0548 04/17/18  1753 04/17/18  1747   WBC Thousand/uL 4 05* 4 41  --  4 69   HEMOGLOBIN g/dL 9 4* 8 0*  --  7 4*   I STAT HEMOGLOBIN g/dl  --   --  6 5*  --    HEMATOCRIT % 27 3* 23 4*  --  21 8*   PLATELETS Thousands/uL 150 143*  --  151   SODIUM mmol/L 137 142  --  139   POTASSIUM mmol/L 3 6 4 6  --  4 8   CHLORIDE mmol/L 98* 106  --  103   CO2 mmol/L 32 20*  --  20*   BUN mg/dL 47* 123*  --  125*   CREATININE mg/dL 9 77* 18 50*  --  18 10*   CALCIUM mg/dL 8 9 8 5  --  8 9   MAGNESIUM mg/dL  --  2 7*  --   --    TOTAL PROTEIN g/dL  --   --   --  6 8   GLUCOSE RANDOM mg/dL 118 127  --  129   GLUCOSE, ISTAT mg/dl  --   --  130  --

## 2018-04-19 NOTE — ASSESSMENT & PLAN NOTE
Continue dialysis as per nephrologist schedule, next tomorrow  Patient used to live in [place] and he had dialysis in [place] up to a month ago and for the past month he has been commuting back and forth on the day of dialysis  At this time, he is interested in finding the dialysis center here locally   working on finding chest time accepting center as soon as possible, will follow up

## 2018-04-19 NOTE — DISCHARGE INSTRUCTIONS
Please resume your outpatient dialysis as done prior to admission  Follow-up with primary care physician  Follow-up with cardiologist  Take all your medications as instructed        Acute Bronchitis   AMBULATORY CARE:   Acute bronchitis  is swelling and irritation in the air passages of your lungs  This irritation may cause you to cough or have other breathing problems  Acute bronchitis often starts because of another illness, such as a cold or the flu  The illness spreads from your nose and throat to your windpipe and airways  Bronchitis is often called a chest cold  Acute bronchitis lasts about 3 to 6 weeks and is usually not a serious illness  Your cough can last for several weeks  You may have any of the following symptoms:   · A cough with sputum that may be clear, yellow, or green    · Feeling more tired than usual, and body aches    · A fever and chills    · Wheezing when you breathe    · A tight chest or pain when you breathe or cough  Seek care immediately if:   · You cough up blood  · Your lips or fingernails turn blue  · You feel like you are not getting enough air when you breathe  Contact your healthcare provider if:   · You have a fever  · Your breathing problems do not go away or get worse  · Your cough does not get better within 4 weeks  · You have questions or concerns about your condition or care  Self-care:   · Get more rest   Rest helps your body to heal  Slowly start to do more each day  Rest when you feel it is needed  · Avoid irritants in the air  Avoid chemicals, fumes, and dust  Wear a face mask if you must work around dust or fumes  Stay inside on days when air pollution levels are high  If you have allergies, stay inside when pollen counts are high  Do not use aerosol products, such as spray-on deodorant, bug spray, and hair spray  · Do not smoke or be around others who smoke    Nicotine and other chemicals in cigarettes and cigars damages the cilia that move mucus out of your lungs  Ask your healthcare provider for information if you currently smoke and need help to quit  E-cigarettes or smokeless tobacco still contain nicotine  Talk to your healthcare provider before you use these products  · Drink liquids as directed  Liquids help keep your air passages moist and help you cough up mucus  You may need to drink more liquids when you have acute bronchitis  Ask how much liquid to drink each day and which liquids are best for you  · Use a humidifier or vaporizer  Use a cool mist humidifier or a vaporizer to increase air moisture in your home  This may make it easier for you to breathe and help decrease your cough  Prevent acute bronchitis by doing the following:   · Get the vaccinations you need  Ask your healthcare provider if you should get vaccinated against the flu or pneumonia  · Prevent the spread of germs  You can decrease your risk of acute bronchitis and other illnesses by doing the following:     [facility] your hands often with soap and water  Carry germ-killing hand lotion or gel with you  You can use the lotion or gel to clean your hands when soap and water are not available  ¨ Do not touch your eyes, nose, or mouth unless you have washed your hands first     ¨ Always cover your mouth when you cough to prevent the spread of germs  It is best to cough into a tissue or your shirt sleeve instead of into your hand  Ask those around you cover their mouths when they cough  ¨ Try to avoid people who have a cold or the flu  If you are sick, stay away from others as much as possible  Medicines: Your healthcare provider may  give you any of the following:  · Ibuprofen or acetaminophen  are medicines that help lower your fever  They are available without a doctor's order  Ask your healthcare provider which medicine is right for you  Ask how much to take and how often to take it  Follow directions   These medicines can cause stomach bleeding if not taken correctly  Ibuprofen can cause kidney damage  Do not take ibuprofen if you have kidney disease, an ulcer, or allergies to aspirin  Acetaminophen can cause liver damage  Do not take more than 4,000 milligrams in 24 hours  · Decongestants  help loosen mucus in your lungs and make it easier to cough up  This can help you breathe easier  · Cough suppressants  decrease your urge to cough  If your cough produces mucus, do not take a cough suppressant unless your healthcare provider tells you to  Your healthcare provider may suggest that you take a cough suppressant at night so you can rest     · Inhalers  may be given  Your healthcare provider may give you one or more inhalers to help you breathe easier and cough less  An inhaler gives your medicine to open your airways  Ask your healthcare provider to show you how to use your inhaler correctly  Follow up with your healthcare provider as directed:  Write down questions you have so you will remember to ask them during your follow-up visits  © 2017 [employer]  Information is for End User's use only and may not be sold, redistributed or otherwise used for commercial purposes  All illustrations and images included in CareNotes® are the copyrighted property of [employer]  or [employer]  The above information is an  only  It is not intended as medical advice for individual conditions or treatments  Talk to your doctor, nurse or pharmacist before following any medical regimen to see if it is safe and effective for you  ABLATION INSTRUCTIONS    No heavy lifting or strenuous activity for one week  No soaking in a bath tub/hot tub/swimming pool for one week or until groin heals  If you notice ongoing bleeding, swelling, or firm lumps in groin near ablation incision, please contact Dr Nu Lisa office - [phone]

## 2018-04-19 NOTE — ASSESSMENT & PLAN NOTE
This seems to be resolved, patient cough is now sporadic  Monitor clinically  Cough and rales possibly related to volume overload as patient is improved after dialysis yesterday

## 2018-04-19 NOTE — PROGRESS NOTES
At [phone], patient converted back to NSR with HR 60s  Patient no longer symptomatic  EKG done after conversion  SLIM attending to be notified through tiger text by Dr Mohsen Wall

## 2018-04-19 NOTE — PROGRESS NOTES
Progress Note - Sera Singh 1960, 56 y o  male MRN: [id number]    Unit/Bed#: -01 Encounter: [id number]    Primary Care Provider: Livia Mealing   Date and time admitted to hospital: 4/17/2018  5:20 PM        Anemia of renal disease   Assessment & Plan    Patient hemoglobin found to be below 8 at the time of admission, he received blood transfusion  Hemoglobin is currently stable, today 9 8  Continue with Epogen and iron infusion as per Nephrology recommendation  CBC daily        Acute bronchitis due to other specified organisms   Assessment & Plan    This seems to be resolved, patient cough is now sporadic  Monitor clinically  Cough and rales possibly related to volume overload as patient is improved after dialysis yesterday        ESRD (end stage renal disease) on dialysis [facility])   Assessment & Plan    Continue dialysis as per nephrologist schedule, next tomorrow  Patient used to live in [place] and he had dialysis in [place] up to a month ago and for the past month he has been commuting back and forth on the day of dialysis  At this time, he is interested in finding the dialysis center here locally   working on finding chest time accepting center as soon as possible, will follow up        * Paroxysmal atrial fibrillation ([facility] [address] )   Assessment & Plan    Patient with known history of supraventricular tachycardia as per Cardiology note  Presented with SVT/atrial fibrillation with rapid ventricular response  Originally he was placed on Cardizem drip, converted in sinus rate with heart rate in the 80s, was stable overnight and early morning he had a new episode of SVT with rapid ventricular response that has been repeating on and off throughout the morning  For recurrent SVT cardiology is now consulted electrophysiology, awaiting recommendations  For now remains on beta-blockers  Continue with telemetry monitoring          VTE Pharmacologic Prophylaxis: yes  Pharmacologic: Heparin  Mechanical VTE Prophylaxis in Place: Yes    Patient Centered Rounds: I have performed bedside rounds with nursing staff today  Discussions with Specialists or Other Care Team Provider:  Nephrology, Cardiology    Education and Discussions with Family / Patient:  Patient    Time Spent for Care: 45 minutes  More than 50% of total time spent on counseling and coordination of care as described above  Current Length of Stay: 2 day(s)    Current Patient Status: Inpatient   Certification Statement: The patient will continue to require additional inpatient hospital stay due to Refer to above    Discharge Plan:  Home when medically stable    Code Status: Level 1 - Full Code      Subjective:   Patient has no complaints, cough largely improved almost completely resolved  Awaiting electrophysiology evaluation    Objective:     Vitals:   Temp (24hrs), Av 5 °F (36 9 °C), Min:97 8 °F (36 6 °C), Max:99 2 °F (37 3 °C)    HR:  [] 69  Resp:  [16-24] 20  BP: ()/(55-76) 105/55  SpO2:  [94 %-97 %] 96 %  Body mass index is 23 51 kg/m²  Input and Output Summary (last 24 hours):     No intake or output data in the 24 hours ending 18 1616    Physical Exam:     Physical Exam   Constitutional: He is oriented to person, place, and time  He appears well-developed  Cardiovascular: Normal rate, regular rhythm and normal heart sounds  Exam reveals no friction rub  No murmur heard  Pulmonary/Chest: Effort normal  No respiratory distress  He has no wheezes  He has no rales  Abdominal: Soft  He exhibits no distension  There is no tenderness  There is no rebound  Musculoskeletal: He exhibits no edema  Dialysis access left upper extremity noted   Neurological: He is alert and oriented to person, place, and time  He exhibits normal muscle tone  Skin: Skin is warm  Psychiatric: He has a normal mood and affect         Additional Data:     Labs:      Results from last 7 days  Lab Units 18  1935   WBC Thousand/uL 4 05* HEMOGLOBIN g/dL 9 4*   HEMATOCRIT % 27 3*   PLATELETS Thousands/uL 150   NEUTROS PCT % 64   LYMPHS PCT % 20   MONOS PCT % 9   EOS PCT % 6       Results from last 7 days  Lab Units 04/18/18  1935  04/17/18  1747   SODIUM mmol/L 137  < > 139   POTASSIUM mmol/L 3 6  < > 4 8   CHLORIDE mmol/L 98*  < > 103   CO2 mmol/L 32  < > 20*   BUN mg/dL 47*  < > 125*   CREATININE mg/dL 9 77*  < > 18 10*   CALCIUM mg/dL 8 9  < > 8 9   TOTAL PROTEIN g/dL  --   --  6 8   BILIRUBIN TOTAL mg/dL  --   --  0 47   ALK PHOS U/L  --   --  89   ALT U/L  --   --  15   AST U/L  --   --  36   GLUCOSE RANDOM mg/dL 118  < > 129   GLUCOSE, ISTAT   --   < >  --    < > = values in this interval not displayed  * I Have Reviewed All Lab Data Listed Above  * Additional Pertinent Lab Tests Reviewed:  All Labs Within Last 24 Hours Reviewed    Imaging:    Imaging Reports Reviewed Today Include:  None  Imaging Personally Reviewed by Myself Includes:  None    Recent Cultures (last 7 days):           Last 24 Hours Medication List:     Current Facility-Administered Medications:  albuterol 2 5 mg Nebulization Q4H PRN Osei Barger MD   aluminum-magnesium hydroxide-simethicone 5 mL Oral Q6H PRN Osei Barger MD   amLODIPine 10 mg Oral Daily Rubenal MD Charbel   calcium acetate 2,001 mg Oral TID With Meals Osei Barger MD   cinacalcet 30 mg Oral Daily With Breakfast Osei Barger MD   docusate sodium 100 mg Oral BID PRN Osei Barger MD   epoetin cecily 4,000 Units Intravenous After Dialysis Rebecca Alston DO   gabapentin 300 mg Oral Daily Rubenal MD Charbel   heparin (porcine) 5,000 Units Subcutaneous Q8H [address] Salome Ceballos MD   metoprolol succinate 25 mg Oral BID (diuretic) Rose Mary Hernandez MD   nicotine 1 patch Transdermal Daily Osei Barger MD   ondansetron 4 mg Intravenous Q6H PRN Osei Barger MD        Today, Patient Was Seen By: Salome Ceballos MD    ** Please Note: Dragon 360 Dictation voice to text software may have been used in the creation of this document   **

## 2018-04-19 NOTE — CONSULTS
Consultation - Electrophysiology-Cardiology (EP)   Samson Osborne 56 y o  male MRN: [id number]  Unit/Bed#: -01 Encounter: [id number]      Inpatient consult to Electrophysiology  Consult performed by: REGINA, NorSun ordered by: Cleopatra Burdick          Assessment/Plan     Assessment:  1  Symptomatic SVT  2  Hypertensive heart disease with mild systolic dysfunction with an EF of 45% in 2015, most recent echo shows preserved LV EF 55%  3  Hypertension  4  End-stage renal disease, on hemodialysis  5  Diabetes mellitus type 2 with diabetic nephropathy  6  Dyslipidemia  7  Anemia chronic disease, status post 2 units PRBC    Plan:  EKG reviewed in detail  SVT is regular - rules out A fib  CL is fixed - suggests re - entry - rules out automatic atrial tachycardia  When LBBB morphology - no flutter waves noted - rules out atrial flutter  When varying between RBBB and LBBB - no change in CL - rules out AVRT  This is AVNRT - also confirmed with psedo R' in V1, negative P waves in lead II, III - VA interval short     Recommend ablation - however if no spot availble for tomorrow will be delayed  Options include - short term use of amiodarone / diltiazem / metoprolol - set up for ablation as out patient         History of Present Illness   Physician Requesting Consult: Samson Huerta MD  Reason for Consult / Principal Problem: SVT    HPI: Samson Osborne is a 56y o  year old male with a history of hypertension, diabetes, and end-stage renal disease on hemodialysis  He has been seen by Cardiology at [facility], and per their notes he has a history of hypertensive heart disease with prior mild systolic dysfunction with an EF of 45% in 2015, however more recent echocardiogram to show preserved LV systolic function with EF 55%  He also has a noted history of SVT  He was noted at dialysis to have a drop in hemoglobin to 7 4, and the patient reported dizziness and worsening shortness of breath    He was thus sent to the emergency room for a blood transfusion  Despite this, he had ongoing shortness of breath as well as palpitations, and he was found have a heart rate of 130 beats per minute  He was given IV Lopressor x 2 without improvement, and was ultimately started on an IV Cardizem drip  He then converted to normal sinus rhythm and this was discontinued  He was started on oral metoprolol  This morning, he had another episode of symptomatic SVT, and was given oral metoprolol as well as IV Cardizem with improvement  EP has been asked to see him in consultation for his ongoing episodes  At least 3 episodes of palpitation  Feb, March and April  Current episode is longest and has been recurrent  Feels palpitations, chest pressure , light headedness        Review of Systems  ROS as noted above, otherwise 12 point review of systems was performed and is negative  Historical Information   Past Medical History:   Diagnosis Date    Dialysis patient ([facility] [address] )     Hypertension     Renal disorder      Past Surgical History:   Procedure Laterality Date    CHOLECYSTECTOMY       History   Alcohol Use No     History   Drug Use No     History   Smoking Status    Current Every Day Smoker    Packs/day: 1 00    Types: Cigarettes   Smokeless Tobacco    Never Used     Family History: History reviewed  No pertinent family history      Meds/Allergies   Hospital Medications: Current Facility-Administered Medications   Medication Dose Route Frequency    albuterol inhalation solution 2 5 mg  2 5 mg Nebulization Q4H PRN    aluminum-magnesium hydroxide-simethicone (MYLANTA) 200-200-20 mg/5 mL oral suspension 5 mL  5 mL Oral Q6H PRN    amLODIPine (NORVASC) tablet 10 mg  10 mg Oral Daily    calcium acetate (PHOSLO) capsule 2,001 mg  2,001 mg Oral TID With Meals    cinacalcet (SENSIPAR) tablet 30 mg  30 mg Oral Daily With Breakfast    docusate sodium (COLACE) capsule 100 mg  100 mg Oral BID PRN    epoetin cecily (EPOGEN,PROCRIT) injection 4,000 Units  4,000 Units Intravenous After Dialysis    gabapentin (NEURONTIN) capsule 300 mg  300 mg Oral Daily    metoprolol succinate (TOPROL-XL) 24 hr tablet 25 mg  25 mg Oral BID (diuretic)    nicotine (NICODERM CQ) 14 mg/24hr TD 24 hr patch 1 patch  1 patch Transdermal Daily    ondansetron (ZOFRAN) injection 4 mg  4 mg Intravenous Q6H PRN     Home Medications:   Prescriptions Prior to Admission   Medication    amLODIPine (NORVASC) 10 mg tablet    calcium acetate (PHOSLO) 667 mg capsule    cinacalcet (SENSIPAR) 30 mg tablet    gabapentin (NEURONTIN) 100 mg capsule       No Known Allergies    Objective   Vitals: Blood pressure 105/55, pulse 69, temperature 97 8 °F (36 6 °C), temperature source Oral, resp  rate 20, height 5' 8" (1 727 m), weight 70 1 kg (154 lb 9 6 oz), SpO2 96 %    Orthostatic Blood Pressures    Flowsheet Row Most Recent Value   Blood Pressure  105/55 filed at 04/19/2018 1454   Patient Position - Orthostatic VS  Lying filed at 04/19/2018 1454          No intake or output data in the 24 hours ending 04/19/18 1545    Invasive Devices     Peripheral Intravenous Line            Peripheral IV 04/17/18 Left Antecubital 1 day          Line            Hemodialysis AV Fistula Left -- days                Physical Exam   The patient is currently not in any acute distress; however went into tachycardia in front of me and was light headed  pallor present   No  cyanosis or icterus  Pale oral mucosa, mucosa is moist, no central cyanosis or icterus, posterior pharynx is not well visualized  Neck examined, no jugular lymphadenopathy, no thyromegaly,  No significant chest wall deformity  Bilateral air entry is normal, id somewhat diminished at the bases, no significant crackles, no rhonchi  S1-S2 currently regular tachycardia, no S3-S4 or murmur, no rubs or gallops  Abdomen is soft and nontender, normal bowel sounds,   Awake alert and oriented, facial symmetry is retained, extraocular movements are full and symmetric, head neck down and palate movement is bilateral and symmetric, speech is normal, follows commands and moves limbs  Normal mood and affect  No peripheral cyanosis or clubbing, no significant peripheral edema  No obvious rash ulcer nodules on exposed area of the skin  No jugular lymphadenopathy  Left arm dialysis fistula      Lab Results: I have personally reviewed pertinent lab results  Results from last 7 days  Lab Units 18  0548 18  1753 18  1747   WBC Thousand/uL 4 05* 4 41  --  4 69   HEMOGLOBIN g/dL 9 4* 8 0*  --  7 4*   I STAT HEMOGLOBIN g/dl  --   --  6 5*  --    HEMATOCRIT % 27 3* 23 4*  --  21 8*   PLATELETS Thousands/uL 150 143*  --  151       Results from last 7 days  Lab Units 18  0548  18  1747   SODIUM mmol/L 137 142  --  139   POTASSIUM mmol/L 3 6 4 6  --  4 8   CHLORIDE mmol/L 98* 106  --  103   CO2 mmol/L 32 20*  --  20*   BUN mg/dL 47* 123*  --  125*   CREATININE mg/dL 9 77* 18 50*  --  18 10*   GLUCOSE RANDOM mg/dL 118 127  --  129   GLUCOSE, ISTAT   --   --   < >  --    CALCIUM mg/dL 8 9 8 5  --  8 9   < > = values in this interval not displayed  Results from last 7 days  Lab Units 18  0548   MAGNESIUM mg/dL 2 7*       Imaging: I have personally reviewed pertinent reports      ECHO:   Results for orders placed during the hospital encounter of 17   Echo complete with contrast if indicated    Narrative Cesar [address] [place] [address]  [phone]    Transthoracic Echocardiogram  2D, M-mode, Doppler, and Color Doppler    Study date:  2017    Patient: Collin Roy  MR number: [id number]  Account number: [de-identified]  : 1960  Age: 56 years  Gender: Male  Status: Inpatient  Location: Bedside  Height: 66 in  Weight: 149 6 lb  BP: 148/ 78 mmHg    Indications: Hypertensive Heart Disease    Diagnoses: I12 0 - Hypertensive chronic kidney disease with stage 5 chronic kidney disease or end stage renal disease    Sonographer:  Eduarda Cohn RDCS  Primary Physician:  Blake Winkler  Referring Physician:  Alexa Bishop MD  Group:  [facility]  Interpreting Physician:  Baldemar Desouza MD    SUMMARY    LEFT VENTRICLE:  Systolic function was normal  Ejection fraction was estimated to be 55 %  There were no regional wall motion abnormalities  Wall thickness was markedly increased  There was severe concentric hypertrophy  Doppler parameters were consistent with abnormal left ventricular relaxation (grade 1 diastolic dysfunction)  Doppler parameters were consistent with elevated ventricular end-diastolic filling pressure  LEFT ATRIUM:  The atrium was mildly dilated  MITRAL VALVE:  There was trace regurgitation  HISTORY: PRIOR HISTORY: End Stage Renal Disease, Hypertension, Diabetes Mellitus II    PROCEDURE: The procedure was performed at the bedside  This was a routine study  The transthoracic approach was used  The study included complete 2D imaging, M-mode, complete spectral Doppler, and color Doppler  The heart rate was 93 bpm,  at the start of the study  Images were obtained from the parasternal, apical, subcostal, and suprasternal notch acoustic windows  Image quality was adequate  LEFT VENTRICLE: Size was normal  Systolic function was normal  Ejection fraction was estimated to be 55 %  There were no regional wall motion abnormalities  Wall thickness was markedly increased  There was severe concentric hypertrophy  DOPPLER: Doppler parameters were consistent with abnormal left ventricular relaxation (grade 1 diastolic dysfunction)  Doppler parameters were consistent with elevated ventricular end-diastolic filling pressure  RIGHT VENTRICLE: The size was normal  Systolic function was normal  Wall thickness was normal     LEFT ATRIUM: The atrium was mildly dilated      RIGHT ATRIUM: Size was normal     MITRAL VALVE: Valve structure was normal  There was normal leaflet separation  DOPPLER: The transmitral velocity was within the normal range  There was no evidence for stenosis  There was trace regurgitation  AORTIC VALVE: The valve was trileaflet  Leaflets exhibited normal thickness and normal cuspal separation  DOPPLER: Transaortic velocity was within the normal range  There was no evidence for stenosis  There was no regurgitation  TRICUSPID VALVE: The valve structure was normal  There was normal leaflet separation  DOPPLER: The transtricuspid velocity was within the normal range  There was no evidence for stenosis  There was no regurgitation  PULMONIC VALVE: Leaflets exhibited normal thickness, no calcification, and normal cuspal separation  DOPPLER: The transpulmonic velocity was within the normal range  There was no regurgitation  PERICARDIUM: There was no pericardial effusion  The pericardium was normal in appearance  AORTA: The root exhibited normal size      SYSTEM MEASUREMENT TABLES    2D  %FS: 29 36 %  Ao Diam: 3 34 cm  EDV(Teich): 159 24 ml  EF Biplane: 45 61 %  EF(Teich): 55 59 %  ESV(Teich): 70 72 ml  IVSd: 1 49 cm  LA Area: 26 75 cm2  LA Diam: 3 7 cm  LVEDV MOD A2C: 278 3 ml  LVEDV MOD A4C: 283 89 ml  LVEDV MOD BP: 284 15 ml  LVEF MOD A2C: 46 72 %  LVEF MOD A4C: 43 77 %  LVESV MOD A2C: 148 28 ml  LVESV MOD A4C: 159 64 ml  LVESV MOD BP: 154 53 ml  LVIDd: 5 69 cm  LVIDs: 4 02 cm  LVLd A2C: 10 36 cm  LVLd A4C: 10 07 cm  LVLs A2C: 8 87 cm  LVLs A4C: 8 97 cm  LVPWd: 1 42 cm  RA Area: 16 76 cm2  RVIDd: 4 57 cm  SV MOD A2C: 130 01 ml  SV MOD A4C: 124 25 ml  SV(Teich): 88 51 ml    MM  TAPSE: 2 53 cm    PW  AVC: 324 2 ms  E': 0 06 m/s  E/E': 18 24  MV A [place]: 1 27 m/s  MV Dec [place]: 4 94 m/s2  MV DecT: 204 31 ms  MV E [place]: 1 01 m/s  MV E/A Ratio: 0 8  MV PHT: 59 25 ms  MVA By PHT: 3 71 cm2    [facility] Accredited [facility]chocardi[facility]    Prepared and electronically signed by    Keya Figueroa MD  Signed 13-Nov-2017 17:21:56         CATH/STRESS TEST:  Per records from [place], he underwent coronary angiography in July 2014 after a reversible apical inferior defect was found on SPECT  Is found have no obstructive coronary disease  He has subsequent nuclear stress test in 2017 which was negative for ischemia  Left Heart Cardiac Cath 7/3/2014 [facility] report states completed for history of SVT but he had a previous abnormal stress test): The left main coronary artery is normal   Left anterior descending coronary artery was free of significant disease  The left circumflex artery was free of significant disease  The right coronary artery was free of significant disease  Conclusions:  Normal right dominant coronary circulation  Mildly reduced LV systolic function with mild global hypokinesis  Ejection fraction 45% likely due to hypertensive heart disease    Elevated LVEDP 22 mmHg            EKG: SVT, suspect AVNRT (short RP noted)      Subsequent ECG - NSR with PAC, LVH      TELEMETRY:  Onset of SVT    Termination        VTE Prophylaxis: Heparin    A total of 70 min spent in case  Discussed ablation  Discussed medical therapy - short term and proceeding to ablation

## 2018-04-19 NOTE — PROGRESS NOTES
Patient rang the call bell around 0900 after feeling SOB  Respiratory paged for albuterol nebulizer  Patient placed on 2L O2 for comfort and cardiology paged  Per Dr Shahriar Luna, EKG to be ordered and iv cardizem to be given  Patient given scheduled 25 mg metoprolol  Will continue to monitor

## 2018-04-19 NOTE — PROGRESS NOTES
Cardiology Progress Note - Chris Webb 56 y o  male MRN: [id number]    Unit/Bed#: -01 Encounter: [id number]        Subjective:    No significant events overnight   + palpitations with SVT this AM      ROS    Objective:   Vitals: Blood pressure 102/55, pulse 65, temperature 97 9 °F (36 6 °C), temperature source Oral, resp  rate 20, height 5' 8" (1 727 m), weight 70 1 kg (154 lb 9 6 oz), SpO2 97 %  , Body mass index is 23 51 kg/m² , Orthostatic Blood Pressures    Flowsheet Row Most Recent Value   Blood Pressure  102/55 filed at 04/19/2018 6974   Patient Position - Orthostatic VS  Lying filed at 04/19/2018 5391         Systolic (21NNN), FJH:791 , Min:99 , WRV:163     Diastolic (53IWI), SHF:63, Min:55, Max:87      Intake/Output Summary (Last 24 hours) at 04/19/18 1125  Last data filed at 04/18/18 1330   Gross per 24 hour   Intake              900 ml   Output             2502 ml   Net            -1602 ml     Weight (last 2 days)     Date/Time   Weight    04/18/18 2142  70 1 (154 6)    04/18/18 0800  69 7 (153 66)    04/17/18 1744  71 7 (158)                Telemetry Review: No significant arrhythmias seen on telemetry review  NSR  PSVT  Physical Exam   Constitutional: He is oriented to person, place, and time  No distress  Eyes: No scleral icterus  Neck: No JVD present  Cardiovascular: Normal rate and regular rhythm  No murmur heard  Pulmonary/Chest: Effort normal and breath sounds normal  No respiratory distress  He has no wheezes  He has no rales  Abdominal: Soft  Bowel sounds are normal  He exhibits no distension  There is no tenderness  There is no rebound  Musculoskeletal: He exhibits no edema  Neurological: He is alert and oriented to person, place, and time  Skin: Skin is warm and dry  He is not diaphoretic  Psychiatric: He has a normal mood and affect           Laboratory Results:    Results from last 7 days  Lab Units 04/17/18  1747   TROPONIN I ng/mL 0 05*       CBC with diff: Results from last 7 days  Lab Units 04/18/18 1935 04/18/18 0548 04/17/18 1753 04/17/18  1747   WBC Thousand/uL 4 05* 4 41  --  4 69   HEMOGLOBIN g/dL 9 4* 8 0*  --  7 4*   I STAT HEMOGLOBIN g/dl  --   --  6 5*  --    HEMATOCRIT % 27 3* 23 4*  --  21 8*   MCV fL 85 86  --  87   PLATELETS Thousands/uL 150 143*  --  151   MCH pg 29 4 29 5  --  29 4   MCHC g/dL 34 4 34 2  --  33 9   RDW % 15 4* 15 9*  --  16 2*   MPV fL 10 1 9 7  --  9 3   NRBC AUTO /100 WBCs 0 0  --  0         CMP:  Results from last 7 days  Lab Units 04/18/18 1935 04/18/18 0548 04/17/18 1753 04/17/18  1747   SODIUM mmol/L 137 142  --  139   POTASSIUM mmol/L 3 6 4 6  --  4 8   CHLORIDE mmol/L 98* 106  --  103   CO2 mmol/L 32 20*  --  20*   ANION GAP mmol/L 7 16*  --  16*   BUN mg/dL 47* 123*  --  125*   CREATININE mg/dL 9 77* 18 50*  --  18 10*   GLUCOSE RANDOM mg/dL 118 127  --  129   GLUCOSE, ISTAT mg/dl  --   --  130  --    CALCIUM mg/dL 8 9 8 5  --  8 9   AST U/L  --   --   --  36   ALT U/L  --   --   --  15   ALK PHOS U/L  --   --   --  89   TOTAL PROTEIN g/dL  --   --   --  6 8   BILIRUBIN TOTAL mg/dL  --   --   --  0 47   EGFR ml/min/1 73sq m 6 3  --  3         BMP:  Results from last 7 days  Lab Units 04/18/18 1935 04/18/18 0548  04/17/18  1747   SODIUM mmol/L 137 142  --  139   POTASSIUM mmol/L 3 6 4 6  --  4 8   CHLORIDE mmol/L 98* 106  --  103   CO2 mmol/L 32 20*  --  20*   BUN mg/dL 47* 123*  --  125*   CREATININE mg/dL 9 77* 18 50*  --  18 10*   GLUCOSE RANDOM mg/dL 118 127  --  129   GLUCOSE, ISTAT   --   --   < >  --    CALCIUM mg/dL 8 9 8 5  --  8 9   < > = values in this interval not displayed  BNP: No results for input(s): BNP in the last 72 hours      Magnesium:   Results from last 7 days  Lab Units 04/18/18  0548   MAGNESIUM mg/dL 2 7*       Coags:       TSH: No results found for: TSH    Hemoglobin A1C       Lipid Profile:       Cardiac testing:   Results for orders placed during the hospital encounter of 11/13/17   Echo complete with contrast if indicated    Narrative [address]  [facility] [place] [address] [facility]  [phone]    Transthoracic Echocardiogram  2D, M-mode, Doppler, and Color Doppler    Study date:  2017    Patient: Peewee Castellanos  MR number: [id number]  Account number: [de-identified]  : 1960  Age: 56 years  Gender: Male  Status: Inpatient  Location: Bedside  Height: 66 in  Weight: 149 6 lb  BP: 148/ 78 mmHg    Indications: Hypertensive Heart Disease    Diagnoses: I12 0 - Hypertensive chronic kidney disease with stage 5 chronic kidney disease or end stage renal disease    Sonographer:  Harleen Anderson RDCS  Primary Physician:  Pérez Kiran  Referring Physician:  Jimmy Schaeffer MD  Group:  [facility]  Interpreting Physician:  Gabriel Phan MD    SUMMARY    LEFT VENTRICLE:  Systolic function was normal  Ejection fraction was estimated to be 55 %  There were no regional wall motion abnormalities  Wall thickness was markedly increased  There was severe concentric hypertrophy  Doppler parameters were consistent with abnormal left ventricular relaxation (grade 1 diastolic dysfunction)  Doppler parameters were consistent with elevated ventricular end-diastolic filling pressure  LEFT ATRIUM:  The atrium was mildly dilated  MITRAL VALVE:  There was trace regurgitation  HISTORY: PRIOR HISTORY: End Stage Renal Disease, Hypertension, Diabetes Mellitus II    PROCEDURE: The procedure was performed at the bedside  This was a routine study  The transthoracic approach was used  The study included complete 2D imaging, M-mode, complete spectral Doppler, and color Doppler  The heart rate was 93 bpm,  at the start of the study  Images were obtained from the parasternal, apical, subcostal, and suprasternal notch acoustic windows  Image quality was adequate      LEFT VENTRICLE: Size was normal  Systolic function was normal  Ejection fraction was estimated to be 55 %  There were no regional wall motion abnormalities  Wall thickness was markedly increased  There was severe concentric hypertrophy  DOPPLER: Doppler parameters were consistent with abnormal left ventricular relaxation (grade 1 diastolic dysfunction)  Doppler parameters were consistent with elevated ventricular end-diastolic filling pressure  RIGHT VENTRICLE: The size was normal  Systolic function was normal  Wall thickness was normal     LEFT ATRIUM: The atrium was mildly dilated  RIGHT ATRIUM: Size was normal     MITRAL VALVE: Valve structure was normal  There was normal leaflet separation  DOPPLER: The transmitral velocity was within the normal range  There was no evidence for stenosis  There was trace regurgitation  AORTIC VALVE: The valve was trileaflet  Leaflets exhibited normal thickness and normal cuspal separation  DOPPLER: Transaortic velocity was within the normal range  There was no evidence for stenosis  There was no regurgitation  TRICUSPID VALVE: The valve structure was normal  There was normal leaflet separation  DOPPLER: The transtricuspid velocity was within the normal range  There was no evidence for stenosis  There was no regurgitation  PULMONIC VALVE: Leaflets exhibited normal thickness, no calcification, and normal cuspal separation  DOPPLER: The transpulmonic velocity was within the normal range  There was no regurgitation  PERICARDIUM: There was no pericardial effusion  The pericardium was normal in appearance  AORTA: The root exhibited normal size      SYSTEM MEASUREMENT TABLES    2D  %FS: 29 36 %  Ao Diam: 3 34 cm  EDV(Teich): 159 24 ml  EF Biplane: 45 61 %  EF(Teich): 55 59 %  ESV(Teich): 70 72 ml  IVSd: 1 49 cm  LA Area: 26 75 cm2  LA Diam: 3 7 cm  LVEDV MOD A2C: 278 3 ml  LVEDV MOD A4C: 283 89 ml  LVEDV MOD BP: 284 15 ml  LVEF MOD A2C: 46 72 %  LVEF MOD A4C: 43 77 %  LVESV MOD A2C: 148 28 ml  LVESV MOD A4C: 159 64 ml  LVESV MOD BP: 154 53 ml  LVIDd: 5 69 cm  LVIDs: 4 02 cm  LVLd A2C: 10 36 cm  LVLd A4C: 10 07 cm  LVLs A2C: 8 87 cm  LVLs A4C: 8 97 cm  LVPWd: 1 42 cm  RA Area: 16 76 cm2  RVIDd: 4 57 cm  SV MOD A2C: 130 01 ml  SV MOD A4C: 124 25 ml  SV(Teich): 88 51 ml    MM  TAPSE: 2 53 cm    PW  AVC: 324 2 ms  E': 0 06 m/s  E/E': 18 24  MV A Fu[place]: 1 27 m/s  MV Dec [place]: 4 94 m/s2  MV DecT: 204 31 ms  MV E Fuad: 1 01 m/s  MV E/A Ratio: 0 8  MV PHT: 59 25 ms  MVA By PHT: 3 71 cm2    [employer] Accredited Echocardiography Laboratory    Prepared and electronically signed by    Pascual Tidwell MD  Signed [id number] 17:21:56       No results found for this or any previous visit  No results found for this or any previous visit  No results found for this or any previous visit      Meds/Allergies   current meds:   Current Facility-Administered Medications   Medication Dose Route Frequency    albuterol inhalation solution 2 5 mg  2 5 mg Nebulization Q4H PRN    aluminum-magnesium hydroxide-simethicone (MYLANTA) 200-200-20 mg/5 mL oral suspension 5 mL  5 mL Oral Q6H PRN    amLODIPine (NORVASC) tablet 10 mg  10 mg Oral Daily    calcium acetate (PHOSLO) capsule 2,001 mg  2,001 mg Oral TID With Meals    cinacalcet (SENSIPAR) tablet 30 mg  30 mg Oral Daily With Breakfast    docusate sodium (COLACE) capsule 100 mg  100 mg Oral BID PRN    epoetin cecily (EPOGEN,PROCRIT) injection 4,000 Units  4,000 Units Intravenous After Dialysis    gabapentin (NEURONTIN) capsule 300 mg  300 mg Oral Daily    metoprolol succinate (TOPROL-XL) 24 hr tablet 25 mg  25 mg Oral BID (diuretic)    nicotine (NICODERM CQ) 14 mg/24hr TD 24 hr patch 1 patch  1 patch Transdermal Daily    ondansetron (ZOFRAN) injection 4 mg  4 mg Intravenous Q6H PRN     Prescriptions Prior to Admission   Medication    amLODIPine (NORVASC) 10 mg tablet    calcium acetate (PHOSLO) 667 mg capsule    cinacalcet (SENSIPAR) 30 mg tablet    gabapentin (NEURONTIN) 100 mg capsule          Assessment:  Principal Problem:    Paroxysmal atrial fibrillation (HCC)  Active Problems:    ESRD (end stage renal disease) on dialysis ([facility] [address] )    Acute bronchitis due to other specified organisms    Anemia of renal disease      Plan:    Recurrent SVT this AM despite medical therapy  Will ask EP to see him  Change beta blocker to toprol  LVEF normal        Counseling / Coordination of Care  Total floor / unit time spent today 25 minutes  Greater than 50% of total time was spent with the patient and / or family counseling and / or coordination of care  A description of the counseling / coordination of care

## 2018-04-19 NOTE — SOCIAL WORK
MINGO spoke with Zeyad Matamoros at [facility] Admissions [phone] to request a transfer of facilities for pt  Pt preference is [facility] or [facility]  Pt would need a 3rd shift chair time  Christi put request in for transfer  MINGO provided [facility] with  CM CP contact information  As per Zeyad Matamoros they will fax an admission check list to  to be completed for transfer

## 2018-04-20 ENCOUNTER — ANESTHESIA (INPATIENT)
Dept: NON INVASIVE DIAGNOSTICS | Facility: HOSPITAL | Age: 58
DRG: 981 | End: 2018-04-20
Payer: MEDICARE

## 2018-04-20 ENCOUNTER — APPOINTMENT (INPATIENT)
Dept: DIALYSIS | Facility: HOSPITAL | Age: 58
DRG: 981 | End: 2018-04-20
Attending: INTERNAL MEDICINE
Payer: MEDICARE

## 2018-04-20 ENCOUNTER — APPOINTMENT (INPATIENT)
Dept: NON INVASIVE DIAGNOSTICS | Facility: HOSPITAL | Age: 58
DRG: 981 | End: 2018-04-20
Attending: INTERNAL MEDICINE
Payer: MEDICARE

## 2018-04-20 PROBLEM — I12.0 BENIGN HYPERTENSION WITH CKD (CHRONIC KIDNEY DISEASE) STAGE V (HCC): Status: RESOLVED | Noted: 2018-03-11 | Resolved: 2018-04-20

## 2018-04-20 PROBLEM — E87.5 HYPERKALEMIA: Chronic | Status: RESOLVED | Noted: 2017-11-13 | Resolved: 2018-04-20

## 2018-04-20 PROBLEM — I16.0 HYPERTENSIVE URGENCY: Status: RESOLVED | Noted: 2017-11-13 | Resolved: 2018-04-20

## 2018-04-20 PROBLEM — J96.01 ACUTE HYPOXEMIC RESPIRATORY FAILURE (HCC): Status: RESOLVED | Noted: 2017-11-13 | Resolved: 2018-04-20

## 2018-04-20 PROBLEM — N18.5 BENIGN HYPERTENSION WITH CKD (CHRONIC KIDNEY DISEASE) STAGE V (HCC): Status: RESOLVED | Noted: 2018-03-11 | Resolved: 2018-04-20

## 2018-04-20 PROBLEM — E87.70 FLUID OVERLOAD, UNSPECIFIED: Status: RESOLVED | Noted: 2017-11-13 | Resolved: 2018-04-20

## 2018-04-20 PROBLEM — R77.8 ELEVATED TROPONIN: Status: RESOLVED | Noted: 2017-11-13 | Resolved: 2018-04-20

## 2018-04-20 LAB
GLUCOSE SERPL-MCNC: 133 MG/DL (ref 65–140)
HEMOCCULT STL QL: NEGATIVE

## 2018-04-20 PROCEDURE — C1730 CATH, EP, 19 OR FEW ELECT: HCPCS | Performed by: INTERNAL MEDICINE

## 2018-04-20 PROCEDURE — 02583ZZ DESTRUCTION OF CONDUCTION MECHANISM, PERCUTANEOUS APPROACH: ICD-10-PCS | Performed by: INTERNAL MEDICINE

## 2018-04-20 PROCEDURE — 93653 COMPRE EP EVAL TX SVT: CPT | Performed by: INTERNAL MEDICINE

## 2018-04-20 PROCEDURE — 5A1D70Z PERFORMANCE OF URINARY FILTRATION, INTERMITTENT, LESS THAN 6 HOURS PER DAY: ICD-10-PCS | Performed by: INTERNAL MEDICINE

## 2018-04-20 PROCEDURE — 93621 COMP EP EVL L PAC&REC C SINS: CPT | Performed by: INTERNAL MEDICINE

## 2018-04-20 PROCEDURE — C1894 INTRO/SHEATH, NON-LASER: HCPCS | Performed by: INTERNAL MEDICINE

## 2018-04-20 PROCEDURE — 99232 SBSQ HOSP IP/OBS MODERATE 35: CPT | Performed by: INTERNAL MEDICINE

## 2018-04-20 PROCEDURE — 82272 OCCULT BLD FECES 1-3 TESTS: CPT | Performed by: INTERNAL MEDICINE

## 2018-04-20 PROCEDURE — 93005 ELECTROCARDIOGRAM TRACING: CPT

## 2018-04-20 PROCEDURE — 93613 INTRACARDIAC EPHYS 3D MAPG: CPT | Performed by: INTERNAL MEDICINE

## 2018-04-20 PROCEDURE — 90935 HEMODIALYSIS ONE EVALUATION: CPT | Performed by: INTERNAL MEDICINE

## 2018-04-20 PROCEDURE — C1733 CATH, EP, OTHR THAN COOL-TIP: HCPCS | Performed by: INTERNAL MEDICINE

## 2018-04-20 PROCEDURE — 82948 REAGENT STRIP/BLOOD GLUCOSE: CPT

## 2018-04-20 PROCEDURE — 93623 PRGRMD STIMJ&PACG IV RX NFS: CPT | Performed by: INTERNAL MEDICINE

## 2018-04-20 PROCEDURE — 94760 N-INVAS EAR/PLS OXIMETRY 1: CPT

## 2018-04-20 PROCEDURE — C1893 INTRO/SHEATH, FIXED,NON-PEEL: HCPCS | Performed by: INTERNAL MEDICINE

## 2018-04-20 RX ORDER — PROPOFOL 10 MG/ML
INJECTION, EMULSION INTRAVENOUS AS NEEDED
Status: DISCONTINUED | OUTPATIENT
Start: 2018-04-20 | End: 2018-04-20 | Stop reason: SURG

## 2018-04-20 RX ORDER — SODIUM CHLORIDE 9 MG/ML
INJECTION, SOLUTION INTRAVENOUS CONTINUOUS PRN
Status: DISCONTINUED | OUTPATIENT
Start: 2018-04-20 | End: 2018-04-20 | Stop reason: SURG

## 2018-04-20 RX ORDER — LIDOCAINE HYDROCHLORIDE 10 MG/ML
INJECTION, SOLUTION INFILTRATION; PERINEURAL CODE/TRAUMA/SEDATION MEDICATION
Status: COMPLETED | OUTPATIENT
Start: 2018-04-20 | End: 2018-04-20

## 2018-04-20 RX ORDER — METOPROLOL TARTRATE 5 MG/5ML
2.5 INJECTION INTRAVENOUS EVERY 6 HOURS PRN
Status: DISCONTINUED | OUTPATIENT
Start: 2018-04-20 | End: 2018-04-21 | Stop reason: HOSPADM

## 2018-04-20 RX ORDER — FENTANYL CITRATE/PF 50 MCG/ML
25 SYRINGE (ML) INJECTION
Status: DISCONTINUED | OUTPATIENT
Start: 2018-04-20 | End: 2018-04-20 | Stop reason: HOSPADM

## 2018-04-20 RX ORDER — ACETAMINOPHEN 325 MG/1
650 TABLET ORAL EVERY 4 HOURS PRN
Status: DISCONTINUED | OUTPATIENT
Start: 2018-04-20 | End: 2018-04-21 | Stop reason: HOSPADM

## 2018-04-20 RX ORDER — PROPOFOL 10 MG/ML
INJECTION, EMULSION INTRAVENOUS CONTINUOUS PRN
Status: DISCONTINUED | OUTPATIENT
Start: 2018-04-20 | End: 2018-04-20 | Stop reason: SURG

## 2018-04-20 RX ORDER — SUCCINYLCHOLINE CHLORIDE 20 MG/ML
INJECTION INTRAMUSCULAR; INTRAVENOUS AS NEEDED
Status: DISCONTINUED | OUTPATIENT
Start: 2018-04-20 | End: 2018-04-20 | Stop reason: SURG

## 2018-04-20 RX ORDER — SODIUM CHLORIDE 9 MG/ML
25 INJECTION, SOLUTION INTRAVENOUS CONTINUOUS
Status: DISCONTINUED | OUTPATIENT
Start: 2018-04-20 | End: 2018-04-21 | Stop reason: HOSPADM

## 2018-04-20 RX ORDER — FENTANYL CITRATE 50 UG/ML
INJECTION, SOLUTION INTRAMUSCULAR; INTRAVENOUS AS NEEDED
Status: DISCONTINUED | OUTPATIENT
Start: 2018-04-20 | End: 2018-04-20 | Stop reason: SURG

## 2018-04-20 RX ORDER — HEPARIN SODIUM 1000 [USP'U]/ML
INJECTION, SOLUTION INTRAVENOUS; SUBCUTANEOUS CODE/TRAUMA/SEDATION MEDICATION
Status: COMPLETED | OUTPATIENT
Start: 2018-04-20 | End: 2018-04-20

## 2018-04-20 RX ORDER — ONDANSETRON 2 MG/ML
4 INJECTION INTRAMUSCULAR; INTRAVENOUS ONCE AS NEEDED
Status: DISCONTINUED | OUTPATIENT
Start: 2018-04-20 | End: 2018-04-20 | Stop reason: HOSPADM

## 2018-04-20 RX ADMIN — AMLODIPINE BESYLATE 10 MG: 10 TABLET ORAL at 13:26

## 2018-04-20 RX ADMIN — CALCIUM ACETATE 2001 MG: 667 CAPSULE ORAL at 20:36

## 2018-04-20 RX ADMIN — PROPOFOL 50 MG: 10 INJECTION, EMULSION INTRAVENOUS at 16:04

## 2018-04-20 RX ADMIN — NICOTINE 1 PATCH: 14 PATCH, EXTENDED RELEASE TRANSDERMAL at 08:05

## 2018-04-20 RX ADMIN — ISOPROTERENOL HYDROCHLORIDE 10 MCG/MIN: 0.2 INJECTION, SOLUTION INTRAMUSCULAR; INTRAVENOUS at 17:27

## 2018-04-20 RX ADMIN — FENTANYL CITRATE 25 MCG: 50 INJECTION, SOLUTION INTRAMUSCULAR; INTRAVENOUS at 15:18

## 2018-04-20 RX ADMIN — FENTANYL CITRATE 25 MCG: 50 INJECTION, SOLUTION INTRAMUSCULAR; INTRAVENOUS at 15:51

## 2018-04-20 RX ADMIN — AMIODARONE HYDROCHLORIDE 200 MG: 200 TABLET ORAL at 08:05

## 2018-04-20 RX ADMIN — ACETAMINOPHEN 650 MG: 325 TABLET, FILM COATED ORAL at 20:57

## 2018-04-20 RX ADMIN — METOPROLOL SUCCINATE 25 MG: 25 TABLET, EXTENDED RELEASE ORAL at 20:35

## 2018-04-20 RX ADMIN — PROPOFOL 20 MCG/KG/MIN: 10 INJECTION, EMULSION INTRAVENOUS at 15:09

## 2018-04-20 RX ADMIN — SODIUM CHLORIDE: 0.9 INJECTION, SOLUTION INTRAVENOUS at 15:00

## 2018-04-20 RX ADMIN — EPOETIN ALFA 4000 UNITS: 4000 SOLUTION INTRAVENOUS; SUBCUTANEOUS at 10:07

## 2018-04-20 RX ADMIN — GABAPENTIN 300 MG: 300 CAPSULE ORAL at 13:26

## 2018-04-20 RX ADMIN — PROPOFOL 50 MG: 10 INJECTION, EMULSION INTRAVENOUS at 15:58

## 2018-04-20 RX ADMIN — FENTANYL CITRATE 25 MCG: 50 INJECTION, SOLUTION INTRAMUSCULAR; INTRAVENOUS at 19:00

## 2018-04-20 RX ADMIN — AMIODARONE HYDROCHLORIDE 200 MG: 200 TABLET ORAL at 13:25

## 2018-04-20 RX ADMIN — FENTANYL CITRATE 25 MCG: 50 INJECTION, SOLUTION INTRAMUSCULAR; INTRAVENOUS at 15:37

## 2018-04-20 RX ADMIN — METOROPROLOL TARTRATE 2.5 MG: 5 INJECTION, SOLUTION INTRAVENOUS at 03:08

## 2018-04-20 RX ADMIN — FENTANYL CITRATE 25 MCG: 50 INJECTION, SOLUTION INTRAMUSCULAR; INTRAVENOUS at 18:54

## 2018-04-20 RX ADMIN — PHENYLEPHRINE HYDROCHLORIDE 20 MCG/MIN: 10 INJECTION INTRAVENOUS at 15:36

## 2018-04-20 RX ADMIN — ISOPROTERENOL HYDROCHLORIDE 2 MCG/MIN: 0.2 INJECTION, SOLUTION INTRAMUSCULAR; INTRAVENOUS at 15:54

## 2018-04-20 RX ADMIN — FENTANYL CITRATE 25 MCG: 50 INJECTION, SOLUTION INTRAMUSCULAR; INTRAVENOUS at 15:29

## 2018-04-20 RX ADMIN — SUCCINYLCHOLINE CHLORIDE 100 MG: 20 INJECTION, SOLUTION INTRAMUSCULAR; INTRAVENOUS at 16:04

## 2018-04-20 RX ADMIN — CINACALCET HYDROCHLORIDE 30 MG: 30 TABLET, COATED ORAL at 08:01

## 2018-04-20 RX ADMIN — LIDOCAINE HYDROCHLORIDE 18 ML: 10 INJECTION, SOLUTION INFILTRATION; PERINEURAL at 15:35

## 2018-04-20 RX ADMIN — HEPARIN SODIUM 7000 UNITS: 1000 INJECTION INTRAVENOUS; SUBCUTANEOUS at 15:49

## 2018-04-20 RX ADMIN — LORAZEPAM 0.5 MG: 2 INJECTION INTRAMUSCULAR; INTRAVENOUS at 02:25

## 2018-04-20 RX ADMIN — METOPROLOL SUCCINATE 25 MG: 25 TABLET, EXTENDED RELEASE ORAL at 08:04

## 2018-04-20 NOTE — SOCIAL WORK
CM faxed transfer form and all clinical information [facility] , as requested for transfer to new location to [phone]  CM will continue to follow

## 2018-04-20 NOTE — ASSESSMENT & PLAN NOTE
Patient hemoglobin found to be below 8 at the time of admission, he received blood transfusion  Hemoglobin remained stable above transfusion level  Monitor CBC p r n

## 2018-04-20 NOTE — PROCEDURES
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        Procedure:  1  Radiofrequency ablation of AVNRT  - Slow pathway region in right side, floor of CS  2  Comprehensive EP study   3   Program stimulation with drug infusion - isoprenaline  4  3 D electro-anatomic mapping with NAVX        Surgeon: Real Mock    Assistants -none    Specimens - none    Estimated blood loss-20 mL    Findings-none    Complications none    Anesthesia- modified by anesthesiologist, local lidocaine by myself      EKG:  EKG reviewed in detail  SVT is regular - rules out A fib  CL is fixed + sudden onset and sudden termination =  suggests re - entry - rules out automatic atrial tachycardia  When LBBB morphology - no flutter waves noted - rules out atrial flutter  When varying between RBBB and LBBB - no change in CL - rules out AVRT  This is AVNRT - also confirmed with psedo R' in V1, negative P waves in lead II, III - VA interval short                        Details of the procedure  The patient has been seen in the hospital   He is having recurrent episodes of narrow complex tachycardia causing hypotension  Had a detailed discussion with the patient, family  Decision was made to go ahead and ablate      Patient was brought to the electrophysiologic laboratory  Proper consent was signed  Patient was brought to the procedure  room  Proper time-out was done  Sterile dressing and draping done  All access obtained under ultrasound guidance  All sheaths and catheters placed as described below        Sheaths used  All  access was obtained under ultrasound guidance  Right femoral vein- 8F upgraded to SRO, 5F for HRA  Left femoral vein-  7F for CS, 6F for His, 5F for RV      Catheters used  HRA catheters - Silvia  His catheter - CRD   RV catheter - Silvia  Coronary sinus catheter - [employer] [place] - FJ curve  Ablation catheter - EPT blazer - 4 mm tip      Imaging systems used  1  Fluoroscopy   Right anterior oblique views were used whenever we needed to determine between anterior and posterior  Left anterior oblique views were used whenever we needed to determine between right and left  2  Electrotomy mapping - 3D electro-anatomic mapping was done by the [address]  This was also used whenever catheter was moved  The His bundle was marked        Anticoagulation:  Heparin was used for anticoagulation - 8000 heparin at beginning of case               Step 1: Details of the ablation  Once all catheters were in place we tried  electrophysiologic study  Isoprenaline up to 20 mcg/min was used  Then during isoprenaline wean was able to induce tachycardia  The patient went into narrow complex tachycardia with extra-stimuli      Characteristics of tachycardia  Narrow complex tachycardia, cycle length 420 milliseconds  VA interval <65 ms  Morady maneuver - VAHV response   PPI-TCL >115ms   SA-VA  >85ms  It takes >4 fully paced beat to advance A  His refractory PVC did not advance A    Taking everything together this is very suggestive of AVNRT        Ablation  This was done with EPT Blazer catheter, 4 mm tip, standard curve  Power varied anywhere from 30 w to 50 w  Temperature at all times kept more than 50 degree centigrade  Impedance fall of 10 Ohms to represent adequate heating of tissue    Ablated slow pathway  Significant number of junctional beats at CL varying between 600-900 ms    Ablation line drawn at level 3 all the way from tricuspid annulus to floor of CS   It was anchored to floor of CS  All near field signals were ablated        Different types of stimulation post ablation  Electrical - ADEC, AEST wih S4, burst pacing  done from HRA and proximal CS  Pharmacologic - isoprenaline up to 10 mcg/min and also with weaning   No tachycardia was induced      Different times of testing  After ablation  After EP study  At end of study- waited 30 min after last ablation and repeated study      3D Electro-anatomic map of ablation        White dots - junctional  Red dots - ablated all near field  Yellow dots - His              Step 2: Details of the electrophysiologic study        Ventricular stimulation  There is retrograde right bundle branch block suggesting all VA conduction is mary    Atrial stimulation  Re-entry beat noted  inducible narrow complex tachycardia  All details of ablation as noted above    Nothing inducible post ablation      Stimulation with Isuprel   Isuprel upto 20 mcg/min         End of procedure  All catheters removed  All sheaths removed  Groin sutures applied bilaterally - patient has a cough , need to maintain hemostasis  Patient awake and doing well            Summary of Procedure :  1  Successful RF ablation of AVNRT  Patient doing well post procedure     2  Comprehensive EP study done    3   Bilateral groin sutures placed for hemostasis  Need to remove in 24 hours

## 2018-04-20 NOTE — ANESTHESIA PREPROCEDURE EVALUATION
Review of Systems/Medical History          Cardiovascular  Hypertension ,   Comment: Now in Sinus, Echo 4/18 -EF-55%,  Pulmonary  Smoker cigarette smoker  , Recent URI ,   Comment: Admitted with Bronchitis, Active Cough, much improved  GI/Hepatic       Dialysis hemodialysis Date of last dialysis: Today,        Endo/Other  Diabetes ,      GYN       Hematology  Anemia ,    Comment: Transfused 2 units since admission  Musculoskeletal       Neurology   Psychology           Lab Results   Component Value Date    GLUCOSE 118 04/18/2018    ALT 15 04/17/2018    AST 36 04/17/2018    BUN 47 (H) 04/18/2018    CALCIUM 8 9 04/18/2018    CL 98 (L) 04/18/2018    CO2 32 04/18/2018    CREATININE 9 77 (H) 04/18/2018    INR 1 06 11/13/2017    HCT 27 3 (L) 04/18/2018    HGB 9 4 (L) 04/18/2018    PROT 6 8 04/17/2018    HGBA1C 5 0 11/13/2017    MG 2 7 (H) 04/18/2018    PHOS 5 6 (H) 11/14/2017     04/18/2018    K 3 6 04/18/2018     04/18/2018    WBC 4 05 (L) 04/18/2018          Anesthesia Plan  ASA Score- 3     Anesthesia Type- IV sedation with anesthesia and general with ASA Monitors  Additional Monitors:   Airway Plan:         Plan Factors-Patient not instructed to abstain from smoking on day of procedure  Patient did not smoke on day of surgery  Induction- intravenous  Postoperative Plan-     Informed Consent- Anesthetic plan and risks discussed with patient  I personally reviewed this patient with the CRNA  Discussed and agreed on the Anesthesia Plan with the CRNA  Magalis Ferreira

## 2018-04-20 NOTE — PROGRESS NOTES
NEPHROLOGY PROGRESS NOTE   Ivy Muniz 56 y o  male MRN: [id number]  Unit/Bed#: -01 Encounter: [id number]  Reason for Consult:  End-stage renal disease    ASSESSMENT/PLAN:  1  End-stage renal disease, maintenance hemodialysis Monday Wednesday Friday, previous unit [facility]  2  Symptomatic SVT, anticipated ablation   3  Acute on chronic anemia, status post PRBC transfusion  4  Hypertension, stable  5  Secondary hyperparathyroidism      PLAN:  · Anticipated ablation today  · Hemoglobin appears stable  · Blood pressure and volume status appears acceptable    SUBJECTIVE:  Seen and examined  Patient awake alert  Complains of transient shortness of breath, chest burning would heart rate is elevated  No abdominal pain  No nausea or vomiting  Review of Systems    OBJECTIVE:  Current Weight: Weight - Scale: 70 kg (154 lb 5 2 oz)  Vitals:    04/20/18 0905 04/20/18 0910 04/20/18 0930 04/20/18 1000   BP: 111/55 110/52 128/66 113/54   BP Location: Right arm Right arm Right arm    Pulse: 103 61 66 62   Resp: 18 18 18    Temp:  97 9 °F (36 6 °C)     TempSrc:  Tympanic     SpO2:       Weight:       Height:           Intake/Output Summary (Last 24 hours) at 04/20/18 1019  Last data filed at 04/20/18 0905   Gross per 24 hour   Intake              420 ml   Output                0 ml   Net              420 ml       Physical Exam   Constitutional: He is oriented to person, place, and time  Vital signs are normal  No distress  Currently on hemodialysis   HENT:   Head: Normocephalic  Eyes: No scleral icterus  Neck: Neck supple  Cardiovascular: Normal rate and regular rhythm  Pulmonary/Chest: Effort normal and breath sounds normal  No respiratory distress  Abdominal: Soft  He exhibits no distension  Musculoskeletal: He exhibits no edema  Neurological: He is alert and oriented to person, place, and time  Skin: Skin is warm and dry  Psychiatric: He has a normal mood and affect  Medications:    Current Facility-Administered Medications:     albuterol inhalation solution 2 5 mg, 2 5 mg, Nebulization, Q4H PRN, Sherrie Morales MD    aluminum-magnesium hydroxide-simethicone (MYLANTA) 200-200-20 mg/5 mL oral suspension 5 mL, 5 mL, Oral, Q6H PRN, Sherrie Morales MD    amiodarone tablet 200 mg, 200 mg, Oral, TID With Meals, Jennie Thacker PA-C, 200 mg at 04/20/18 0805    amLODIPine (NORVASC) tablet 10 mg, 10 mg, Oral, Daily, Sherrie Morales MD, 10 mg at 04/19/18 0900    calcium acetate (PHOSLO) capsule 2,001 mg, 2,001 mg, Oral, TID With Meals, Sherrie Morales MD, 2,001 mg at 04/19/18 1730    cinacalcet (SENSIPAR) tablet 30 mg, 30 mg, Oral, Daily With Breakfast, Sherrie Morales MD, 30 mg at 04/20/18 0801    docusate sodium (COLACE) capsule 100 mg, 100 mg, Oral, BID PRN, Sherrie Morales MD    epoetin cecily (EPOGEN,PROCRIT) injection 4,000 Units, 4,000 Units, Intravenous, After Dialysis, Minna Lane DO, 4,000 Units at 04/20/18 1007    gabapentin (NEURONTIN) capsule 300 mg, 300 mg, Oral, Daily, Sherrie Morales MD, 300 mg at 04/19/18 0904    heparin (porcine) subcutaneous injection 5,000 Units, 5,000 Units, Subcutaneous, Q8H [address], Marshia Dance, MD, 5,000 Units at 04/19/18 2144    metoprolol (LOPRESSOR) injection 2 5 mg, 2 5 mg, Intravenous, Q6H PRN, Disha Newell PA-C, 2 5 mg at 04/20/18 0308    metoprolol succinate (TOPROL-XL) 24 hr tablet 25 mg, 25 mg, Oral, BID (diuretic), Serena Peng MD, 25 mg at 04/20/18 0804    nicotine (NICODERM CQ) 14 mg/24hr TD 24 hr patch 1 patch, 1 patch, Transdermal, Daily, Sherrie Morales MD, 1 patch at 04/20/18 0805    Laboratory Results:    Results from last 7 days  Lab Units 04/18/18  1935 04/18/18  0548 04/17/18  1753 04/17/18  1747   WBC Thousand/uL 4 05* 4 41  --  4 69   HEMOGLOBIN g/dL 9 4* 8 0*  --  7 4*   I STAT HEMOGLOBIN g/dl  --   --  6 5*  --    HEMATOCRIT % 27 3* 23 4*  --  21 8*   PLATELETS Thousands/uL 150 143* --  151   SODIUM mmol/L 137 142  --  139   POTASSIUM mmol/L 3 6 4 6  --  4 8   CHLORIDE mmol/L 98* 106  --  103   CO2 mmol/L 32 20*  --  20*   BUN mg/dL 47* 123*  --  125*   CREATININE mg/dL 9 77* 18 50*  --  18 10*   CALCIUM mg/dL 8 9 8 5  --  8 9   MAGNESIUM mg/dL  --  2 7*  --   --    TOTAL PROTEIN g/dL  --   --   --  6 8   GLUCOSE RANDOM mg/dL 118 127  --  129   GLUCOSE, ISTAT mg/dl  --   --  130  --

## 2018-04-20 NOTE — ASSESSMENT & PLAN NOTE
Patient with known history of supraventricular tachycardia as per Cardiology note  Presented with SVT with rapid ventricular response  Persistent, despite the use of beta-blockers  Discussed with electrophysiology today, possible ablation later on today pending OR availability, will follow

## 2018-04-20 NOTE — PROGRESS NOTES
Pt is anxious and stated to PCA "I feel calmer when you are here " MD notified  Ativan prn given as per dr  orders  Continue to monitor

## 2018-04-20 NOTE — PROGRESS NOTES
Progress Note - Anna Bravo 1960, 56 y o  male MRN: [id number]    Unit/Bed#: -01 Encounter: [id number]    Primary Care Provider: Ismael Valles   Date and time admitted to hospital: 4/17/2018  5:20 PM        Anemia of renal disease   Assessment & Plan    Patient hemoglobin found to be below 8 at the time of admission, he received blood transfusion  Hemoglobin remained stable above transfusion level  Monitor CBC p r n  Acute bronchitis due to other specified organisms   Assessment & Plan    Resolved, monitor clinically, continue with albuterol p r n  for shortness of breath or wheezing        ESRD (end stage renal disease) on dialysis [facility])   Assessment & Plan    Dialysis today, ongoing, uneventful  Per works have been faxed over to dialysis center in [facility] by , awaiting response with chair time today  Will follow  Nephrology following the patient while in the hospital        * Paroxysmal atrial fibrillation [facility])   Assessment & Plan    Patient with known history of supraventricular tachycardia as per Cardiology note  Presented with SVT with rapid ventricular response  Persistent, despite the use of beta-blockers  Discussed with electrophysiology today, possible ablation later on today pending OR availability, will follow          VTE Pharmacologic Prophylaxis: yes  Pharmacologic: Heparin  Mechanical VTE Prophylaxis in Place: Yes     Patient Centered Rounds: I have performed bedside rounds with nursing staff today      Discussions with Specialists or Other Care Team Provider:   electrophysiology     Education and Discussions with Family / Patient:  Patient     Time Spent for Care: 35 minutes    More than 50% of total time spent on counseling and coordination of care as described above      Current Length of Stay: 3 day(s)     Current Patient Status: Inpatient   Certification Statement: The patient will continue to require additional inpatient hospital stay due to refer to above     Discharge Plan:  Home when medically stable     Code Status: Level 1 - Full Code    Subjective:   Patient has no complaints today  Undergoing dialysis and feeling well    Objective:     Vitals:   Temp (24hrs), Av 1 °F (36 7 °C), Min:97 2 °F (36 2 °C), Max:98 9 °F (37 2 °C)    HR:  [] 63  Resp:  [16-20] 16  BP: (105-131)/(52-69) 128/69  SpO2:  [94 %-100 %] 100 %  Body mass index is 23 46 kg/m²  Input and Output Summary (last 24 hours): Intake/Output Summary (Last 24 hours) at 18 1327  Last data filed at 18 1230   Gross per 24 hour   Intake              720 ml   Output             1500 ml   Net             -780 ml       Physical Exam:     Physical Exam   Constitutional: He is oriented to person, place, and time  He appears well-developed  Cardiovascular: Normal rate, regular rhythm and normal heart sounds  Exam reveals no friction rub  No murmur heard  Pulmonary/Chest: Effort normal  No respiratory distress  He has no wheezes  He has no rales  Abdominal: Soft  He exhibits no distension  There is no tenderness  There is no rebound  Musculoskeletal: He exhibits no edema  Neurological: He is alert and oriented to person, place, and time  He exhibits normal muscle tone  Skin: Skin is warm  Psychiatric: He has a normal mood and affect         Additional Data:     Labs:      Results from last 7 days  Lab Units 18   WBC Thousand/uL 4 05*   HEMOGLOBIN g/dL 9 4*   HEMATOCRIT % 27 3*   PLATELETS Thousands/uL 150   NEUTROS PCT % 64   LYMPHS PCT % 20   MONOS PCT % 9   EOS PCT % 6       Results from last 7 days  Lab Units 18  1747   SODIUM mmol/L 137  < > 139   POTASSIUM mmol/L 3 6  < > 4 8   CHLORIDE mmol/L 98*  < > 103   CO2 mmol/L 32  < > 20*   BUN mg/dL 47*  < > 125*   CREATININE mg/dL 9 77*  < > 18 10*   CALCIUM mg/dL 8 9  < > 8 9   TOTAL PROTEIN g/dL  --   --  6 8   BILIRUBIN TOTAL mg/dL  --   --  0 47   ALK PHOS U/L  --   --  89 ALT U/L  --   --  15   AST U/L  --   --  36   GLUCOSE RANDOM mg/dL 118  < > 129   GLUCOSE, ISTAT   --   < >  --    < > = values in this interval not displayed  * I Have Reviewed All Lab Data Listed Above  * Additional Pertinent Lab Tests Reviewed: All Labs Within Last 24 Hours Reviewed    Imaging:    Imaging Reports Reviewed Today Include:  None  Imaging Personally Reviewed by Myself Includes:  None    Recent Cultures (last 7 days):           Last 24 Hours Medication List:     Current Facility-Administered Medications:  albuterol 2 5 mg Nebulization Q4H PRN Hyun Davidson MD   aluminum-magnesium hydroxide-simethicone 5 mL Oral Q6H PRN Hyun Davidson MD   amiodarone 200 mg Oral TID With Meals Lucia Avalos PA-C   amLODIPine 10 mg Oral Daily Hynu Davidson MD   calcium acetate 2,001 mg Oral TID With Meals Hyun Davidson MD   cinacalcet 30 mg Oral Daily With Breakfast Hyun Davidson MD   docusate sodium 100 mg Oral BID PRN Hyun Davidson MD   epoetin cecily 4,000 Units Intravenous After Dialysis Sabrina Calderon DO   gabapentin 300 mg Oral Daily Hyun Davidson MD   heparin (porcine) 5,000 Units Subcutaneous Q8H [facility] Collin Ignacio MD   metoprolol 2 5 mg Intravenous Q6H PRN Disha Newell PA-C   metoprolol succinate 25 mg Oral BID (diuretic) Young Kirkpatrick MD   nicotine 1 patch Transdermal Daily Hyun Davidson MD        Today, Patient Was Seen By: Collin Ignacio MD    ** Please Note: Dragon 360 Dictation voice to text software may have been used in the creation of this document   **

## 2018-04-20 NOTE — ANESTHESIA POSTPROCEDURE EVALUATION
Post-Op Assessment Note      CV Status:  Stable    Mental Status:  Awake    Hydration Status:  Stable    PONV Controlled:  None    Airway Patency:  Patent    Post Op Vitals Reviewed: Yes          Staff: AnesthesiologistJENELLE           /67 (04/20/18 1828)    Temp (!) 97 3 °F (36 3 °C) (04/20/18 1828)    Pulse 61 (04/20/18 1828)   Resp 16 (04/20/18 1828)    SpO2 99 % (04/20/18 1828)

## 2018-04-20 NOTE — CASE MANAGEMENT
Notification of Inpatient Admission/Inpatient Authorization Request  This is a Notification of Inpatient Admission/Request for Inpatient Authorization to our facility [facility]  Please be advised that this patient is currently in our facility under Inpatient Status  Below you will find the Attending Physician and Facilitys information including NPI# and contact information for the Utilization  assigned to the [facility] where the patient is receiving services  Please feel free to contact the Utilization Review Department with any questions  Patient Information:  PATIENT NAME: Glenna Mooney  MRN: [id number]  YOB: 1960    PRESENTATION DATE: 4/17/2018  5:20 PM  IP ADMISSION DATE: 4/17/18 2200  DISCHARGE DATE: No discharge date for patient encounter  DISPOSITION: Home/Self Care    Attending Physician:  DANTE Pichardo  [facility] Practioner ID- [id number]  [address] [place] [address]  Phone 1: [phone]  Fax: [phone]  Amanda Peraza RN Registered Nurse Signed   Case Management Date of Service: 4/18/2018 11:40 AM         []Hide copied text  Initial Clinical Review     Admission: Date/Time/Statement: 4/17/18 @ 2200            Orders Placed This Encounter   Procedures    Inpatient Admission (expected length of stay for this patient is greater than two midnights)       Standing Status:   Standing       Number of Occurrences:   1       Order Specific Question:   Admitting Physician       Answer:   Brandi Madrid [[id number]]       Order Specific Question:   Level of Care       Answer:   Level 2 Stepdown / HOT [14]       Order Specific Question:   Estimated length of stay       Answer:   More than 2 Midnights       Order Specific Question:   Certification       Answer:   I certify that inpatient services are medically necessary for this patient for a duration of greater than two midnights   See H&P and MD Progress Notes for additional information about the patient's course of treatment       ED: Date/Time/Mode of Arrival:             ED Arrival Information      Expected Arrival Acuity Means of Arrival Escorted By Service Admission Type     - 4/17/2018 17:20 Emergent Ambulance [address] Emergency     Arrival Complaint     Dizziness          Chief Complaint:        Chief Complaint   Patient presents with    Dizziness       patient states that he was dizzy and palpitations earlier and called 911 but vomited x 1 and felt better after and refused EMS  EMS states that patient was awake and talkative and VSS  Patient then states he got a phone call appox 40 min ago from doctor that told him that he will need a tranfusion  Patient states he continued with dizziness and started with chest burning  Denies nausea at this time        History of Illness: Rylie Roth is a 56 y  o  male who has a past medical history significant for diabetes mellitus, benign essential hypertension with chronic kidney disease stage 5/end-stage renal disease and currently on hemodialysis   He also has chronic anemia due to kidney disease   Secondary hyperparathyroidism due to chronic kidney disease   Patient was recently seen by Nephrology where he gets hemodialysis regularly and it was of note that his hemoglobin is currently at 7 4 when he usually is at 10 8   That said, the patient is also complaining of dizziness and somewhat with mild shortness of breath   The patient was sent to the emergency room because they thought that he needs blood transfusion for which 2 units were ordered   Despite that, the patient was still with some shortness of breath and noted to was that his heart rate is elevated at 130   He was given metoprolol 5 mg intravenously x2 with no improvement   Patient then states that he smokes approximately 1/2 packs per day since he was age 17   He used to smoke heavily   No sputum production however there is intermittent cough with note of rhonchi and wheezes   At this point the patient was given some nebulizations with improvement of bronchodilation   Also with the start of Cardizem, his heart rate has gone down to 60s with note of resurgence of normal sinus rhythm      ED Vital Signs:            ED Triage Vitals   Temperature Pulse Respirations Blood Pressure SpO2   04/17/18 1751 04/17/18 1744 04/17/18 1744 04/17/18 1744 04/17/18 1744   (!) 97 2 °F (36 2 °C) (!) 130 16 116/65 97 %       Temp Source Heart Rate Source Patient Position - Orthostatic VS BP Location FiO2 (%)   04/17/18 1751 04/17/18 1744 04/17/18 1744 04/17/18 1744 --   Oral Monitor Lying Right arm         Pain Score           04/17/18 1744           7                Wt Readings from Last 1 Encounters:   04/18/18 69 7 kg (153 lb 10 6 oz)      Vital Signs (abnormal):   04/18/18 0200   --    122   14   137/84   --   96 %   --   Lying   04/18/18 0100   --    120   15   119/57   --   99 %   None (Room air)   Lying   04/17/18 2300   --    124    24   113/56   --   96 %   None (Room air)   Lying   04/17/18 2215   98 2 °F (36 8 °C)    130   16   116/72   --   100 %   --   --   04/17/18 2200   98 2 °F (36 8 °C)    132    23   120/70   --   98 %   None (Room air)   Lying   04/17/18 2145   --    130   22   121/74   --   97 %   None (Room air)   --   04/17/18 2130   --    130   22   125/74   --   97 %   --   --   04/17/18 2115   --    128   18   118/72   --   96 %   None (Room air)   Lying   04/17/18 2100   --    132   17   128/80   --   96 %   None (Room air)   Lying   04/17/18 2045   --    132   17   125/77   --   95 %   None (Room air)   Lying   04/17/18 2000   --    138   17   122/72   --   97 %   None (Room air)   Lying   04/17/18 1945   98 1 °F (36 7 °C)    138   13   123/69   --   97 %   None (Room air)   Lying   04/17/18 1930   98 2 °F (36 8 °C)    136   15   131/77   --   98 %   None (Room air)   Lying   04/17/18 1923   98 2 °F (36 8 °C)    137   18   125/82   --   --   --   --   04/17/18 1919   --    136   18   125/82   --   97 %   None (Room air)   Lying   04/17/18 1828   --    136   18   120/68   --   100 %   None (Room air)   Lying   04/17/18 1751    97 2 °F (36 2 °C)   --   --   --   --   --   --   --   04/17/18 1744   --    130   16   116/65   --   97 %   None (Room air)   Lying      Abnormal Labs:    Ref Range & Units 04/17/18 1753   ph, [place] ISTAT 7 300 - 7 400 7 457     pCO2, [place] i-STAT 42 0 - 50 0 mm HG 28 8     pO2, [place] i-STAT 35 0 - 45 0 mm HG 50 0     BE, i-STAT -2 - 3 mmol/L -3     HCO3, [place] i-STAT 24 0 - 30 0 mmol/L 20 3     O2 Sat, i-STAT 95 - 98 % 88     Calcium, Ionized i-STAT 1 12 - 1 32 mmol/L 1 00     Hct, i-STAT 36 5 - 49 3 % 19     Hgb, i-STAT 12 0 - 17 0 g/dl 6 5     Specimen Type   VENOUS         4/17 4/18   CO2 20 20   Anion Gap 16 16    123   Creatinine 18 10 18 50   Albumin 3 3     Magnesium   2 7      RBC 2 52 2 71   Hemoglobin 7 4 8 0   Hematocrit 21 8 23 4   RDW 16 2 15 9   Platelets 155 948      Trop 0 05     Diagnostic Test Results:      4/17 CXR - Stable bilateral airspace disease suspicious for pulmonary edema   Bilateral pneumonia not excluded      4/17 EKG - pending      4/18 Cardiac Echo - results pending      ED Treatment:           Medication Administration from 04/17/2018 1720 to 04/18/2018 0741    Date/Time Order Dose Route Action   04/17/2018 2015 metoprolol (LOPRESSOR) injection 5 mg 5 mg Intravenous Given   04/17/2018 2107 metoprolol (LOPRESSOR) injection 5 mg 5 mg Intravenous Given   04/17/2018 2220 diltiazem (CARDIZEM) injection 10 mg 10 mg Intravenous Given   04/18/2018 0209 diltiazem (CARDIZEM) 125 mg in sodium chloride 0 9 % 125 mL infusion 12 5 mg/hr Intravenous Rate/Dose Change   04/18/2018 0121 diltiazem (CARDIZEM) 125 mg in sodium chloride 0 9 % 125 mL infusion 10 mg/hr Intravenous Rate/Dose Change   04/18/2018 0100 diltiazem (CARDIZEM) 125 mg in sodium chloride 0 9 % 125 mL infusion 7 5 mg/hr Intravenous Rate/Dose Change   04/17/2018 2256 diltiazem (CARDIZEM) 125 mg in sodium chloride 0 9 % 125 mL infusion 5 mg/hr Intravenous New Bag   04/17/2018 2257 diltiazem (CARDIZEM) injection 10 mg 10 mg Intravenous Given          Past Medical/Surgical History: Active Ambulatory Problems     Diagnosis Date Noted    Fluid overload, unspecified 11/13/2017    Hypertensive urgency 11/13/2017    Hyperkalemia 11/13/2017    [facility] ) 11/13/2017    DM (diabetes mellitus) ([facility] ) 02/09/2015    Elevated troponin 11/13/2017    ESRD (end stage renal disease) on dialysis ([facility] ) 03/11/2018    Benign hypertension with CKD (chronic kidney disease) stage V ([place] [address] ) 03/11/2018    Secondary hyperparathyroidism of renal origin ([place] [address] ) 03/11/2018    Anemia in chronic kidney disease, on chronic dialysis ([place] [address] ) 03/11/2018           Resolved Ambulatory Problems     Diagnosis Date Noted    Dyspnea 11/13/2017    Encephalopathy 11/13/2017           Past Medical History:   Diagnosis Date    Dialysis patient ([place] [address] )      Hypertension      Renal disorder        Admitting Diagnosis: Dizziness [R42]  ESRD (end stage renal disease) on dialysis ([place] [address] ) [N18 6, Z99 2]  Atrial fibrillation with rapid ventricular response ([place] [address] ) [I48 91]     Age/Sex: 56 y o  male     Assessment/Plan:            * Paroxysmal atrial fibrillation ([facility] )   Assessment & Plan     Patient comes in with paroxysmal atrial fibrillation with heart rate approximately in the 130s   Probably this is secondary to mild pulmonary hypertension from an ongoing bronchitis   That said, the patient was given Norvasc for the control of blood pressure  Orysia Ingles, he was started on Cardizem 10 mg intravenous initially ordered x1 then given another dose while waiting for the drip   With the drip at 5 mg per hour, the patient's heart rate is currently in the 60s            Anemia of renal disease   Assessment & Plan     Patient was sent here by Nephrology due to worsening anemia presumably secondary to anemia of chronic disease and chronic renal failure, the patient was given 2 units of packed RBCs and we are going to recheck hemoglobin           Acute bronchitis due to other specified organisms   Assessment & Plan     Noted patient is actively smoking approximately 1/2 packs of cigarettes per day  [facility] has been complaining of cough for the past 4 days   Currently he has mild wheezes and rhonchi   Patient will be given nebulizations to bronchodilator 8 and hopefully this will also alleviate any stresses on cardiac activity and would alleviate atrial fibrillation           ESRD (end stage renal disease) on dialysis [facility])   Assessment & Plan     Consult to Nephrology for end-stage renal disease and dialysis            VTE Prophylaxis: Pharmacologic VTE Prophylaxis contraindicated due to End-stage renal disease  / sequential compression device   Code Status: Level 1 - Full Code as discussed with patient  Anticipated Length of Stay: Lincoln El will be admitted on an Inpatient basis with an anticipated length of stay of  greater than 2 midnights    Justification for Hospital Stay: Please see detailed plans noted above      Admission Orders:  Scheduled Meds:   Current Facility-Administered Medications:  albuterol 2 5 mg Nebulization Q4H PRN Minnie Carter MD     aluminum-magnesium hydroxide-simethicone 5 mL Oral Q6H PRN Minnie Carter MD     amLODIPine 10 mg Oral Daily Minnie Carter MD     calcium acetate 2,001 mg Oral TID With Meals Minnie Carter MD     cinacalcet 30 mg Oral Daily With Breakfast Minnie Carter MD     diltiazem 1-15 mg/hr Intravenous Titrated Minnie Carter MD Last Rate: Stopped (04/18/18 1109)   docusate sodium 100 mg Oral BID PRN Minnie Carter MD     epoetin cecily 4,000 Units Intravenous After Dialysis Daphnie Godinez DO     gabapentin 300 mg Oral Daily Minnie Carter MD     nicotine 1 patch Transdermal Daily Minnie Carter MD   ondansetron 4 mg Intravenous Q6H PRN Garcia Yi MD        Continuous Infusions:   diltiazem 1-15 mg/hr Last Rate: Stopped (04/18/18 1109)      PRN Meds:   albuterol    aluminum-magnesium hydroxide-simethicone    docusate sodium    epoetin cecily    ondansetron     MICU - Level 2 Stepdown/HOT  Tele   SCDs  OOB as jillian   Daily wt  Respiratory protocol   Cons Cardiology   Cons Nephrology   Diet Damon/CHO Controlled; Consistent Carbohydrate Diet Level 2 (5 carb servings/75 grams CHO/meal); Renal (dialysis)  Hemodialysis MWF   Transfuse 2 Units PRBCs   Cardiac echo   ____________________________  4/18 Nephrology Consult   1  End stage renal disease, maintenance hemodialysis Monday Wednesday Friday, [facility] unit in [place]  2  Atrial fibrillation with RVR  3  Acute on chronic anemia, status post PRBC transfusion  4  Suspecting component of volume overload, challenge dry weight with hemodialysis  5  Previous ejection fraction 61% with diastolic dysfunction  6  Hypertension, stable  7  Secondary hyperparathyroidism     Plan:  · Plan for hemodialysis today, challenge dry weight  · Blood pressure stable  · Heart rate improved with diltiazem  · Records from outpatient hemodialysis facility  · Resume AUBRIE therapy, check iron stores   Thank you,  [address] [facility] in the [facility] by Michele Sr for 2017  Network Utilization Review Department  Phone: [phone]; Fax [phone]  ATTENTION: The Network Utilization Review Department is now centralized for our 7 Facilities  Make a note that we have a new phone and fax numbers for our Department  Please call with any questions or concerns to [phone] and carefully follow the prompts so that you are directed to the right person  All voicemails are confidential  Fax any determinations, approvals, denials, and requests for initial or continue stay review clinical to [phone]   Due to HIGH CALL volume, it would be easier if you could please send faxed requests to expedite your requests and in part, help us provide discharge notifications faster

## 2018-04-20 NOTE — ASSESSMENT & PLAN NOTE
Dialysis today, ongoing, uneventful  Per works have been faxed over to dialysis center in [facility] by , awaiting response with chair time today  Will follow  Nephrology following the patient while in the hospital

## 2018-04-21 VITALS
OXYGEN SATURATION: 92 % | RESPIRATION RATE: 18 BRPM | BODY MASS INDEX: 23.49 KG/M2 | TEMPERATURE: 98.4 F | HEIGHT: 68 IN | WEIGHT: 155 LBS | HEART RATE: 69 BPM | DIASTOLIC BLOOD PRESSURE: 60 MMHG | SYSTOLIC BLOOD PRESSURE: 128 MMHG

## 2018-04-21 LAB
ANION GAP SERPL CALCULATED.3IONS-SCNC: 6 MMOL/L (ref 4–13)
BUN SERPL-MCNC: 42 MG/DL (ref 5–25)
CALCIUM SERPL-MCNC: 8.2 MG/DL (ref 8.3–10.1)
CHLORIDE SERPL-SCNC: 100 MMOL/L (ref 100–108)
CO2 SERPL-SCNC: 31 MMOL/L (ref 21–32)
CREAT SERPL-MCNC: 9.67 MG/DL (ref 0.6–1.3)
ERYTHROCYTE [DISTWIDTH] IN BLOOD BY AUTOMATED COUNT: 15 % (ref 11.6–15.1)
GFR SERPL CREATININE-BSD FRML MDRD: 6 ML/MIN/1.73SQ M
GLUCOSE SERPL-MCNC: 163 MG/DL (ref 65–140)
HCT VFR BLD AUTO: 27 % (ref 36.5–49.3)
HGB BLD-MCNC: 8.7 G/DL (ref 12–17)
MCH RBC QN AUTO: 29.1 PG (ref 26.8–34.3)
MCHC RBC AUTO-ENTMCNC: 32.2 G/DL (ref 31.4–37.4)
MCV RBC AUTO: 90 FL (ref 82–98)
PLATELET # BLD AUTO: 150 THOUSANDS/UL (ref 149–390)
PMV BLD AUTO: 9.6 FL (ref 8.9–12.7)
POTASSIUM SERPL-SCNC: 4.5 MMOL/L (ref 3.5–5.3)
RBC # BLD AUTO: 2.99 MILLION/UL (ref 3.88–5.62)
SODIUM SERPL-SCNC: 137 MMOL/L (ref 136–145)
WBC # BLD AUTO: 4.42 THOUSAND/UL (ref 4.31–10.16)

## 2018-04-21 PROCEDURE — 99238 HOSP IP/OBS DSCHRG MGMT 30/<: CPT | Performed by: INTERNAL MEDICINE

## 2018-04-21 PROCEDURE — 80048 BASIC METABOLIC PNL TOTAL CA: CPT | Performed by: PHYSICIAN ASSISTANT

## 2018-04-21 PROCEDURE — 99232 SBSQ HOSP IP/OBS MODERATE 35: CPT | Performed by: INTERNAL MEDICINE

## 2018-04-21 PROCEDURE — 94760 N-INVAS EAR/PLS OXIMETRY 1: CPT | Performed by: SOCIAL WORKER

## 2018-04-21 PROCEDURE — 85027 COMPLETE CBC AUTOMATED: CPT | Performed by: PHYSICIAN ASSISTANT

## 2018-04-21 RX ORDER — METOPROLOL SUCCINATE 25 MG/1
25 TABLET, EXTENDED RELEASE ORAL
Qty: 60 TABLET | Refills: 0 | Status: SHIPPED | OUTPATIENT
Start: 2018-04-21 | End: 2018-04-21 | Stop reason: HOSPADM

## 2018-04-21 RX ORDER — ALBUTEROL SULFATE 90 UG/1
2 AEROSOL, METERED RESPIRATORY (INHALATION) EVERY 6 HOURS PRN
Qty: 1 INHALER | Refills: 0 | Status: SHIPPED | OUTPATIENT
Start: 2018-04-21 | End: 2018-12-14

## 2018-04-21 RX ORDER — NICOTINE 21 MG/24HR
1 PATCH, TRANSDERMAL 24 HOURS TRANSDERMAL DAILY
Qty: 28 PATCH | Refills: 0 | Status: SHIPPED | OUTPATIENT
Start: 2018-04-21 | End: 2018-12-14

## 2018-04-21 RX ADMIN — NICOTINE 1 PATCH: 14 PATCH, EXTENDED RELEASE TRANSDERMAL at 08:28

## 2018-04-21 RX ADMIN — CINACALCET HYDROCHLORIDE 30 MG: 30 TABLET, COATED ORAL at 08:29

## 2018-04-21 RX ADMIN — GABAPENTIN 300 MG: 300 CAPSULE ORAL at 08:28

## 2018-04-21 RX ADMIN — AMLODIPINE BESYLATE 10 MG: 10 TABLET ORAL at 08:28

## 2018-04-21 RX ADMIN — ACETAMINOPHEN 650 MG: 325 TABLET, FILM COATED ORAL at 02:01

## 2018-04-21 RX ADMIN — METOPROLOL SUCCINATE 25 MG: 25 TABLET, EXTENDED RELEASE ORAL at 08:28

## 2018-04-21 NOTE — PROGRESS NOTES
Cardiology Progress Note - Lenora Mosher 56 y o  male MRN: [id number]    Unit/Bed#: -01 Encounter: [id number]        Subjective:    No significant events overnight  No chest pain  ROS    Objective:   Vitals: Blood pressure 128/60, pulse 69, temperature 98 4 °F (36 9 °C), temperature source Oral, resp  rate 18, height 5' 8" (1 727 m), weight 70 3 kg (155 lb), SpO2 92 %  , Body mass index is 23 57 kg/m² , Orthostatic Blood Pressures    Flowsheet Row Most Recent Value   Blood Pressure  128/60 filed at 04/21/2018 0700   Patient Position - Orthostatic VS  Lying filed at 04/21/2018 9895         Systolic (19EZB), LDU:713 , Min:99 , PCB:561     Diastolic (27HSN), FJA:83, Min:51, Max:67      Intake/Output Summary (Last 24 hours) at 04/21/18 1245  Last data filed at 04/21/18 0310   Gross per 24 hour   Intake              880 ml   Output                0 ml   Net              880 ml     Weight (last 2 days)     Date/Time   Weight    04/21/18 0540  70 3 (155)    04/20/18 0534  70 (154 32)                Telemetry Review: No significant arrhythmias seen on telemetry review  NSR      Physical Exam   Constitutional: He is oriented to person, place, and time  No distress  Eyes: No scleral icterus  Neck: No JVD present  Cardiovascular: Normal rate and regular rhythm  No murmur heard  Pulmonary/Chest: Effort normal and breath sounds normal  No respiratory distress  He has no wheezes  He has no rales  Abdominal: Soft  Bowel sounds are normal  He exhibits no distension  There is no tenderness  There is no rebound  Musculoskeletal: He exhibits no edema  Neurological: He is alert and oriented to person, place, and time  Skin: Skin is warm and dry  He is not diaphoretic  Psychiatric: He has a normal mood and affect           Laboratory Results:    Results from last 7 days  Lab Units 04/17/18  1747   TROPONIN I ng/mL 0 05*       CBC with diff:   Results from last 7 days  Lab Units 04/21/18  0426 04/18/18  1935 04/18/18 0548 04/17/18  1753 04/17/18  1747   WBC Thousand/uL 4 42 4 05* 4 41  --  4 69   HEMOGLOBIN g/dL 8 7* 9 4* 8 0*  --  7 4*   I STAT HEMOGLOBIN g/dl  --   --   --  6 5*  --    HEMATOCRIT % 27 0* 27 3* 23 4*  --  21 8*   MCV fL 90 85 86  --  87   PLATELETS Thousands/uL 150 150 143*  --  151   MCH pg 29 1 29 4 29 5  --  29 4   MCHC g/dL 32 2 34 4 34 2  --  33 9   RDW % 15 0 15 4* 15 9*  --  16 2*   MPV fL 9 6 10 1 9 7  --  9 3   NRBC AUTO /100 WBCs  --  0 0  --  0         CMP:  Results from last 7 days  Lab Units 04/21/18 0426 04/18/18 1935 04/18/18 0548 04/17/18 1753 04/17/18  1747   SODIUM mmol/L 137 137 142  --  139   POTASSIUM mmol/L 4 5 3 6 4 6  --  4 8   CHLORIDE mmol/L 100 98* 106  --  103   CO2 mmol/L 31 32 20*  --  20*   ANION GAP mmol/L 6 7 16*  --  16*   BUN mg/dL 42* 47* 123*  --  125*   CREATININE mg/dL 9 67* 9 77* 18 50*  --  18 10*   GLUCOSE RANDOM mg/dL 163* 118 127  --  129   GLUCOSE, ISTAT mg/dl  --   --   --  130  --    CALCIUM mg/dL 8 2* 8 9 8 5  --  8 9   AST U/L  --   --   --   --  36   ALT U/L  --   --   --   --  15   ALK PHOS U/L  --   --   --   --  89   TOTAL PROTEIN g/dL  --   --   --   --  6 8   BILIRUBIN TOTAL mg/dL  --   --   --   --  0 47   EGFR ml/min/1 73sq m 6 6 3  --  3         BMP:  Results from last 7 days  Lab Units 04/21/18 0426 04/18/18 1935 04/18/18 0548  04/17/18  1747   SODIUM mmol/L 137 137 142  --  139   POTASSIUM mmol/L 4 5 3 6 4 6  --  4 8   CHLORIDE mmol/L 100 98* 106  --  103   CO2 mmol/L 31 32 20*  --  20*   BUN mg/dL 42* 47* 123*  --  125*   CREATININE mg/dL 9 67* 9 77* 18 50*  --  18 10*   GLUCOSE RANDOM mg/dL 163* 118 127  --  129   GLUCOSE, ISTAT   --   --   --   < >  --    CALCIUM mg/dL 8 2* 8 9 8 5  --  8 9   < > = values in this interval not displayed  BNP: No results for input(s): BNP in the last 72 hours      Magnesium:   Results from last 7 days  Lab Units 04/18/18  0548   MAGNESIUM mg/dL 2 7*       Coags:       TSH: No results found for: TSH    Hemoglobin A1C       Lipid Profile:       Cardiac testing:   Results for orders placed during the hospital encounter of 17   Echo complete with contrast if indicated    Narrative [address]   [facility], [address] [place] [address]  [phone]    Transthoracic Echocardiogram  2D, M-mode, Doppler, and Color Doppler    Study date:  2017    Patient: Nixon Blackwell  MR number: [id number]  Account number: [de-identified]  : 1960  Age: 56 years  Gender: Male  Status: Inpatient  Location: Bedside  Height: 66 in  Weight: 149 6 lb  BP: 148/ 78 mmHg    Indications: Hypertensive Heart Disease    Diagnoses: I12 0 - Hypertensive chronic kidney disease with stage 5 chronic kidney disease or end stage renal disease    Sonographer:  Aline Mcnulty RDCS  Primary Physician:  Gillian Simms  Referring Physician:  Rufus Santana MD  Group:  [facility]  Interpreting Physician:  Kehinde Rodríguez MD    SUMMARY    LEFT VENTRICLE:  Systolic function was normal  Ejection fraction was estimated to be 55 %  There were no regional wall motion abnormalities  Wall thickness was markedly increased  There was severe concentric hypertrophy  Doppler parameters were consistent with abnormal left ventricular relaxation (grade 1 diastolic dysfunction)  Doppler parameters were consistent with elevated ventricular end-diastolic filling pressure  LEFT ATRIUM:  The atrium was mildly dilated  MITRAL VALVE:  There was trace regurgitation  HISTORY: PRIOR HISTORY: End Stage Renal Disease, Hypertension, Diabetes Mellitus II    PROCEDURE: The procedure was performed at the bedside  This was a routine study  The transthoracic approach was used  The study included complete 2D imaging, M-mode, complete spectral Doppler, and color Doppler  The heart rate was 93 bpm,  at the start of the study   Images were obtained from the parasternal, apical, subcostal, and suprasternal notch acoustic windows  Image quality was adequate  LEFT VENTRICLE: Size was normal  Systolic function was normal  Ejection fraction was estimated to be 55 %  There were no regional wall motion abnormalities  Wall thickness was markedly increased  There was severe concentric hypertrophy  DOPPLER: Doppler parameters were consistent with abnormal left ventricular relaxation (grade 1 diastolic dysfunction)  Doppler parameters were consistent with elevated ventricular end-diastolic filling pressure  RIGHT VENTRICLE: The size was normal  Systolic function was normal  Wall thickness was normal     LEFT ATRIUM: The atrium was mildly dilated  RIGHT ATRIUM: Size was normal     MITRAL VALVE: Valve structure was normal  There was normal leaflet separation  DOPPLER: The transmitral velocity was within the normal range  There was no evidence for stenosis  There was trace regurgitation  AORTIC VALVE: The valve was trileaflet  Leaflets exhibited normal thickness and normal cuspal separation  DOPPLER: Transaortic velocity was within the normal range  There was no evidence for stenosis  There was no regurgitation  TRICUSPID VALVE: The valve structure was normal  There was normal leaflet separation  DOPPLER: The transtricuspid velocity was within the normal range  There was no evidence for stenosis  There was no regurgitation  PULMONIC VALVE: Leaflets exhibited normal thickness, no calcification, and normal cuspal separation  DOPPLER: The transpulmonic velocity was within the normal range  There was no regurgitation  PERICARDIUM: There was no pericardial effusion  The pericardium was normal in appearance  AORTA: The root exhibited normal size      SYSTEM MEASUREMENT TABLES    2D  %FS: 29 36 %  Ao Diam: 3 34 cm  EDV(Teich): 159 24 ml  EF Biplane: 45 61 %  EF(Teich): 55 59 %  ESV(Teich): 70 72 ml  IVSd: 1 49 cm  LA Area: 26 75 cm2  LA Diam: 3 7 cm  LVEDV MOD A2C: 278 3 ml  LVEDV MOD A4C: 283 89 ml  LVEDV MOD BP: 284 15 ml  LVEF MOD A2C: 46 72 %  LVEF MOD A4C: 43 77 %  LVESV MOD A2C: 148 28 ml  LVESV MOD A4C: 159 64 ml  LVESV MOD BP: 154 53 ml  LVIDd: 5 69 cm  LVIDs: 4 02 cm  LVLd A2C: 10 36 cm  LVLd A4C: 10 07 cm  LVLs A2C: 8 87 cm  LVLs A4C: 8 97 cm  LVPWd: 1 42 cm  RA Area: 16 76 cm2  RVIDd: 4 57 cm  SV MOD A2C: 130 01 ml  SV MOD A4C: 124 25 ml  SV(Teich): 88 51 ml    MM  TAPSE: 2 53 cm    PW  AVC: 324 2 ms  E': 0 06 m/s  E/E': 18 24  MV A Fuad: 1 27 m/s  MV Dec [place]: 4 94 m/s2  MV DecT: 204 31 ms  MV E Fuad: 1 01 m/s  MV E/A Ratio: 0 8  MV PHT: 59 25 ms  MVA By PHT: 3 71 cm2    [facility] Accredited Echocardiography Laboratory    Prepared and electronically signed by    Jeniffer Roe MD  Signed [id number] 17:21:56       No results found for this or any previous visit  No results found for this or any previous visit  No results found for this or any previous visit      Meds/Allergies   current meds:   Current Facility-Administered Medications   Medication Dose Route Frequency    acetaminophen (TYLENOL) tablet 650 mg  650 mg Oral Q4H PRN    aluminum-magnesium hydroxide-simethicone (MYLANTA) 200-200-20 mg/5 mL oral suspension 5 mL  5 mL Oral Q6H PRN    amLODIPine (NORVASC) tablet 10 mg  10 mg Oral Daily    calcium acetate (PHOSLO) capsule 2,001 mg  2,001 mg Oral TID With Meals    cinacalcet (SENSIPAR) tablet 30 mg  30 mg Oral Daily With Breakfast    docusate sodium (COLACE) capsule 100 mg  100 mg Oral BID PRN    epoetin cecily (EPOGEN,PROCRIT) injection 4,000 Units  4,000 Units Intravenous After Dialysis    gabapentin (NEURONTIN) capsule 300 mg  300 mg Oral Daily    heparin (porcine) subcutaneous injection 5,000 Units  5,000 Units Subcutaneous Q8H [facility]    metoprolol (LOPRESSOR) injection 2 5 mg  2 5 mg Intravenous Q6H PRN    metoprolol succinate (TOPROL-XL) 24 hr tablet 25 mg  25 mg Oral BID (diuretic)    nicotine (NICODERM CQ) 14 mg/24hr TD 24 hr patch 1 patch  1 patch Transdermal Daily    sodium chloride 0 9 % infusion  25 mL/hr Intravenous Continuous     Prescriptions Prior to Admission   Medication    amLODIPine (NORVASC) 10 mg tablet    calcium acetate (PHOSLO) 667 mg capsule    cinacalcet (SENSIPAR) 30 mg tablet    gabapentin (NEURONTIN) 100 mg capsule         sodium chloride 25 mL/hr     Assessment:  Principal Problem:    Paroxysmal atrial fibrillation (HCC)  Active Problems:    ESRD (end stage renal disease) on dialysis (HCC)    Acute bronchitis due to other specified organisms    Anemia of renal disease      Plan:    S/p ablation of AVNRT  Fine for discharge  Counseling / Coordination of Care  Total floor / unit time spent today 25 minutes  Greater than 50% of total time was spent with the patient and / or family counseling and / or coordination of care  A description of the counseling / coordination of care

## 2018-04-21 NOTE — ASSESSMENT & PLAN NOTE
Patient received blood transfusion during this admission for acute on chronic anemia related to kidney disease  Hemoglobin remained stable after transfusion  He will follow up with dialysis center as an outpatient  Follow-up with nephrologist  If appropriate, will continue Epogen as well as iron infusion at the dialysis center

## 2018-04-21 NOTE — ASSESSMENT & PLAN NOTE
Patient presented with shortness of breath and wheezing, resolved during the stay after dialysis treatment the use of p r n   Albuterol  He has been prescribed albuterol p r n  upon discharge  No active issues

## 2018-04-21 NOTE — PLAN OF CARE
Problem: Nutrition/Hydration-ADULT  Goal: Nutrient/Hydration intake appropriate for improving, restoring or maintaining nutritional needs  Monitor and assess patient's nutrition/hydration status for malnutrition (ex- brittle hair, bruises, dry skin, pale skin and conjunctiva, muscle wasting, smooth red tongue, and disorientation)  Collaborate with interdisciplinary team and initiate plan and interventions as ordered  Monitor patient's weight and dietary intake as ordered or per policy  Utilize nutrition screening tool and intervene per policy  Determine patient's food preferences and provide high-protein, high-caloric foods as appropriate       INTERVENTIONS:  - Monitor oral intake, urinary output, labs, and treatment plans  - Assess nutrition and hydration status and recommend course of action  - Evaluate amount of meals eaten  - Assist patient with eating if necessary   - Allow adequate time for meals  - Recommend/ encourage appropriate diets, oral nutritional supplements, and vitamin/mineral supplements  - Order, calculate, and assess calorie counts as needed  - Recommend, monitor, and adjust tube feedings and TPN/PPN based on assessed needs  - Assess need for intravenous fluids  - Provide specific nutrition/hydration education as appropriate  - Include patient/family/caregiver in decisions related to nutrition   Outcome: Progressing      Problem: DISCHARGE PLANNING - CARE MANAGEMENT  Goal: Discharge to post-acute care or home with appropriate resources  INTERVENTIONS:  - Conduct assessment to determine patient/family and health care team treatment goals, and need for post-acute services based on payer coverage, community resources, and patient preferences, and barriers to discharge  - Address psychosocial, clinical, and financial barriers to discharge as identified in assessment in conjunction with the patient/family and health care team  - Arrange appropriate level of post-acute services according to patient's needs and preference and payer coverage in collaboration with the physician and health care team  - Communicate with and update the patient/family, physician, and health care team regarding progress on the discharge plan  - Arrange appropriate transportation to post-acute venues  Pt will go home when medically clear for discharge   Outcome: Progressing      Problem: Potential for Falls  Goal: Patient will remain free of falls  INTERVENTIONS:  - Assess patient frequently for physical needs  -  Identify cognitive and physical deficits and behaviors that affect risk of falls    -  [place] fall precautions as indicated by assessment   - Educate patient/family on patient safety including physical limitations  - Instruct patient to call for assistance with activity based on assessment  - Modify environment to reduce risk of injury  - Consider OT/PT consult to assist with strengthening/mobility   Outcome: Progressing

## 2018-04-21 NOTE — RESPIRATORY THERAPY NOTE
resp protocol      04/21/18 0727   Respiratory Assessment   Assessment Type Assess only   General Appearance Awake   Respiratory Pattern Normal   Chest Assessment Chest expansion symmetrical   Bilateral Breath Sounds Clear   Resp Comments Pt  has no resp c/o  has not needed prn udn since admit  no pulm meds at home  will d/c prn udn and d/c pt from resp protocol      Additional Assessments   SpO2 92 %

## 2018-04-21 NOTE — ASSESSMENT & PLAN NOTE
Patient presented with palpitation, found to have supraventricular tachycardia with rapid ventricular response, originally he was placed on Cardizem drip and converted in sinus   Started also on beta-blockers and maintained sinus for only a few hours and again he had episode of SVT, symptomatic, with rapid ventricular response  He was evaluated by cardiologist and ultimately electrophysiologist and on April 28, 2018 underwent ablation with electrophysiology  Discussed with Cardiology today, discontinue beta-blocker upon discharge, will follow up with electrophysiology within 1 month as an outpatient  Patient asymptomatic at the time of discharge

## 2018-04-21 NOTE — ASSESSMENT & PLAN NOTE
Patient with known history of end-stage renal disease on dialysis  He recently moved from [place]  Is commuting back and forth for dialysis treatment every other day  During this stay, he received dialysis in the hospital with our Nephrology group   was working on dialysis center, last paperwork as per  were faxed yesterday at 6:00 p m  and answer was not yet received  I had long conversation with patient and his girlfriend at bedside, I advised him to stay in the hospital today and wait Monday for further determination and maybe receiving dialysis treatment as well  Patient is adamant that he wants to give as he has to go to work on Monday  I also offered him to provide paperwork proving that he still in the hospital to his of boss but he has declined  Is awake, alert, oriented x3, able to make medical decisions for himself and he refuses to stay in the hospital  At this point, I have advised him to go back to his dialysis center on Monday as scheduled before  I also wrote an e-mail to the  was following the patient as well as our  with patient information so that we can follow up on hemodialysis center response and eventually notify the patient if he has been accepted or rejected and what would be the next step to establish dialysis in [place]  Patient agreeable with this approach

## 2018-04-21 NOTE — DISCHARGE SUMMARY
Discharge- Dottie [facility] 1960, 56 y o  male MRN: [id number]    Unit/Bed#: -01 Encounter: [id number]    Primary Care Provider: Vivi Huffman   Date and time admitted to hospital: 4/17/2018  5:20 PM        Anemia of renal disease   Assessment & Plan    Patient received blood transfusion during this admission for acute on chronic anemia related to kidney disease  Hemoglobin remained stable after transfusion  He will follow up with dialysis center as an outpatient  Follow-up with nephrologist  If appropriate, will continue Epogen as well as iron infusion at the dialysis center        Acute bronchitis due to other specified organisms   Assessment & Plan    Patient presented with shortness of breath and wheezing, resolved during the stay after dialysis treatment the use of p r n   Albuterol  He has been prescribed albuterol p r n  upon discharge  No active issues        ESRD (end stage renal disease) on dialysis [facility])   Assessment & Plan    Patient with known history of end-stage renal disease on dialysis  He recently moved from [place]  Is commuting back and forth for dialysis treatment every other day  During this stay, he received dialysis in the hospital with our Nephrology group   was working on dialysis center, last paperwork as per  were faxed yesterday at 6:00 p m  and answer was not yet received  I had long conversation with patient and his girlfriend at bedside, I advised him to stay in the hospital today and wait Monday for further determination and maybe receiving dialysis treatment as well  Patient is adamant that he wants to give as he has to go to work on Monday  I also offered him to provide paperwork proving that he still in the hospital to his of boss but he has declined  Is awake, alert, oriented x3, able to make medical decisions for himself and he refuses to stay in the hospital  At this point, I have advised him to go back to his dialysis center on Monday as scheduled before  I also wrote an e-mail to the  was following the patient as well as our  with patient information so that we can follow up on hemodialysis center response and eventually notify the patient if he has been accepted or rejected and what would be the next step to establish dialysis in [place]  Patient agreeable with this approach        * Paroxysmal atrial fibrillation [facility])   Assessment & Plan    Patient presented with palpitation, found to have supraventricular tachycardia with rapid ventricular response, originally he was placed on Cardizem drip and converted in sinus   Started also on beta-blockers and maintained sinus for only a few hours and again he had episode of SVT, symptomatic, with rapid ventricular response  He was evaluated by cardiologist and ultimately electrophysiologist and on April 28, 2018 underwent ablation with electrophysiology  Discussed with Cardiology today, discontinue beta-blocker upon discharge, will follow up with electrophysiology within 1 month as an outpatient  Patient asymptomatic at the time of discharge            Discharging Physician / Practitioner: Perri Burkett MD  PCP: Angela Kyle  Admission Date:   Admission Orders     Ordered        04/17/18 2200  Inpatient Admission (expected length of stay for this patient is greater than two midnights)  Once             Discharge Date: 04/21/18    Resolved Problems  Date Reviewed: 4/21/2018    None          Consultations During Hospital Stay:  · Cardiology, electrophysiology, nephrology    Procedures Performed:     · Cardiac ablation    Significant Findings / Test Results:     · None    Incidental Findings:   · None     Test Results Pending at Discharge (will require follow up):    · None     Outpatient Tests Requested:  · None    Complications:  None    Reason for Admission:  Refer to H&P    Hospital Course:     Gerri Gerardo is a 56 y o  male patient who originally presented to the hospital on 4/17/2018 due to palpitations  Please see above list of diagnoses and related plan for additional information  Condition at Discharge: good     Discharge Day Visit / Exam:  Declined, ready to leave      Discussion with Family:  Girlfriend a bedside    Discharge instructions/Information to patient and family:   See after visit summary for information provided to patient and family  Provisions for Follow-Up Care:  See after visit summary for information related to follow-up care and any pertinent home health orders  Disposition:     Home    For Discharges to   [address] SN[facility]:   · Not Applicable to this Patient - Not Applicable to this Patient    Planned Readmission:  No     Discharge Statement:  I spent less than 30 minutes discharging the patient  This time was spent on the day of discharge  I had direct contact with the patient on the day of discharge  Greater than 50% of the total time was spent examining patient, answering all patient questions, arranging and discussing plan of care with patient as well as directly providing post-discharge instructions  Additional time then spent on discharge activities  Discharge Medications:  See after visit summary for reconciled discharge medications provided to patient and family        ** Please Note: This note has been constructed using a voice recognition system **

## 2018-04-21 NOTE — PROGRESS NOTES
Patient having difficulty following bedrest order  Patient witnessed not laying flat by visitor and student nurse  Patient educated on the risks of moving before bedrest is over and assured that his bed rest would be over shortly  B/L groin sites clean dry and intact

## 2018-04-21 NOTE — PROGRESS NOTES
NEPHROLOGY PROGRESS NOTE   Juan Daniel Hinson 56 y o  male MRN: [id number]  Unit/Bed#: -01 Encounter: [id number]  Reason for Consult: ESRD    ASSESSMENT/PLAN:  1  End-stage renal disease, maintenance hemodialysis Monday Wednesday Friday, previous unit [facility]  2  Symptomatic SVT, status post ablation  3  Acute on chronic anemia, status post PRBC transfusion  4  Hypertension, stable  5  Secondary hyperparathyroidism    PLAN:  · Continue maintenance hemodialysis, Monday Wednesday Friday  · Blood pressure appears stable  · Awaiting dialysis placement  · No changes in current regimen    SUBJECTIVE:  Seen and examined  Patient feeling well  Denies any chest pain burning shortness of breath  No abdominal pain  States that he is ready to go home  Review of Systems    OBJECTIVE:  Current Weight: Weight - Scale: 70 3 kg (155 lb)  Vitals:    04/21/18 0307 04/21/18 0540 04/21/18 0700 04/21/18 0727   BP: 105/59  128/60    BP Location: Right arm      Pulse: 65  69    Resp: 20  18    Temp: 98 4 °F (36 9 °C)      TempSrc: Oral      SpO2: 100%  94% 92%   Weight:  70 3 kg (155 lb)     Height:           Intake/Output Summary (Last 24 hours) at 04/21/18 0957  Last data filed at 04/21/18 0310   Gross per 24 hour   Intake             1180 ml   Output             1500 ml   Net             -320 ml       Physical Exam   Constitutional: No distress  HENT:   Head: Normocephalic  Eyes: Conjunctivae are normal    Neck: Neck supple  Cardiovascular: Normal rate and regular rhythm  Pulmonary/Chest: Effort normal and breath sounds normal    Abdominal: Soft  He exhibits no distension  Musculoskeletal: He exhibits no edema  Neurological: He is alert  Skin: Skin is warm and dry  Psychiatric: He has a normal mood and affect         Medications:    Current Facility-Administered Medications:     acetaminophen (TYLENOL) tablet 650 mg, 650 mg, Oral, Q4H PRN, Bernardo Tate PA-C, 650 mg at 04/21/18 0201   aluminum-magnesium hydroxide-simethicone (MYLANTA) 200-200-20 mg/5 mL oral suspension 5 mL, 5 mL, Oral, Q6H PRN, Yue Cortes MD    amLODIPine (NORVASC) tablet 10 mg, 10 mg, Oral, Daily, Yue Cortes MD, 10 mg at 04/21/18 6480    calcium acetate (PHOSLO) capsule 2,001 mg, 2,001 mg, Oral, TID With Meals, Yue Cortes MD, 2,001 mg at 04/20/18 2036    cinacalcet (SENSIPAR) tablet 30 mg, 30 mg, Oral, Daily With Breakfast, Yue Cortes MD, 30 mg at 04/21/18 3376    docusate sodium (COLACE) capsule 100 mg, 100 mg, Oral, BID PRN, Yue Cortes MD    epoetin cecily (EPOGEN,PROCRIT) injection 4,000 Units, 4,000 Units, Intravenous, After Dialysis, Myrna Gonzalez DO, 4,000 Units at 04/20/18 1007    gabapentin (NEURONTIN) capsule 300 mg, 300 mg, Oral, Daily, Yue Cortes MD, 300 mg at 04/21/18 0828    heparin (porcine) subcutaneous injection 5,000 Units, 5,000 Units, Subcutaneous, Q8H [address], Samson Huerta MD, 5,000 Units at 04/19/18 2144    metoprolol (LOPRESSOR) injection 2 5 mg, 2 5 mg, Intravenous, Q6H PRN, Disha Newell PA-C, 2 5 mg at 04/20/18 0308    metoprolol succinate (TOPROL-XL) 24 hr tablet 25 mg, 25 mg, Oral, BID (diuretic), Polina Herndon MD, 25 mg at 04/21/18 0828    nicotine (NICODERM CQ) 14 mg/24hr TD 24 hr patch 1 patch, 1 patch, Transdermal, Daily, Yue Cortes MD, 1 patch at 04/21/18 0828    sodium chloride 0 9 % infusion, 25 mL/hr, Intravenous, Continuous, Jay Varela CRNA    Laboratory Results:    Results from last 7 days  Lab Units 04/21/18  0426 04/18/18  1935 04/18/18  0548 04/17/18  1753 04/17/18  1747   WBC Thousand/uL 4 42 4 05* 4 41  --  4 69   HEMOGLOBIN g/dL 8 7* 9 4* 8 0*  --  7 4*   I STAT HEMOGLOBIN g/dl  --   --   --  6 5*  --    HEMATOCRIT % 27 0* 27 3* 23 4*  --  21 8*   PLATELETS Thousands/uL 150 150 143*  --  151   SODIUM mmol/L 137 137 142  --  139   POTASSIUM mmol/L 4 5 3 6 4 6  --  4 8   CHLORIDE mmol/L 100 98* 106  --  103   CO2 mmol/L 31 32 20*  --  20*   BUN mg/dL 42* 47* 123*  --  125*   CREATININE mg/dL 9 67* 9 77* 18 50*  --  18 10*   CALCIUM mg/dL 8 2* 8 9 8 5  --  8 9   MAGNESIUM mg/dL  --   --  2 7*  --   --    TOTAL PROTEIN g/dL  --   --   --   --  6 8   GLUCOSE RANDOM mg/dL 163* 118 127  --  129   GLUCOSE, ISTAT mg/dl  --   --   --  130  --

## 2018-04-22 LAB
ATRIAL RATE: 60 BPM
ATRIAL RATE: 63 BPM
P AXIS: 66 DEGREES
P AXIS: 66 DEGREES
PR INTERVAL: 208 MS
PR INTERVAL: 214 MS
QRS AXIS: 54 DEGREES
QRS AXIS: 69 DEGREES
QRSD INTERVAL: 106 MS
QRSD INTERVAL: 113 MS
QT INTERVAL: 468 MS
QT INTERVAL: 471 MS
QTC INTERVAL: 471 MS
QTC INTERVAL: 478 MS
T WAVE AXIS: 65 DEGREES
T WAVE AXIS: 68 DEGREES
VENTRICULAR RATE: 60 BPM
VENTRICULAR RATE: 63 BPM

## 2018-04-22 PROCEDURE — 93010 ELECTROCARDIOGRAM REPORT: CPT | Performed by: INTERNAL MEDICINE

## 2018-04-25 ENCOUNTER — APPOINTMENT (OUTPATIENT)
Dept: DIALYSIS | Facility: HOSPITAL | Age: 58
DRG: 377 | End: 2018-04-25
Payer: MEDICARE

## 2018-04-25 ENCOUNTER — APPOINTMENT (EMERGENCY)
Dept: RADIOLOGY | Facility: HOSPITAL | Age: 58
DRG: 377 | End: 2018-04-25
Payer: MEDICARE

## 2018-04-25 ENCOUNTER — HOSPITAL ENCOUNTER (INPATIENT)
Facility: HOSPITAL | Age: 58
LOS: 4 days | Discharge: HOME | DRG: 377 | End: 2018-04-30
Attending: EMERGENCY MEDICINE | Admitting: INTERNAL MEDICINE
Payer: MEDICARE

## 2018-04-25 DIAGNOSIS — K62.5 BRIGHT RED BLOOD PER RECTUM: ICD-10-CM

## 2018-04-25 DIAGNOSIS — Z98.890 HISTORY OF CARDIAC RADIOFREQUENCY ABLATION: ICD-10-CM

## 2018-04-25 DIAGNOSIS — Z99.2 ESRD (END STAGE RENAL DISEASE) ON DIALYSIS (HCC): ICD-10-CM

## 2018-04-25 DIAGNOSIS — Z86.79 HISTORY OF SUPRAVENTRICULAR TACHYCARDIA: ICD-10-CM

## 2018-04-25 DIAGNOSIS — R00.1 SYMPTOMATIC BRADYCARDIA: ICD-10-CM

## 2018-04-25 DIAGNOSIS — K92.2 GI BLEED: Primary | ICD-10-CM

## 2018-04-25 DIAGNOSIS — N18.6 ESRD (END STAGE RENAL DISEASE) ON DIALYSIS (HCC): ICD-10-CM

## 2018-04-25 PROBLEM — R04.2 COUGH WITH HEMOPTYSIS: Status: ACTIVE | Noted: 2018-04-25

## 2018-04-25 PROBLEM — I10 ACCELERATED HYPERTENSION: Status: ACTIVE | Noted: 2018-04-25

## 2018-04-25 LAB
ABO GROUP BLD: NORMAL
ALBUMIN SERPL BCP-MCNC: 3.3 G/DL (ref 3.5–5)
ALBUMIN SERPL BCP-MCNC: 3.9 G/DL (ref 3.5–5)
ALP SERPL-CCNC: 101 U/L (ref 46–116)
ALP SERPL-CCNC: 113 U/L (ref 46–116)
ALT SERPL W P-5'-P-CCNC: 18 U/L (ref 12–78)
ALT SERPL W P-5'-P-CCNC: 22 U/L (ref 12–78)
ANION GAP SERPL CALCULATED.3IONS-SCNC: 13 MMOL/L (ref 4–13)
ANION GAP SERPL CALCULATED.3IONS-SCNC: 16 MMOL/L (ref 4–13)
AST SERPL W P-5'-P-CCNC: 21 U/L (ref 5–45)
AST SERPL W P-5'-P-CCNC: 25 U/L (ref 5–45)
ATRIAL RATE: 76 BPM
BASOPHILS # BLD AUTO: 0.03 THOUSANDS/ΜL (ref 0–0.1)
BASOPHILS NFR BLD AUTO: 0 % (ref 0–1)
BILIRUB SERPL-MCNC: 0.33 MG/DL (ref 0.2–1)
BILIRUB SERPL-MCNC: 0.48 MG/DL (ref 0.2–1)
BLD GP AB SCN SERPL QL: NEGATIVE
BUN SERPL-MCNC: 124 MG/DL (ref 5–25)
BUN SERPL-MCNC: 124 MG/DL (ref 5–25)
CALCIUM SERPL-MCNC: 8.6 MG/DL (ref 8.3–10.1)
CALCIUM SERPL-MCNC: 9.6 MG/DL (ref 8.3–10.1)
CHLORIDE SERPL-SCNC: 106 MMOL/L (ref 100–108)
CHLORIDE SERPL-SCNC: 108 MMOL/L (ref 100–108)
CO2 SERPL-SCNC: 16 MMOL/L (ref 21–32)
CO2 SERPL-SCNC: 19 MMOL/L (ref 21–32)
CREAT SERPL-MCNC: 17 MG/DL (ref 0.6–1.3)
CREAT SERPL-MCNC: 17.4 MG/DL (ref 0.6–1.3)
EOSINOPHIL # BLD AUTO: 0.36 THOUSAND/ΜL (ref 0–0.61)
EOSINOPHIL NFR BLD AUTO: 5 % (ref 0–6)
ERYTHROCYTE [DISTWIDTH] IN BLOOD BY AUTOMATED COUNT: 15.8 % (ref 11.6–15.1)
GFR SERPL CREATININE-BSD FRML MDRD: 3 ML/MIN/1.73SQ M
GFR SERPL CREATININE-BSD FRML MDRD: 3 ML/MIN/1.73SQ M
GLUCOSE SERPL-MCNC: 70 MG/DL (ref 65–140)
GLUCOSE SERPL-MCNC: 80 MG/DL (ref 65–140)
HCT VFR BLD AUTO: 20.6 % (ref 36.5–49.3)
HCT VFR BLD AUTO: 28.1 % (ref 36.5–49.3)
HGB BLD-MCNC: 7 G/DL (ref 12–17)
HGB BLD-MCNC: 9 G/DL (ref 12–17)
LYMPHOCYTES # BLD AUTO: 1.29 THOUSANDS/ΜL (ref 0.6–4.47)
LYMPHOCYTES NFR BLD AUTO: 18 % (ref 14–44)
MCH RBC QN AUTO: 28.8 PG (ref 26.8–34.3)
MCHC RBC AUTO-ENTMCNC: 32 G/DL (ref 31.4–37.4)
MCV RBC AUTO: 90 FL (ref 82–98)
MONOCYTES # BLD AUTO: 0.3 THOUSAND/ΜL (ref 0.17–1.22)
MONOCYTES NFR BLD AUTO: 4 % (ref 4–12)
NEUTROPHILS # BLD AUTO: 5.31 THOUSANDS/ΜL (ref 1.85–7.62)
NEUTS SEG NFR BLD AUTO: 73 % (ref 43–75)
NRBC BLD AUTO-RTO: 0 /100 WBCS
P AXIS: 61 DEGREES
PLATELET # BLD AUTO: 229 THOUSANDS/UL (ref 149–390)
PMV BLD AUTO: 9.3 FL (ref 8.9–12.7)
POTASSIUM SERPL-SCNC: 7 MMOL/L (ref 3.5–5.3)
POTASSIUM SERPL-SCNC: 7 MMOL/L (ref 3.5–5.3)
PR INTERVAL: 196 MS
PROT SERPL-MCNC: 6.7 G/DL (ref 6.4–8.2)
PROT SERPL-MCNC: 8.2 G/DL (ref 6.4–8.2)
QRS AXIS: 41 DEGREES
QRSD INTERVAL: 98 MS
QT INTERVAL: 408 MS
QTC INTERVAL: 459 MS
RBC # BLD AUTO: 3.12 MILLION/UL (ref 3.88–5.62)
RH BLD: POSITIVE
SODIUM SERPL-SCNC: 138 MMOL/L (ref 136–145)
SODIUM SERPL-SCNC: 140 MMOL/L (ref 136–145)
SPECIMEN EXPIRATION DATE: NORMAL
T WAVE AXIS: 74 DEGREES
TROPONIN I SERPL-MCNC: 0.12 NG/ML
TROPONIN I SERPL-MCNC: 0.14 NG/ML
VENTRICULAR RATE: 76 BPM
WBC # BLD AUTO: 7.3 THOUSAND/UL (ref 4.31–10.16)

## 2018-04-25 PROCEDURE — G0257 UNSCHED DIALYSIS ESRD PT HOS: HCPCS

## 2018-04-25 PROCEDURE — 86900 BLOOD TYPING SEROLOGIC ABO: CPT

## 2018-04-25 PROCEDURE — 99214 OFFICE O/P EST MOD 30 MIN: CPT | Performed by: INTERNAL MEDICINE

## 2018-04-25 PROCEDURE — 5A1D70Z PERFORMANCE OF URINARY FILTRATION, INTERMITTENT, LESS THAN 6 HOURS PER DAY: ICD-10-PCS | Performed by: INTERNAL MEDICINE

## 2018-04-25 PROCEDURE — 84484 ASSAY OF TROPONIN QUANT: CPT | Performed by: EMERGENCY MEDICINE

## 2018-04-25 PROCEDURE — 85014 HEMATOCRIT: CPT | Performed by: NURSE PRACTITIONER

## 2018-04-25 PROCEDURE — 80053 COMPREHEN METABOLIC PANEL: CPT | Performed by: EMERGENCY MEDICINE

## 2018-04-25 PROCEDURE — 99220 PR INITIAL OBSERVATION CARE/DAY 70 MINUTES: CPT | Performed by: HOSPITALIST

## 2018-04-25 PROCEDURE — 71046 X-RAY EXAM CHEST 2 VIEWS: CPT

## 2018-04-25 PROCEDURE — 85018 HEMOGLOBIN: CPT | Performed by: NURSE PRACTITIONER

## 2018-04-25 PROCEDURE — 86923 COMPATIBILITY TEST ELECTRIC: CPT

## 2018-04-25 PROCEDURE — 36415 COLL VENOUS BLD VENIPUNCTURE: CPT | Performed by: EMERGENCY MEDICINE

## 2018-04-25 PROCEDURE — 99285 EMERGENCY DEPT VISIT HI MDM: CPT

## 2018-04-25 PROCEDURE — 85025 COMPLETE CBC W/AUTO DIFF WBC: CPT | Performed by: EMERGENCY MEDICINE

## 2018-04-25 PROCEDURE — 90935 HEMODIALYSIS ONE EVALUATION: CPT | Performed by: INTERNAL MEDICINE

## 2018-04-25 PROCEDURE — 86901 BLOOD TYPING SEROLOGIC RH(D): CPT

## 2018-04-25 PROCEDURE — 93005 ELECTROCARDIOGRAM TRACING: CPT | Performed by: EMERGENCY MEDICINE

## 2018-04-25 PROCEDURE — 94644 CONT INHLJ TX 1ST HOUR: CPT

## 2018-04-25 PROCEDURE — 86850 RBC ANTIBODY SCREEN: CPT

## 2018-04-25 PROCEDURE — 84484 ASSAY OF TROPONIN QUANT: CPT | Performed by: NURSE PRACTITIONER

## 2018-04-25 PROCEDURE — 93010 ELECTROCARDIOGRAM REPORT: CPT | Performed by: INTERNAL MEDICINE

## 2018-04-25 PROCEDURE — 99222 1ST HOSP IP/OBS MODERATE 55: CPT | Performed by: INTERNAL MEDICINE

## 2018-04-25 PROCEDURE — 93005 ELECTROCARDIOGRAM TRACING: CPT

## 2018-04-25 RX ORDER — AMLODIPINE BESYLATE 10 MG/1
10 TABLET ORAL DAILY
Status: DISCONTINUED | OUTPATIENT
Start: 2018-04-26 | End: 2018-04-27

## 2018-04-25 RX ORDER — ACETAMINOPHEN 325 MG/1
650 TABLET ORAL EVERY 8 HOURS PRN
Status: DISCONTINUED | OUTPATIENT
Start: 2018-04-25 | End: 2018-04-30 | Stop reason: HOSPADM

## 2018-04-25 RX ORDER — ALBUTEROL SULFATE 90 UG/1
2 AEROSOL, METERED RESPIRATORY (INHALATION) EVERY 6 HOURS PRN
Status: DISCONTINUED | OUTPATIENT
Start: 2018-04-25 | End: 2018-04-30 | Stop reason: HOSPADM

## 2018-04-25 RX ORDER — LABETALOL HYDROCHLORIDE 5 MG/ML
10 INJECTION, SOLUTION INTRAVENOUS EVERY 4 HOURS PRN
Status: DISCONTINUED | OUTPATIENT
Start: 2018-04-25 | End: 2018-04-30 | Stop reason: HOSPADM

## 2018-04-25 RX ORDER — CINACALCET 30 MG/1
30 TABLET, FILM COATED ORAL
Status: DISCONTINUED | OUTPATIENT
Start: 2018-04-26 | End: 2018-04-30 | Stop reason: HOSPADM

## 2018-04-25 RX ORDER — GABAPENTIN 300 MG/1
300 CAPSULE ORAL DAILY
Status: DISCONTINUED | OUTPATIENT
Start: 2018-04-26 | End: 2018-04-30 | Stop reason: HOSPADM

## 2018-04-25 RX ORDER — SODIUM CHLORIDE FOR INHALATION 0.9 %
3 VIAL, NEBULIZER (ML) INHALATION ONCE
Status: COMPLETED | OUTPATIENT
Start: 2018-04-25 | End: 2018-04-25

## 2018-04-25 RX ORDER — PANTOPRAZOLE SODIUM 40 MG/1
40 TABLET, DELAYED RELEASE ORAL
Status: DISCONTINUED | OUTPATIENT
Start: 2018-04-26 | End: 2018-04-30 | Stop reason: HOSPADM

## 2018-04-25 RX ORDER — ALBUTEROL SULFATE 2.5 MG/3ML
10 SOLUTION RESPIRATORY (INHALATION) ONCE
Status: COMPLETED | OUTPATIENT
Start: 2018-04-25 | End: 2018-04-25

## 2018-04-25 RX ORDER — NICOTINE 21 MG/24HR
1 PATCH, TRANSDERMAL 24 HOURS TRANSDERMAL DAILY
Status: DISCONTINUED | OUTPATIENT
Start: 2018-04-26 | End: 2018-04-25 | Stop reason: DRUGHIGH

## 2018-04-25 RX ORDER — DEXTROSE MONOHYDRATE 25 G/50ML
50 INJECTION, SOLUTION INTRAVENOUS ONCE
Status: COMPLETED | OUTPATIENT
Start: 2018-04-25 | End: 2018-04-25

## 2018-04-25 RX ORDER — NICOTINE 21 MG/24HR
21 PATCH, TRANSDERMAL 24 HOURS TRANSDERMAL DAILY
Status: DISCONTINUED | OUTPATIENT
Start: 2018-04-25 | End: 2018-04-30 | Stop reason: HOSPADM

## 2018-04-25 RX ADMIN — INSULIN HUMAN 10 UNITS: 100 INJECTION, SOLUTION PARENTERAL at 13:54

## 2018-04-25 RX ADMIN — ISODIUM CHLORIDE 3 ML: 0.03 SOLUTION RESPIRATORY (INHALATION) at 13:57

## 2018-04-25 RX ADMIN — ACETAMINOPHEN 650 MG: 325 TABLET, FILM COATED ORAL at 22:27

## 2018-04-25 RX ADMIN — DEXTROSE MONOHYDRATE 50 ML: 25 INJECTION, SOLUTION INTRAVENOUS at 13:54

## 2018-04-25 RX ADMIN — ALBUTEROL SULFATE 10 MG: 2.5 SOLUTION RESPIRATORY (INHALATION) at 13:57

## 2018-04-25 NOTE — ASSESSMENT & PLAN NOTE
· K 7 0   · Received hyperkalemia cocktail in ED  · Hemodialysis today  · Monitor BMP  · Monitor on telemetry

## 2018-04-25 NOTE — CONSULTS
Consultation - Nephrology   Sarah Israel 56 y o  male MRN: [id number]  Unit/Bed#: ED 06 Encounter: [id number]    ASSESSMENT/PLAN:  1  ESRD:  on HD previously at [facility] in [place]  -however he has not had dialysis since 04/21/2018, which was at [facility] with his last admission  -per case management note from previous admission: [facility] facilities did not accept him as a patient secondary to his previous noncompliance   -will move forward with stat dialysis for 2 5 hours today  -will plan dialysis tomorrow  -and will assess daily for dialysis needs  -potassium elevated at 7 0 and will move forward with a 1K bath  -will trend vital signs, labs, volume status daily    2  Access:  Left upper extremity AV fistula; positive bruit and thrill  -will evaluate function while on dialysis    3  Hypertension:  BP above goal, likely secondary to volume overload and and no dialysis since the 21st   -will trend BP with UF    4  Anemia:  Hemoglobin 9 0  -had received this year as an outpatient  -will start Epogen with dialysis in am once on regular schedule  -will check iron stores    5  Mineral and bone disease: Will continue with calcitriol 1 5 mcg with dialysis, PhosLo, and Sensipar  -will check phosphorus and PTH levels  -placed on renal diet    6  Azotemia:  Likely secondary to noncompliance with HD  -will trend with dialysis    7  Shortness of breath:  Likely secondary to volume overload and no dialysis since 4/21/2018    8  Hyperkalemia:  Secondary to no dialysis  -will treat with hemodialysis  -he is actually status post medical treatment with insulin unfortunately D50 was not given secondary to infiltration of IV  -will trend closely      HISTORY OF PRESENT ILLNESS:  Requesting Physician: Oneil Carey MD  Reason for Consult: ESRD on HD    Sarah Israel is a 56y o  year old male who presented to the ER with increased weakness, lethargy, shortness of breath, blood-tinged sputum and bloody stools   He was recently discharged on 4/21/2018 and has had no dialysis since discharge  He has a past medical history of end-stage renal disease, previously on dialysis at [facility], hypertension, anemia, diabetes, and known to be noncompliant with medication and treatment time  A renal consultation is requested today for assistance in the management of ESRD  In the ER, he was found to have severe hyperkalemia at 7 0, medical treatment was attempted however is IV infiltrated, and only received insulin  On discussion the patient and his wife are very tearful, and discussed concerns about not having a facility that accepted him to maintain hemodialysis in the local area  Currently he is without chest pain, dizziness, lightheadedness, nausea, or vomiting  He states that his legs feel heavy, he is weak and lethargic, coughing up blood tinged sputum and having bloody stools that started today  PAST MEDICAL HISTORY:  Past Medical History:   Diagnosis Date    Dialysis patient ([facility] [address] )     Hypertension     Renal disorder        PAST SURGICAL HISTORY:  Past Surgical History:   Procedure Laterality Date    CHOLECYSTECTOMY         ALLERGIES:  No Known Allergies    SOCIAL HISTORY:  History   Alcohol Use No     History   Drug Use No     History   Smoking Status    Current Every Day Smoker    Packs/day: 1 00    Types: Cigarettes   Smokeless Tobacco    Never Used       FAMILY HISTORY:  History reviewed  No pertinent family history  MEDICATIONS:  No current facility-administered medications for this encounter       Current Outpatient Prescriptions:     albuterol (PROVENTIL HFA,VENTOLIN HFA) 90 mcg/act inhaler, Inhale 2 puffs every 6 (six) hours as needed for wheezing, Disp: 1 Inhaler, Rfl: 0    amLODIPine (NORVASC) 10 mg tablet, Take 10 mg by mouth daily, Disp: , Rfl:     calcium acetate (PHOSLO) 667 mg capsule, Take 2,001 mg by mouth 3 (three) times a day with meals, Disp: , Rfl:     cinacalcet (SENSIPAR) 30 mg tablet, 30 mg, Disp: , Rfl:     gabapentin (NEURONTIN) 100 mg capsule, Take 300 mg by mouth daily  , Disp: , Rfl:     nicotine (NICODERM CQ) 14 mg/24hr TD 24 hr patch, Place 1 patch on the skin daily, Disp: 28 patch, Rfl: 0    REVIEW OF SYSTEMS:  General:  Tired, increased weakness, heavy leg feeling  Cardiovascular: No chest pain, no palpitation  Respiratory: + moist blood tinge sputum with cough,+ shortness of breath,   Gastrointestinal: No nausea, vomiting, abdominal pain, diarrhea  Reports positive bloody stools X 1 day  Genitourinary:  Still makes urine, No hematuria      PHYSICAL EXAM:  Current Weight: Weight - Scale: 72 6 kg (160 lb)  First Weight: Weight - Scale: 72 6 kg (160 lb)  Vitals:    04/25/18 0954 04/25/18 1228 04/25/18 1309   BP: (!) 185/86 (!) 203/93 (!) 197/91   BP Location:  Right arm Right arm   Pulse: 78 80 77   Resp: 22 18 17   Temp: 97 8 °F (36 6 °C)     TempSrc: Oral     SpO2: 94% 93% 96%   Weight: 72 6 kg (160 lb)     Height: 5' 6" (1 676 m)       No intake or output data in the 24 hours ending 04/25/18 1415    eneral:  No acute distress, cooperative, tearful  Skin:  Warm and dry, no rash noted  HEENT: MMM, sclera anicteric  Neck:  Supple, JVD noted bilaterally  Chest:  Slightly coarse throughout decreased bases  CVS:  Regular rate and rhythm no rub  Abdomen:  Soft, slightly distended, nontender audible bowel sounds  Extremities:  No significant edema noted bilaterally  Neuro:  Alert and oriented  Psych:  Appropriate affect     Lab Results:     Results from last 7 days  Lab Units 04/25/18  1233 04/25/18  1045 04/21/18  0426 04/18/18  1935   WBC Thousand/uL 7 30  --  4 42 4 05*   HEMOGLOBIN g/dL 9 0*  --  8 7* 9 4*   HEMATOCRIT % 28 1*  --  27 0* 27 3*   PLATELETS Thousands/uL 229  --  150 150   SODIUM mmol/L 138 140 137 137   POTASSIUM mmol/L 7 0* 7 0* 4 5 3 6   CHLORIDE mmol/L 106 108 100 98*   CO2 mmol/L 19* 16* 31 32   BUN mg/dL 124* 124* 42* 47*   CREATININE mg/dL 17 00* 17 40* 9 67* 9 77*   CALCIUM mg/dL 9 6 8 6 8 2* 8 9   TOTAL PROTEIN g/dL 8 2 6 7  --   --    BILIRUBIN TOTAL mg/dL 0 48 0 33  --   --    ALK PHOS U/L 113 101  --   --    ALT U/L 22 18  --   --    AST U/L 25 21  --   --    GLUCOSE RANDOM mg/dL 70 80 163* 118     Lab Results   Component Value Date    CALCIUM 9 6 04/25/2018    PHOS 5 6 (H) 11/14/2017

## 2018-04-25 NOTE — ASSESSMENT & PLAN NOTE
· ESRD on HD T,R,Sat  · Recently moved to [place] from [place]; has not established dialysis in [place], no dialysis since discharged last week  · Continue Sensipar and PhosLo  · Nephrology consult

## 2018-04-25 NOTE — ASSESSMENT & PLAN NOTE
· Hg/Hct 9/28, chronic, stable  · Received 2units PRBC on last admission 4/17/18; also on epogen 4000units during HD  · Monitor CBC

## 2018-04-25 NOTE — CONSULTS
Consultation - [facility]  Santino Mcclain 56 y o  male MRN: [id number]  Unit/Bed#: ED 06 Encounter: [id number]        Inpatient consult to gastroenterology  Consult performed by: Shandra Burns ordered by: Cb Gaston          Reason for Consult / Principal Problem: GI bleeding    HPI: 51-year-old male with history of end-stage renal disease on hemodialysis, chronic anemia, hypertension, AVNRT status post recent ablation on 4/20/18 not on anticoagulation presenting to GI for evaluation of GI bleeding  He presents to the ER today with increased weakness, lethargy, dizziness, shortness of breath, cough, blood-tinged sputum, and bright red blood per rectum  He was discharged from the hospital on 4/21/18 and has not received dialysis since discharge  He does have history of non-compliance  He was found to be hyperkalemic with potassium of 7  He was given insulin and will be going for dialysis shortly  He reports onset of bright red blood per rectum this morning  He reports two episodes of bleeding, he describes the blood "gushing" out of him  He denies history of GI bleeding in the past   Hemoglobin upon presentation today is 9, stable from blood work a few days ago  For the past 2-3 days he admits to lower abdominal "soreness" for which he was taking ibuprofen twice daily  Currently he denies any abdominal pain  He denies nausea, vomiting, diarrhea, constipation, melena  He reports history of cholecystectomy in the past  No known family history of GI or liver malignancies  He is a current tobacco smoker  He denies alcohol and recreational drug use  He denies past EGD  Last colonoscopy was about 6 years ago and unremarkable, per the patient  REVIEW OF SYSTEMS:    CONSTITUTIONAL: Denies any fever, chills  Good appetite, and no recent weight loss  HEENT: No earache or tinnitus  Denies hearing loss or visual disturbances  CARDIOVASCULAR: No chest pain or palpitations  RESPIRATORY: + Cough and shortness of breath  GASTROINTESTINAL: As noted in the History of Present Illness  GENITOURINARY: No problems with urination  Denies any hematuria or dysuria  NEUROLOGIC: + Dizziness  MUSCULOSKELETAL: Denies any muscle or joint pain  SKIN: Denies skin rashes or itching  ENDOCRINE: Denies excessive thirst  Denies intolerance to heat or cold  PSYCHOSOCIAL: Denies depression or anxiety  Denies any recent memory loss  Historical Information   Past Medical History:   Diagnosis Date    Dialysis patient ([facility] [address] )     Hypertension     Renal disorder      Past Surgical History:   Procedure Laterality Date    CHOLECYSTECTOMY       Social History   History   Alcohol Use No     History   Drug Use No     History   Smoking Status    Current Every Day Smoker    Packs/day: 1 00    Types: Cigarettes   Smokeless Tobacco    Never Used     History reviewed  No pertinent family history  Meds/Allergies       (Not in a hospital admission)  No current facility-administered medications for this encounter  No Known Allergies        Objective     Blood pressure (!) 197/91, pulse 77, temperature 97 8 °F (36 6 °C), temperature source Oral, resp  rate 17, height 5' 6" (1 676 m), weight 72 6 kg (160 lb), SpO2 96 %  No intake or output data in the 24 hours ending 04/25/18 1449      PHYSICAL EXAM:      General Appearance:   Alert male, wearing nebulizer mask, cooperative, no distress, appears stated age    HEENT:   Normocephalic, atraumatic, anicteric      Neck:  Supple, symmetrical, trachea midline, no adenopathy    Lungs:   Clear to auscultation bilaterally; no rales, rhonchi or wheezing; respirations unlabored    Heart[de-identified]   S1 and S2 normal; regular rate and rhythm; no murmur, rub, or gallop  Abdomen:   Soft, non-tender, non-distended; normal bowel sounds; no masses, no organomegaly    Genitalia:   Deferred    Rectal:   Clear mucous  No visible blood     Extremities:  No cyanosis, clubbing or edema    Pulses:  2+ and symmetric all extremities    Skin:  Skin color, texture, turgor normal, no rashes or lesions    Lymph nodes:  No palpable cervical lymphadenopathy        Lab Results:     Results from last 7 days  Lab Units 04/25/18  1233   WBC Thousand/uL 7 30   HEMOGLOBIN g/dL 9 0*   HEMATOCRIT % 28 1*   PLATELETS Thousands/uL 229   NEUTROS PCT % 73   LYMPHS PCT % 18   MONOS PCT % 4   EOS PCT % 5       Results from last 7 days  Lab Units 04/25/18  1233   SODIUM mmol/L 138   POTASSIUM mmol/L 7 0*   CHLORIDE mmol/L 106   CO2 mmol/L 19*   BUN mg/dL 124*   CREATININE mg/dL 17 00*   CALCIUM mg/dL 9 6   TOTAL PROTEIN g/dL 8 2   BILIRUBIN TOTAL mg/dL 0 48   ALK PHOS U/L 113   ALT U/L 22   AST U/L 25   GLUCOSE RANDOM mg/dL 70               Imaging Studies: I have personally reviewed pertinent imaging studies  Xr Chest 2 Views    Result Date: 4/25/2018  Impression: 1  Enlargement of cardiac silhouette similar to prior study 2  Persistent but partially improved groundglass opacities in the lungs Workstation performed: [id number]       ASSESSMENT and PLAN:      Discussed plan with Nephrology and SLIM    56-year-old male with history of end-stage renal disease on hemodialysis, chronic anemia, hypertension, AVNRT status post recent ablation on 4/20/18 not on anticoagulation presenting to GI for evaluation of GI bleeding  1) Bright red blood per rectum: Patient reports onset of BRBPR earlier today  Hemoglobin stable at 9 from a few days ago  Rectal exam negative for overt bleeding however I did witness red blood in the toilet bowl, which patient passed shortly afterward  He is hypertensive rather than hypotensive  He denies abdominal pain  Abdominal exam is benign  Differential diagnosis includes hemorrhoidal bleeding, diverticular bleeding, rectal ulcer, AVM, large polyp, or mass  Last colonoscopy was over 6 years ago        -Recommend non-urgent colonoscopy once patient gets dialyzed and stabilizes from renal standpoint--perhaps as early as Friday  -If hemoglobin drops and bleeding continues, may need more urgent scope  -Monitor H&H closely    2) End stage renal disease: non-compliant, on hemodialysis    -Patient going for urgent dialysis today  -Appreciate recommendations from Nephrology    Patient was seen and examined by Dr Stevie Colin  All ross medical decisions were made by Dr Stevie Colin  Thank you for allowing us to participate in the care of this present patient  We will follow-up with you closely

## 2018-04-25 NOTE — ASSESSMENT & PLAN NOTE
· SVT on last hospitalization 4/17/18, s/p Cardiac EPS/SVT Ablation 4/20/18    Was seen by Cardiology, beta-blocker discontinued on discharge, advised to follow up with electrophysiology  · EKG NSR rate 76  · Telemetry monitoring

## 2018-04-25 NOTE — ASSESSMENT & PLAN NOTE
· Likely secondary to CKD  · Troponin 0 14, trend x3  · EKG: NSR, rate 76, no signs of ischemia  · Telemetry monitoring

## 2018-04-25 NOTE — ASSESSMENT & PLAN NOTE
· C/o toilet full of red blood mixed with red stool  · R/o GIB vs hemorrhoids  · Patient not on oral anticoagulation, only takes ASA 81mg daily, last anticoagulation during hospitalization on 4/17-4/21 was on  heparin 5000units q8h for DVT prophylaxis   · Hg/Hct 9/28, stable  · Oral PPI  · Hydrocortisone suppository  · Clear diet; advance as tolerated  · Hg/Hct q6h x24h; monitor CBC  · Transfuse p r n    · Discussed with GI, will advise if abdominal CT needed (if IV contrast required, pt will need HD within 12hours)

## 2018-04-25 NOTE — H&P
H&P- Neftaly Malik 1960, 56 y o  male MRN: [id number]    Unit/Bed#: ED 06 Encounter: [id number]    Primary Care Provider: Zahida Mejia   Date and time admitted to hospital: 4/25/2018 10:18 AM      * Bright red blood per rectum   Assessment & Plan    · C/o toilet full of red blood mixed with red stool  · R/o GIB vs hemorrhoids  · Patient not on oral anticoagulation, only takes ASA 81mg daily, last anticoagulation during hospitalization on 4/17-4/21 was on  heparin 5000units q8h for DVT prophylaxis   · Hg/Hct 9/28, stable  · Oral PPI  · Hydrocortisone suppository  · Clear diet; advance as tolerated  · Hg/Hct q6h x24h; monitor CBC  · Transfuse p r n  · Discussed with GI, will advise if abdominal CT needed (if IV contrast required, pt will need HD within 12hours)        Elevated troponin   Assessment & Plan    · Likely secondary to CKD  · Troponin 0 14, trend x3  · EKG: NSR, rate 76, no signs of ischemia  · Telemetry monitoring        Hyperkalemia   Assessment & Plan    · K 7 0   · Received hyperkalemia cocktail in ED  · Hemodialysis today  · Monitor BMP  · Monitor on telemetry        Accelerated hypertension   Assessment & Plan    · /91, 203/93  · Continue Norvasc  · Prn Labetalol IV SBP >180        Cough with hemoptysis   Assessment & Plan    · Cough productive of blood-tinged clear sputum  · Pt not on anticoagulation, only on ASA 81mg qd        ESRD (end stage renal disease) on dialysis [facility])   Assessment & Plan    · ESRD on HD T,R,Sat  · Recently moved to [place] from [place]; has not established dialysis in [place], no dialysis since discharged last week  · Continue Sensipar and PhosLo  · Nephrology consult        History of supraventricular tachycardia   Assessment & Plan    · SVT on last hospitalization 4/17/18, s/p Cardiac EPS/SVT Ablation 4/20/18    Was seen by Cardiology, beta-blocker discontinued on discharge, advised to follow up with electrophysiology  · EKG NSR rate 76  · Telemetry monitoring        Anemia in chronic kidney disease, on chronic dialysis [facility])   Assessment & Plan    · Hg/Hct 9/28, chronic, stable  · Received 2units PRBC on last admission 4/17/18; also on epogen 4000units during HD  · Monitor CBC            VTE Prophylaxis: BBBPR  / reason for no mechanical VTE prophylaxis AMBULATE PATIENT   Code Status:  FULL CODE  POLST: POLST is not applicable to this patient  Discussion with family:  Girlfriend at bedside    Anticipated Length of Stay:  Patient will be admitted on an Observation basis with an anticipated length of stay of  Less than 2 midnights  Justification for Hospital Stay: BBBPR, hemoptysis    Total Time for Visit, including Counseling / Coordination of Care: 45 minutes  Greater than 50% of this total time spent on direct patient counseling and coordination of care  Chief Complaint: Toilet full of blood and bloody stool    History of Present Illness:    Sarah Israel is a 56 y o  male who presents with c/o waking up this morning and moving his bowels, when he looked in the toilet, it was full of blood and stool; also c/o cough productive of blood-tinged sputum  In ER he started to c/o SOB, states it is 2/2 missing HD, has not had dialysis since being discharged last week because he has not established a dialysis center in [place] (see A/P)  Had 2 episodes of bloody stools, 1 at home this morning and 1 here in ED  Denies history of hemorrhoids  Denies chest pain, palpitations, fever, loss of appetite, abdominal pain, NVCD or dysuria  Review of Systems:    Review of Systems   Constitutional: Positive for chills  Negative for fever  HENT: Negative  Respiratory: Positive for cough and shortness of breath  Negative for chest tightness  Blood tinged sputum   Cardiovascular: Negative  Gastrointestinal: Positive for blood in stool  Negative for abdominal pain, constipation, diarrhea, nausea and vomiting  Abdominal cramping   Genitourinary: Negative  Musculoskeletal: Negative  Neurological: Negative  Psychiatric/Behavioral: Negative  Past Medical and Surgical History:     Past Medical History:   Diagnosis Date    Dialysis patient ([address] )     Hypertension     Renal disorder        Past Surgical History:   Procedure Laterality Date    CHOLECYSTECTOMY         Meds/Allergies:    Prior to Admission medications    Medication Sig Start Date End Date Taking? Authorizing Provider   albuterol (PROVENTIL HFA,VENTOLIN HFA) 90 mcg/act inhaler Inhale 2 puffs every 6 (six) hours as needed for wheezing 4/21/18  Yes Trung Wilde MD   amLODIPine (NORVASC) 10 mg tablet Take 10 mg by mouth daily   Yes Historical Provider, MD   calcium acetate (PHOSLO) 667 mg capsule Take 2,001 mg by mouth 3 (three) times a day with meals   Yes Historical Provider, MD   cinacalcet (SENSIPAR) 30 mg tablet 30 mg 12/27/16  Yes Historical Provider, MD   gabapentin (NEURONTIN) 100 mg capsule Take 300 mg by mouth daily     Yes Historical Provider, MD   nicotine (NICODERM CQ) 14 mg/24hr TD 24 hr patch Place 1 patch on the skin daily 4/21/18  Yes Trung Wilde MD     I have reviewed home medications with patient personally  Allergies: No Known Allergies    Social History:     Marital Status:    Occupation:  Delivers order parts  Patient Pre-hospital Living Situation:  Lives at home  Patient Pre-hospital Level of Mobility:  Ambulatory  Patient Pre-hospital Diet Restrictions:  Denies diet  Substance Use History:   History   Alcohol Use No     History   Smoking Status    Current Every Day Smoker    Packs/day: 1 00    Types: Cigarettes   Smokeless Tobacco    Never Used     History   Drug Use No       Family History:    History reviewed  No pertinent family history      Physical Exam:     Vitals:   Blood Pressure: (!) 197/91 (04/25/18 1309)  Pulse: 77 (04/25/18 1309)  Temperature: 97 8 °F (36 6 °C) (04/25/18 0954)  Temp Source: Oral (04/25/18 0954)  Respirations: 17 (04/25/18 1309)  Height: 5' 6" (167 6 cm) (04/25/18 0954)  Weight - Scale: 72 6 kg (160 lb) (04/25/18 0954)  SpO2: 96 % (04/25/18 1309)    Physical Exam   Constitutional: He is oriented to person, place, and time  He appears well-developed and well-nourished  No distress  HENT:   Head: Normocephalic and atraumatic  Eyes: Conjunctivae and EOM are normal  Right eye exhibits no discharge  Left eye exhibits no discharge  No scleral icterus  Neck: Neck supple  No JVD present  Cardiovascular: Normal rate, regular rhythm, normal heart sounds and intact distal pulses  No murmur heard  Pulmonary/Chest: Breath sounds normal  No respiratory distress  He has no wheezes  He has no rales  He exhibits no tenderness  Abdominal: Soft  Bowel sounds are normal  He exhibits no distension and no mass  There is no tenderness  There is no rebound and no guarding  Musculoskeletal: He exhibits no edema  Neurological: He is alert and oriented to person, place, and time  Skin: Skin is warm and dry  No rash noted  He is not diaphoretic  No erythema  No pallor  Psychiatric: He has a normal mood and affect  His behavior is normal  Judgment and thought content normal      Additional Data:     Lab Results: I have personally reviewed pertinent reports  Results from last 7 days  Lab Units 04/25/18  1233   WBC Thousand/uL 7 30   HEMOGLOBIN g/dL 9 0*   HEMATOCRIT % 28 1*   PLATELETS Thousands/uL 229   NEUTROS PCT % 73   LYMPHS PCT % 18   MONOS PCT % 4   EOS PCT % 5       Results from last 7 days  Lab Units 04/25/18  1233   SODIUM mmol/L 138   POTASSIUM mmol/L 7 0*   CHLORIDE mmol/L 106   CO2 mmol/L 19*   BUN mg/dL 124*   CREATININE mg/dL 17 00*   CALCIUM mg/dL 9 6   TOTAL PROTEIN g/dL 8 2   BILIRUBIN TOTAL mg/dL 0 48   ALK PHOS U/L 113   ALT U/L 22   AST U/L 25   GLUCOSE RANDOM mg/dL 70           Imaging: I have personally reviewed pertinent reports        XR chest 2 views   ED Interpretation by José Miguel Richey MD (04/25 7961)   No intrapulmonary pathology Final Result by Carey Hart MD (04/25 8871)      1  Enlargement of cardiac silhouette similar to prior study   2  Persistent but partially improved groundglass opacities in the lungs                  Workstation performed: [id number]             EKG, Pathology, and Other Studies Reviewed on Admission:   · EKG: Echo, CXR    Allscripts / Epic Records Reviewed: Yes     ** Please Note: This note has been constructed using a voice recognition system   **

## 2018-04-25 NOTE — ED ATTENDING ATTESTATION
Andreia Reed MD, saw and evaluated the patient  I have discussed the patient with the resident/non-physician practitioner and agree with the resident's/non-physician practitioner's findings, Plan of Care, and MDM as documented in the resident's/non-physician practitioner's note, except where noted  All available labs and Radiology studies were reviewed  At this point I agree with the current assessment done in the Emergency Department  I have conducted an independent evaluation of this patient a history and physical is as follows:  Patient had a recent hospitalization for paroxysmal atrial fibrillation he underwent ablation  Patient also had rectal bleeding during the hospitalization  Patient is off all anticoagulants at the present time  Patient presents again with rectal bleeding feeling lightheaded  Past medical history end-stage renal disease on Tuesday Thursday Saturday dialysis  last dialysis was on Saturday  Exam pale-appearing no acute distress clear anicteric lungs clear heart regular no murmur abdomen soft nontender rectal exam heme-positive stool  dark bloody color  Impression GI bleed  Type and screen Labs IV fluids admission    Critical Care Time  The patient presented with a condition in which there was a high probability of imminent or life-threatening deterioration, and critical care services (excluding separately billable procedures) totalled 30-74 minutes          Procedures

## 2018-04-26 ENCOUNTER — APPOINTMENT (OUTPATIENT)
Dept: DIALYSIS | Facility: HOSPITAL | Age: 58
DRG: 377 | End: 2018-04-26
Attending: INTERNAL MEDICINE
Payer: MEDICARE

## 2018-04-26 PROBLEM — K92.2 GI BLEED: Status: ACTIVE | Noted: 2018-04-25

## 2018-04-26 LAB
ANION GAP SERPL CALCULATED.3IONS-SCNC: 8 MMOL/L (ref 4–13)
BASOPHILS # BLD AUTO: 0.03 THOUSANDS/ΜL (ref 0–0.1)
BASOPHILS NFR BLD AUTO: 1 % (ref 0–1)
BUN SERPL-MCNC: 68 MG/DL (ref 5–25)
CALCIUM SERPL-MCNC: 8.5 MG/DL (ref 8.3–10.1)
CHLORIDE SERPL-SCNC: 101 MMOL/L (ref 100–108)
CO2 SERPL-SCNC: 28 MMOL/L (ref 21–32)
CREAT SERPL-MCNC: 11.5 MG/DL (ref 0.6–1.3)
EOSINOPHIL # BLD AUTO: 0.23 THOUSAND/ΜL (ref 0–0.61)
EOSINOPHIL NFR BLD AUTO: 7 % (ref 0–6)
ERYTHROCYTE [DISTWIDTH] IN BLOOD BY AUTOMATED COUNT: 15.6 % (ref 11.6–15.1)
FERRITIN SERPL-MCNC: 1238 NG/ML (ref 8–388)
GFR SERPL CREATININE-BSD FRML MDRD: 5 ML/MIN/1.73SQ M
GLUCOSE SERPL-MCNC: 57 MG/DL (ref 65–140)
HCT VFR BLD AUTO: 20.5 % (ref 36.5–49.3)
HCT VFR BLD AUTO: 21.1 % (ref 36.5–49.3)
HCT VFR BLD AUTO: 24.8 % (ref 36.5–49.3)
HCT VFR BLD AUTO: 26.2 % (ref 36.5–49.3)
HGB BLD-MCNC: 6.8 G/DL (ref 12–17)
HGB BLD-MCNC: 7 G/DL (ref 12–17)
HGB BLD-MCNC: 8.2 G/DL (ref 12–17)
HGB BLD-MCNC: 8.7 G/DL (ref 12–17)
IRON SATN MFR SERPL: 29 %
IRON SERPL-MCNC: 43 UG/DL (ref 65–175)
LYMPHOCYTES # BLD AUTO: 0.75 THOUSANDS/ΜL (ref 0.6–4.47)
LYMPHOCYTES NFR BLD AUTO: 21 % (ref 14–44)
MAGNESIUM SERPL-MCNC: 2.3 MG/DL (ref 1.6–2.6)
MCH RBC QN AUTO: 28.8 PG (ref 26.8–34.3)
MCHC RBC AUTO-ENTMCNC: 32.2 G/DL (ref 31.4–37.4)
MCV RBC AUTO: 89 FL (ref 82–98)
MONOCYTES # BLD AUTO: 0.34 THOUSAND/ΜL (ref 0.17–1.22)
MONOCYTES NFR BLD AUTO: 10 % (ref 4–12)
NEUTROPHILS # BLD AUTO: 2.15 THOUSANDS/ΜL (ref 1.85–7.62)
NEUTS SEG NFR BLD AUTO: 61 % (ref 43–75)
NRBC BLD AUTO-RTO: 0 /100 WBCS
PHOSPHATE SERPL-MCNC: 5.2 MG/DL (ref 2.7–4.5)
PLATELET # BLD AUTO: 158 THOUSANDS/UL (ref 149–390)
PMV BLD AUTO: 9 FL (ref 8.9–12.7)
POTASSIUM SERPL-SCNC: 5.2 MMOL/L (ref 3.5–5.3)
PTH-INTACT SERPL-MCNC: 1211.5 PG/ML (ref 18.4–80.1)
RBC # BLD AUTO: 2.36 MILLION/UL (ref 3.88–5.62)
SODIUM SERPL-SCNC: 137 MMOL/L (ref 136–145)
TIBC SERPL-MCNC: 149 UG/DL (ref 250–450)
WBC # BLD AUTO: 3.51 THOUSAND/UL (ref 4.31–10.16)

## 2018-04-26 PROCEDURE — 83550 IRON BINDING TEST: CPT | Performed by: NURSE PRACTITIONER

## 2018-04-26 PROCEDURE — 30233N1 TRANSFUSION OF NONAUTOLOGOUS RED BLOOD CELLS INTO PERIPHERAL VEIN, PERCUTANEOUS APPROACH: ICD-10-PCS | Performed by: INTERNAL MEDICINE

## 2018-04-26 PROCEDURE — 80048 BASIC METABOLIC PNL TOTAL CA: CPT | Performed by: NURSE PRACTITIONER

## 2018-04-26 PROCEDURE — 85018 HEMOGLOBIN: CPT | Performed by: PHYSICIAN ASSISTANT

## 2018-04-26 PROCEDURE — 85025 COMPLETE CBC W/AUTO DIFF WBC: CPT | Performed by: NURSE PRACTITIONER

## 2018-04-26 PROCEDURE — 83970 ASSAY OF PARATHORMONE: CPT | Performed by: NURSE PRACTITIONER

## 2018-04-26 PROCEDURE — 82728 ASSAY OF FERRITIN: CPT | Performed by: NURSE PRACTITIONER

## 2018-04-26 PROCEDURE — 99232 SBSQ HOSP IP/OBS MODERATE 35: CPT | Performed by: INTERNAL MEDICINE

## 2018-04-26 PROCEDURE — 84100 ASSAY OF PHOSPHORUS: CPT | Performed by: NURSE PRACTITIONER

## 2018-04-26 PROCEDURE — 83735 ASSAY OF MAGNESIUM: CPT | Performed by: NURSE PRACTITIONER

## 2018-04-26 PROCEDURE — P9016 RBC LEUKOCYTES REDUCED: HCPCS

## 2018-04-26 PROCEDURE — 83540 ASSAY OF IRON: CPT | Performed by: NURSE PRACTITIONER

## 2018-04-26 PROCEDURE — 85014 HEMATOCRIT: CPT | Performed by: PHYSICIAN ASSISTANT

## 2018-04-26 PROCEDURE — G0257 UNSCHED DIALYSIS ESRD PT HOS: HCPCS

## 2018-04-26 RX ORDER — LANOLIN ALCOHOL/MO/W.PET/CERES
3 CREAM (GRAM) TOPICAL
Status: DISCONTINUED | OUTPATIENT
Start: 2018-04-26 | End: 2018-04-30 | Stop reason: HOSPADM

## 2018-04-26 RX ADMIN — GABAPENTIN 300 MG: 300 CAPSULE ORAL at 12:40

## 2018-04-26 RX ADMIN — POLYETHYLENE GLYCOL 3350, SODIUM SULFATE ANHYDROUS, SODIUM BICARBONATE, SODIUM CHLORIDE, POTASSIUM CHLORIDE 4000 ML: 236; 22.74; 6.74; 5.86; 2.97 POWDER, FOR SOLUTION ORAL at 18:34

## 2018-04-26 RX ADMIN — MELATONIN TAB 3 MG 3 MG: 3 TAB at 23:22

## 2018-04-26 RX ADMIN — BISACODYL 10 MG: 5 TABLET, COATED ORAL at 18:34

## 2018-04-26 RX ADMIN — NICOTINE 21 MG: 21 PATCH, EXTENDED RELEASE TRANSDERMAL at 12:42

## 2018-04-26 RX ADMIN — PANTOPRAZOLE SODIUM 40 MG: 40 TABLET, DELAYED RELEASE ORAL at 06:01

## 2018-04-26 RX ADMIN — AMLODIPINE BESYLATE 10 MG: 10 TABLET ORAL at 12:40

## 2018-04-26 NOTE — PROGRESS NOTES
Hemoglobin dropped from 9 0-7 0, on H&H q6h x 24 hours, if drops any further, transfuse  See type and screen from 04/17/2018

## 2018-04-26 NOTE — SOCIAL WORK
CM met with the Pt at bedside to explain the CM role and discuss any potential D/C needs  Pt lives with his girl friend in a 2 story home with 5STE  PTA IADL's, Pt does not use any DME and drives  Pt has Hemodialysis MWF at [facility] on [address] [place], [place]  No hx of HHC, IP rehab, MH diagnosis and D/A Abuse  Pt does not have reported POA  Pt's home pharmacy is [facility] in [place], unsure of street name  Pt will need to transfer Dialysis centers at this time  CM will continue to follow

## 2018-04-26 NOTE — ASSESSMENT & PLAN NOTE
· Hg/Hct 9/28, chronic, stable  · Received 2units PRBC on last admission 4/17/18; also on epogen 4000units during HD  · prbc as Hb was 6 8

## 2018-04-26 NOTE — PROGRESS NOTES
Progress Note - Xiao Seay 56 y o  male MRN: [id number]    [facility] Encounter: [id number]    Subjective:    Patient seen and examined this morning during hemodialysis  He reported 1 additional episode of BRBPR last evening  Hemoglobin dropped from 9 --> 6 8 overnight  He received 1 unit of PRBCs this morning  He reports feeling fatigued  He denies any abdominal pain, nausea, vomiting  Objective:     Vitals: Blood pressure 161/74, pulse 70, temperature 97 5 °F (36 4 °C), temperature source Tympanic, resp  rate 17, height 5' 6" (1 676 m), weight 72 1 kg (158 lb 15 2 oz), SpO2 100 %  ,Body mass index is 25 66 kg/m²        Intake/Output Summary (Last 24 hours) at 04/26/18 1241  Last data filed at 04/26/18 1145   Gross per 24 hour   Intake             1450 ml   Output             5314 ml   Net            -3864 ml       Physical Exam:     General Appearance: Alert, chronically ill appearing male, appears stated age and cooperative  Lungs: Clear to auscultation bilaterally, no rales or rhonchi, no labored breathing/accessory muscle use  Heart: Regular rate and rhythm, S1, S2 normal, no murmur, click, rub or gallop  Abdomen: Soft, non-tender, non-distended; bowel sounds normal; no masses or no organomegaly  Extremities: No cyanosis, edema    Invasive Devices     Peripheral Intravenous Line            Peripheral IV 04/25/18 Right;Upper Arm 1 day          Line            Hemodialysis AV Fistula Left -- days                Lab Results:    Results from last 7 days  Lab Units 04/26/18  0506   WBC Thousand/uL 3 51*   HEMOGLOBIN g/dL 6 8*   HEMATOCRIT % 21 1*   PLATELETS Thousands/uL 158   NEUTROS PCT % 61   LYMPHS PCT % 21   MONOS PCT % 10   EOS PCT % 7*       Results from last 7 days  Lab Units 04/26/18  0506 04/25/18  1233   SODIUM mmol/L 137 138   POTASSIUM mmol/L 5 2 7 0*   CHLORIDE mmol/L 101 106   CO2 mmol/L 28 19*   BUN mg/dL 68* 124*   CREATININE mg/dL 11 50* 17 00*   CALCIUM mg/dL 8 5 9 6   TOTAL PROTEIN g/dL  --  8 2   BILIRUBIN TOTAL mg/dL  --  0 48   ALK PHOS U/L  --  113   ALT U/L  --  22   AST U/L  --  25   GLUCOSE RANDOM mg/dL 57* 70               Imaging Studies: I have personally reviewed pertinent imaging studies  Xr Chest 2 Views    Result Date: 4/25/2018  Impression: 1  Enlargement of cardiac silhouette similar to prior study 2  Persistent but partially improved groundglass opacities in the lungs Workstation performed: [id number]       Assessment and Plan:    Discussed plan with Dr Donna Valerio with Nephrology      62-year-old male with history of end-stage renal disease on hemodialysis, chronic anemia, hypertension, AVNRT status post recent ablation on 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 not on anticoagulation presenting to GI for evaluation of GI bleeding     1) Bright red blood per rectum: He reports an additional episode of BRBPR last evening  Hemoglobin dropped from 9 --> 6 8 overnight  He received 1 unit of PRBCs this morning  Differential diagnosis includes hemorrhoidal bleeding, diverticular bleeding, less likely ischemic colitis, rectal ulcer, AVM, large polyp, or mass  Last colonoscopy was over 6 years ago        -Patient received dialysis yesterday and again this morning--K+ improved to 5 2   Discussed with Nephrology, we will plan for colonoscopy tomorrow 4/27, to evaluate for source of bleeding   -Clear liquids today with Dulcolax/Golytely bowel prep then NPO after midnight  -If hemoglobin drops and bleeding continues, may need more urgent scope  -Monitor H&H closely and transfuse to keep hemoglobin > 7     2) End stage renal disease: non-compliant, on hemodialysis     -Management as per Nephrology

## 2018-04-26 NOTE — CASE MANAGEMENT
Initial Clinical Review    Admission: Date/Time/Statement: Observation 4/25 and Changed to Inpatient on 4/26 @ 1426  Pt requiring 2MN to continue treatment    Admitting Physician Celeste Britt    Level of Care Med Surg    Bed Type [place]    Estimated length of stay More than 2 Midnights    Certification I certify that inpatient services are medically necessary for this patient for a duration of greater than two midnights  See H&P and MD Progress Notes for additional information about the patient's course of treatment  GIB     ED: Date/Time/Mode of Arrival:   ED Arrival Information     Expected Arrival Acuity Means of Arrival Escorted By Service Admission Type    - 4/25/2018 09:40 Urgent Walk-In Self General Medicine Urgent    Arrival Complaint    rectal bleeding          Chief Complaint:   Chief Complaint   Patient presents with    Rectal Bleeding     Woke up and stool was loose this morning and noted toilet was full of bright red blood  Also noticing blood from his nose when he is blowing it  History of Illness: 56 y o  male who presents with c/o waking up this morning and moving his bowels, when he looked in the toilet, it was full of blood and stool; also c/o cough productive of blood-tinged sputum  In ER he started to c/o SOB, states it is 2/2 missing HD, has not had dialysis since being discharged last week because he has not established a dialysis center in [place] (see A/P)  Had 2 episodes of bloody stools, 1 at home this morning and 1 here in ED  Denies history of hemorrhoids   Denies chest pain, palpitations, fever, loss of appetite, abdominal pain, NVCD or dysuria        ED Vital Signs:   ED Triage Vitals   Temperature Pulse Respirations Blood Pressure SpO2   04/25/18 0954 04/25/18 0954 04/25/18 0954 04/25/18 0954 04/25/18 0954   97 8 °F (36 6 °C) 78 22 (!) 185/86 94 %      Temp Source Heart Rate Source Patient Position - Orthostatic VS BP Location FiO2 (%)   04/25/18 4702 04/25/18 4243 04/25/18 1228 04/25/18 1228 --   Oral Monitor Lying Right arm       Pain Score       04/25/18 0954       No Pain        Wt Readings from Last 1 Encounters:   04/26/18 72 1 kg (158 lb 15 2 oz)       Vital Signs (abnormal):   Date/Time  Temp  Pulse  Resp  BP  SpO2  O2 Device  Patient Position - Orthostatic VS   04/26/18 1000  --  71  --   202/102  --  --  --   04/26/18 0930  --  72  --   185/102  --  --  --   04/26/18 0900  --  --  --  --  --  Nasal cannula  --   04/26/18 0830  --  72  --   179/100  --  --  --   04/26/18 0821  --  70  --  170/83  --  --  --   04/26/18 0816   96 6 °F (35 9 °C)  70  17  164/80  --  --  Sitting   04/26/18 0719  98 °F (36 7 °C)  76  18  165/74  100 %  --  --   04/26/18 0652  98 °F (36 7 °C)  74  20   178/80  99 %         Abnormal Labs:    04/25/18 1045    Sodium 136 - 145 mmol/L 140    Potassium 3 5 - 5 3 mmol/L 7 0     Comments: No Hemolysis present   Chloride 100 - 108 mmol/L 108    CO2 21 - 32 mmol/L 16     Anion Gap 4 - 13 mmol/L 16     BUN 5 - 25 mg/dL 124     Creatinine 0 60 - 1 30 mg/dL 17 40        04/25/18 1130    Troponin I <=0 04 ng/mL 0 14        04/26/18 0506    Ferritin 8 - 388 ng/mL 1,238        04/26/18 0506    Iron Saturation % 29    TIBC 250 - 450 ug/dL 149     Iron 65 - 175 ug/dL 43        04/25/18 1233    WBC 4 31 - 10 16 Thousand/uL 7 30    RBC 3 88 - 5 62 Million/uL 3 12     Hemoglobin 12 0 - 17 0 g/dL 9 0     Hematocrit 36 5 - 49 3 % 28 1        04/26/18 0506    WBC 4 31 - 10 16 Thousand/uL 3 51     RBC 3 88 - 5 62 Million/uL 2 36     Hemoglobin 12 0 - 17 0 g/dL 6 8     Hematocrit 36 5 - 49 3 % 21 1      04/26/18 0506     PTH 18 4 - 80 1 pg/mL 1,211 5       Diagnostic Test Results: CXR - 1  Enlargement of cardiac silhouette similar to prior study  2    Persistent but partially improved groundglass opacities in the lungs    ED Treatment:   Medication Administration from 04/25/2018 0940 to 04/25/2018 1034       Date/Time Order Dose Route Action     04/25/2018 3631 insulin regular (HumuLIN R,NovoLIN R) injection 10 Units 10 Units Intravenous Given     04/25/2018 1354 dextrose 50 % IV solution 50 mL 50 mL Intravenous Given     04/25/2018 1357 albuterol inhalation solution 10 mg 10 mg Nebulization Given     04/25/2018 1357 sodium chloride 0 9 % inhalation solution 3 mL 3 mL Nebulization Given          Past Medical/Surgical History:    Active Ambulatory Problems     Diagnosis Date Noted    Hyperkalemia 11/13/2017    DM (diabetes mellitus) ([place] [address] ) 02/09/2015    Elevated troponin 11/13/2017    ESRD (end stage renal disease) on dialysis ([place] [address] ) 03/11/2018    Secondary hyperparathyroidism of renal origin ([place] [address] ) 03/11/2018    Anemia in chronic kidney disease, on chronic dialysis ([place] [address] ) 03/11/2018    History of supraventricular tachycardia 04/17/2018    Acute bronchitis due to other specified organisms 04/17/2018    Anemia of renal disease 04/17/2018     Resolved Ambulatory Problems     Diagnosis Date Noted    Fluid overload, unspecified 11/13/2017    Hypertensive urgency 11/13/2017    Dyspnea 11/13/2017    Acute hypoxemic respiratory failure ([place] [address] ) 11/13/2017    Encephalopathy 11/13/2017    Benign hypertension with CKD (chronic kidney disease) stage V ([place] [address] ) 03/11/2018     Past Medical History:   Diagnosis Date    Dialysis patient ([place] [address] )     Hypertension     Renal disorder        Admitting Diagnosis: Bright red blood per rectum [K62 5]  Rectal bleeding [K62 5]  GI bleed [K92 2]  ESRD (end stage renal disease) on dialysis ([place] [address] ) [N18 6, Z99 2]    Age/Sex: 56 y o  male    Assessment/Plan:   * Bright red blood per rectum   Assessment & Plan     · C/o toilet full of red blood mixed with red stool  · R/o GIB vs hemorrhoids  · Patient not on oral anticoagulation, only takes ASA 81mg daily, last anticoagulation during hospitalization on 4/17-4/21 was on  heparin 5000units q8h for DVT prophylaxis   · Hg/Hct 9/28, stable  · Oral PPI  · Hydrocortisone suppository  · Clear diet; advance as tolerated  · Hg/Hct q6h x24h; monitor CBC  · Transfuse p r n  · Discussed with GI, will advise if abdominal CT needed (if IV contrast required, pt will need HD within 12hours)          Elevated troponin   Assessment & Plan     · Likely secondary to CKD  · Troponin 0 14, trend x3  · EKG: NSR, rate 76, no signs of ischemia  · Telemetry monitoring          Hyperkalemia   Assessment & Plan     · K 7 0   · Received hyperkalemia cocktail in ED  · Hemodialysis today  · Monitor BMP  · Monitor on telemetry          Accelerated hypertension   Assessment & Plan     · /91, 203/93  · Continue Norvasc  · Prn Labetalol IV SBP >180          Cough with hemoptysis   Assessment & Plan     · Cough productive of blood-tinged clear sputum  · Pt not on anticoagulation, only on ASA 81mg qd          ESRD (end stage renal disease) on dialysis [facility])   Assessment & Plan     · ESRD on HD T,R,Sat  · Recently moved to [place] from [place]; has not established dialysis in [place], no dialysis since discharged last week  · Continue Sensipar and PhosLo  · Nephrology consult          History of supraventricular tachycardia   Assessment & Plan     · SVT on last hospitalization 4/17/18, s/p Cardiac EPS/SVT Ablation 4/20/18    Was seen by Cardiology, beta-blocker discontinued on discharge, advised to follow up with electrophysiology  · EKG NSR rate 76  · Telemetry monitoring          Anemia in chronic kidney disease, on chronic dialysis [facility])   Assessment & Plan     · Hg/Hct 9/28, chronic, stable  · Received 2units PRBC on last admission 4/17/18; also on epogen 4000units during HD  · Monitor CBC                VTE Prophylaxis: BBBPR  / reason for no mechanical VTE prophylaxis AMBULATE PATIENT   Code Status:  FULL CODE      Admission Orders:  Clear liquid  Tele monitoring  4/26 Bld Transfusion - RBC  Hemodialysis Adult  Nephrology cons  Gastroenterology cons    Scheduled Meds:   Current Facility-Administered Medications:  amLODIPine 10 mg Oral Daily   calcium acetate 2,001 mg Oral TID With Meals   cinacalcet 30 mg Oral Daily With Breakfast   gabapentin 300 mg Oral Daily   hydrocortisone  Rectal 4x Daily PRN   nicotine 21 mg Transdermal Daily   pantoprazole 40 mg Oral Early Morning     Continuous Infusions:    PRN Meds:   acetaminophen    albuterol    hydrocortisone    HYDROmorphone    labetalol    ----------------------------------------------------------------------------------    4/25 Nephrology cons:  1  ESRD:  on HD previously at [facility] in [place]  -however he has not had dialysis since 04/21/2018, which was at [facility] with his last admission  -per case management note from previous admission: [address]  [facility] a [facility] secondary to his previous noncompliance   -will move forward with stat dialysis for 2 5 hours today  -will plan dialysis tomorrow  -and will assess daily for dialysis needs  -potassium elevated at 7 0 and will move forward with a 1K bath  -will trend vital signs, labs, volume status daily     2  Access:  Left upper extremity AV fistula; positive bruit and thrill  -will evaluate function while on dialysis     3  Hypertension:  BP above goal, likely secondary to volume overload and and no dialysis since the 21st   -will trend BP with UF     4  Anemia:  Hemoglobin 9 0  -had received this year as an outpatient  -will start Epogen with dialysis in am once on regular schedule  -will check iron stores     5  Mineral and bone disease: Will continue with calcitriol 1 5 mcg with dialysis, PhosLo, and Sensipar  -will check phosphorus and PTH levels  -placed on renal diet     6  Azotemia:  Likely secondary to noncompliance with HD  -will trend with dialysis     7  Shortness of breath:  Likely secondary to volume overload and no dialysis since 4/21/2018     8    Hyperkalemia:  Secondary to no dialysis  -will treat with hemodialysis  -he is actually status post medical treatment with insulin unfortunately D50 was not given secondary to infiltration of IV  -will trend closely

## 2018-04-26 NOTE — PROGRESS NOTES
Progress Note - Taina Cruz 1960, 56 y o  male MRN: [id number]    Unit/Bed#: [facility] 928-01 Encounter: [id number]    Primary Care Provider: Korina Peralta   Date and time admitted to hospital: 2018 10:18 AM    Anemia in chronic kidney disease, on chronic dialysis [facility])   Assessment & Plan    · Hg/Hct , chronic, stable  · Received 2units PRBC on last admission 18; also on epogen 4000units during HD  · prbc as Hb was 6 8        ESRD (end stage renal disease) on dialysis [facility])   Assessment & Plan    · ESRD on HD T,R,Sat  · Recently moved to [place] from [place]; has not established dialysis in [place], no dialysis since discharged last week  · Continue Sensipar and PhosLo  · Nephrology consult        Hyperkalemia   Assessment & Plan    Improved with HD        * Bright red blood per rectum   Assessment & Plan    For EGD and colonoscopy          VTE Pharmacologic Prophylaxis: Pharmacologic VTE Prophylaxis contraindicated due to GIB  Mechanical: Mechanical VTE prophylaxis in place  Patient Centered Rounds: I have performed bedside rounds with nursing staff today  Discussions with Specialists or Other Care Team Provider:     Education and Discussions with Family / Patient:     Time Spent for Care: More than 50% of total time spent on counseling and coordination of care as described above  Current Length of Stay: 0 day(s)    Current Patient Status: Inpatient   Certification Statement: The patient will continue to require additional inpatient hospital stay due to as above    Discharge Plan:    Code Status: Level 1 - Full Code      Subjective: no active bleeding    Objective:     Vitals:   Temp (24hrs), Av 7 °F (36 5 °C), Min:96 6 °F (35 9 °C), Max:98 5 °F (36 9 °C)    HR:  [69-92] 73  Resp:  [17-20] 18  BP: (143-202)/() 165/79  SpO2:  [91 %-100 %] 100 %  Body mass index is 25 66 kg/m²  Input and Output Summary (last 24 hours):        Intake/Output Summary (Last 24 hours) at 18 5165  Last data filed at 04/26/18 1300   Gross per 24 hour   Intake             1070 ml   Output             2814 ml   Net            -1744 ml       Physical Exam   Constitutional: He is oriented to person, place, and time  HENT:   Head: Normocephalic  Cardiovascular: Normal heart sounds  Pulmonary/Chest: Effort normal    Abdominal: Soft  Neurological: He is alert and oriented to person, place, and time  Skin: There is pallor  Additional Data:     Labs:      Results from last 7 days  Lab Units 04/26/18  1352 04/26/18  0506   WBC Thousand/uL  --  3 51*   HEMOGLOBIN g/dL 8 2* 6 8*   HEMATOCRIT % 24 8* 21 1*   PLATELETS Thousands/uL  --  158   NEUTROS PCT %  --  61   LYMPHS PCT %  --  21   MONOS PCT %  --  10   EOS PCT %  --  7*       Results from last 7 days  Lab Units 04/26/18  0506 04/25/18  1233   SODIUM mmol/L 137 138   POTASSIUM mmol/L 5 2 7 0*   CHLORIDE mmol/L 101 106   CO2 mmol/L 28 19*   BUN mg/dL 68* 124*   CREATININE mg/dL 11 50* 17 00*   CALCIUM mg/dL 8 5 9 6   TOTAL PROTEIN g/dL  --  8 2   BILIRUBIN TOTAL mg/dL  --  0 48   ALK PHOS U/L  --  113   ALT U/L  --  22   AST U/L  --  25   GLUCOSE RANDOM mg/dL 57* 70           * I Have Reviewed All Lab Data Listed Above      Imaging:  Imaging Personally Reviewed by Myself Includes:     Cultures:   Blood Culture: No results found for: BLOODCX  Urine Culture: No results found for: URINECX  Sputum Culture: No components found for: SPUTUMCX  Wound Culture: No results found for: WOUNDCULT    Last 24 Hours Medication List:     Current Facility-Administered Medications:  acetaminophen 650 mg Oral Q8H PRN Sammi Almeida PA-C   albuterol 2 puff Inhalation Q6H PRN Naif Pak MD   amLODIPine 10 mg Oral Daily Naif Pak MD   calcium acetate 2,001 mg Oral TID With Meals Naif Pak MD   cinacalcet 30 mg Oral Daily With Breakfast Naif Pak MD   gabapentin 300 mg Oral Daily Naif Pak MD   hydrocortisone  Rectal 4x Daily PRN Naif Pak MD HYDROmorphone 0 5 mg Intravenous Q3H PRN Imelda Griffin MD   labetalol 10 mg Intravenous Q4H PRN Chel Thompson MD   nicotine 21 mg Transdermal Daily Chel Thompson MD   pantoprazole 40 mg Oral Early Morning Chel Thompson MD        Today, Patient Was Seen By: Kirt Turner MD    ** Please Note: Dragon 360 Dictation voice to text software may have been used in the creation of this document   **

## 2018-04-26 NOTE — PROGRESS NOTES
NEPHROLOGY PROGRESS NOTE   Joseph Johnson 56 y o  male MRN: [id number]  Unit/Bed#: [facility] 928-01 Encounter: [id number]  Reason for Consult: ESRD MWF normally    ASSESSMENT and PLAN:    57 yo male with PMHx of ESRD MWF at [facility] in [place] who moved to [place] 2 weeks ago, HTN, Anemia, DM,AVNRT s/p ablation, concern of non compliance who presents on 4/25 with BRBPR  Nephrology is on board for ESRD  1) ESRD - MWF at [address] [place] [address] AVF  - HD today with 300 BFR (using lower flows today, then will increase to full flows next treatment)  - UF 2 5 L if tolerates  - plan for dialysis Saturday for now  - recheck labs in AM, may need extra treatment on friday    2) Anemia - BRBPR    - receiving pRBC  - GI on board  - Hb decreasing today  pRBC as per Primary Team on dialysis  - further plan per GI team    3) HTN    - attempt to slightly increase UF goal  - continue amlodipine    4) MBD - continue current regimen    - f/u PTH and phos  - Phos 5 2  Today  - PTH 1211  - may need to increase sensipar  Restart med  - may need to add sevelamer to calcium acetate  - check vit d    5) hyperkalemia - improving with dialysis  5 2 this AM    SUBJECTIVE / INTERVAL HISTORY:    Pt denies complaints  Seen on dialysis  Tolerating 2 L UF  Increase UF to 2 5 L  Receiving pRBC       OBJECTIVE:  Current Weight: Weight - Scale: 72 1 kg (158 lb 15 2 oz)  Vitals:    04/26/18 0830 04/26/18 0930 04/26/18 1000 04/26/18 1020   BP: (!) 179/100 (!) 185/102 (!) 202/102 (!) 195/93   BP Location:       Pulse: 72 72 71 74   Resp:       Temp:       TempSrc:       SpO2:       Weight:       Height:           Intake/Output Summary (Last 24 hours) at 04/26/18 1021  Last data filed at 04/26/18 0816   Gross per 24 hour   Intake              800 ml   Output             2500 ml   Net            -1700 ml     General: NAD  Skin: no rash  Eyes: anicteric sclera  ENT: moist mucous membrane  Neck: supple  Chest: CTA b/l  CVS: s1s2  Abdomen: soft, nontender  Extremities: no edema LE  : no tellez  Neuro: AAOX3  Psych: normal affect    Medications:    Current Facility-Administered Medications:     acetaminophen (TYLENOL) tablet 650 mg, 650 mg, Oral, Q8H PRN, Hugh Erazo PA-C, 650 mg at 04/25/18 2227    albuterol (PROVENTIL HFA,VENTOLIN HFA) inhaler 2 puff, 2 puff, Inhalation, Q6H PRN, Juan Carlos Singh MD    amLODIPine (NORVASC) tablet 10 mg, 10 mg, Oral, Daily, Juan Carlos Singh MD    calcium acetate (PHOSLO) capsule 2,001 mg, 2,001 mg, Oral, TID With Meals, Juan Carlos Singh MD    cinacalcet (SENSIPAR) tablet 30 mg, 30 mg, Oral, Daily With Breakfast, Juan Carlos Singh MD    gabapentin (NEURONTIN) capsule 300 mg, 300 mg, Oral, Daily, Juan Carlos Singh MD    hydrocortisone (ANUSOL-HC, PROCTOSOL HC)) 2 5 % rectal cream, , Rectal, 4x Daily PRN, Juan Carlos Singh MD    HYDROmorphone (DILAUDID) injection 0 5 mg, 0 5 mg, Intravenous, Q3H PRN, Taya Wilkins MD    labetalol (NORMODYNE) injection 10 mg, 10 mg, Intravenous, Q4H PRN, Juan Carlos Singh MD    nicotine (NICODERM CQ) 21 mg/24 hr TD 24 hr patch 21 mg, 21 mg, Transdermal, Daily, Juan Carlos Singh MD    pantoprazole (PROTONIX) EC tablet 40 mg, 40 mg, Oral, Early Morning, Juan Carlos Singh MD, 40 mg at 04/26/18 0601    Laboratory Results:    Results from last 7 days  Lab Units 04/26/18  0506 04/26/18  0017 04/25/18  1534 04/25/18  1233 04/25/18  1045 04/21/18  0426   WBC Thousand/uL 3 51*  --   --  7 30  --  4 42   HEMOGLOBIN g/dL 6 8* 7 0* 7 0* 9 0*  --  8 7*   HEMATOCRIT % 21 1* 20 5* 20 6* 28 1*  --  27 0*   PLATELETS Thousands/uL 158  --   --  229  --  150   SODIUM mmol/L 137  --   --  138 140 137   POTASSIUM mmol/L 5 2  --   --  7 0* 7 0* 4 5   CHLORIDE mmol/L 101  --   --  106 108 100   CO2 mmol/L 28  --   --  19* 16* 31   BUN mg/dL 68*  --   --  124* 124* 42*   CREATININE mg/dL 11 50*  --   --  17 00* 17 40* 9 67*   CALCIUM mg/dL 8 5  --   --  9 6 8 6 8 2*   MAGNESIUM mg/dL 2 3  --   --   --   -- --    PHOSPHORUS mg/dL 5 2*  --   --   --   --   --    TOTAL PROTEIN g/dL  --   --   --  8 2 6 7  --    GLUCOSE RANDOM mg/dL 57*  --   --  70 80 163*

## 2018-04-27 ENCOUNTER — ANESTHESIA EVENT (INPATIENT)
Dept: GASTROENTEROLOGY | Facility: HOSPITAL | Age: 58
DRG: 377 | End: 2018-04-27
Payer: MEDICARE

## 2018-04-27 ENCOUNTER — ANESTHESIA (INPATIENT)
Dept: GASTROENTEROLOGY | Facility: HOSPITAL | Age: 58
DRG: 377 | End: 2018-04-27
Payer: MEDICARE

## 2018-04-27 LAB
25(OH)D3 SERPL-MCNC: 7.9 NG/ML (ref 30–100)
ABO GROUP BLD BPU: NORMAL
ANION GAP SERPL CALCULATED.3IONS-SCNC: 12 MMOL/L (ref 4–13)
ATRIAL RATE: 106 BPM
ATRIAL RATE: 77 BPM
BPU ID: NORMAL
BUN SERPL-MCNC: 42 MG/DL (ref 5–25)
CALCIUM SERPL-MCNC: 8.2 MG/DL (ref 8.3–10.1)
CHLORIDE SERPL-SCNC: 95 MMOL/L (ref 100–108)
CO2 SERPL-SCNC: 27 MMOL/L (ref 21–32)
CREAT SERPL-MCNC: 8.5 MG/DL (ref 0.6–1.3)
ERYTHROCYTE [DISTWIDTH] IN BLOOD BY AUTOMATED COUNT: 15 % (ref 11.6–15.1)
GFR SERPL CREATININE-BSD FRML MDRD: 7 ML/MIN/1.73SQ M
GLUCOSE SERPL-MCNC: 55 MG/DL (ref 65–140)
HCT VFR BLD AUTO: 22.3 % (ref 36.5–49.3)
HGB BLD-MCNC: 7.6 G/DL (ref 12–17)
MAGNESIUM SERPL-MCNC: 2.1 MG/DL (ref 1.6–2.6)
MCH RBC QN AUTO: 29.6 PG (ref 26.8–34.3)
MCHC RBC AUTO-ENTMCNC: 34.1 G/DL (ref 31.4–37.4)
MCV RBC AUTO: 87 FL (ref 82–98)
P AXIS: 58 DEGREES
P AXIS: 64 DEGREES
PLATELET # BLD AUTO: 183 THOUSANDS/UL (ref 149–390)
PMV BLD AUTO: 9.5 FL (ref 8.9–12.7)
POTASSIUM SERPL-SCNC: 4.6 MMOL/L (ref 3.5–5.3)
PR INTERVAL: 174 MS
PR INTERVAL: 176 MS
QRS AXIS: 30 DEGREES
QRS AXIS: 39 DEGREES
QRSD INTERVAL: 90 MS
QRSD INTERVAL: 94 MS
QT INTERVAL: 428 MS
QT INTERVAL: 448 MS
QTC INTERVAL: 484 MS
QTC INTERVAL: 595 MS
RBC # BLD AUTO: 2.57 MILLION/UL (ref 3.88–5.62)
SODIUM SERPL-SCNC: 134 MMOL/L (ref 136–145)
T WAVE AXIS: 36 DEGREES
T WAVE AXIS: 38 DEGREES
TROPONIN I SERPL-MCNC: 0.08 NG/ML
UNIT DISPENSE STATUS: NORMAL
UNIT PRODUCT CODE: NORMAL
UNIT RH: NORMAL
VENTRICULAR RATE: 106 BPM
VENTRICULAR RATE: 77 BPM
WBC # BLD AUTO: 4.08 THOUSAND/UL (ref 4.31–10.16)

## 2018-04-27 PROCEDURE — 93010 ELECTROCARDIOGRAM REPORT: CPT | Performed by: INTERNAL MEDICINE

## 2018-04-27 PROCEDURE — 82306 VITAMIN D 25 HYDROXY: CPT | Performed by: INTERNAL MEDICINE

## 2018-04-27 PROCEDURE — 93005 ELECTROCARDIOGRAM TRACING: CPT

## 2018-04-27 PROCEDURE — 84484 ASSAY OF TROPONIN QUANT: CPT | Performed by: PHYSICIAN ASSISTANT

## 2018-04-27 PROCEDURE — 45386 COLONOSCOPY W/BALLOON DILAT: CPT | Performed by: INTERNAL MEDICINE

## 2018-04-27 PROCEDURE — 99221 1ST HOSP IP/OBS SF/LOW 40: CPT | Performed by: INTERNAL MEDICINE

## 2018-04-27 PROCEDURE — 83735 ASSAY OF MAGNESIUM: CPT | Performed by: PHYSICIAN ASSISTANT

## 2018-04-27 PROCEDURE — 80048 BASIC METABOLIC PNL TOTAL CA: CPT | Performed by: INTERNAL MEDICINE

## 2018-04-27 PROCEDURE — 99232 SBSQ HOSP IP/OBS MODERATE 35: CPT | Performed by: INTERNAL MEDICINE

## 2018-04-27 PROCEDURE — 85027 COMPLETE CBC AUTOMATED: CPT | Performed by: PHYSICIAN ASSISTANT

## 2018-04-27 PROCEDURE — 0W3P8ZZ CONTROL BLEEDING IN GASTROINTESTINAL TRACT, VIA NATURAL OR ARTIFICIAL OPENING ENDOSCOPIC: ICD-10-PCS | Performed by: INTERNAL MEDICINE

## 2018-04-27 RX ORDER — ERGOCALCIFEROL 1.25 MG/1
50000 CAPSULE ORAL WEEKLY
Status: DISCONTINUED | OUTPATIENT
Start: 2018-04-27 | End: 2018-04-30 | Stop reason: HOSPADM

## 2018-04-27 RX ORDER — SODIUM CHLORIDE 9 MG/ML
INJECTION, SOLUTION INTRAVENOUS CONTINUOUS PRN
Status: DISCONTINUED | OUTPATIENT
Start: 2018-04-27 | End: 2018-04-27 | Stop reason: SURG

## 2018-04-27 RX ORDER — PROPOFOL 10 MG/ML
INJECTION, EMULSION INTRAVENOUS CONTINUOUS PRN
Status: DISCONTINUED | OUTPATIENT
Start: 2018-04-27 | End: 2018-04-27 | Stop reason: SURG

## 2018-04-27 RX ORDER — PROPOFOL 10 MG/ML
INJECTION, EMULSION INTRAVENOUS AS NEEDED
Status: DISCONTINUED | OUTPATIENT
Start: 2018-04-27 | End: 2018-04-27 | Stop reason: SURG

## 2018-04-27 RX ORDER — AMLODIPINE BESYLATE 10 MG/1
10 TABLET ORAL DAILY
Status: DISCONTINUED | OUTPATIENT
Start: 2018-04-28 | End: 2018-04-30 | Stop reason: HOSPADM

## 2018-04-27 RX ADMIN — MELATONIN TAB 3 MG 3 MG: 3 TAB at 22:31

## 2018-04-27 RX ADMIN — PROPOFOL 120 MCG/KG/MIN: 10 INJECTION, EMULSION INTRAVENOUS at 10:49

## 2018-04-27 RX ADMIN — CINACALCET HYDROCHLORIDE 30 MG: 30 TABLET, COATED ORAL at 08:16

## 2018-04-27 RX ADMIN — SODIUM CHLORIDE: 0.9 INJECTION, SOLUTION INTRAVENOUS at 10:43

## 2018-04-27 RX ADMIN — PROPOFOL 80 MG: 10 INJECTION, EMULSION INTRAVENOUS at 10:49

## 2018-04-27 RX ADMIN — METOPROLOL TARTRATE 12.5 MG: 25 TABLET ORAL at 20:20

## 2018-04-27 RX ADMIN — PROPOFOL 50 MG: 10 INJECTION, EMULSION INTRAVENOUS at 10:56

## 2018-04-27 RX ADMIN — CALCIUM ACETATE 2001 MG: 667 CAPSULE ORAL at 16:27

## 2018-04-27 RX ADMIN — GABAPENTIN 300 MG: 300 CAPSULE ORAL at 08:16

## 2018-04-27 RX ADMIN — ERGOCALCIFEROL 50000 UNITS: 1.25 CAPSULE ORAL at 17:21

## 2018-04-27 RX ADMIN — AMLODIPINE BESYLATE 10 MG: 10 TABLET ORAL at 08:16

## 2018-04-27 NOTE — PROGRESS NOTES
Progress Note- Oc Sotelo 56 y o  male MRN: [id number]    Unit/Bed#: [facility] Encounter: [id number]      Assessment and Plan:    56year old male with ESRD on HD presenting with    1  Hematochezia- last colonoscopy over 6 years ago, diff includes diverticular, hemorrhoidal, vs  Polyp/mass        _________________________________________________________________  Subjective:     Pt with BRBPR s/p PRBC's  Prepped for colonoscopy today        Medication Administration - last 24 hours from 04/26/2018 1047 to 04/27/2018 1047       Date/Time Order Dose Route Action Action by     04/27/2018 1006 nicotine (NICODERM CQ) 21 mg/24 hr TD 24 hr patch 21 mg 21 mg Transdermal Not Given Александр Mata RN     04/27/2018 0814 nicotine (NICODERM CQ) 21 mg/24 hr TD 24 hr patch 21 mg 21 mg Transdermal Patch Removed Andria Pradhan RN     04/26/2018 1242 nicotine (NICODERM CQ) 21 mg/24 hr TD 24 hr patch 21 mg 21 mg Transdermal Medication Applied Juana Booth RN     04/27/2018 0600 pantoprazole (PROTONIX) EC tablet 40 mg 40 mg Oral Not Given Daniele Sue RN     04/27/2018 0816 amLODIPine (NORVASC) tablet 10 mg 10 mg Oral Given Александр Mata RN     04/26/2018 1240 amLODIPine (NORVASC) tablet 10 mg 10 mg Oral Given Juana Booth RN     04/27/2018 0815 calcium acetate (PHOSLO) capsule 2,001 mg 2,001 mg Oral Not Given Александр Mata RN     04/26/2018 1835 calcium acetate (PHOSLO) capsule 2,001 mg 2,001 mg Oral Not Given John Coreas RN     04/26/2018 1243 calcium acetate (PHOSLO) capsule 2,001 mg 2,001 mg Oral Not Given Juana Booth RN     04/26/2018 1241 calcium acetate (PHOSLO) capsule 2,001 mg 2,001 mg Oral Not Given Juana Booth RN     04/27/2018 0816 gabapentin (NEURONTIN) capsule 300 mg 300 mg Oral Given Александр Mata RN     04/26/2018 1240 gabapentin (NEURONTIN) capsule 300 mg 300 mg Oral Given Juana Booth RN     04/27/2018 0816 cinacalcet (SENSIPAR) tablet 30 mg 30 mg Oral Given Nella Lewis RN     04/26/2018 1241 cinacalcet (SENSIPAR) tablet 30 mg 30 mg Oral Not Given Celi Wang RN     04/26/2018 1834 bisacodyl (DULCOLAX) EC tablet 10 mg 10 mg Oral Given Demetria Goode RN     04/26/2018 1834 polyethylene glycol (GOLYTELY) bowel prep 4,000 mL 4,000 mL Oral Given Demetria Goode RN     04/26/2018 2322 melatonin tablet 3 mg 3 mg Oral Given Azul Paul RN          Objective:     Vitals: Blood pressure 131/62, pulse 72, temperature 98 5 °F (36 9 °C), temperature source Temporal, resp  rate 16, height 5' 6" (1 676 m), weight 71 7 kg (158 lb), SpO2 95 %  ,Body mass index is 25 5 kg/m²        Intake/Output Summary (Last 24 hours) at 04/27/18 1047  Last data filed at 04/27/18 0900   Gross per 24 hour   Intake              300 ml   Output             2814 ml   Net            -2514 ml       Physical Exam:   General Appearance: Awake and alert, in no acute distress  Abdomen: Soft, non-tender, non-distended; bowel sounds normal; no masses or no organomegaly    Invasive Devices     Peripheral Intravenous Line            Peripheral IV 04/25/18 Right;Upper Arm 2 days    Peripheral IV 04/27/18 Right Hand less than 1 day          Line            Hemodialysis AV Fistula Left -- days                Lab Results:  Admission on 04/25/2018   Component Date Value    Sodium 04/25/2018 140     Potassium 04/25/2018 7 0*    Chloride 04/25/2018 108     CO2 04/25/2018 16*    Anion Gap 04/25/2018 16*    BUN 04/25/2018 124*    Creatinine 04/25/2018 17 40*    Glucose 04/25/2018 80     Calcium 04/25/2018 8 6     AST 04/25/2018 21     ALT 04/25/2018 18     Alkaline Phosphatase 04/25/2018 101     Total Protein 04/25/2018 6 7     Albumin 04/25/2018 3 3*    Total Bilirubin 04/25/2018 0 33     eGFR 04/25/2018 3     Troponin I 04/25/2018 0 14*    Ventricular Rate 04/25/2018 76     Atrial Rate 04/25/2018 76     WI Interval 04/25/2018 196     QRSD Interval 04/25/2018 98     QT Interval 04/25/2018 408     QTC Interval 04/25/2018 459     P Axis 04/25/2018 61     QRS Axis 04/25/2018 41     T Wave Axis 04/25/2018 74     ABO Grouping 04/25/2018 O     Rh Factor 04/25/2018 Positive     Antibody Screen 04/25/2018 Negative     Specimen Expiration Date 04/25/2018 [id number]     WBC 04/25/2018 7 30     RBC 04/25/2018 3 12*    Hemoglobin 04/25/2018 9 0*    Hematocrit 04/25/2018 28 1*    MCV 04/25/2018 90     MCH 04/25/2018 28 8     MCHC 04/25/2018 32 0     RDW 04/25/2018 15 8*    MPV 04/25/2018 9 3     Platelets 59/21/8859 229     nRBC 04/25/2018 0     Neutrophils Relative 04/25/2018 73     Lymphocytes Relative 04/25/2018 18     Monocytes Relative 04/25/2018 4     Eosinophils Relative 04/25/2018 5     Basophils Relative 04/25/2018 0     Neutrophils Absolute 04/25/2018 5 31     Lymphocytes Absolute 04/25/2018 1 29     Monocytes Absolute 04/25/2018 0 30     Eosinophils Absolute 04/25/2018 0 36     Basophils Absolute 04/25/2018 0 03     Sodium 04/25/2018 138     Potassium 04/25/2018 7 0*    Chloride 04/25/2018 106     CO2 04/25/2018 19*    Anion Gap 04/25/2018 13     BUN 04/25/2018 124*    Creatinine 04/25/2018 17 00*    Glucose 04/25/2018 70     Calcium 04/25/2018 9 6     AST 04/25/2018 25     ALT 04/25/2018 22     Alkaline Phosphatase 04/25/2018 113     Total Protein 04/25/2018 8 2     Albumin 04/25/2018 3 9     Total Bilirubin 04/25/2018 0 48     eGFR 04/25/2018 3     Hemoglobin 04/25/2018 7 0*    Hematocrit 04/25/2018 20 6*    Troponin I 04/25/2018 0 12*    Hemoglobin 04/26/2018 7 0*    Hematocrit 04/26/2018 20 5*    WBC 04/26/2018 3 51*    RBC 04/26/2018 2 36*    Hemoglobin 04/26/2018 6 8*    Hematocrit 04/26/2018 21 1*    MCV 04/26/2018 89     MCH 04/26/2018 28 8     MCHC 04/26/2018 32 2     RDW 04/26/2018 15 6*    MPV 04/26/2018 9 0     Platelets 40/61/2972 158     nRBC 04/26/2018 0     Neutrophils Relative 04/26/2018 61     Lymphocytes Relative 04/26/2018 21     Monocytes Relative 04/26/2018 10     Eosinophils Relative 04/26/2018 7*    Basophils Relative 04/26/2018 1     Neutrophils Absolute 04/26/2018 2 15     Lymphocytes Absolute 04/26/2018 0 75     Monocytes Absolute 04/26/2018 0 34     Eosinophils Absolute 04/26/2018 0 23     Basophils Absolute 04/26/2018 0 03     Sodium 04/26/2018 137     Potassium 04/26/2018 5 2     Chloride 04/26/2018 101     CO2 04/26/2018 28     Anion Gap 04/26/2018 8     BUN 04/26/2018 68*    Creatinine 04/26/2018 11 50*    Glucose 04/26/2018 57*    Calcium 04/26/2018 8 5     eGFR 04/26/2018 5     PTH 04/26/2018 1211 5*    Phosphorus 04/26/2018 5 2*    Magnesium 04/26/2018 2 3     Iron Saturation 04/26/2018 29     TIBC 04/26/2018 149*    Iron 04/26/2018 43*    Ferritin 04/26/2018 1238*    Hemoglobin 04/26/2018 8 2*    Hematocrit 04/26/2018 24 8*    Unit Product Code 04/27/2018 [id number]     Unit Number 04/27/2018 [id number]     Unit ABO 04/27/2018 O     Unit RH 04/27/2018 POS     Unit Dispense Status 04/27/2018 Presumed Trans     Hemoglobin 04/26/2018 8 7*    Hematocrit 04/26/2018 26 2*    Vit D, 25-Hydroxy 04/27/2018 7 9*    Sodium 04/27/2018 134*    Potassium 04/27/2018 4 6     Chloride 04/27/2018 95*    CO2 04/27/2018 27     Anion Gap 04/27/2018 12     BUN 04/27/2018 42*    Creatinine 04/27/2018 8 50*    Glucose 04/27/2018 55*    Calcium 04/27/2018 8 2*    eGFR 04/27/2018 7     WBC 04/27/2018 4 08*    RBC 04/27/2018 2 57*    Hemoglobin 04/27/2018 7 6*    Hematocrit 04/27/2018 22 3*    MCV 04/27/2018 87     MCH 04/27/2018 29 6     MCHC 04/27/2018 34 1     RDW 04/27/2018 15 0     Platelets 92/83/7075 183     MPV 04/27/2018 9 5     Troponin I 04/27/2018 0 08*    Magnesium 04/27/2018 2 1        Imaging Studies: I have personally reviewed pertinent imaging studies

## 2018-04-27 NOTE — ASSESSMENT & PLAN NOTE
No symptoms  Note PVC's on EKG  Note recent past Ablation for SVT per Dr Lissy Hernandez  Reported palpitations overnight - cardiology consulted

## 2018-04-27 NOTE — OP NOTE
**** GI/ENDOSCOPY REPORT ****     PATIENT NAME: Candy Briceño ------ VISIT ID:  Patient ID: [id number]   YOB: 1960     INTRODUCTION: Colonoscopy - A 56 male patient presents for an inpatient   Colonoscopy at [facility]  PREVIOUS COLONOSCOPY: Patient's last colonoscopy was 6 years ago  INDICATIONS: Bleeding  CONSENT:  The benefits, risks, and alternatives to the procedure were   discussed and informed consent was obtained from the patient  PREPARATION: EKG, pulse, pulse oximetry and blood pressure were monitored   throughout the procedure  The patient was identified by myself both   verbally and by visual inspection of ID band  Airway Assessment   Classification: Airway class 2 - Visualization of the soft palate, fauces   and uvula  ASA Classification: Class 2 - Patient has mild to moderate   systemic disturbance that may or may not be related to the disorder   requiring surgery  MEDICATIONS: Anesthesia-check records     PROCEDURE:  The endoscope was passed without difficulty through the anus   under direct visualization and advanced to the cecum, confirmed by   appendiceal orifice and ileocecal valve  The scope was withdrawn and the   mucosa was carefully examined  The quality of the preparation was good  RECTAL EXAM: Normal rectal exam      FINDINGS:  Bright red blood was found in the colon  In the cecum there   were two small ulcerated areas with a visibile vessel  One was oozing   blood the other had stigmata of bleeding  Both of these areas were clipped   and hemostasis achieved  No further bleeding identified  One sigmoid   diverticuli seen but the rest of the colon was normal except for the blood  COMPLICATIONS: There were no complications  IMPRESSIONS: Blood found in the colon  Two areas in the cecum with   stigmata of recent bleeding  Treated with clips  RECOMMENDATIONS: Observe after today's therapy   Can start liquid diet      Continue to monitor for signs of rebleeding  ESTIMATED BLOOD LOSS:     PATHOLOGY SPECIMENS:     PROCEDURE CODES:     ICD-9 Codes: 578 9 Hemorrhage of gastrointestinal tract, unspecified     ICD-10 Codes: K92 2 Gastrointestinal hemorrhage, unspecified     PERFORMED BY: KUMAR Li  on 04/27/2018  Version 1, electronically signed by KUMAR Kramer  on   04/27/2018 at 11:25

## 2018-04-27 NOTE — CASE MANAGEMENT
Notification of Inpatient Admission/Inpatient Authorization Request  This is a Notification of Inpatient Admission/Request for Inpatient Authorization to our facility [facility]  Please be advised that this patient is currently in our facility under Inpatient Status  Below you will find the Attending Physician and Facilitys information including NPI# and contact information for the Utilization  assigned to the [facility] where the patient is receiving services  Please feel free to contact the Utilization Review Department with any questions  Patient Information:  PATIENT NAME: Yola Durant  MRN: [id number]  YOB: 1960    PRESENTATION DATE: 4/25/2018 10:18 AM  IP ADMISSION DATE: 4/26/18 1425  DISCHARGE DATE: No discharge date for patient encounter  DISPOSITION: Home/Self Care    Attending Physician:  DANTE Magana  [facility] Practioner ID- [id number]  Primary Office:  [address] [place] [address]  Phone 1: [phone]  Phone 2:   Fax: [phone]    Facility:  [address] [place] [address] [phone]  NPI: [id number]  TAX ID# [id number]  MEDICARE ID: [id number]    [facility] in the [facility] for 2017  Network Utilization Review Department  Phone: [phone]; Fax [phone]  ATTENTION: The Network Utilization Review Department is now centralized for our 7 Facilities  Make a note that we have a new phone and fax numbers for our Department  Please call with any questions or concerns to [phone] and carefully follow the prompts so that you are directed to the right person  All voicemails are confidential  Fax any determinations, approvals, denials, and requests for initial or continue stay review clinical to [phone]   Due to HIGH CALL volume, it would be easier if you could please send faxed requests to expedite your requests and in part, help us provide discharge notifications faster

## 2018-04-27 NOTE — PROGRESS NOTES
Progress Note - Mildred Cuff 1960, 56 y o  male MRN: [id number]    Unit/Bed#: [facility] 928-01 Encounter: [id number]    Primary Care Provider: Albino Khan   Date and time admitted to hospital: 2018 10:18 AM    ESRD (end stage renal disease) on dialysis [facility])   Assessment & Plan    · ESRD on HD T,R,Sat  · Recently moved to [place] from [place]; has not established dialysis in [place], no dialysis since discharged last week  · Continue Sensipar and PhosLo  · Nephrology consult        Elevated troponin   Assessment & Plan    No symptoms  Note PVC's on EKG  Note recent past Ablation for SVT per Dr Leah Chapman  Reported palpitations overnight - cardiology consulted        Hyperkalemia   Assessment & Plan    Improved with HD        * Bright red blood per rectum   Assessment & Plan    For colonoscopy - two small ulcerated areas with a visibile vessel  Monitor overnight; allow PO  If no further Bleeding and Hb stable - anticipate DC          Echo  - severe LVH  Note Ablation for SVT recent past     VTE Pharmacologic Prophylaxis: Pharmacologic VTE Prophylaxis contraindicated due to GIB  Mechanical: Mechanical VTE prophylaxis in place  Patient Centered Rounds: I have performed bedside rounds with nursing staff today  Discussions with Specialists or Other Care Team Provider:     Education and Discussions with Family / Patient:     Time Spent for Care: More than 50% of total time spent on counseling and coordination of care as described above      Current Length of Stay: 1 day(s)    Current Patient Status: Inpatient   Certification Statement: The patient will continue to require additional inpatient hospital stay due to as above    Discharge Plan:    Code Status: Level 1 - Full Code      Subjective: nil acute    Objective:     Vitals:   Temp (24hrs), Av 2 °F (36 8 °C), Min:97 7 °F (36 5 °C), Max:98 5 °F (36 9 °C)    HR:  [56-81] 80  Resp:  [16-18] 17  BP: ()/(43-70) 130/59  SpO2:  [91 %-99 %] 91 %  Body mass index is 25 5 kg/m²  Input and Output Summary (last 24 hours): Intake/Output Summary (Last 24 hours) at 04/27/18 1626  Last data filed at 04/27/18 1300   Gross per 24 hour   Intake              640 ml   Output                0 ml   Net              640 ml       Physical Exam   HENT:   Head: Normocephalic  Eyes: Pupils are equal, round, and reactive to light  Cardiovascular: Normal heart sounds  Pulmonary/Chest: Effort normal    Abdominal: Soft  Neurological: He is alert  Skin: There is pallor  Additional Data:     Labs:      Results from last 7 days  Lab Units 04/27/18  0447  04/26/18  0506   WBC Thousand/uL 4 08*  --  3 51*   HEMOGLOBIN g/dL 7 6*  < > 6 8*   HEMATOCRIT % 22 3*  < > 21 1*   PLATELETS Thousands/uL 183  --  158   NEUTROS PCT %  --   --  61   LYMPHS PCT %  --   --  21   MONOS PCT %  --   --  10   EOS PCT %  --   --  7*   < > = values in this interval not displayed  Results from last 7 days  Lab Units 04/27/18  0447  04/25/18  1233   SODIUM mmol/L 134*  < > 138   POTASSIUM mmol/L 4 6  < > 7 0*   CHLORIDE mmol/L 95*  < > 106   CO2 mmol/L 27  < > 19*   BUN mg/dL 42*  < > 124*   CREATININE mg/dL 8 50*  < > 17 00*   CALCIUM mg/dL 8 2*  < > 9 6   TOTAL PROTEIN g/dL  --   --  8 2   BILIRUBIN TOTAL mg/dL  --   --  0 48   ALK PHOS U/L  --   --  113   ALT U/L  --   --  22   AST U/L  --   --  25   GLUCOSE RANDOM mg/dL 55*  < > 70   < > = values in this interval not displayed  * I Have Reviewed All Lab Data Listed Above      Imaging:  Imaging Personally Reviewed by Myself Includes:     Cultures:   Blood Culture: No results found for: BLOODCX  Urine Culture: No results found for: URINECX  Sputum Culture: No components found for: SPUTUMCX  Wound Culture: No results found for: WOUNDCULT    Last 24 Hours Medication List:     Current Facility-Administered Medications:  acetaminophen 650 mg Oral Q8H PRN Saskia Yeung PA-C   albuterol 2 puff Inhalation Q6H PRN Ena Han MD   [START ON 4/28/2018] amLODIPine 10 mg Oral Daily Luz Maria Hayes MD   calcium acetate 2,001 mg Oral TID With Meals Carolyn Tate MD   cinacalcet 30 mg Oral Daily With Breakfast Carolyn Tate MD   ergocalciferol 50,000 Units Oral Weekly Luz Maria Hayes MD   gabapentin 300 mg Oral Daily Carloyn Tate MD   hydrocortisone  Rectal 4x Daily PRN Carolyn Tate MD   HYDROmorphone 0 5 mg Intravenous Q3H PRN Yue Cortes MD   labetalol 10 mg Intravenous Q4H PRN Carolyn Tate MD   melatonin 3 mg Oral HS PRN Disha Newell PA-C   nicotine 21 mg Transdermal Daily Carolyn Ttae MD   pantoprazole 40 mg Oral Early Morning Carolyn Tate MD        Today, Patient Was Seen By: Xavier Murphy MD    ** Please Note: Dragon 360 Dictation voice to text software may have been used in the creation of this document   **

## 2018-04-27 NOTE — PROGRESS NOTES
Pt started bowel prep for colonoscopy and called me into room stating he was having multiple bowel movements and they were still really bloody and he was worried and stating he was short of breath  His O2 was 96% on room air  Also he felt like something wasn't right with his heart, and was concerned  Mohinder BECKER spoke with [facility] GISELLE REY put in an order for an H&H and was also going to come to floor to assess pt  Will continue to monitor

## 2018-04-27 NOTE — PROGRESS NOTES
Originally paged by RN around 2100 as patient had multiple bloody BM, as well as palpitations and SOB  Patient taking bowel prep for colonoscopy  Upon initial evaluation, symptoms appeared to be anxiety driven  Given CKD, I offered melatonin to help the patient relax and recommended keeping HOB elevated to avoid what he described as orthopnea  VSS  On exam, patient in NSR with rate of 75 bpm  Lungs CTA bilaterally  No signs of fluid overload  Stat H/H: 8 7/26 2    Re-paged later this morning because patient is concerned about his heart rhythm  States he was in the hospital about a week ago with similar symptoms, for which he had an ablation procedure  Feels as though his "heart is jumping" at times and when this happens the he becomes lightheaded/dizzy with visual changes  As I was in the patient's room (approx 5 minutes) these episodes occurred 3 times  Patient would say he feels palpitations and shortly after his masimo monitor would read bradycardic in the 40s  However, telemetry monitoring reveals tachycardia with frequent PVCs       Repeat H/H: 7 6/22 3  Trop: 0 08  EKG: without ischemic changes  Electrolytes within normal limits    Plan:  ? Transfuse 1 unit of PRBCs, will discuss with Nephrology   EP consult

## 2018-04-27 NOTE — PROGRESS NOTES
Noted on pts telemetry that he was tachy in 130s-140s  Went into assess pt and he stated he was having the funny feeling in his chest, and asked if I could call the doctors again  After about 5 mins his HR did return to baseline 70s  Paged SLIM spoke with [facility] [place]  She was coming up to assess pt  While in the room pt said he was having the feeling in his chest, his Masimo was alarming ad HR 44, feeling lightheaded, and his vision didn't feel right  While on his telemetry it was showing he was having multiple PVCs  Orders were placed for ECG, troponin, Magnesium level, also a consult to electrophysiology was placed  Telemetry was renewed for another 24hrs  Will continue to monitor pt

## 2018-04-27 NOTE — SOCIAL WORK
CM spoke with Brenda Cordova ([phone]) from Pt's [facility] in [address] to discuss Pt's HD plan  Brenda Cordova has made multiple attempts with [facility] and [place] [address] facilities in the area to move Pt's HD location  Per previous SW notes, Pt has been denied by medical director at [facility] in [address] due to non-compliance  Per Brenda Cordova, Pt has also been denied [facility] [address] in [address] [facility]  CM met with the Pt at bedside to discuss work schedule and HD availability  Pt states he works 8-5 Monday through Friday and is requesting a 4:30pm chair time or 5:30am  CM contacted Brad Gallagher at [employer] [facility] per Dr Dillon Tai request, who states only availability is T,Th,Sat at 1:30pm  Brad Gallagher recommended CM contact [facility] facility as they have a 4th shift ([phone])  [facility] states CM needs to contact [facility] Admissions for referrals at [phone]  CM spoke with Severiano Bowie from Patient Admissions to initiate referral to [facility] facility  CM faxed requested documentation from Dialysis Admissions checklist to [phone]  Per review of chart, Pt to have HD Sat and vascular consult Monday

## 2018-04-27 NOTE — CONSULTS
[address]  [address],  [address] [facility] [address]  [phone]                                              Cardiology Consult  Walter Carrillo 56 y o  male   YOB: 1960 MRN: [id number]  Unit/Bed#: [facility] 928-01 Encounter: [id number]      Physician Requesting Consult: Gary Zepeda MD  Reason for Consult / Principal Problem: Palpitations, Dizziness    Assessment and Plan  1  Paroxysmal Atrial Fibrillation   2  Frequent PVCs  3  GI bleed s/p clipping  · s/p 2 U PRBC  4  Recurrent, symptomatic AVNRT s/p ablation  · Echo - 11/2017 - LVEF 55%, no regional wall motion abnormality, severe LVH, grade 1 diastolic dysfunction, mildly dilated LA, no significant valvular abnormalities  · Echo - 4/2017 - ([facility]) - LVEF 55-60%, no significant abnormality  · Nuclear SPECT - 8 5 METS, was stopped early due to hypertension, partially reversible apical inferior defect  · Cardiac cath - 7/2014 - Normal coronaries  2  Hypertension  3  Diabetes Mellitus Type 2   4  ESRD on HD  · Currently being evaluated for renal transplant at [address] [place] [address]  6  Anemia 2/2 CKD  · S/p 2 U PRBC  7  H/o cocaine use  · Quit many years ago     Outpatient cardiologist:  Dr Wilmar Aponte ([address] [place] [address]) / Marisol Mccann    Plan  · Has brief bursts of Afib on telemonitor overnight, spontaneously converting back to sinus rhythm  · Also has frequent PVCs on monitor around those times  · These PVCs were likely non-perfusing, leading to appearance of bradycardia on pulse monitor, while being tachycardic on telemetry  · Will start on metoprolol 12 5 bid in order to decrease the PVC burden as well as Afib  · CHADS-VASC = 2, will need to be on anticoagulation  · But given his GI bleed episode, will hold off for now  · Can start on anticoagulation when stable from GI standpoint        History of Present Illness   HPI: Walter Carrillo is a 56y o  year old male who presents with bleeding per rectum      28-year-old gentleman was recently discharged from the hospital about a week ago  During that hospitalization he had episodes of recurrent supraventricular tachycardia, for which he eventually underwent ablation of AVNRT  Post discharge, he he had been feeling well, except for being unable to follow for dialysis  On the day of presentation he had red blood per rectum, and as a result was concern for the same  He was found to have a hemoglobin below 7, and needed transfusion for the same  He underwent colonoscopy for the same earlier this morning, and was found to have 2 small ulcers in the cecum with a visible vessel and some losing from what of them  This was treated with clips, and he has been hemodynamically stable since then  While in the hospital, overnight around 4:00 a m , he had an episode of fluttering sensation in his chest associated with some dizziness  At this time, his pulse rate on the monitor was noted to be low in the 40s  But telemetry monitor review at the same time showed tachyarrhythmia  This lasted for about 2 min, after which it resolved on its own  He has continued to have more episodes of the same this morning, all all resolving spontaneously  Cardiology was consulted for the same  No complains of chest pain, shortness of breath  Past Medical History:   Diagnosis Date    [facility]     Hypertension     Renal disorder      Past Surgical History:   Procedure Laterality Date    CHOLECYSTECTOMY       History reviewed  No pertinent family history    Meds/Allergies   all current active meds have been reviewed and current meds: Current Facility-Administered Medications   Medication Dose Route Frequency    acetaminophen (TYLENOL) tablet 650 mg  650 mg Oral Q8H PRN    albuterol (PROVENTIL HFA,VENTOLIN HFA) inhaler 2 puff  2 puff Inhalation Q6H PRN    [START ON 4/28/2018] amLODIPine (NORVASC) tablet 10 mg  10 mg Oral Daily    calcium acetate (PHOSLO) capsule 2,001 mg  2,001 mg Oral TID With Meals    cinacalcet (SENSIPAR) tablet 30 mg  30 mg Oral Daily With Breakfast    ergocalciferol (VITAMIN D2) capsule 50,000 Units  50,000 Units Oral Weekly    gabapentin (NEURONTIN) capsule 300 mg  300 mg Oral Daily    hydrocortisone (ANUSOL-HC, PROCTOSOL HC)) 2 5 % rectal cream   Rectal 4x Daily PRN    HYDROmorphone (DILAUDID) injection 0 5 mg  0 5 mg Intravenous Q3H PRN    labetalol (NORMODYNE) injection 10 mg  10 mg Intravenous Q4H PRN    melatonin tablet 3 mg  3 mg Oral HS PRN    nicotine (NICODERM CQ) 21 mg/24 hr TD 24 hr patch 21 mg  21 mg Transdermal Daily    pantoprazole (PROTONIX) EC tablet 40 mg  40 mg Oral Early Morning     Prescriptions Prior to Admission   Medication    albuterol (PROVENTIL HFA,VENTOLIN HFA) 90 mcg/act inhaler    amLODIPine (NORVASC) 10 mg tablet    calcium acetate (PHOSLO) 667 mg capsule    cinacalcet (SENSIPAR) 30 mg tablet    gabapentin (NEURONTIN) 100 mg capsule    nicotine (NICODERM CQ) 14 mg/24hr TD 24 hr patch     No Known Allergies  Social History     Social History    Marital status:      Spouse name: N/A    Number of children: N/A    Years of education: N/A     Social History Main Topics    Smoking status: Current Every Day Smoker     Packs/day: 1 00     Types: Cigarettes    Smokeless tobacco: Never Used    Alcohol use No    Drug use: No    Sexual activity: Not Asked     Other Topics Concern    None     Social History Narrative    None         Review of Systems  No c/o chest pain, c/o palpitations+, No c/o shortness of breath, c/o dizziness+, No c/o abdominal pain, no c/o nausea/vomitting  All other systems negative    Vitals:    04/27/18 1024 04/27/18 1129 04/27/18 1144 04/27/18 1539   BP: 131/62 (!) 81/43 105/51 130/59   BP Location:    Right arm   Pulse: 72 76 74 80   Resp: 16 16 18 17   Temp: 98 5 °F (36 9 °C)   98 3 °F (36 8 °C)   TempSrc: Temporal   Oral   SpO2: 95% 97% 97% 91%   Weight: 71 7 kg (158 lb)      Height: 5' 6" (1 676 m) Orthostatic Blood Pressures      Most Recent Value   Blood Pressure  130/59 [Map85] filed at 04/27/2018 1539   Patient Position - Orthostatic VS  Lying filed at 04/27/2018 1539        Body mass index is 25 5 kg/m²  Wt Readings from Last 5 Encounters:   04/27/18 71 7 kg (158 lb)   04/21/18 70 3 kg (155 lb)   03/12/18 68 4 kg (150 lb 12 7 oz)   11/13/17 68 kg (150 lb)     I/O last 3 completed shifts: In: 1 [P O :220; I V :500; Blood:350]  Out: 2814 [Other:2814]      Physical Exam    Constitutional:  Walter Carrillo appears well, alert and oriented x 3, pleasant and cooperative  No acute distress   HEENT:    Normocephalic, atraumatic   Neck:  Supple, No JVD   Lungs:  Clear to auscultation bilaterally, respirations unlabored    Chest Wall:  No tenderness or deformity    Heart::  Regular rate, Regular rhythm, ectopy+, S1 and S2 normal, no murmur, rub or gallop    Abdomen:  Soft, non-tender, non-distended   Neurological:  Awake, alert, oriented x3  Non-focal exam    Extremities:  No pedal edema, No calf tenderness  Left arm AV fistula appears enlarged           Labs:    Results from last 7 days  Lab Units 04/27/18  0447 04/26/18  2107 04/26/18  1352 04/26/18  0506 04/26/18  0017 04/25/18  1534 04/25/18  1233 04/21/18  0426   WBC Thousand/uL 4 08*  --   --  3 51*  --   --  7 30 4 42   HEMOGLOBIN g/dL 7 6* 8 7* 8 2* 6 8* 7 0* 7 0* 9 0* 8 7*   HEMATOCRIT % 22 3* 26 2* 24 8* 21 1* 20 5* 20 6* 28 1* 27 0*   RDW % 15 0  --   --  15 6*  --   --  15 8* 15 0   PLATELETS Thousands/uL 183  --   --  158  --   --  229 150       Results from last 7 days  Lab Units 04/27/18  0447 04/26/18  0506 04/25/18  1233 04/25/18  1045 04/21/18  0426   SODIUM mmol/L 134* 137 138 140 137   POTASSIUM mmol/L 4 6 5 2 7 0* 7 0* 4 5   CHLORIDE mmol/L 95* 101 106 108 100   CO2 mmol/L 27 28 19* 16* 31   MAGNESIUM mg/dL 2 1 2 3  --   --   --    BUN mg/dL 42* 68* 124* 124* 42*   CREATININE mg/dL 8 50* 11 50* 17 00* 17 40* 9 67*   CALCIUM mg/dL 8 2* 8 5 9 6 8 6 8 2*   TOTAL PROTEIN g/dL  --   --  8 2 6 7  --    BILIRUBIN TOTAL mg/dL  --   --  0 48 0 33  --    AST U/L  --   --  25 21  --    ALT U/L  --   --  22 18  --    ALK PHOS U/L  --   --  113 101  --    GLUCOSE RANDOM mg/dL 55* 57* 70 80 163*       Results from last 7 days  Lab Units 04/27/18  0447 04/25/18  1534 04/25/18  1130   TROPONIN I ng/mL 0 08* 0 12* 0 14*                   EKG: NSR, consecutive PVCs  Telemetry: Afib, PVCs in couplet  Imaging: Xr Chest 1 View Portable    Result Date: 4/17/2018  Narrative: CHEST INDICATION:   sob  COMPARISON:  11/13/2017 EXAM PERFORMED/VIEWS:  XR CHEST PORTABLE FINDINGS: Heart shadow is enlarged but unchanged from prior exam  Airspace disease within both lung fields, similar to the prior examination  No effusions  No pneumothorax  Osseous structures appear within normal limits for patient age  Impression: Stable bilateral airspace disease suspicious for pulmonary edema  Bilateral pneumonia not excluded  Workstation performed: [id number]     Xr Chest 2 Views    Result Date: 4/25/2018  Narrative: CHEST INDICATION:   Rectal bleeding, chest pain, shortness of breath  COMPARISON:  4/17/2018 EXAM PERFORMED/VIEWS:  XR CHEST PA & LATERAL  The frontal view was performed utilizing dual energy radiographic technique  FINDINGS: Heart shadow is enlarged but unchanged from prior exam  Mild bilateral groundglass opacities are identified, perhaps slightly improved since the previous examination  There is no evidence of pneumothorax  No significant effusion  Osseous structures appear within normal limits for patient age  Impression: 1  Enlargement of cardiac silhouette similar to prior study 2    Persistent but partially improved groundglass opacities in the lungs Workstation performed: [id number]       Cardiac testing:   Results for orders placed during the hospital encounter of 11/13/17   Echo complete with contrast if indicated    Narrative [address]  [phone]    Transthoracic Echocardiogram  2D, M-mode, Doppler, and Color Doppler    Study date:  2017    Patient: Mary Daily  MR number: [id number]  Account number: [de-identified]  : 1960  Age: 56 years  Gender: Male  Status: Inpatient  Location: Bedside  Height: 66 in  Weight: 149 6 lb  BP: 148/ 78 mmHg    Indications: Hypertensive Heart Disease    Diagnoses: I12 0 - Hypertensive chronic kidney disease with stage 5 chronic kidney disease or end stage renal disease    Sonographer:  Bernie Palmer RDCS  Primary Physician:  Parul Farrell  Referring Physician:  Mariana Resendez MD  Group:  [facility]  Interpreting Physician:  Pablo Bee MD    SUMMARY    LEFT VENTRICLE:  Systolic function was normal  Ejection fraction was estimated to be 55 %  There were no regional wall motion abnormalities  Wall thickness was markedly increased  There was severe concentric hypertrophy  Doppler parameters were consistent with abnormal left ventricular relaxation (grade 1 diastolic dysfunction)  Doppler parameters were consistent with elevated ventricular end-diastolic filling pressure  LEFT ATRIUM:  The atrium was mildly dilated  MITRAL VALVE:  There was trace regurgitation  HISTORY: PRIOR HISTORY: End Stage Renal Disease, Hypertension, Diabetes Mellitus II    PROCEDURE: The procedure was performed at the bedside  This was a routine study  The transthoracic approach was used  The study included complete 2D imaging, M-mode, complete spectral Doppler, and color Doppler  The heart rate was 93 bpm,  at the start of the study  Images were obtained from the parasternal, apical, subcostal, and suprasternal notch acoustic windows  Image quality was adequate  LEFT VENTRICLE: Size was normal  Systolic function was normal  Ejection fraction was estimated to be 55 %  There were no regional wall motion abnormalities   Wall thickness was markedly increased  There was severe concentric hypertrophy  DOPPLER: Doppler parameters were consistent with abnormal left ventricular relaxation (grade 1 diastolic dysfunction)  Doppler parameters were consistent with elevated ventricular end-diastolic filling pressure  RIGHT VENTRICLE: The size was normal  Systolic function was normal  Wall thickness was normal     LEFT ATRIUM: The atrium was mildly dilated  RIGHT ATRIUM: Size was normal     MITRAL VALVE: Valve structure was normal  There was normal leaflet separation  DOPPLER: The transmitral velocity was within the normal range  There was no evidence for stenosis  There was trace regurgitation  AORTIC VALVE: The valve was trileaflet  Leaflets exhibited normal thickness and normal cuspal separation  DOPPLER: Transaortic velocity was within the normal range  There was no evidence for stenosis  There was no regurgitation  TRICUSPID VALVE: The valve structure was normal  There was normal leaflet separation  DOPPLER: The transtricuspid velocity was within the normal range  There was no evidence for stenosis  There was no regurgitation  PULMONIC VALVE: Leaflets exhibited normal thickness, no calcification, and normal cuspal separation  DOPPLER: The transpulmonic velocity was within the normal range  There was no regurgitation  PERICARDIUM: There was no pericardial effusion  The pericardium was normal in appearance  AORTA: The root exhibited normal size      SYSTEM MEASUREMENT TABLES    2D  %FS: 29 36 %  Ao Diam: 3 34 cm  EDV(Teich): 159 24 ml  EF Biplane: 45 61 %  EF(Teich): 55 59 %  ESV(Teich): 70 72 ml  IVSd: 1 49 cm  LA Area: 26 75 cm2  LA Diam: 3 7 cm  LVEDV MOD A2C: 278 3 ml  LVEDV MOD A4C: 283 89 ml  LVEDV MOD BP: 284 15 ml  LVEF MOD A2C: 46 72 %  LVEF MOD A4C: 43 77 %  LVESV MOD A2C: 148 28 ml  LVESV MOD A4C: 159 64 ml  LVESV MOD BP: 154 53 ml  LVIDd: 5 69 cm  LVIDs: 4 02 cm  LVLd A2C: 10 36 cm  LVLd A4C: 10 07 cm  LVLs A2C: 8 87 cm  LVLs A4C: 8 97 cm  LVPWd: 1 42 cm  RA Area: 16 76 cm2  RVIDd: 4 57 cm  SV MOD A2C: 130 01 ml  SV MOD A4C: 124 25 ml  SV(Teich): 88 51 ml    MM  TAPSE: 2 53 cm    PW  AVC: 324 2 ms  E': 0 06 m/s  E/E': 18 24  MV A [place]: 1 27 m/s  MV Dec [place]: 4 94 m/s2  MV DecT: 204 31 ms  MV E Fuad: 1 01 m/s  MV E/A Ratio: 0 8  MV PHT: 59 25 ms  MVA By PHT: 3 71 cm2    [employer] Accredited Echocardiography Laboratory    Prepared and electronically signed by    Anjelica Monterroso MD  Signed [id number] 17:21:56               Counseling / Coordination of Care  Total floor / unit time spent today 30 minutes  Greater than 50% of total time was spent with the patient and / or family counseling and / or coordination of care  A description of the counseling / coordination of care: Obtained history, performed physical examination, discussed laboratory and imaging results, and explained further plan of care  Valeriano Rivera

## 2018-04-27 NOTE — ANESTHESIA PREPROCEDURE EVALUATION
Review of Systems/Medical History  Patient summary reviewed  Chart reviewed      Cardiovascular  Hypertension ,    Pulmonary       GI/Hepatic       Kidney disease ESRD,        Endo/Other  Diabetes well controlled type 1 Insulin,      GYN       Hematology  Anemia ,     Musculoskeletal       Neurology   Psychology           Physical Exam    Airway    Mallampati score: II  TM Distance: >3 FB  Neck ROM: full     Dental   No notable dental hx     Cardiovascular  Rhythm: regular, Rate: normal, Cardiovascular exam normal    Pulmonary  Pulmonary exam normal     Other Findings        Anesthesia Plan  ASA Score- 3     Anesthesia Type- IV sedation with anesthesia with ASA Monitors  Additional Monitors:   Airway Plan:         Plan Factors-    Induction- intravenous  Postoperative Plan- Plan for postoperative opioid use  Informed Consent- Anesthetic plan and risks discussed with patient  I personally reviewed this patient with the CRNA  Discussed and agreed on the Anesthesia Plan with the CRNA  Grant Longoria

## 2018-04-27 NOTE — ASSESSMENT & PLAN NOTE
For colonoscopy - two small ulcerated areas with a visibile vessel  Monitor overnight; allow PO  If no further Bleeding and Hb stable - anticipate DC

## 2018-04-27 NOTE — PROGRESS NOTES
NEPHROLOGY PROGRESS NOTE   Walter Carrillo 56 y o  male MRN: [id number]  Unit/Bed#: [facility] 928-01 Encounter: [id number]  Reason for Consult: ESRD MWF normally, this stay has been TThSat    ASSESSMENT and PLAN:    57 yo male with PMHx of ESRD MWF at [facility] in [place] who moved to [place] 2 weeks ago, HTN, Anemia, DM,AVNRT s/p ablation, concern of non compliance who presents on 4/25 with BRBPR  Nephrology is on board for ESRD       1) ESRD - MWF at [address] [place] [address] AVF - aneurysmal  Appears stable for now  But, will need Vascular team eval  Can eval non emergently for now - will place consult on Monday  If any changes over weekend, can request Vascular team to see earlier    - HD 4/28 - use lower flow for now until Vascular team is consulted on Monday  - plan for dialysis Saturday for now  - placement - pt does not have local dialysis unit  Discussed with Case Management regarding options  Pt would be better served in a unit that can accept for 3rd shift given patient's work schedule       2) Anemia - BRBPR     - received transfusion  - GI on board  - Hb decreasing today  pRBC as per Primary Team on dialysis  - further plan per GI team - colonoscopy with two areas of clipping  - f/u CBC     3) HTN     - amlodipine with hold parameters  - marginal BP today, but appears euvolemic  Change hold parameters on amlodipine  - monitor with dialysis     4) MBD - continue current regimen     - Phos 5 2    - PTH 1211  - may need to increase sensipar  Restart med first  - may need to add sevelamer to calcium acetate  - vit D low 7 9 - start ergocalciferol      5) hyperkalemia - improving with dialysis        SUBJECTIVE / INTERVAL HISTORY:    Pt denies complaints       OBJECTIVE:  Current Weight: Weight - Scale: 71 7 kg (158 lb)  Vitals:    04/27/18 0721 04/27/18 1024 04/27/18 1129 04/27/18 1144   BP: 134/64 131/62 (!) 81/43 105/51   BP Location:       Pulse: 79 72 76 74   Resp: 18 16 16 18   Temp: 98 1 °F (36 7 °C) 98 5 °F (36 9 °C)     TempSrc:  Temporal     SpO2: 98% 95% 97% 97%   Weight:  71 7 kg (158 lb)     Height:  5' 6" (1 676 m)         Intake/Output Summary (Last 24 hours) at 04/27/18 1440  Last data filed at 04/27/18 1300   Gross per 24 hour   Intake              640 ml   Output                0 ml   Net              640 ml     General: NAD  Skin: no rash  Eyes: anicteric sclera  ENT: moist mucous membrane  Neck: supple  Chest: CTA b/l  CVS: s1s2  Abdomen: soft, nontender  Extremities: no edema LE  : no tellez  Neuro: AAOX3  Psych: normal affect  Fistula LUE aneurysmal    Medications:    Current Facility-Administered Medications:     acetaminophen (TYLENOL) tablet 650 mg, 650 mg, Oral, Q8H PRN, Lacretia KATHI Hart, 650 mg at 04/25/18 2227    albuterol (PROVENTIL HFA,VENTOLIN HFA) inhaler 2 puff, 2 puff, Inhalation, Q6H PRN, Alex Mirza MD    amLODIPine (NORVASC) tablet 10 mg, 10 mg, Oral, Daily, Alex Mirza MD, 10 mg at 04/27/18 0816    calcium acetate (PHOSLO) capsule 2,001 mg, 2,001 mg, Oral, TID With Meals, Alex Mirza MD    cinacalcet (SENSIPAR) tablet 30 mg, 30 mg, Oral, Daily With Breakfast, Alex Mirza MD, 30 mg at 04/27/18 0816    ergocalciferol (VITAMIN D2) capsule 50,000 Units, 50,000 Units, Oral, Weekly, Nisa Pyle MD    gabapentin (NEURONTIN) capsule 300 mg, 300 mg, Oral, Daily, Alex Mirza MD, 300 mg at 04/27/18 0816    hydrocortisone (ANUSOL-HC, PROCTOSOL HC)) 2 5 % rectal cream, , Rectal, 4x Daily PRN, Alex Mirza MD    HYDROmorphone (DILAUDID) injection 0 5 mg, 0 5 mg, Intravenous, Q3H PRN, Mabel Tabor MD    labetalol (NORMODYNE) injection 10 mg, 10 mg, Intravenous, Q4H PRN, Alex Mirza MD    melatonin tablet 3 mg, 3 mg, Oral, HS PRN, Disha Newell PA-C, 3 mg at 04/26/18 2322    nicotine (NICODERM CQ) 21 mg/24 hr TD 24 hr patch 21 mg, 21 mg, Transdermal, Daily, Alex Mirza MD, 21 mg at 04/26/18 1242    pantoprazole (PROTONIX) EC tablet 40 mg, 40 mg, Oral, Early Morning, Maria Eugenia Cabrera MD, 40 mg at 04/26/18 0601    Laboratory Results:    Results from last 7 days  Lab Units 04/27/18  0447 04/26/18  2107 04/26/18  1352 04/26/18  0506 04/26/18  0017 04/25/18  1534 04/25/18  1233 04/25/18  1045 04/21/18  0426   WBC Thousand/uL 4 08*  --   --  3 51*  --   --  7 30  --  4 42   HEMOGLOBIN g/dL 7 6* 8 7* 8 2* 6 8* 7 0* 7 0* 9 0*  --  8 7*   HEMATOCRIT % 22 3* 26 2* 24 8* 21 1* 20 5* 20 6* 28 1*  --  27 0*   PLATELETS Thousands/uL 183  --   --  158  --   --  229  --  150   SODIUM mmol/L 134*  --   --  137  --   --  138 140 137   POTASSIUM mmol/L 4 6  --   --  5 2  --   --  7 0* 7 0* 4 5   CHLORIDE mmol/L 95*  --   --  101  --   --  106 108 100   CO2 mmol/L 27  --   --  28  --   --  19* 16* 31   BUN mg/dL 42*  --   --  68*  --   --  124* 124* 42*   CREATININE mg/dL 8 50*  --   --  11 50*  --   --  17 00* 17 40* 9 67*   CALCIUM mg/dL 8 2*  --   --  8 5  --   --  9 6 8 6 8 2*   MAGNESIUM mg/dL 2 1  --   --  2 3  --   --   --   --   --    PHOSPHORUS mg/dL  --   --   --  5 2*  --   --   --   --   --    TOTAL PROTEIN g/dL  --   --   --   --   --   --  8 2 6 7  --    GLUCOSE RANDOM mg/dL 55*  --   --  57*  --   --  70 80 163*

## 2018-04-28 ENCOUNTER — APPOINTMENT (INPATIENT)
Dept: DIALYSIS | Facility: HOSPITAL | Age: 58
DRG: 377 | End: 2018-04-28
Attending: INTERNAL MEDICINE
Payer: MEDICARE

## 2018-04-28 LAB
ANION GAP SERPL CALCULATED.3IONS-SCNC: 11 MMOL/L (ref 4–13)
BUN SERPL-MCNC: 66 MG/DL (ref 5–25)
CALCIUM SERPL-MCNC: 7.9 MG/DL (ref 8.3–10.1)
CHLORIDE SERPL-SCNC: 95 MMOL/L (ref 100–108)
CO2 SERPL-SCNC: 27 MMOL/L (ref 21–32)
CREAT SERPL-MCNC: 11.5 MG/DL (ref 0.6–1.3)
ERYTHROCYTE [DISTWIDTH] IN BLOOD BY AUTOMATED COUNT: 14.9 % (ref 11.6–15.1)
GFR SERPL CREATININE-BSD FRML MDRD: 5 ML/MIN/1.73SQ M
GLUCOSE SERPL-MCNC: 120 MG/DL (ref 65–140)
HCT VFR BLD AUTO: 20.7 % (ref 36.5–49.3)
HGB BLD-MCNC: 6.9 G/DL (ref 12–17)
MCH RBC QN AUTO: 29.4 PG (ref 26.8–34.3)
MCHC RBC AUTO-ENTMCNC: 33.3 G/DL (ref 31.4–37.4)
MCV RBC AUTO: 88 FL (ref 82–98)
PLATELET # BLD AUTO: 173 THOUSANDS/UL (ref 149–390)
PMV BLD AUTO: 9.3 FL (ref 8.9–12.7)
POTASSIUM SERPL-SCNC: 5 MMOL/L (ref 3.5–5.3)
RBC # BLD AUTO: 2.35 MILLION/UL (ref 3.88–5.62)
SODIUM SERPL-SCNC: 133 MMOL/L (ref 136–145)
WBC # BLD AUTO: 3.9 THOUSAND/UL (ref 4.31–10.16)

## 2018-04-28 PROCEDURE — 86803 HEPATITIS C AB TEST: CPT | Performed by: NURSE PRACTITIONER

## 2018-04-28 PROCEDURE — 86704 HEP B CORE ANTIBODY TOTAL: CPT | Performed by: NURSE PRACTITIONER

## 2018-04-28 PROCEDURE — P9021 RED BLOOD CELLS UNIT: HCPCS

## 2018-04-28 PROCEDURE — 85027 COMPLETE CBC AUTOMATED: CPT | Performed by: INTERNAL MEDICINE

## 2018-04-28 PROCEDURE — 99232 SBSQ HOSP IP/OBS MODERATE 35: CPT | Performed by: INTERNAL MEDICINE

## 2018-04-28 PROCEDURE — 86706 HEP B SURFACE ANTIBODY: CPT | Performed by: NURSE PRACTITIONER

## 2018-04-28 PROCEDURE — 86705 HEP B CORE ANTIBODY IGM: CPT | Performed by: NURSE PRACTITIONER

## 2018-04-28 PROCEDURE — 90935 HEMODIALYSIS ONE EVALUATION: CPT | Performed by: INTERNAL MEDICINE

## 2018-04-28 PROCEDURE — 87340 HEPATITIS B SURFACE AG IA: CPT | Performed by: NURSE PRACTITIONER

## 2018-04-28 PROCEDURE — 80048 BASIC METABOLIC PNL TOTAL CA: CPT | Performed by: INTERNAL MEDICINE

## 2018-04-28 RX ADMIN — CALCIUM ACETATE 2001 MG: 667 CAPSULE ORAL at 06:42

## 2018-04-28 RX ADMIN — PANTOPRAZOLE SODIUM 40 MG: 40 TABLET, DELAYED RELEASE ORAL at 05:52

## 2018-04-28 RX ADMIN — METOPROLOL TARTRATE 12.5 MG: 25 TABLET ORAL at 20:20

## 2018-04-28 RX ADMIN — CALCIUM ACETATE 2001 MG: 667 CAPSULE ORAL at 12:58

## 2018-04-28 RX ADMIN — CINACALCET HYDROCHLORIDE 30 MG: 30 TABLET, COATED ORAL at 06:42

## 2018-04-28 RX ADMIN — CALCIUM ACETATE 2001 MG: 667 CAPSULE ORAL at 18:19

## 2018-04-28 NOTE — PROGRESS NOTES
Cardiology Progress Note - Samson Osborne 56 y o  male MRN: [id number]    Unit/Bed#: [facility] 928-01 Encounter: [id number]    Assessment and plan  1  Brief paroxysmal atrial fibrillation  2  Frequent non perfusing PVCs  3   GI bleed  4  AVNRT status post ablation  5  Diabetes  6  Hypertension  7  End-stage renal disease on dialysis    Recommendations:  Rhythm has been stable today continue current dose of beta-blocker  Received blood transfusion for anemia  No anticoagulating agents given acute GI bleed  Will follow up on telemetry tomorrow  Subjective:    No significant events overnight  Doing well denies chest pain or palpitations have significantly improved  ROS    Objective:   Vitals: Blood pressure 143/65, pulse 63, temperature (!) 96 5 °F (35 8 °C), temperature source Tympanic, resp  rate 16, height 5' 6" (1 676 m), weight 72 8 kg (160 lb 7 9 oz), SpO2 98 %  , Body mass index is 25 9 kg/m² , Orthostatic Blood Pressures      Most Recent Value   Blood Pressure  143/65 filed at 04/28/2018 1157   Patient Position - Orthostatic VS  Lying filed at 04/28/2018 2141         Systolic (95ISE), RMM:251 , Min:118 , DFF:172     Diastolic (60TSO), JGX:68, Min:56, Max:76      Intake/Output Summary (Last 24 hours) at 04/28/18 1345  Last data filed at 04/28/18 1140   Gross per 24 hour   Intake             1800 ml   Output             2227 ml   Net             -427 ml     Weight (last 2 days)     Date/Time   Weight    04/28/18 0600  72 8 (160 5)    04/27/18 1024  71 7 (158)    04/26/18 0600  72 1 (158 95)                Telemetry Review: No significant arrhythmias seen on telemetry review  EKG personally reviewed by Nikko Linn DO  Physical Exam   Constitutional: He is oriented to person, place, and time  He appears well-nourished  No distress  HENT:   Head: Atraumatic  Eyes: Conjunctivae are normal  Pupils are equal, round, and reactive to light  Neck: Neck supple     Cardiovascular: Normal rate, regular rhythm and normal heart sounds  Exam reveals no friction rub  No murmur heard  Pulmonary/Chest: Effort normal and breath sounds normal  No respiratory distress  He has no wheezes  He has no rales  Abdominal: Bowel sounds are normal  He exhibits no distension  There is no tenderness  There is no rebound  Musculoskeletal: He exhibits no edema  Neurological: He is alert and oriented to person, place, and time  No cranial nerve deficit  Skin: Skin is warm and dry  No erythema  Nursing note and vitals reviewed          Laboratory Results:    Results from last 7 days  Lab Units 04/27/18  0447 04/25/18  1534 04/25/18  1130   TROPONIN I ng/mL 0 08* 0 12* 0 14*       CBC with diff:   Results from last 7 days  Lab Units 04/28/18  0509 04/27/18  0447 04/26/18  2107 04/26/18  1352 04/26/18  0506 04/26/18  0017 04/25/18  1534 04/25/18  1233   WBC Thousand/uL 3 90* 4 08*  --   --  3 51*  --   --  7 30   HEMOGLOBIN g/dL 6 9* 7 6* 8 7* 8 2* 6 8* 7 0* 7 0* 9 0*   HEMATOCRIT % 20 7* 22 3* 26 2* 24 8* 21 1* 20 5* 20 6* 28 1*   MCV fL 88 87  --   --  89  --   --  90   PLATELETS Thousands/uL 173 183  --   --  158  --   --  229   MCH pg 29 4 29 6  --   --  28 8  --   --  28 8   MCHC g/dL 33 3 34 1  --   --  32 2  --   --  32 0   RDW % 14 9 15 0  --   --  15 6*  --   --  15 8*   MPV fL 9 3 9 5  --   --  9 0  --   --  9 3   NRBC AUTO /100 WBCs  --   --   --   --  0  --   --  0         CMP:  Results from last 7 days  Lab Units 04/28/18  0509 04/27/18  0447 04/26/18  0506 04/25/18  1233 04/25/18  1045   SODIUM mmol/L 133* 134* 137 138 140   POTASSIUM mmol/L 5 0 4 6 5 2 7 0* 7 0*   CHLORIDE mmol/L 95* 95* 101 106 108   CO2 mmol/L 27 27 28 19* 16*   ANION GAP mmol/L 11 12 8 13 16*   BUN mg/dL 66* 42* 68* 124* 124*   CREATININE mg/dL 11 50* 8 50* 11 50* 17 00* 17 40*   GLUCOSE RANDOM mg/dL 120 55* 57* 70 80   CALCIUM mg/dL 7 9* 8 2* 8 5 9 6 8 6   AST U/L  --   --   --  25 21   ALT U/L  --   --   --  22 18   ALK PHOS U/L --   --   --  113 101   TOTAL PROTEIN g/dL  --   --   --  8 2 6 7   BILIRUBIN TOTAL mg/dL  --   --   --  0 48 0 33   EGFR ml/min/1 73sq m 5 7 5 3 3         BMP:  Results from last 7 days  Lab Units 18  0509 18  0447 18  0506 18  1233 18  1045   SODIUM mmol/L 133* 134* 137 138 140   POTASSIUM mmol/L 5 0 4 6 5 2 7 0* 7 0*   CHLORIDE mmol/L 95* 95* 101 106 108   CO2 mmol/L  19* 16*   BUN mg/dL 66* 42* 68* 124* 124*   CREATININE mg/dL 11 50* 8 50* 11 50* 17 00* 17 40*   GLUCOSE RANDOM mg/dL 120 55* 57* 70 80   CALCIUM mg/dL 7 9* 8 2* 8 5 9 6 8 6       BNP: No results for input(s): BNP in the last 72 hours  Magnesium:   Results from last 7 days  Lab Units 18  0447 18  0506   MAGNESIUM mg/dL 2 1 2 3       Coags:       TSH:        Hemoglobin A1C       Lipid Profile:       Cardiac testing:   Results for orders placed during the hospital encounter of 17   Echo complete with contrast if indicated    Mellisa Guerrero [address]  [address] [facility], [address] [place] [address]  [phone]    Transthoracic Echocardiogram  2D, M-mode, Doppler, and Color Doppler    Study date:  2017    Patient: Claudia Cordova  MR number: [id number]  Account number: [de-identified]  : 1960  Age: 56 years  Gender: Male  Status: Inpatient  Location: Bedside  Height: 66 in  Weight: 149 6 lb  BP: 148/ 78 mmHg    Indications: Hypertensive Heart Disease    Diagnoses: I12 0 - Hypertensive chronic kidney disease with stage 5 chronic kidney disease or end stage renal disease    Sonographer:  Rubia Millard RDCS  Primary Physician:  Cassandra London  Referring Physician:  Marina Paz MD  Group:  [facility]  Interpreting Physician:  Brennon Helms MD    SUMMARY    LEFT VENTRICLE:  Systolic function was normal  Ejection fraction was estimated to be 55 %  There were no regional wall motion abnormalities  Wall thickness was markedly increased    There was severe concentric hypertrophy  Doppler parameters were consistent with abnormal left ventricular relaxation (grade 1 diastolic dysfunction)  Doppler parameters were consistent with elevated ventricular end-diastolic filling pressure  LEFT ATRIUM:  The atrium was mildly dilated  MITRAL VALVE:  There was trace regurgitation  HISTORY: PRIOR HISTORY: End Stage Renal Disease, Hypertension, Diabetes Mellitus II    PROCEDURE: The procedure was performed at the bedside  This was a routine study  The transthoracic approach was used  The study included complete 2D imaging, M-mode, complete spectral Doppler, and color Doppler  The heart rate was 93 bpm,  at the start of the study  Images were obtained from the parasternal, apical, subcostal, and suprasternal notch acoustic windows  Image quality was adequate  LEFT VENTRICLE: Size was normal  Systolic function was normal  Ejection fraction was estimated to be 55 %  There were no regional wall motion abnormalities  Wall thickness was markedly increased  There was severe concentric hypertrophy  DOPPLER: Doppler parameters were consistent with abnormal left ventricular relaxation (grade 1 diastolic dysfunction)  Doppler parameters were consistent with elevated ventricular end-diastolic filling pressure  RIGHT VENTRICLE: The size was normal  Systolic function was normal  Wall thickness was normal     LEFT ATRIUM: The atrium was mildly dilated  RIGHT ATRIUM: Size was normal     MITRAL VALVE: Valve structure was normal  There was normal leaflet separation  DOPPLER: The transmitral velocity was within the normal range  There was no evidence for stenosis  There was trace regurgitation  AORTIC VALVE: The valve was trileaflet  Leaflets exhibited normal thickness and normal cuspal separation  DOPPLER: Transaortic velocity was within the normal range  There was no evidence for stenosis  There was no regurgitation      TRICUSPID VALVE: The valve structure was normal  There was normal leaflet separation  DOPPLER: The transtricuspid velocity was within the normal range  There was no evidence for stenosis  There was no regurgitation  PULMONIC VALVE: Leaflets exhibited normal thickness, no calcification, and normal cuspal separation  DOPPLER: The transpulmonic velocity was within the normal range  There was no regurgitation  PERICARDIUM: There was no pericardial effusion  The pericardium was normal in appearance  AORTA: The root exhibited normal size      SYSTEM MEASUREMENT TABLES    2D  %FS: 29 36 %  Ao Diam: 3 34 cm  EDV(Teich): 159 24 ml  EF Biplane: 45 61 %  EF(Teich): 55 59 %  ESV(Teich): 70 72 ml  IVSd: 1 49 cm  LA Area: 26 75 cm2  LA Diam: 3 7 cm  LVEDV MOD A2C: 278 3 ml  LVEDV MOD A4C: 283 89 ml  LVEDV MOD BP: 284 15 ml  LVEF MOD A2C: 46 72 %  LVEF MOD A4C: 43 77 %  LVESV MOD A2C: 148 28 ml  LVESV MOD A4C: 159 64 ml  LVESV MOD BP: 154 53 ml  LVIDd: 5 69 cm  LVIDs: 4 02 cm  LVLd A2C: 10 36 cm  LVLd A4C: 10 07 cm  LVLs A2C: 8 87 cm  LVLs A4C: 8 97 cm  LVPWd: 1 42 cm  RA Area: 16 76 cm2  RVIDd: 4 57 cm  SV MOD A2C: 130 01 ml  SV MOD A4C: 124 25 ml  SV(Teich): 88 51 ml    MM  TAPSE: 2 53 cm    PW  AVC: 324 2 ms  E': 0 06 m/s  E/E': 18 24  MV A Fuad: 1 27 m/s  MV Dec Mohave: 4 94 m/s2  MV DecT: 204 31 ms  MV E Fuad: 1 01 m/s  MV E/A Ratio: 0 8  MV PHT: 59 25 ms  MVA By PHT: 3 71 cm2    [employer] Accredited Echocardiography Laboratory    Prepared and electronically signed by    Pat Hatch MD  Signed [id number] 17:21:56               Meds/Allergies   all current active meds have been reviewed  Prescriptions Prior to Admission   Medication    albuterol (PROVENTIL HFA,VENTOLIN HFA) 90 mcg/act inhaler    amLODIPine (NORVASC) 10 mg tablet    calcium acetate (PHOSLO) 667 mg capsule    cinacalcet (SENSIPAR) 30 mg tablet    gabapentin (NEURONTIN) 100 mg capsule    nicotine (NICODERM CQ) 14 mg/24hr TD 24 hr patch          Assessment:  Principal Problem:    Bright red blood per rectum  Active Problems:    Hyperkalemia    Elevated troponin    ESRD (end stage renal disease) on dialysis (HCC)    Anemia in chronic kidney disease, on chronic dialysis (HCC)    History of supraventricular tachycardia    Cough with hemoptysis    Accelerated hypertension    GI bleed

## 2018-04-28 NOTE — PROGRESS NOTES
Progress Note - Yola Durant 56 y o  male MRN: [id number]    Unit/Bed#: [facility] 928-01 Encounter: [id number]    Subjective:     Patient seen and examined at dialysis  He is fatigued  He denies any further overt bleeding overnight  Objective:     Vitals: Blood pressure 143/65, pulse 63, temperature (!) 96 5 °F (35 8 °C), temperature source Tympanic, resp  rate 16, height 5' 6" (1 676 m), weight 72 8 kg (160 lb 7 9 oz), SpO2 98 %  ,Body mass index is 25 9 kg/m²  Intake/Output Summary (Last 24 hours) at 04/28/18 1321  Last data filed at 04/28/18 1140   Gross per 24 hour   Intake             1800 ml   Output             2227 ml   Net             -427 ml       Physical Exam:     General Appearance: Alert, chronically ill male, appears stated age and cooperative  Lungs: Clear to auscultation bilaterally, no rales or rhonchi, no labored breathing/accessory muscle use  Heart: Regular rate and rhythm, S1, S2 normal, no murmur, click, rub or gallop  Abdomen: Soft, non-tender, non-distended; bowel sounds normal; no masses or no organomegaly  Extremities: No cyanosis, edema    Invasive Devices     Peripheral Intravenous Line            Peripheral IV 04/27/18 Right Hand 1 day          Line            Hemodialysis AV Fistula Left -- days                Lab Results:    Results from last 7 days  Lab Units 04/28/18  0509  04/26/18  0506   WBC Thousand/uL 3 90*  < > 3 51*   HEMOGLOBIN g/dL 6 9*  < > 6 8*   HEMATOCRIT % 20 7*  < > 21 1*   PLATELETS Thousands/uL 173  < > 158   NEUTROS PCT %  --   --  61   LYMPHS PCT %  --   --  21   MONOS PCT %  --   --  10   EOS PCT %  --   --  7*   < > = values in this interval not displayed      Results from last 7 days  Lab Units 04/28/18  0509  04/25/18  1233   SODIUM mmol/L 133*  < > 138   POTASSIUM mmol/L 5 0  < > 7 0*   CHLORIDE mmol/L 95*  < > 106   CO2 mmol/L 27  < > 19*   BUN mg/dL 66*  < > 124*   CREATININE mg/dL 11 50*  < > 17 00*   CALCIUM mg/dL 7 9*  < > 9 6   TOTAL PROTEIN g/dL  -- --  8 2   BILIRUBIN TOTAL mg/dL  --   --  0 48   ALK PHOS U/L  --   --  113   ALT U/L  --   --  22   AST U/L  --   --  25   GLUCOSE RANDOM mg/dL 120  < > 70   < > = values in this interval not displayed  Imaging Studies: I have personally reviewed pertinent imaging studies  Xr Chest 2 Views    Result Date: 4/25/2018  Impression: 1  Enlargement of cardiac silhouette similar to prior study 2  Persistent but partially improved groundglass opacities in the lungs Workstation performed: [id number]       Assessment and Plan:    Discussed plan with Dr Nagy Patient with SLIM    1) BRBPR likely secondary to bleeding cecal ulcers:  Colonoscopy revealed bright red blood in the colon  In the cecum there were 2 small ulcerated areas with a visible vessel  One was oozing blood the other had stigmata of bleeding  Both of these areas were clipped and hemostasis was achieved  Hemoglobin dropped from 7 6-6 9 this morning  He denies any further overt bleeding   He was transfused 1 unit of PRBCs during dialysis      -Follow up on H&H to confirm response to blood transfusion  -If hemoglobin continues to drop and/or patient has further overt bleeding, consider EGD to assess tor UGI source of bleeding  -Okay to continue regular diet for now     2) End stage renal disease: non-compliant, on hemodialysis     -Management as per Nephrology

## 2018-04-28 NOTE — PROGRESS NOTES
NEPHROLOGY PROGRESS NOTE   Glenna Parcel 56 y o  male MRN: [id number]  [facility] Encounter: [id number]      ASSESSMENT & PLAN:    1  ESKD on HD MWF [facility] [address] AVF-aneurysm on fistula, consult vascular surgery on Monday  -seen on HD today 3 5 hour UF as tolerated and flow set at 300  -moved here 2 weeks ago awaiting placement as moved here 2 weeks ago  -seen on HD at 11:00    2  Anemia-BRBPR  -s/p transfusion, hemoglobin decreased again to 6 9  -will monitor and if worsening over weekend can transfuse off dialysis otherwise may need unit monday   -did have two areas in cecum that were clipped    3  HTN  -BP stable  -amlodipine stable dosing    4  MBD  -Phos 5 2  -PTH 1211  -started on ergocalciferol and on sensipar    5  Hyperkalemia  -improved      SUBJECTIVE:    Denies any new complaints  No cp, sob, fevers, chills  OBJECTIVE:  Current Weight: Weight - Scale: 72 8 kg (160 lb 7 9 oz)  Vitals:    04/28/18 1100   BP: 141/63   Pulse: 67   Resp:    Temp:    SpO2:        Intake/Output Summary (Last 24 hours) at 04/28/18 1110  Last data filed at 04/28/18 0810   Gross per 24 hour   Intake             1790 ml   Output                0 ml   Net             1790 ml       General: conscious, cooperative, in not acute distress  Eyes: conjunctivae pink, anicteric sclerae  ENT: lips and mucous membranes moist  Neck: supple, no JVD  Chest: clear breath sounds bilateral, no crackles, ronchus or wheezings  CVS: distinct S1 & S2, normal rate, regular rhythm  Abdomen: soft, non-tender, non-distended, normoactive bowel sounds  Extremities: no edema of both legs   LUE av fistula on HD  Skin: no rash  Neuro: awake, alert, oriented    Medications:    Current Facility-Administered Medications:     acetaminophen (TYLENOL) tablet 650 mg, 650 mg, Oral, Q8H PRN, Saskia Yeung PA-C, 650 mg at 04/25/18 2227    albuterol (PROVENTIL HFA,VENTOLIN HFA) inhaler 2 puff, 2 puff, Inhalation, Q6H PRN, Ena Han, MD    amLODIPine (NORVASC) tablet 10 mg, 10 mg, Oral, Daily, Beltran Murguia MD, Stopped at 04/28/18 1375    calcium acetate (PHOSLO) capsule 2,001 mg, 2,001 mg, Oral, TID With Meals, Marly Damon MD, 2,001 mg at 04/28/18 4122    cinacalcet (SENSIPAR) tablet 30 mg, 30 mg, Oral, Daily With Breakfast, Marly Damon MD, 30 mg at 04/28/18 7325    ergocalciferol (VITAMIN D2) capsule 50,000 Units, 50,000 Units, Oral, Weekly, Beltran Murguia MD, 50,000 Units at 04/27/18 1721    gabapentin (NEURONTIN) capsule 300 mg, 300 mg, Oral, Daily, Marly Damon MD, Stopped at 04/28/18 0936    hydrocortisone (ANUSOL-HC, PROCTOSOL HC)) 2 5 % rectal cream, , Rectal, 4x Daily PRN, Marly Damon MD    HYDROmorphone (DILAUDID) injection 0 5 mg, 0 5 mg, Intravenous, Q3H PRN, Hyun Davidson MD    labetalol (NORMODYNE) injection 10 mg, 10 mg, Intravenous, Q4H PRN, Marly Damon MD    melatonin tablet 3 mg, 3 mg, Oral, HS PRN, Disha Newell PA-C, 3 mg at 04/27/18 2231    metoprolol tartrate (LOPRESSOR) partial tablet 12 5 mg, 12 5 mg, Oral, Q12H [address], Phi Williamson MD, Stopped at 04/28/18 0937    nicotine (NICODERM CQ) 21 mg/24 hr TD 24 hr patch 21 mg, 21 mg, Transdermal, Daily, Marly Damon MD, Stopped at 04/28/18 0937    pantoprazole (PROTONIX) EC tablet 40 mg, 40 mg, Oral, Early Morning, Marly Damon MD, 40 mg at 04/28/18 7846       Lab Results:     Results from last 7 days  Lab Units 04/28/18  0509 04/27/18  0447 04/26/18  2107 04/26/18  1352 04/26/18  0506  04/25/18  1233 04/25/18  1045   WBC Thousand/uL 3 90* 4 08*  --   --  3 51*  --  7 30  --    HEMOGLOBIN g/dL 6 9* 7 6* 8 7* 8 2* 6 8*  < > 9 0*  --    HEMATOCRIT % 20 7* 22 3* 26 2* 24 8* 21 1*  < > 28 1*  --    PLATELETS Thousands/uL 173 183  --   --  158  --  229  --    SODIUM mmol/L 133* 134*  --   --  137  --  138 140   POTASSIUM mmol/L 5 0 4 6  --   --  5 2  --  7 0* 7 0*   CHLORIDE mmol/L 95* 95*  --   --  101  --  106 108 CO2 mmol/L 27 27  --   --  28  --  19* 16*   BUN mg/dL 66* 42*  --   --  68*  --  124* 124*   CREATININE mg/dL 11 50* 8 50*  --   --  11 50*  --  17 00* 17 40*   CALCIUM mg/dL 7 9* 8 2*  --   --  8 5  --  9 6 8 6   MAGNESIUM mg/dL  --  2 1  --   --  2 3  --   --   --    PHOSPHORUS mg/dL  --   --   --   --  5 2*  --   --   --    TOTAL PROTEIN g/dL  --   --   --   --   --   --  8 2 6 7   BILIRUBIN TOTAL mg/dL  --   --   --   --   --   --  0 48 0 33   ALK PHOS U/L  --   --   --   --   --   --  113 101   ALT U/L  --   --   --   --   --   --  22 18   AST U/L  --   --   --   --   --   --  25 21   GLUCOSE RANDOM mg/dL 120 55*  --   --  57*  --  70 80   < > = values in this interval not displayed      Previous work up: please see previous notes

## 2018-04-29 LAB
ABO GROUP BLD BPU: NORMAL
ALBUMIN SERPL BCP-MCNC: 2.9 G/DL (ref 3.5–5)
ALP SERPL-CCNC: 92 U/L (ref 46–116)
ALT SERPL W P-5'-P-CCNC: 13 U/L (ref 12–78)
ANION GAP SERPL CALCULATED.3IONS-SCNC: 8 MMOL/L (ref 4–13)
AST SERPL W P-5'-P-CCNC: 19 U/L (ref 5–45)
BASOPHILS # BLD AUTO: 0.03 THOUSANDS/ΜL (ref 0–0.1)
BASOPHILS NFR BLD AUTO: 1 % (ref 0–1)
BILIRUB SERPL-MCNC: 0.3 MG/DL (ref 0.2–1)
BPU ID: NORMAL
BUN SERPL-MCNC: 43 MG/DL (ref 5–25)
CALCIUM SERPL-MCNC: 8.5 MG/DL (ref 8.3–10.1)
CHLORIDE SERPL-SCNC: 98 MMOL/L (ref 100–108)
CO2 SERPL-SCNC: 29 MMOL/L (ref 21–32)
CREAT SERPL-MCNC: 8.95 MG/DL (ref 0.6–1.3)
EOSINOPHIL # BLD AUTO: 0.22 THOUSAND/ΜL (ref 0–0.61)
EOSINOPHIL NFR BLD AUTO: 5 % (ref 0–6)
ERYTHROCYTE [DISTWIDTH] IN BLOOD BY AUTOMATED COUNT: 15.5 % (ref 11.6–15.1)
GFR SERPL CREATININE-BSD FRML MDRD: 7 ML/MIN/1.73SQ M
GLUCOSE SERPL-MCNC: 87 MG/DL (ref 65–140)
HBV CORE AB SER QL: NORMAL
HBV CORE IGM SER QL: NORMAL
HBV SURFACE AB SER-ACNC: 114.77 MIU/ML
HBV SURFACE AG SER QL: NORMAL
HCT VFR BLD AUTO: 25.7 % (ref 36.5–49.3)
HCV AB SER QL: NORMAL
HGB BLD-MCNC: 8.8 G/DL (ref 12–17)
LYMPHOCYTES # BLD AUTO: 1.32 THOUSANDS/ΜL (ref 0.6–4.47)
LYMPHOCYTES NFR BLD AUTO: 28 % (ref 14–44)
MAGNESIUM SERPL-MCNC: 2.2 MG/DL (ref 1.6–2.6)
MCH RBC QN AUTO: 29.1 PG (ref 26.8–34.3)
MCHC RBC AUTO-ENTMCNC: 34.2 G/DL (ref 31.4–37.4)
MCV RBC AUTO: 85 FL (ref 82–98)
MONOCYTES # BLD AUTO: 0.34 THOUSAND/ΜL (ref 0.17–1.22)
MONOCYTES NFR BLD AUTO: 7 % (ref 4–12)
NEUTROPHILS # BLD AUTO: 2.77 THOUSANDS/ΜL (ref 1.85–7.62)
NEUTS SEG NFR BLD AUTO: 59 % (ref 43–75)
NRBC BLD AUTO-RTO: 0 /100 WBCS
PLATELET # BLD AUTO: 178 THOUSANDS/UL (ref 149–390)
PMV BLD AUTO: 9.2 FL (ref 8.9–12.7)
POTASSIUM SERPL-SCNC: 4.6 MMOL/L (ref 3.5–5.3)
PROT SERPL-MCNC: 6.5 G/DL (ref 6.4–8.2)
RBC # BLD AUTO: 3.02 MILLION/UL (ref 3.88–5.62)
SODIUM SERPL-SCNC: 135 MMOL/L (ref 136–145)
UNIT DISPENSE STATUS: NORMAL
UNIT PRODUCT CODE: NORMAL
UNIT RH: NORMAL
WBC # BLD AUTO: 4.69 THOUSAND/UL (ref 4.31–10.16)

## 2018-04-29 PROCEDURE — 83735 ASSAY OF MAGNESIUM: CPT | Performed by: INTERNAL MEDICINE

## 2018-04-29 PROCEDURE — 99232 SBSQ HOSP IP/OBS MODERATE 35: CPT | Performed by: INTERNAL MEDICINE

## 2018-04-29 PROCEDURE — 85025 COMPLETE CBC W/AUTO DIFF WBC: CPT | Performed by: INTERNAL MEDICINE

## 2018-04-29 PROCEDURE — 80053 COMPREHEN METABOLIC PANEL: CPT | Performed by: INTERNAL MEDICINE

## 2018-04-29 RX ADMIN — CINACALCET HYDROCHLORIDE 30 MG: 30 TABLET, COATED ORAL at 08:12

## 2018-04-29 RX ADMIN — CALCIUM ACETATE 2001 MG: 667 CAPSULE ORAL at 16:09

## 2018-04-29 RX ADMIN — CALCIUM ACETATE 2001 MG: 667 CAPSULE ORAL at 11:53

## 2018-04-29 RX ADMIN — MELATONIN TAB 3 MG 3 MG: 3 TAB at 22:22

## 2018-04-29 RX ADMIN — AMLODIPINE BESYLATE 10 MG: 10 TABLET ORAL at 08:11

## 2018-04-29 RX ADMIN — CALCIUM ACETATE 2001 MG: 667 CAPSULE ORAL at 08:12

## 2018-04-29 RX ADMIN — NICOTINE 21 MG: 21 PATCH, EXTENDED RELEASE TRANSDERMAL at 08:11

## 2018-04-29 RX ADMIN — GABAPENTIN 300 MG: 300 CAPSULE ORAL at 08:11

## 2018-04-29 RX ADMIN — METOPROLOL TARTRATE 12.5 MG: 25 TABLET ORAL at 22:19

## 2018-04-29 RX ADMIN — METOPROLOL TARTRATE 12.5 MG: 25 TABLET ORAL at 08:11

## 2018-04-29 RX ADMIN — PANTOPRAZOLE SODIUM 40 MG: 40 TABLET, DELAYED RELEASE ORAL at 06:56

## 2018-04-29 NOTE — PROGRESS NOTES
Progress Note - Samson Osborne 1960, 56 y o  male MRN: [id number]    Unit/Bed#: [facility] 928-01 Encounter: [id number]    Primary Care Provider: Daisy Harrison   Date and time admitted to hospital: 2018 10:18 AM    History of supraventricular tachycardia   Assessment & Plan    SVT on last hospitalization 18, s/p Cardiac EPS/SVT Ablation 18  Anemia in chronic kidney disease, on chronic dialysis [facility])   Assessment & Plan    Hg/Hct , chronic, stable  Received 2units PRBC on last admission 18; also on epogen 4000units during HD  prbc as Hb was 6 9        ESRD (end stage renal disease) on dialysis [facility])   Assessment & Plan    ESRD on HD T,R,Sat  Recently moved to [place] from [place]; has not established dialysis in [place], no dialysis since discharged last week  Continue Sensipar and PhosLo  Nephrology following        Hyperkalemia   Assessment & Plan    Improved with HD        * Bright red blood per rectum   Assessment & Plan    colonoscopy - two small ulcerated areas with a visibile vessel  Monitor overnight; allow PO  Hb drop to 6 9          VTE Pharmacologic Prophylaxis: Pharmacologic VTE Prophylaxis contraindicated due to gib  Mechanical: Mechanical VTE prophylaxis in place  Patient Centered Rounds: I have performed bedside rounds with nursing staff today  Discussions with Specialists or Other Care Team Provider:     Education and Discussions with Family / Patient:     Time Spent for Care: More than 50% of total time spent on counseling and coordination of care as described above      Current Length of Stay: 2 day(s)    Current Patient Status: Inpatient   Certification Statement: The patient will continue to require additional inpatient hospital stay due to as above    Discharge Plan:    Code Status: Level 1 - Full Code      Subjective: nil acute    Objective:     Vitals:   Temp (24hrs), Av 5 °F (36 4 °C), Min:96 4 °F (35 8 °C), Max:99 1 °F (37 3 °C)    HR:  [63-78] 72  Resp:  [16-18] 18  BP: (118-155)/(56-76) 155/67  SpO2:  [96 %-100 %] 96 %  Body mass index is 25 9 kg/m²  Input and Output Summary (last 24 hours): Intake/Output Summary (Last 24 hours) at 04/28/18 2025  Last data filed at 04/28/18 1300   Gross per 24 hour   Intake             1800 ml   Output             2227 ml   Net             -427 ml       Physical Exam   HENT:   Head: Normocephalic  Eyes: Pupils are equal, round, and reactive to light  Cardiovascular: Normal heart sounds  Pulmonary/Chest: Effort normal    Abdominal: Soft  Bowel sounds are normal    Neurological: He is alert  Skin: Skin is warm  Additional Data:     Labs:      Results from last 7 days  Lab Units 04/28/18  0509  04/26/18  0506   WBC Thousand/uL 3 90*  < > 3 51*   HEMOGLOBIN g/dL 6 9*  < > 6 8*   HEMATOCRIT % 20 7*  < > 21 1*   PLATELETS Thousands/uL 173  < > 158   NEUTROS PCT %  --   --  61   LYMPHS PCT %  --   --  21   MONOS PCT %  --   --  10   EOS PCT %  --   --  7*   < > = values in this interval not displayed  Results from last 7 days  Lab Units 04/28/18  0509  04/25/18  1233   SODIUM mmol/L 133*  < > 138   POTASSIUM mmol/L 5 0  < > 7 0*   CHLORIDE mmol/L 95*  < > 106   CO2 mmol/L 27  < > 19*   BUN mg/dL 66*  < > 124*   CREATININE mg/dL 11 50*  < > 17 00*   CALCIUM mg/dL 7 9*  < > 9 6   TOTAL PROTEIN g/dL  --   --  8 2   BILIRUBIN TOTAL mg/dL  --   --  0 48   ALK PHOS U/L  --   --  113   ALT U/L  --   --  22   AST U/L  --   --  25   GLUCOSE RANDOM mg/dL 120  < > 70   < > = values in this interval not displayed  * I Have Reviewed All Lab Data Listed Above      Imaging:  Imaging Personally Reviewed by Myself Includes:     Cultures:   Blood Culture: No results found for: BLOODCX  Urine Culture: No results found for: URINECX  Sputum Culture: No components found for: SPUTUMCX  Wound Culture: No results found for: WOUNDCULT    Last 24 Hours Medication List:     Current Facility-Administered Medications:  acetaminophen 650 mg Oral Q8H PRN Beatris Riley PA-C   albuterol 2 puff Inhalation Q6H PRN Jose Multani MD   amLODIPine 10 mg Oral Daily Beau Iqbal MD   calcium acetate 2,001 mg Oral TID With Meals Jose Multani MD   cinacalcet 30 mg Oral Daily With Breakfast Jose Multani MD   ergocalciferol 50,000 Units Oral Weekly Beau Iqbal MD   gabapentin 300 mg Oral Daily Jose Multani MD   hydrocortisone  Rectal 4x Daily PRN Jose Multani MD   HYDROmorphone 0 5 mg Intravenous Q3H PRN Junaid Myers MD   labetalol 10 mg Intravenous Q4H PRN Jose Multani MD   melatonin 3 mg Oral HS PRN Disha Newell PA-C   metoprolol tartrate 12 5 mg Oral Q12H [facility] Ursula Espinal MD   nicotine 21 mg Transdermal Daily Jose Multani MD   pantoprazole 40 mg Oral Early Morning Jose Multani MD        Today, Patient Was Seen By: Natividad Gomez MD    ** Please Note: Dragon 360 Dictation voice to text software may have been used in the creation of this document   **

## 2018-04-29 NOTE — ASSESSMENT & PLAN NOTE
ESRD on HD T,R,Sat  Recently moved to [place] from [place]; has not established dialysis in [place], no dialysis since discharged last week  Continue Sensipar and PhosLo  Nephrology following

## 2018-04-29 NOTE — PROGRESS NOTES
NEPHROLOGY PROGRESS NOTE   Khanh Young 56 y o  male MRN: [id number]  [facility] Encounter: [id number]      ASSESSMENT & PLAN:    1  ESKD on HD but is awaiting placement for a new dialysis unit  -YARITZA AVF-aneurysm on fistula, consult vascular surgery on Monday  -awaiting placement has had noncompliance issues in the past  -next dialysis treatment on Tuesday  -[facility] units do not have a 3rd shift, patient works and would prefer 3rd shift looking at [facility] for tomorrow dialysis depending on outpatient schedule      2  Anemia-BRBPR  -transfused 2 units per slim hemoglobin 8 8 has cecal ulcers that were clipped continue to monitor for blood loss    3  HTN  -BP stable  -continue amlodipine    4  MBD  -Phos 5 2  -PTH 1211  -started on ergocalciferol and on sensipar    5   Hyperkalemia  -improved      SUBJECTIVE:    The denies any new complaints today anxious to leave the hospital, no chest pain or shortness of Breath    OBJECTIVE:  Current Weight: Weight - Scale: 72 6 kg (160 lb 0 9 oz)  Vitals:    04/29/18 0756   BP: 162/76   Pulse: 76   Resp: 18   Temp: 98 °F (36 7 °C)   SpO2: 96%       Intake/Output Summary (Last 24 hours) at 04/29/18 0955  Last data filed at 04/29/18 0536   Gross per 24 hour   Intake             1790 ml   Output             2227 ml   Net             -437 ml       General: conscious, cooperative, in not acute distress  Eyes: conjunctivae pink, anicteric sclerae  ENT: lips and mucous membranes moist  Neck: supple, no JVD  Chest: clear breath sounds bilateral, no crackles, ronchus or wheezings  CVS: distinct S1 & S2, normal rate, regular rhythm  Abdomen: soft, non-tender, non-distended, normoactive bowel sounds  Extremities: no edema of both legs, aneurysm on left upper extremity fistula  Skin: no rash  Neuro: awake, alert, oriented      Medications:    Current Facility-Administered Medications:     acetaminophen (TYLENOL) tablet 650 mg, 650 mg, Oral, Q8H PRN, Arely Presser, KATHI, 650 mg at 04/25/18 2227    albuterol (PROVENTIL HFA,VENTOLIN HFA) inhaler 2 puff, 2 puff, Inhalation, Q6H PRN, Lucie Franklin MD    amLODIPine (NORVASC) tablet 10 mg, 10 mg, Oral, Daily, Kathie Carter MD, 10 mg at 04/29/18 0602    calcium acetate (PHOSLO) capsule 2,001 mg, 2,001 mg, Oral, TID With Meals, Lucie Franklin MD, 2,001 mg at 04/29/18 4008    cinacalcet (SENSIPAR) tablet 30 mg, 30 mg, Oral, Daily With Breakfast, Lucie Franklin MD, 30 mg at 04/29/18 5330    ergocalciferol (VITAMIN D2) capsule 50,000 Units, 50,000 Units, Oral, Weekly, Kathie Carter MD, 50,000 Units at 04/27/18 1721    gabapentin (NEURONTIN) capsule 300 mg, 300 mg, Oral, Daily, Lucie Franklin MD, 300 mg at 04/29/18 0811    hydrocortisone (ANUSOL-HC, PROCTOSOL HC)) 2 5 % rectal cream, , Rectal, 4x Daily PRN, Lucie Franklin MD    HYDROmorphone (DILAUDID) injection 0 5 mg, 0 5 mg, Intravenous, Q3H PRN, Osiris Glasgow MD    labetalol (NORMODYNE) injection 10 mg, 10 mg, Intravenous, Q4H PRN, Lucie Franklin MD    melatonin tablet 3 mg, 3 mg, Oral, HS PRN, Disha Newell PA-C, 3 mg at 04/27/18 2231    metoprolol tartrate (LOPRESSOR) partial tablet 12 5 mg, 12 5 mg, Oral, Q12H [address], Dank Nelson MD, 12 5 mg at 04/29/18 0811    nicotine (NICODERM CQ) 21 mg/24 hr TD 24 hr patch 21 mg, 21 mg, Transdermal, Daily, Lucie Farnklin MD, 21 mg at 04/29/18 0811    pantoprazole (PROTONIX) EC tablet 40 mg, 40 mg, Oral, Early Morning, Lucie Franklin MD, 40 mg at 04/29/18 0656       Lab Results:     Results from last 7 days  Lab Units 04/29/18  0509 04/28/18  0509 04/27/18  0447 04/26/18  2107 04/26/18  1352 04/26/18  0506  04/25/18  1233 04/25/18  1045   WBC Thousand/uL 4 69 3 90* 4 08*  --   --  3 51*  --  7 30  --    HEMOGLOBIN g/dL 8 8* 6 9* 7 6* 8 7* 8 2* 6 8*  < > 9 0*  --    HEMATOCRIT % 25 7* 20 7* 22 3* 26 2* 24 8* 21 1*  < > 28 1*  --    PLATELETS Thousands/uL 178 173 183  --   --  158  --  229  -- SODIUM mmol/L 135* 133* 134*  --   --  137  --  138 140   POTASSIUM mmol/L 4 6 5 0 4 6  --   --  5 2  --  7 0* 7 0*   CHLORIDE mmol/L 98* 95* 95*  --   --  101  --  106 108   CO2 mmol/L 29 27 27  --   --  28  --  19* 16*   BUN mg/dL 43* 66* 42*  --   --  68*  --  124* 124*   CREATININE mg/dL 8 95* 11 50* 8 50*  --   --  11 50*  --  17 00* 17 40*   CALCIUM mg/dL 8 5 7 9* 8 2*  --   --  8 5  --  9 6 8 6   MAGNESIUM mg/dL 2 2  --  2 1  --   --  2 3  --   --   --    PHOSPHORUS mg/dL  --   --   --   --   --  5 2*  --   --   --    TOTAL PROTEIN g/dL 6 5  --   --   --   --   --   --  8 2 6 7   BILIRUBIN TOTAL mg/dL 0 30  --   --   --   --   --   --  0 48 0 33   ALK PHOS U/L 92  --   --   --   --   --   --  113 101   ALT U/L 13  --   --   --   --   --   --  22 18   AST U/L 19  --   --   --   --   --   --  25 21   GLUCOSE RANDOM mg/dL 87 120 55*  --   --  57*  --  70 80   < > = values in this interval not displayed      Previous work up: please see previous notes

## 2018-04-29 NOTE — ASSESSMENT & PLAN NOTE
Hg/Hct 9/28, chronic, stable  Received 2units PRBC on last admission 4/17/18; also on epogen 4000units during HD  prbc as Hb was 6 9

## 2018-04-29 NOTE — PROGRESS NOTES
Progress Note - Samson Osborne 1960, 56 y o  male MRN: [id number]    Unit/Bed#: [facility] 928-01 Encounter: [id number]    Primary Care Provider: Daisy Harrison   Date and time admitted to hospital: 2018 10:18 AM      Anemia in chronic kidney disease, on chronic dialysis [facility])   Assessment & Plan    Hg/Hct , chronic, stable  Received 2units PRBC on last admission 18; also on epogen 4000units during HD  s/p PRBC with rpt Hb 8 8        ESRD (end stage renal disease) on dialysis [facility])   Assessment & Plan    ESRD on HD T,R,Sat  Recently moved to [place] from [place]; has not established dialysis in [place], no dialysis since discharged last week  Continue Sensipar and PhosLo  Waiting chair time  Nephrology following        * Bright red blood per rectum   Assessment & Plan    colonoscopy - two small ulcerated areas with a visibile vessel  Monitor overnight; allow PO  Monitor Hb next 24 hrs  If stable - anticipate DC          VTE Pharmacologic Prophylaxis: Pharmacologic VTE Prophylaxis contraindicated due to GIB  Mechanical: Mechanical VTE prophylaxis in place  Patient Centered Rounds: I have performed bedside rounds with nursing staff today  Discussions with Specialists or Other Care Team Provider:     Education and Discussions with Family / Patient:     Time Spent for Care: More than 50% of total time spent on counseling and coordination of care as described above  Current Length of Stay: 3 day(s)    Current Patient Status: Inpatient   Certification Statement: The patient will continue to require additional inpatient hospital stay due to as above    Discharge Plan:    Code Status: Level 1 - Full Code      Subjective: nil acute " wants to go home"    Objective:     Vitals:   Temp (24hrs), Av 4 °F (36 9 °C), Min:97 9 °F (36 6 °C), Max:99 °F (37 2 °C)    HR:  [66-80] 66  Resp:  [16-18] 18  BP: (136-162)/(63-76) 136/63  SpO2:  [96 %-98 %] 98 %  Body mass index is 25 83 kg/m²       Input and Output Summary (last 24 hours): Intake/Output Summary (Last 24 hours) at 04/29/18 1412  Last data filed at 04/29/18 0900   Gross per 24 hour   Intake             1020 ml   Output                0 ml   Net             1020 ml       Physical Exam   Constitutional: He is oriented to person, place, and time  HENT:   Head: Normocephalic  Eyes: Pupils are equal, round, and reactive to light  Cardiovascular: Normal rate  Pulmonary/Chest: Effort normal    Abdominal: Soft  Bowel sounds are normal    Neurological: He is alert and oriented to person, place, and time  Additional Data:     Labs:      Results from last 7 days  Lab Units 04/29/18  0509   WBC Thousand/uL 4 69   HEMOGLOBIN g/dL 8 8*   HEMATOCRIT % 25 7*   PLATELETS Thousands/uL 178   NEUTROS PCT % 59   LYMPHS PCT % 28   MONOS PCT % 7   EOS PCT % 5       Results from last 7 days  Lab Units 04/29/18  0509   SODIUM mmol/L 135*   POTASSIUM mmol/L 4 6   CHLORIDE mmol/L 98*   CO2 mmol/L 29   BUN mg/dL 43*   CREATININE mg/dL 8 95*   CALCIUM mg/dL 8 5   TOTAL PROTEIN g/dL 6 5   BILIRUBIN TOTAL mg/dL 0 30   ALK PHOS U/L 92   ALT U/L 13   AST U/L 19   GLUCOSE RANDOM mg/dL 87           * I Have Reviewed All Lab Data Listed Above      Imaging:  Imaging Personally Reviewed by Myself Includes:     Cultures:   Blood Culture: No results found for: BLOODCX  Urine Culture: No results found for: URINECX  Sputum Culture: No components found for: SPUTUMCX  Wound Culture: No results found for: WOUNDCULT    Last 24 Hours Medication List:     Current Facility-Administered Medications:  acetaminophen 650 mg Oral Q8H PRN Daniel Carvalho PA-C   albuterol 2 puff Inhalation Q6H PRN Kaylyn Villegas MD   amLODIPine 10 mg Oral Daily Antonia Recio MD   calcium acetate 2,001 mg Oral TID With Meals Kayyln Villegas MD   cinacalcet 30 mg Oral Daily With Breakfast Kaylyn Villegas MD   ergocalciferol 50,000 Units Oral Weekly Antonia Recio MD   gabapentin 300 mg Oral Daily Kaylyn Villegas MD   hydrocortisone  Rectal 4x Daily PRN Griselda Cesar MD   HYDROmorphone 0 5 mg Intravenous Q3H PRN Perez Roth MD   labetalol 10 mg Intravenous Q4H PRN Griselda Cesar MD   melatonin 3 mg Oral HS PRN Disha Newell PA-C   metoprolol tartrate 12 5 mg Oral Q12H [address] Kavon Campuzano MD   nicotine 21 mg Transdermal Daily Griselda Cesar MD   pantoprazole 40 mg Oral Early Morning Griselda Cesar MD        Today, Patient Was Seen By: Tyson Stewart MD    ** Please Note: Dragon 360 Dictation voice to text software may have been used in the creation of this document   **

## 2018-04-29 NOTE — PROGRESS NOTES
Rounds completed with Dr Liset Doyle  Questions and concerns addressed about not being discharged today  Pt understandable of plan of care  Per Dr Heidy Dean pt is able to go outside with a PCA to get some air

## 2018-04-29 NOTE — ASSESSMENT & PLAN NOTE
ESRD on HD T,R,Sat  Recently moved to [place] from [address] [place] [address]; has not established dialysis in [place], no dialysis since discharged last week  Continue Sensipar and PhosLo  Waiting chair time  Nephrology following

## 2018-04-29 NOTE — ASSESSMENT & PLAN NOTE
colonoscopy - two small ulcerated areas with a visibile vessel  Monitor overnight; allow PO  Monitor Hb next 24 hrs  If stable - anticipate DC

## 2018-04-29 NOTE — ASSESSMENT & PLAN NOTE
colonoscopy - two small ulcerated areas with a visibile vessel  Monitor overnight; allow PO  Hb drop to 6 9

## 2018-04-29 NOTE — ASSESSMENT & PLAN NOTE
Hg/Hct 9/28, chronic, stable  Received 2units PRBC on last admission 4/17/18; also on epogen 4000units during HD  s/p PRBC with rpt Hb 8 8

## 2018-04-30 VITALS
HEART RATE: 70 BPM | SYSTOLIC BLOOD PRESSURE: 151 MMHG | WEIGHT: 160.05 LBS | DIASTOLIC BLOOD PRESSURE: 70 MMHG | TEMPERATURE: 98.1 F | BODY MASS INDEX: 25.72 KG/M2 | RESPIRATION RATE: 18 BRPM | OXYGEN SATURATION: 98 % | HEIGHT: 66 IN

## 2018-04-30 PROBLEM — R77.8 ELEVATED TROPONIN: Status: RESOLVED | Noted: 2017-11-13 | Resolved: 2018-04-30

## 2018-04-30 PROBLEM — E87.5 HYPERKALEMIA: Chronic | Status: RESOLVED | Noted: 2017-11-13 | Resolved: 2018-04-30

## 2018-04-30 PROBLEM — Z86.79 HISTORY OF SUPRAVENTRICULAR TACHYCARDIA: Status: RESOLVED | Noted: 2018-04-17 | Resolved: 2018-04-30

## 2018-04-30 PROBLEM — R04.2 COUGH WITH HEMOPTYSIS: Status: RESOLVED | Noted: 2018-04-25 | Resolved: 2018-04-30

## 2018-04-30 PROBLEM — I77.0 ANEURYSM OF ARTERIOVENOUS FISTULA (HCC): Status: ACTIVE | Noted: 2018-04-30

## 2018-04-30 PROBLEM — D62 ACUTE BLOOD LOSS ANEMIA: Status: RESOLVED | Noted: 2018-04-30 | Resolved: 2018-04-30

## 2018-04-30 PROBLEM — K62.5 BRIGHT RED BLOOD PER RECTUM: Status: RESOLVED | Noted: 2018-04-25 | Resolved: 2018-04-30

## 2018-04-30 PROBLEM — D62 ACUTE BLOOD LOSS ANEMIA: Status: ACTIVE | Noted: 2018-04-30

## 2018-04-30 PROBLEM — K92.2 GI BLEED: Status: RESOLVED | Noted: 2018-04-25 | Resolved: 2018-04-30

## 2018-04-30 PROCEDURE — 99222 1ST HOSP IP/OBS MODERATE 55: CPT | Performed by: PHYSICIAN ASSISTANT

## 2018-04-30 PROCEDURE — 99239 HOSP IP/OBS DSCHRG MGMT >30: CPT | Performed by: INTERNAL MEDICINE

## 2018-04-30 PROCEDURE — 99232 SBSQ HOSP IP/OBS MODERATE 35: CPT | Performed by: INTERNAL MEDICINE

## 2018-04-30 PROCEDURE — 99231 SBSQ HOSP IP/OBS SF/LOW 25: CPT | Performed by: PHYSICIAN ASSISTANT

## 2018-04-30 RX ORDER — ERGOCALCIFEROL 1.25 MG/1
50000 CAPSULE ORAL WEEKLY
Qty: 7 CAPSULE | Refills: 0 | Status: SHIPPED | OUTPATIENT
Start: 2018-05-04 | End: 2018-12-07 | Stop reason: HOSPADM

## 2018-04-30 RX ORDER — PANTOPRAZOLE SODIUM 40 MG/1
40 TABLET, DELAYED RELEASE ORAL
Qty: 30 TABLET | Refills: 0 | Status: SHIPPED | OUTPATIENT
Start: 2018-05-01 | End: 2018-12-14

## 2018-04-30 RX ADMIN — NICOTINE 21 MG: 21 PATCH, EXTENDED RELEASE TRANSDERMAL at 09:46

## 2018-04-30 RX ADMIN — CINACALCET HYDROCHLORIDE 30 MG: 30 TABLET, COATED ORAL at 09:46

## 2018-04-30 RX ADMIN — AMLODIPINE BESYLATE 10 MG: 10 TABLET ORAL at 09:44

## 2018-04-30 RX ADMIN — GABAPENTIN 300 MG: 300 CAPSULE ORAL at 09:44

## 2018-04-30 RX ADMIN — PANTOPRAZOLE SODIUM 40 MG: 40 TABLET, DELAYED RELEASE ORAL at 05:44

## 2018-04-30 RX ADMIN — METOPROLOL TARTRATE 12.5 MG: 25 TABLET ORAL at 09:44

## 2018-04-30 NOTE — ASSESSMENT & PLAN NOTE
Aneurysmal LUE AVF    Pt is not agreeable to work-up and treatment at this time and is planning on discharge today, possible against medical advice    Plan:  --Obtain AVF duplex if pt agreeable, can be done as outpatient if he is discharged  --May benefit from [address] [place] [address] follow-up in the [facility]

## 2018-04-30 NOTE — ASSESSMENT & PLAN NOTE
Acute GI bleed in setting of cecal ulcers s/p colonoscopy w/ clipping  Received blood transfusion this admission    Plan:  --Management per Primary service/GI

## 2018-04-30 NOTE — CONSULTS
Vascular Surgery Consultation     Xiao Seay 1960, 56 y o  male MRN: [id number]    Unit/Bed#: [facility] 928-01 Encounter: [id number]    Primary Care Provider: Stefany Dalton   Date and time admitted to hospital: 4/25/2018 10:18 AM    Consulting Service: Nephrology    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[facility])   Assessment & Plan    Aneurysmal LUE AVF    Pt is not agreeable to work-up and treatment at this time and is planning on discharge today, possible against medical advice    Plan:  --Obtain AVF duplex if pt agreeable, can be done as outpatient if he is discharged  --May benefit from fistulagram  --Outpatient follow-up in the [facility]ascular Center        ESRD (end stage renal disease) on dialysis [facility])   Assessment & Plan    ESRD on HD w/ medical noncompliance currently dialyzing via LUE AVF  Hx of Left radiocephalic AVF which failed to mature    Pt recently moved to [place] and has not established new dialysis unit and has been medically noncompliant with receiving dialysis treatments    Plan:  --HD as per nephrology team          GI bleed   Assessment & Plan    Acute GI bleed in setting of cecal ulcers s/p colonoscopy w/ clipping  Received blood transfusion this admission    Plan:  --Management per Primary service/GI            Chief Complaint: "I need to get out of here"    HPI: Xiao Seay is a 56 y o  male with ESRD on HD who has been medically noncompliant with receiving dialysis treatments  He recently moved to [place] from [place] and has not established new dialysis center  He has been admitted to [facility] twice in the last 2 weeks  He was recently admitted with SVT/AVNRT and is s/p ablation on 4/20  He was discharged on ASA 81mg  He was readmitted 4/25 due to LGI bleed  He required blood transfusions on both admissions  He is s/p colonoscopy and clipping of 2 cecal ulcers  He did not receive dialysis treatments between these hospitalizations  Has a LUE AVF that was placed in [place] and has been functioning on HD for treatments   It is aneurysmal and has been for quite sometime  He has not had any skin breakdown or bleeding events  He also has a history of Left radiocephalic AVF which never matured and multiple permacaths for access in the past  He has never had RUE access creation  He denies any complaints today he is adamant about leaving the hospital  I suggested obtain duplex of his LUE AVF and he would rather be evaluated in the office  Review of Systems:  A full 13 system review was performed at time of consultation the patient denies any positives  General: negative  Cardiovascular: no chest pain or dyspnea on exertion  Respiratory: no cough, shortness of breath, or wheezing  Gastrointestinal: no abdominal pain, change in bowel habits, or black or bloody stools  Genitourinary ROS: no dysuria, trouble voiding, or hematuria  Musculoskeletal ROS: negative  Neurological ROS: no TIA or stroke symptoms  Hematological and Lymphatic ROS: negative  Dermatological ROS: negative  Psychological ROS: negative  Ophthalmic ROS: negative  ENT ROS: negative    Past Medical History:    End-stage renal disease on HD  Anemia  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  Vitamin-D deficiency  Hypertension  Lower GI bleed  Mineral bone disease    Past Surgical History:  Past Surgical History:   Procedure Laterality Date    CHOLECYSTECTOMY      COLONOSCOPY N/A 4/27/2018    Procedure: COLONOSCOPY;  Surgeon: Jose Whitaker DO;  Location: [facility]; Service: Gastroenterology       Social History:  History   Alcohol Use No     History   Drug Use No     History   Smoking Status    Current Every Day Smoker    Packs/day: 1 00    Types: Cigarettes   Smokeless Tobacco    Never Used       Family History:  History reviewed  No pertinent family history      Allergies:  No Known Allergies    Medications:  Current Facility-Administered Medications   Medication Dose Route Frequency    acetaminophen (TYLENOL) tablet 650 mg  650 mg Oral Q8H PRN    albuterol (PROVENTIL HFA,VENTOLIN HFA) inhaler 2 puff  2 puff Inhalation Q6H PRN    amLODIPine (NORVASC) tablet 10 mg  10 mg Oral Daily    calcium acetate (PHOSLO) capsule 2,001 mg  2,001 mg Oral TID With Meals    cinacalcet (SENSIPAR) tablet 30 mg  30 mg Oral Daily With Breakfast    ergocalciferol (VITAMIN D2) capsule 50,000 Units  50,000 Units Oral Weekly    gabapentin (NEURONTIN) capsule 300 mg  300 mg Oral Daily    hydrocortisone (ANUSOL-HC, PROCTOSOL HC)) 2 5 % rectal cream   Rectal 4x Daily PRN    HYDROmorphone (DILAUDID) injection 0 5 mg  0 5 mg Intravenous Q3H PRN    labetalol (NORMODYNE) injection 10 mg  10 mg Intravenous Q4H PRN    melatonin tablet 3 mg  3 mg Oral HS PRN    metoprolol tartrate (LOPRESSOR) partial tablet 12 5 mg  12 5 mg Oral Q12H [facility]    nicotine (NICODERM CQ) 21 mg/24 hr TD 24 hr patch 21 mg  21 mg Transdermal Daily    pantoprazole (PROTONIX) EC tablet 40 mg  40 mg Oral Early Morning       Vitals:  /70 (BP Location: Right arm)   Pulse 70   Temp 98 1 °F (36 7 °C) (Oral)   Resp 18   Ht 5' 6" (1 676 m)   Wt 72 6 kg (160 lb 0 9 oz)   SpO2 98%   BMI 25 83 kg/m²     I/Os:  I/O last 24 hours: In: 1145 [P O :1145]  Out: -     Lab Results and Cultures:   Lab Results   Component Value Date    WBC 4 69 2018    HGB 8 8 (L) 2018    HCT 25 7 (L) 2018    MCV 85 2018     2018     Lab Results   Component Value Date    GLUCOSE 87 2018    CALCIUM 8 5 2018     (L) 2018    K 4 6 2018    CO2 29 2018    CL 98 (L) 2018    BUN 43 (H) 2018    CREATININE 8 95 (H) 2018     Imagin/25 CXR    Physical Exam:    General appearance: alert and oriented, in no acute distress  Skin: Skin color, texture, turgor normal  No rashes or lesions  Neurologic: Grossly normal  Head: Normocephalic, without obvious abnormality, atraumatic  Eyes: conjunctivae/corneas clear  PERRL, EOM's intact  Fundi benign    Throat: lips, mucosa, and tongue normal; teeth and gums normal  Neck: no adenopathy, no carotid bruit, no JVD, supple, symmetrical, trachea midline and thyroid not enlarged, symmetric, no tenderness/mass/nodules  Back: symmetric, no curvature  ROM normal  No CVA tenderness    Lungs: clear to auscultation bilaterally  Chest wall: no tenderness  Heart: regular rate and rhythm, S1, S2 normal, no murmur, click, rub or gallop  Abdomen: soft, non-tender; bowel sounds normal; no masses,  no organomegaly  Extremities:lower extremities normal, warm and well-perfused; no cyanosis, clubbing, or edema   Left upper extremity AV fistula aneurysmal without any skin degeneration or ulcers with positive thrill and bruit, pulsatile left radiocephalic AV fistula    Pulse exam:  Radial: Right: 2+ Left[de-identified] 2+  Ulnar: Right: 2+ Left[de-identified] 2+        Jennifer Devi PA-C  4/30/2018

## 2018-04-30 NOTE — PROGRESS NOTES
Cardiology Progress Note - Tracee Ellis 56 y o  male MRN: [id number]    Unit/Bed#: [facility] 928-01 Encounter: [id number]      Assessment:  1  Paroxysmal Atrial Fibrillation   2  Frequent PVCs  3  GI bleed s/p clipping  · s/p 2 U PRBC  4  Recurrent, symptomatic AVNRT s/p ablation  · Echo - 11/2017 - LVEF 55%, no regional wall motion abnormality, severe LVH, grade 1 diastolic dysfunction, mildly dilated LA, no significant valvular abnormalities  · Echo - 4/2017 - ([facility]) - LVEF 55-60%, no significant abnormality  · Nuclear SPECT - 8 5 METS, was stopped early due to hypertension, partially reversible apical inferior defect  · Cardiac cath - 7/2014 - Normal coronaries  2  Hypertension  3  Diabetes Mellitus Type 2   4  ESRD on HD  · Currently being evaluated for renal transplant at [address] [place] [address]  6  Anemia 2/2 CKD  · S/p 2 U PRBC  7  H/o cocaine use  · Quit many years ago     Outpatient cardiologist:  Dr Max Partida ([address] [place] [address]) / Ayaka Colon  · Off telemetry at [facility]  · Has not had further episodes of palpitations, dizziness since starting metoprolol  · Clinically appears to be in sinus rhythm  · No further bleeding, Hb up to 8 8 yesterday, no repeat checks today  · Awaiting dialysis placement  · Continue metoprolol 12 5 bid  · Not on anticoagulation at present, due to GI bleed this admission, needing transfusion  · Would be a candidate for anticoagulation when okay with GI and primary    Subjective:    No significant events overnight  Review of Systems    No c/o chest pain, No c/o palpitations, No c/o shortness of breath, No c/o dizziness or light-headedness, No c/o abdominal pain, no c/o nausea/vomitting  All other systems negative    Telemetry Review: Not on telemetry    Objective:   Vitals: Blood pressure 151/70, pulse 70, temperature 98 1 °F (36 7 °C), temperature source Oral, resp  rate 18, height 5' 6" (1 676 m), weight 72 6 kg (160 lb 0 9 oz), SpO2 98 %  , Body mass index is 25 83 kg/m²  , Orthostatic Blood Pressures      Most Recent Value   Blood Pressure  151/70 filed at 04/30/2018 0700   Patient Position - Orthostatic VS  Lying filed at 04/30/2018 3863         Systolic (98YHT), QDU:779 , Min:136 , FXA:711     Diastolic (47DCB), GNK:63, Min:64, Max:93    Wt Readings from Last 5 Encounters:   04/29/18 72 6 kg (160 lb 0 9 oz)   04/21/18 70 3 kg (155 lb)   03/12/18 68 4 kg (150 lb 12 7 oz)   11/13/17 68 kg (150 lb)     I/O       04/28 0701 - 04/29 0700 04/29 0701 - 04/30 0700 04/30 0701 - 05/01 0700    P  O  1380 1145 180    I V  (mL/kg) 500 (6 9)      Blood 350      Total Intake(mL/kg) 2230 (30 7) 1145 (15 8) 180 (2 5)    Other 2227      Total Output 2227        Net +3 +1145 +180           Unmeasured Urine Occurrence  4 x               Physical Exam    Constitutional:  Magali Tapia appears well, alert and oriented x 3, pleasant and cooperative  No acute distress   HEENT:    Normocephalic, atraumatic   Neck:  Supple, No JVD   Lungs:  Clear to auscultation bilaterally, respirations unlabored    Chest Wall:  No tenderness or deformity    Heart::  Regular rate, Regular rhythm, S1 and S2 normal, no murmur, rub or gallop    Abdomen:  Soft, non-tender, non-distended   Neurological:  Awake, alert, oriented x3   Non-focal exam    Extremities:  No pedal edema, No calf tenderness         Laboratory Results:    Results from last 7 days  Lab Units 04/27/18  0447 04/25/18  1534 04/25/18  1130   TROPONIN I ng/mL 0 08* 0 12* 0 14*       CBC with diff:     Results from last 7 days  Lab Units 04/29/18  0509 04/28/18  0509 04/27/18  0447 04/26/18  2107 04/26/18  1352 04/26/18  0506 04/26/18  0017  04/25/18  1233   WBC Thousand/uL 4 69 3 90* 4 08*  --   --  3 51*  --   --  7 30   HEMOGLOBIN g/dL 8 8* 6 9* 7 6* 8 7* 8 2* 6 8* 7 0*  < > 9 0*   HEMATOCRIT % 25 7* 20 7* 22 3* 26 2* 24 8* 21 1* 20 5*  < > 28 1*   MCV fL 85 88 87  --   --  89  --   --  90   PLATELETS Thousands/uL 178 173 183  --   --  158  --   --  229   MCH pg 29 1 29 4 29 6  --   --  28 8  --   --  28 8   MCHC g/dL 34 2 33 3 34 1  --   --  32 2  --   --  32 0   RDW % 15 5* 14 9 15 0  --   --  15 6*  --   --  15 8*   MPV fL 9 2 9 3 9 5  --   --  9 0  --   --  9 3   NRBC AUTO /100 WBCs 0  --   --   --   --  0  --   --  0   < > = values in this interval not displayed  CMP:  Results from last 7 days  Lab Units 04/29/18  0509 04/28/18  0509 04/27/18  0447 04/26/18  0506 04/25/18  1233 04/25/18  1045   SODIUM mmol/L 135* 133* 134* 137 138 140   POTASSIUM mmol/L 4 6 5 0 4 6 5 2 7 0* 7 0*   CHLORIDE mmol/L 98* 95* 95* 101 106 108   CO2 mmol/L 29 27 27 28 19* 16*   ANION GAP mmol/L 8 11 12 8 13 16*   BUN mg/dL 43* 66* 42* 68* 124* 124*   CREATININE mg/dL 8 95* 11 50* 8 50* 11 50* 17 00* 17 40*   GLUCOSE RANDOM mg/dL 87 120 55* 57* 70 80   CALCIUM mg/dL 8 5 7 9* 8 2* 8 5 9 6 8 6   AST U/L 19  --   --   --  25 21   ALT U/L 13  --   --   --  22 18   ALK PHOS U/L 92  --   --   --  113 101   TOTAL PROTEIN g/dL 6 5  --   --   --  8 2 6 7   BILIRUBIN TOTAL mg/dL 0 30  --   --   --  0 48 0 33   EGFR ml/min/1 73sq m 7 5 7 5 3 3         BMP:  Results from last 7 days  Lab Units 04/29/18  0509 04/28/18  0509 04/27/18  0447 04/26/18  0506 04/25/18  1233 04/25/18  1045   SODIUM mmol/L 135* 133* 134* 137 138 140   POTASSIUM mmol/L 4 6 5 0 4 6 5 2 7 0* 7 0*   CHLORIDE mmol/L 98* 95* 95* 101 106 108   CO2 mmol/L 29 27 27 28 19* 16*   BUN mg/dL 43* 66* 42* 68* 124* 124*   CREATININE mg/dL 8 95* 11 50* 8 50* 11 50* 17 00* 17 40*   GLUCOSE RANDOM mg/dL 87 120 55* 57* 70 80   CALCIUM mg/dL 8 5 7 9* 8 2* 8 5 9 6 8 6       BNP: No results for input(s): BNP in the last 72 hours      Magnesium:   Results from last 7 days  Lab Units 04/29/18  0509 04/27/18  0447 04/26/18  0506   MAGNESIUM mg/dL 2 2 2 1 2 3       Coags:       TSH:        Hemoglobin A1C       Lipid Profile:       Meds/Allergies   all current active meds have been reviewed and current meds: Current Facility-Administered Medications Medication Dose Route Frequency    acetaminophen (TYLENOL) tablet 650 mg  650 mg Oral Q8H PRN    albuterol (PROVENTIL HFA,VENTOLIN HFA) inhaler 2 puff  2 puff Inhalation Q6H PRN    amLODIPine (NORVASC) tablet 10 mg  10 mg Oral Daily    calcium acetate (PHOSLO) capsule 2,001 mg  2,001 mg Oral TID With Meals    cinacalcet (SENSIPAR) tablet 30 mg  30 mg Oral Daily With Breakfast    ergocalciferol (VITAMIN D2) capsule 50,000 Units  50,000 Units Oral Weekly    gabapentin (NEURONTIN) capsule 300 mg  300 mg Oral Daily    hydrocortisone (ANUSOL-HC, PROCTOSOL HC)) 2 5 % rectal cream   Rectal 4x Daily PRN    HYDROmorphone (DILAUDID) injection 0 5 mg  0 5 mg Intravenous Q3H PRN    labetalol (NORMODYNE) injection 10 mg  10 mg Intravenous Q4H PRN    melatonin tablet 3 mg  3 mg Oral HS PRN    metoprolol tartrate (LOPRESSOR) partial tablet 12 5 mg  12 5 mg Oral Q12H [address]    nicotine (NICODERM CQ) 21 mg/24 hr TD 24 hr patch 21 mg  21 mg Transdermal Daily    pantoprazole (PROTONIX) EC tablet 40 mg  40 mg Oral Early Morning     Prescriptions Prior to Admission   Medication    albuterol (PROVENTIL HFA,VENTOLIN HFA) 90 mcg/act inhaler    amLODIPine (NORVASC) 10 mg tablet    calcium acetate (PHOSLO) 667 mg capsule    cinacalcet (SENSIPAR) 30 mg tablet    gabapentin (NEURONTIN) 100 mg capsule    nicotine (NICODERM CQ) 14 mg/24hr TD 24 hr patch            Cardiac testing:   Results for orders placed during the hospital encounter of 17   Echo complete with contrast if indicated    Narrative [facility]  [facility], [address] [facility]  [phone]    Transthoracic Echocardiogram  2D, M-mode, Doppler, and Color Doppler    Study date:  2017    Patient: Collin Roy  MR number: [id number]  Account number: [de-identified]  : 1960  Age: 56 years  Gender: Male  Status: Inpatient  Location: Bedside  Height: 66 in  Weight: 149 6 lb  BP: 148/ 78 mmHg    Indications: Hypertensive Heart Disease    Diagnoses: I12 0 - Hypertensive chronic kidney disease with stage 5 chronic kidney disease or end stage renal disease    Sonographer:  Elton Mylse RDCS  Primary Physician:  Germaine Simeon  Referring Physician:  Jorge Blake MD  Group:  [facility]  Interpreting Physician:  Michelle Peter MD    SUMMARY    LEFT VENTRICLE:  Systolic function was normal  Ejection fraction was estimated to be 55 %  There were no regional wall motion abnormalities  Wall thickness was markedly increased  There was severe concentric hypertrophy  Doppler parameters were consistent with abnormal left ventricular relaxation (grade 1 diastolic dysfunction)  Doppler parameters were consistent with elevated ventricular end-diastolic filling pressure  LEFT ATRIUM:  The atrium was mildly dilated  MITRAL VALVE:  There was trace regurgitation  HISTORY: PRIOR HISTORY: End Stage Renal Disease, Hypertension, Diabetes Mellitus II    PROCEDURE: The procedure was performed at the bedside  This was a routine study  The transthoracic approach was used  The study included complete 2D imaging, M-mode, complete spectral Doppler, and color Doppler  The heart rate was 93 bpm,  at the start of the study  Images were obtained from the parasternal, apical, subcostal, and suprasternal notch acoustic windows  Image quality was adequate  LEFT VENTRICLE: Size was normal  Systolic function was normal  Ejection fraction was estimated to be 55 %  There were no regional wall motion abnormalities  Wall thickness was markedly increased  There was severe concentric hypertrophy  DOPPLER: Doppler parameters were consistent with abnormal left ventricular relaxation (grade 1 diastolic dysfunction)  Doppler parameters were consistent with elevated ventricular end-diastolic filling pressure      RIGHT VENTRICLE: The size was normal  Systolic function was normal  Wall thickness was normal     LEFT ATRIUM: The atrium was mildly dilated  RIGHT ATRIUM: Size was normal     MITRAL VALVE: Valve structure was normal  There was normal leaflet separation  DOPPLER: The transmitral velocity was within the normal range  There was no evidence for stenosis  There was trace regurgitation  AORTIC VALVE: The valve was trileaflet  Leaflets exhibited normal thickness and normal cuspal separation  DOPPLER: Transaortic velocity was within the normal range  There was no evidence for stenosis  There was no regurgitation  TRICUSPID VALVE: The valve structure was normal  There was normal leaflet separation  DOPPLER: The transtricuspid velocity was within the normal range  There was no evidence for stenosis  There was no regurgitation  PULMONIC VALVE: Leaflets exhibited normal thickness, no calcification, and normal cuspal separation  DOPPLER: The transpulmonic velocity was within the normal range  There was no regurgitation  PERICARDIUM: There was no pericardial effusion  The pericardium was normal in appearance  AORTA: The root exhibited normal size      SYSTEM MEASUREMENT TABLES    2D  %FS: 29 36 %  Ao Diam: 3 34 cm  EDV(Teich): 159 24 ml  EF Biplane: 45 61 %  EF(Teich): 55 59 %  ESV(Teich): 70 72 ml  IVSd: 1 49 cm  LA Area: 26 75 cm2  LA Diam: 3 7 cm  LVEDV MOD A2C: 278 3 ml  LVEDV MOD A4C: 283 89 ml  LVEDV MOD BP: 284 15 ml  LVEF MOD A2C: 46 72 %  LVEF MOD A4C: 43 77 %  LVESV MOD A2C: 148 28 ml  LVESV MOD A4C: 159 64 ml  LVESV MOD BP: 154 53 ml  LVIDd: 5 69 cm  LVIDs: 4 02 cm  LVLd A2C: 10 36 cm  LVLd A4C: 10 07 cm  LVLs A2C: 8 87 cm  LVLs A4C: 8 97 cm  LVPWd: 1 42 cm  RA Area: 16 76 cm2  RVIDd: 4 57 cm  SV MOD A2C: 130 01 ml  SV MOD A4C: 124 25 ml  SV(Teich): 88 51 ml    MM  TAPSE: 2 53 cm    PW  AVC: 324 2 ms  E': 0 06 m/s  E/E': 18 24  MV A Fuad: 1 27 m/s  MV Dec Geary: 4 94 m/s2  MV DecT: 204 31 ms  MV E Fuad: 1 01 m/s  MV E/A Ratio: 0 8  MV PHT: 59 25 ms  MVA By PHT: 3 71 cm2    [facility]    Prepared and electronically signed by    Anjelica Monterroso MD  Signed [id number] 17:21:56                     Counseling / Coordination of Care  Total floor / unit time spent today 20 minutes

## 2018-04-30 NOTE — SOCIAL WORK
CM spoke with Donita Vogel from [employer] [facility] [employer] Admissions who states Pt is a regional denial by Director of [address] [place] [address] ([phone])  MINGO left  for [facility] to inquire about reconsideration and discuss Pt's denial  Pt requesting to be D/C'd home and states he will continue going to his [address] [place] [address] location at this time  Pt states he already called and confirmed with NATHALY Shepherd that his chair time is still available  [facility] aware

## 2018-04-30 NOTE — DISCHARGE SUMMARY
Discharge Summary - [facility]    Patient Information: Dottie Neves 56 y o  male MRN: [id number]  Unit/Bed#: [facility] 928-01 Encounter: [id number]    Discharging Physician / Practitioner: Avril Pak MD  PCP: Vivi Huffman  Admission Date: 4/25/2018  Discharge Date: 04/30/18    Reason for Admission: bright red blood per rectum    Discharge Diagnoses:     Principal Problem (Resolved):    Bright red blood per rectum  Active Problems:    ESRD (end stage renal disease) on dialysis (HCC)    Anemia in chronic kidney disease, on chronic dialysis ([facility] )    Accelerated hypertension    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([facility] )  Resolved Problems:    Hyperkalemia    Elevated troponin    History of supraventricular tachycardia    Cough with hemoptysis    GI bleed    Acute blood loss anemia      Consultations During Hospital Stay:  · Vascular surgery Dr Loida Gonzalez  · Dr Hina Monterroso cardiology  · Dr Jose Walker  · Dr Piper Traylor Nephrology    Procedures Performed:     · CXR - nil acute      Incidental Findings:   · none    Test Results Pending at Discharge (will require follow up):   · none     Outpatient Tests Requested:  · none    Complications: none    Hospital Course:     Dottie Neves is a 56 y o  male patient who originally presented to the hospital on 4/25/2018 due to BRBPR  He has known h/o ESRD, Anemia, HTN, recent AVNRT s/p ablation and is also transitioning care from [place] to [place]  He was found to have 2 ulcers with visible vessels on colonoscopy  They were clipped with rpt Hb stable  GI can follow as outpt  He has not been able to secure a HD chair time in [place] and that was partly responsible for readmission within 10 days  He was also found to have hyperkalemia that improved with HD  At discharge, he still does not have an outpt chair time but was keen on discharge  He will also need follow up with vascular for AVFistula monitoring  Anticipated Fistulogram as outpt  Please see hospital records for details    Stable at discharge  Condition at Discharge: fair     Discharge Day Visit / Exam:     Vitals: Blood Pressure: 151/70 (04/30/18 0700)  Pulse: 70 (04/30/18 0700)  Temperature: 98 1 °F (36 7 °C) (04/30/18 0700)  Temp Source: Oral (04/30/18 0700)  Respirations: 18 (04/30/18 0700)  Height: 5' 6" (167 6 cm) (04/27/18 1024)  Weight - Scale: 72 6 kg (160 lb 0 9 oz) (04/29/18 0536)  SpO2: 98 % (04/30/18 0700)  Exam:   Physical Exam   HENT:   Head: Normocephalic  Eyes: Pupils are equal, round, and reactive to light  Cardiovascular: Normal heart sounds  Pulmonary/Chest: Effort normal    Abdominal: Soft  Musculoskeletal: Normal range of motion  Neurological: He is alert  Skin: Skin is warm  Discharge instructions/Information to patient and family:   See after visit summary for information provided to patient and family  Provisions for Follow-Up Care:  See after visit summary for information related to follow-up care and any pertinent home health orders  Disposition: Home    Discharge Statement:  I spent 35 minutes discharging the patient  This time was spent on the day of discharge  I had direct contact with the patient on the day of discharge  Greater than 50% of the total time was spent examining patient, answering all patient questions, arranging and discussing plan of care with patient as well as directly providing post-discharge instructions  Additional time then spent on discharge activities  Discharge Medications:  See after visit summary for reconciled discharge medications provided to patient and family  ** Please Note: Dragon 360 Dictation voice to text software may have been used in the creation of this document   **

## 2018-04-30 NOTE — ASSESSMENT & PLAN NOTE
Aneurysmal RUE AVF    Pt is not agreeable to work-up and treatment at this time and is planning on discharge today, possible against medical advice    Plan:  --Obtain AVF duplex if pt agreeable, can be done as outpatient if he is discharged  --May benefit from [address] [place] [address] follow-up in the [facility]

## 2018-04-30 NOTE — ASSESSMENT & PLAN NOTE
ESRD on HD w/ medical noncompliance currently dialyzing via LUE AVF  Hx of Left radiocephalic AVF which failed to mature    Pt recently moved to [place] and has not established new dialysis unit and has been medically noncompliant with receiving dialysis treatments    Plan:  --HD as per nephrology team

## 2018-04-30 NOTE — PROGRESS NOTES
Patient refused phosflo x2 states that medicine must be given at the same instant meal is eaten and that administration ten minutes after eating meal is too late       Patient is anxious to leave despite outpatient dialysis not being set up yet  Dr Nagy Patient with [facility] notified, and states he will do the discharge

## 2018-04-30 NOTE — SOCIAL WORK
CM spoke with Angel Lyle from [facility] Admissions to discuss placement plan  [facility] facility is unable to accept, referral was placed to the [facility] facility for request for 4:30pm chair time  Angel Lyle states she is awaiting determination from medical director for acceptance  Pt aware that a new location has not been confirmed and states he will go to his [place] facility on Wednesday if [facility] cannot accept him  SLIM aware  CM will continue to follow at this time

## 2018-04-30 NOTE — SOCIAL WORK
3:40PM : MINGO spoke with Peri Shell  at [facility], she states Pt has been denied by the Physician Lobo Sharpe and is a regional denial  She also states the [address] locations are at max capacity and are on divert  Peri Shell suggest Pt try to find a location in the [place]  Yes

## 2018-04-30 NOTE — ED PROVIDER NOTES
History  Chief Complaint   Patient presents with    Rectal Bleeding     Woke up and stool was loose this morning and noted toilet was full of bright red blood  Also noticing blood from his nose when he is blowing it  This is a 53-year-old male with end-stage renal disease in a few presenting to emergency department for evaluation of bright red blood per rectum  The patient had a recent hospital admission for the same at which time he was significantly anemic hand admitted with AFib with RVR  He was given a blood transfusion at the time and during the course of his hospital admission underwent RF ablation for AFib  He returns now hemodynamically stable and in normal sinus rhythm but he noted 1 large bloody bowel movement prior to arrival and continuous small amounts of bright red blood per rectum  He has had no pain with his bowel movement and has had no abdominal pain nausea or vomiting  He is off all anticoagulants since last admission  He denies any lightheadedness palpitations syncope or presyncope  Prior to Admission Medications   Prescriptions Last Dose Informant Patient Reported? Taking?    albuterol (PROVENTIL HFA,VENTOLIN HFA) 90 mcg/act inhaler 2018 at Unknown time  No Yes   Sig: Inhale 2 puffs every 6 (six) hours as needed for wheezing   amLODIPine (NORVASC) 10 mg tablet 2018 at Unknown time  Yes Yes   Sig: Take 10 mg by mouth daily   calcium acetate (PHOSLO) 667 mg capsule 2018 at Unknown time  Yes Yes   Sig: Take 2,001 mg by mouth 3 (three) times a day with meals   cinacalcet (SENSIPAR) 30 mg tablet 2018 at Unknown time  Yes Yes   Si mg   gabapentin (NEURONTIN) 100 mg capsule 2018 at Unknown time  Yes Yes   Sig: Take 300 mg by mouth daily     nicotine (NICODERM CQ) 14 mg/24hr TD 24 hr patch 2018 at Unknown time  No Yes   Sig: Place 1 patch on the skin daily      Facility-Administered Medications: None       Past Medical History:   Diagnosis Date  Dialysis patient [facility])     Hypertension     Renal disorder        Past Surgical History:   Procedure Laterality Date    CHOLECYSTECTOMY      COLONOSCOPY N/A 4/27/2018    Procedure: COLONOSCOPY;  Surgeon: Tom Jose DO;  Location: [facility]; Service: Gastroenterology       History reviewed  No pertinent family history  I have reviewed and agree with the history as documented  Social History   Substance Use Topics    Smoking status: Current Every Day Smoker     Packs/day: 1 00     Types: Cigarettes    Smokeless tobacco: Never Used    Alcohol use No        Review of Systems   Constitutional: Negative for appetite change, chills, fatigue and fever  HENT: Negative for sneezing and sore throat  Eyes: Negative for visual disturbance  Respiratory: Negative for cough, choking, chest tightness, shortness of breath and wheezing  Cardiovascular: Negative for chest pain and palpitations  Gastrointestinal: Positive for blood in stool  Negative for abdominal pain, constipation, diarrhea, nausea and vomiting  Genitourinary: Negative for difficulty urinating and dysuria  Neurological: Negative for dizziness, weakness, light-headedness, numbness and headaches  All other systems reviewed and are negative        Physical Exam  ED Triage Vitals   Temperature Pulse Respirations Blood Pressure SpO2   04/25/18 0954 04/25/18 0954 04/25/18 0954 04/25/18 0954 04/25/18 0954   97 8 °F (36 6 °C) 78 22 (!) 185/86 94 %      Temp Source Heart Rate Source Patient Position - Orthostatic VS BP Location FiO2 (%)   04/25/18 0954 04/25/18 0954 04/25/18 1228 04/25/18 1228 --   Oral Monitor Lying Right arm       Pain Score       04/25/18 0954       No Pain           Orthostatic Vital Signs  Vitals:    04/29/18 1535 04/29/18 1909 04/30/18 0100 04/30/18 0700   BP: 136/64 151/93 148/72 151/70   Pulse: 72 76 69 70   Patient Position - Orthostatic VS: Sitting Sitting  Lying       Physical Exam   Constitutional: He is oriented to person, place, and time  He appears well-developed and well-nourished  No distress  HENT:   Head: Normocephalic and atraumatic  Mouth/Throat: Oropharynx is clear and moist    Eyes: EOM are normal  Pupils are equal, round, and reactive to light  Neck: No JVD present  No tracheal deviation present  Cardiovascular: Normal rate, regular rhythm, normal heart sounds and intact distal pulses  Exam reveals no gallop and no friction rub  No murmur heard  Pulmonary/Chest: Effort normal and breath sounds normal  No respiratory distress  He has no wheezes  He has no rales  Abdominal: Soft  Bowel sounds are normal  He exhibits no distension  There is no tenderness  There is no rebound and no guarding  Genitourinary:   Genitourinary Comments: Rectal exam is significant for bright red blood per rectum with a nontender anal verge with no fissures or hemorrhoids appreciated   Neurological: He is alert and oriented to person, place, and time  No cranial nerve deficit  He exhibits normal muscle tone  Skin: Skin is warm and dry  He is not diaphoretic  No pallor  Psychiatric: He has a normal mood and affect  His behavior is normal    Nursing note and vitals reviewed        ED Medications  Medications   insulin regular (HumuLIN R,NovoLIN R) injection 10 Units (10 Units Intravenous Given 4/25/18 1354)   dextrose 50 % IV solution 50 mL (50 mL Intravenous Given 4/25/18 1354)   albuterol inhalation solution 10 mg (10 mg Nebulization Given 4/25/18 1357)     And   sodium chloride 0 9 % inhalation solution 3 mL (3 mL Nebulization Given 4/25/18 1357)   bisacodyl (DULCOLAX) EC tablet 10 mg (10 mg Oral Given 4/26/18 1834)   polyethylene glycol (GOLYTELY) bowel prep 4,000 mL (4,000 mL Oral Given 4/26/18 1834)       Diagnostic Studies  Results Reviewed     Procedure Component Value Units Date/Time    PTH, intact [[id number]]  (Abnormal) Collected:  04/26/18 1520    Lab Status:  Final result Specimen:  Blood from Arm, Right Updated:  04/26/18 0857     PTH 1,211 5 (H) pg/mL     Basic metabolic panel [[id number]]  (Abnormal) Collected:  04/26/18 0506    Lab Status:  Final result Specimen:  Blood from Arm, Right Updated:  04/26/18 0602     Sodium 137 mmol/L      Potassium 5 2 mmol/L      Chloride 101 mmol/L      CO2 28 mmol/L      Anion Gap 8 mmol/L      BUN 68 (H) mg/dL      Creatinine 11 50 (H) mg/dL      Glucose 57 (L) mg/dL      Calcium 8 5 mg/dL      eGFR 5 ml/min/1 73sq m     Narrative:         National Kidney Disease Education Program recommendations are as follows:  GFR calculation is accurate only with a steady state creatinine  Chronic Kidney disease less than 60 ml/min/1 73 sq  meters  Kidney failure less than 15 ml/min/1 73 sq  meters  Phosphorus [[id number]]  (Abnormal) Collected:  04/26/18 0506    Lab Status:  Final result Specimen:  Blood from Arm, Right Updated:  04/26/18 0602     Phosphorus 5 2 (H) mg/dL     Magnesium [[id number]]  (Normal) Collected:  04/26/18 0506    Lab Status:  Final result Specimen:  Blood from Arm, Right Updated:  04/26/18 0602     Magnesium 2 3 mg/dL     Hemoglobin and hematocrit, blood [[id number]] Collected:  04/26/18 0018    Lab Status:  No result Specimen:  Blood from Arm, Right     Troponin I [[id number]]  (Abnormal) Collected:  04/25/18 1534    Lab Status:  Final result Specimen:  Blood from Line Updated:  04/25/18 1609     Troponin I 0 12 (H) ng/mL     Narrative:         [employer] Chemistry analyzer 99% cutoff is > 0 04 ng/mL in network labs    o cTnI 99% cutoff is useful only when applied to patients in the clinical setting of myocardial ischemia  o cTnI 99% cutoff should be interpreted in the context of clinical history, ECG findings and possibly cardiac imaging to establish correct diagnosis  o cTnI 99% cutoff may be suggestive but clearly not indicative of a coronary event without the clinical setting of myocardial ischemia      Hemoglobin and hematocrit, blood [[id number]]  (Abnormal) Collected:  04/25/18 1534    Lab Status:  Final result Specimen:  Blood from Line Updated:  04/25/18 1554     Hemoglobin 7 0 (L) g/dL      Hematocrit 20 6 (L) %     Comprehensive metabolic panel [[id number]]  (Abnormal) Collected:  04/25/18 1233    Lab Status:  Final result Specimen:  Blood from Arm, Left Updated:  04/25/18 1319     Sodium 138 mmol/L      Potassium 7 0 (HH) mmol/L      Chloride 106 mmol/L      CO2 19 (L) mmol/L      Anion Gap 13 mmol/L       (H) mg/dL      Creatinine 17 00 (H) mg/dL      Glucose 70 mg/dL      Calcium 9 6 mg/dL      AST 25 U/L      ALT 22 U/L      Alkaline Phosphatase 113 U/L      Total Protein 8 2 g/dL      Albumin 3 9 g/dL      Total Bilirubin 0 48 mg/dL      eGFR 3 ml/min/1 73sq m     Narrative:         National Kidney Disease Education Program recommendations are as follows:  GFR calculation is accurate only with a steady state creatinine  Chronic Kidney disease less than 60 ml/min/1 73 sq  meters  Kidney failure less than 15 ml/min/1 73 sq  meters      CBC and differential [[id number]]  (Abnormal) Collected:  04/25/18 1233    Lab Status:  Final result Specimen:  Blood from Arm, Right Updated:  04/25/18 1244     WBC 7 30 Thousand/uL      RBC 3 12 (L) Million/uL      Hemoglobin 9 0 (L) g/dL      Hematocrit 28 1 (L) %      MCV 90 fL      MCH 28 8 pg      MCHC 32 0 g/dL      RDW 15 8 (H) %      MPV 9 3 fL      Platelets 998 Thousands/uL      nRBC 0 /100 WBCs      Neutrophils Relative 73 %      Lymphocytes Relative 18 %      Monocytes Relative 4 %      Eosinophils Relative 5 %      Basophils Relative 0 %      Neutrophils Absolute 5 31 Thousands/µL      Lymphocytes Absolute 1 29 Thousands/µL      Monocytes Absolute 0 30 Thousand/µL      Eosinophils Absolute 0 36 Thousand/µL      Basophils Absolute 0 03 Thousands/µL     Troponin I [[id number]]  (Abnormal) Collected:  04/25/18 1130    Lab Status:  Final result Specimen:  Blood from Arm, Right Updated:  04/25/18 1153     Troponin I 0 14 (H) ng/mL     Narrative:         [employer] Chemistry analyzer 99% cutoff is > 0 04 ng/mL in network labs    o cTnI 99% cutoff is useful only when applied to patients in the clinical setting of myocardial ischemia  o cTnI 99% cutoff should be interpreted in the context of clinical history, ECG findings and possibly cardiac imaging to establish correct diagnosis  o cTnI 99% cutoff may be suggestive but clearly not indicative of a coronary event without the clinical setting of myocardial ischemia  Comprehensive metabolic panel [[id number]]  (Abnormal) Collected:  04/25/18 1045    Lab Status:  Final result Specimen:  Blood from Arm, Right Updated:  04/25/18 1127     Sodium 140 mmol/L      Potassium 7 0 (HH) mmol/L      Chloride 108 mmol/L      CO2 16 (L) mmol/L      Anion Gap 16 (H) mmol/L       (H) mg/dL      Creatinine 17 40 (H) mg/dL      Glucose 80 mg/dL      Calcium 8 6 mg/dL      AST 21 U/L      ALT 18 U/L      Alkaline Phosphatase 101 U/L      Total Protein 6 7 g/dL      Albumin 3 3 (L) g/dL      Total Bilirubin 0 33 mg/dL      eGFR 3 ml/min/1 73sq m     Narrative:         National Kidney Disease Education Program recommendations are as follows:  GFR calculation is accurate only with a steady state creatinine  Chronic Kidney disease less than 60 ml/min/1 73 sq  meters  Kidney failure less than 15 ml/min/1 73 sq  meters  XR chest 2 views   ED Interpretation by Vargas Cantu MD (04/25 1251)   No intrapulmonary pathology      Final Result by Renata Kevin MD (04/25 1315)      1  Enlargement of cardiac silhouette similar to prior study   2  Persistent but partially improved groundglass opacities in the lungs                  Workstation performed: [id number]               Procedures  Procedures      Phone Consults  ED Phone Contact    ED Course  ED Course as of Apr 30 1814 Wed Apr 25, 2018   1159 Laboratory notified other blood draws were hemolyzed  I suspect this is hemolyzed as well   The patient is asymptomatic with normal EKG will redraw and reassess Potassium: (!!) 7 0                               [facility]  Number of Diagnoses or Management Options  ESRD (end stage renal disease) on dialysis [facility]):   GI bleed:   Diagnosis management comments: 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  given the restenosis of the blood and the multiple bright red bowel movements he has had here in the emergency department, would recommend admission for observation and possible GI consultation  Additionally the fact that he recently had a large bleed her insurance she usually he should be typed and screened and monitor closely for the need for additional blood    CritCare Time    Disposition  Final diagnoses:   GI bleed   ESRD (end stage renal disease) on dialysis [facility])     Time reflects when diagnosis was documented in both MDM as applicable and the Disposition within this note     Time User Action Codes Description Comment    4/25/2018  1:04 PM Jd Eldridge [K92 2] GI bleed     4/25/2018  1:51 PM Jd Eldridge [N18 6,  Z99 2] ESRD (end stage renal disease[facility]     4/25/2018  1:51 PM Hermelindo Eldridge [address] [B06 6,  Z99 2] ESRD (end stage renal disease) on dialysis [facility] )     4/25/2018  2:16 PM Nilam Baeza [K62 5] Bright red blood per rectum     4/25/2018  2:16 PM Nilam Christianson Modify [K62 5] Bright red blood per rectum     4/27/2018  4:26 AM Held [facility] tenzin Add [Z86 79] History of supraventricular tachycardia     4/27/2018  6:01 AM HeldDisha [R00 1] Symptomatic bradycardia     4/27/2018  6:04 AM HeldDisha [Z98 890] History of cardiac radiofrequency ablation       ED Disposition     ED Disposition Condition Comment    Admit  Case was discussed with ROSITA and the patient's admission status was agreed to be Admission Status: observation status to the service of Dr Yaniv Smith           Follow-up Information     Follow up With Specialties Details Why Contact Info    [facility]  Follow up Call for appt to be seen in 1-2 weeks for f/u LUE aneurysmal AVF Isidro [address]      Annalise Winston MD Nephrology Follow up call for help with dialysis [address]  [facility] [address] [place] [address]      Mini Carroll  Follow up  [phone]          Discharge Medication List as of 4/30/2018  1:23 PM      START taking these medications    Details   ergocalciferol (VITAMIN D2) 50,000 units Take 1 capsule (50,000 Units total) by mouth once a week for 7 doses, Starting Fri 5/4/2018, Until Sat 6/16/2018, Print      hydrocortisone (ANUSOL-HC, PROCTOSOL HC,) 2 5 % rectal cream Insert into the rectum 4 (four) times a day as needed for hemorrhoids, Starting Mon 4/30/2018, Print      metoprolol tartrate (LOPRESSOR) 25 mg tablet Take 0 5 tablets (12 5 mg total) by mouth every 12 (twelve) hours, Starting Mon 4/30/2018, Print      pantoprazole (PROTONIX) 40 mg tablet Take 1 tablet (40 mg total) by mouth daily in the early morning, Starting Tue 5/1/2018, Print         CONTINUE these medications which have NOT CHANGED    Details   albuterol (PROVENTIL HFA,VENTOLIN HFA) 90 mcg/act inhaler Inhale 2 puffs every 6 (six) hours as needed for wheezing, Starting Sat 4/21/2018, Print      amLODIPine (NORVASC) 10 mg tablet Take 10 mg by mouth daily, Historical Med      calcium acetate (PHOSLO) 667 mg capsule Take 2,001 mg by mouth 3 (three) times a day with meals, Historical Med      cinacalcet (SENSIPAR) 30 mg tablet 30 mg, Starting Tue 12/27/2016, Historical Med      gabapentin (NEURONTIN) 100 mg capsule Take 300 mg by mouth daily  , Historical Med      nicotine (NICODERM CQ) 14 mg/24hr TD 24 hr patch Place 1 patch on the skin daily, Starting Sat 4/21/2018, Print             Outpatient Discharge Orders  Discharge Diet     Activity as tolerated         ED Provider  Attending physically available and evaluated Lenora Mosher I managed the patient along with the ED Attending      Electronically Signed by         Valeriano Dc MD  04/30/18 4047

## 2018-04-30 NOTE — PROGRESS NOTES
NEPHROLOGY PROGRESS NOTE   Benita Umana 56 y o  male MRN: [id number]  [facility] Encounter: [id number]  Reason for Consult: ESRD MWF in [place]; TThSat while inpatient     ASSESSMENT and PLAN:    55 yo male with PMHx of ESRD MWF at [facility] in [place] who moved to [place] 2 weeks ago, HTN, Anemia, DM,AVNRT s/p ablation, concern of non compliance who presents on 4/25 with BRBPR  Nephrology is on board for ESRD       1) ESRD - MWF at [address] [facility] AVF - aneurysmal  Consult Vascular surgery team given size of aneurysmal dilations and elevated    - HD on 5/1  - placement - pt does not have local dialysis unit - await today's findings     2) Anemia - BRBPR     - received transfusion over weekend  Hb 8 8 today from 6 9   - GI on board  - further plan per GI team - colonoscopy with two areas of clipping  - f/u CBC     3) HTN     - amlodipine with hold parameters  - metoprolol started over weekend  - monitor with dialysis     4) MBD - continue current regimen     - Phos 5 2    - PTH 1211  - may need to increase sensipar  Restart med first  - may need to add sevelamer to calcium acetate  - vit D low 7 9 - startedergocalciferol      5) hyperkalemia - improving with dialysis  6) PVC/parox a fib - Cardiology consulted end of last week    - beta blocker started  - cannot start anticoagulation until cleared from GI team    SUBJECTIVE / INTERVAL HISTORY:    Pt denies complaints  Required pRBC over weekend       OBJECTIVE:  Current Weight: Weight - Scale: 72 6 kg (160 lb 0 9 oz)  Vitals:    04/29/18 1909 04/29/18 2000 04/30/18 0100 04/30/18 0700   BP: 151/93  148/72 151/70   BP Location: Right arm  Right arm Right arm   Pulse: 76  69 70   Resp: 18  20 18   Temp: 97 8 °F (36 6 °C)  98 °F (36 7 °C) 98 1 °F (36 7 °C)   TempSrc: Oral  Oral Oral   SpO2:  98% 98% 98%   Weight:       Height:           Intake/Output Summary (Last 24 hours) at 04/30/18 0845  Last data filed at 04/30/18 0521   Gross per 24 hour   Intake 1145 ml   Output                0 ml   Net             1145 ml     General: NAD  Skin: no rash  Eyes: anicteric sclera  ENT: moist mucous membrane  Neck: supple  Chest: CTA b/l  CVS: s1s2  Abdomen: soft, nontender  Extremities: no edema LE  : no tellez  Neuro: AAOX3  Psych: normal affect         Medications:    Current Facility-Administered Medications:     acetaminophen (TYLENOL) tablet 650 mg, 650 mg, Oral, Q8H PRN, Earnestine Lakhani PA-C, 650 mg at 04/25/18 2227    albuterol (PROVENTIL HFA,VENTOLIN HFA) inhaler 2 puff, 2 puff, Inhalation, Q6H PRN, Susan Boo MD    amLODIPine (NORVASC) tablet 10 mg, 10 mg, Oral, Daily, Hudson Briscoe MD, 10 mg at 04/29/18 2916    calcium acetate (PHOSLO) capsule 2,001 mg, 2,001 mg, Oral, TID With Meals, Susan Boo MD, 2,001 mg at 04/29/18 1609    cinacalcet (SENSIPAR) tablet 30 mg, 30 mg, Oral, Daily With Breakfast, Susan Boo MD, 30 mg at 04/29/18 2900    ergocalciferol (VITAMIN D2) capsule 50,000 Units, 50,000 Units, Oral, Weekly, Hudson Briscoe MD, 50,000 Units at 04/27/18 1721    gabapentin (NEURONTIN) capsule 300 mg, 300 mg, Oral, Daily, Susan Boo MD, 300 mg at 04/29/18 0811    hydrocortisone (ANUSOL-HC, PROCTOSOL HC)) 2 5 % rectal cream, , Rectal, 4x Daily PRN, Susan Boo MD    HYDROmorphone (DILAUDID) injection 0 5 mg, 0 5 mg, Intravenous, Q3H PRN, Jackson Julian MD    labetalol (NORMODYNE) injection 10 mg, 10 mg, Intravenous, Q4H PRN, Susan Boo MD    melatonin tablet 3 mg, 3 mg, Oral, HS PRN, Disha Newell PA-C, 3 mg at 04/29/18 2222    metoprolol tartrate (LOPRESSOR) partial tablet 12 5 mg, 12 5 mg, Oral, Q12H [address], Sulaiman Showers, MD, 12 5 mg at 04/29/18 2219    nicotine (NICODERM CQ) 21 mg/24 hr TD 24 hr patch 21 mg, 21 mg, Transdermal, Daily, Susan Boo MD, 21 mg at 04/29/18 0811    pantoprazole (PROTONIX) EC tablet 40 mg, 40 mg, Oral, Early Morning, Susan Boo MD, 40 mg at 04/30/18 0544    Laboratory Results:    Results from last 7 days  Lab Units 04/29/18  0509 04/28/18  0509 04/27/18  0447 04/26/18  2107 04/26/18  1352 04/26/18  0506 04/26/18  0017  04/25/18  1233 04/25/18  1045   WBC Thousand/uL 4 69 3 90* 4 08*  --   --  3 51*  --   --  7 30  --    HEMOGLOBIN g/dL 8 8* 6 9* 7 6* 8 7* 8 2* 6 8* 7 0*  < > 9 0*  --    HEMATOCRIT % 25 7* 20 7* 22 3* 26 2* 24 8* 21 1* 20 5*  < > 28 1*  --    PLATELETS Thousands/uL 178 173 183  --   --  158  --   --  229  --    SODIUM mmol/L 135* 133* 134*  --   --  137  --   --  138 140   POTASSIUM mmol/L 4 6 5 0 4 6  --   --  5 2  --   --  7 0* 7 0*   CHLORIDE mmol/L 98* 95* 95*  --   --  101  --   --  106 108   CO2 mmol/L 29 27 27  --   --  28  --   --  19* 16*   BUN mg/dL 43* 66* 42*  --   --  68*  --   --  124* 124*   CREATININE mg/dL 8 95* 11 50* 8 50*  --   --  11 50*  --   --  17 00* 17 40*   CALCIUM mg/dL 8 5 7 9* 8 2*  --   --  8 5  --   --  9 6 8 6   MAGNESIUM mg/dL 2 2  --  2 1  --   --  2 3  --   --   --   --    PHOSPHORUS mg/dL  --   --   --   --   --  5 2*  --   --   --   --    TOTAL PROTEIN g/dL 6 5  --   --   --   --   --   --   --  8 2 6 7   GLUCOSE RANDOM mg/dL 87 120 55*  --   --  57*  --   --  70 80   < > = values in this interval not displayed

## 2018-04-30 NOTE — ASSESSMENT & PLAN NOTE
ESRD on HD w/ medical noncompliance currently dialyzing via LUE AVF  Hx of Right radiocephalic AVF which failed to mature    Pt recently moved to [place] and has not established new dialysis unit and has been medically noncompliant with receiving dialysis treatments    Plan:  --HD as per nephrology team

## 2018-04-30 NOTE — PROGRESS NOTES
Progress Note - Lenora Mosher 56 y o  male MRN: [id number]    Unit/Bed#: [facility] 928-01 Encounter: [id number]         Assessment/ Plan:  Hematochezia  Cecal ulcers    S/p colonoscopy 4/27 showing 2 cecal ulcers treated w/ clips  No further bleeding  Hbg 7 6->6 9->8 8 after 1 unit  Hbg was to be repeated today but pt refusing to get stuck again  He states he wants to leave, AMA if necessary  I would have liked the repeat Hbg today but since he is refusing & no overt bleeding, he is ok to be d/c'ed  Subjective:   Pt seen & examined  No abd pain  Tolerating diet  No rectal bleeding  Objective:     Vitals: Blood pressure 151/70, pulse 70, temperature 98 1 °F (36 7 °C), temperature source Oral, resp  rate 18, height 5' 6" (1 676 m), weight 72 6 kg (160 lb 0 9 oz), SpO2 98 %  ,Body mass index is 25 83 kg/m²          Physical Exam: General appearance: alert and oriented, in no acute distress  Lungs: clear to auscultation bilaterally  Heart: regular rate and rhythm  Abdomen: soft, non-tender; bowel sounds normal; no masses,  no organomegaly  Skin: Skin color, texture, turgor normal  No rashes or lesions     Invasive Devices     Peripheral Intravenous Line            Peripheral IV 04/27/18 Right Hand 3 days          Line            Hemodialysis AV Fistula Left -- days

## 2018-05-01 NOTE — CASE MANAGEMENT
Notification of Discharge  This is a Notification of Discharge from our facility [address]  Please be advised that this patient has been discharge from our facility  Below you will find the admission and discharge date and time including the patients disposition  PRESENTATION DATE: 4/25/2018 10:18 AM  IP ADMISSION DATE: 4/26/18 1425  DISCHARGE DATE: 4/30/2018  2:38 PM  DISPOSITION: [facility] in the [facility] by Michele Sr for 2017  Network Utilization Review Department  Phone: [phone]; Fax [phone]  ATTENTION: The Network Utilization Review Department is now centralized for our 7 Facilities  Make a note that we have a new phone and fax numbers for our Department  Please call with any questions or concerns to [phone] and carefully follow the prompts so that you are directed to the right person  All voicemails are confidential  Fax any determinations, approvals, denials, and requests for initial or continue stay review clinical to [phone]  Due to HIGH CALL volume, it would be easier if you could please send faxed requests to expedite your requests and in part, help us provide discharge notifications faster

## 2018-05-02 NOTE — CASE MANAGEMENT
Notification of Discharge  This is a Notification of Discharge from our facility [facility]  Please be advised that this patient has been discharge from our facility  Below you will find the admission and discharge date and time including the patients disposition  PRESENTATION DATE: 4/17/2018  5:20 PM  IP ADMISSION DATE: 4/17/18 2200  DISCHARGE DATE: 4/21/2018  4:29 PM  DISPOSITION: [id number] in the [employer] by Michele Sr for 2017  Network Utilization Review Department  Phone: [phone]; Fax [phone]  ATTENTION: The Network Utilization Review Department is now centralized for our 7 Facilities  Make a note that we have a new phone and fax numbers for our Department  Please call with any questions or concerns to [phone] and carefully follow the prompts so that you are directed to the right person  All voicemails are confidential  Fax any determinations, approvals, denials, and requests for initial or continue stay review clinical to [phone]  Due to HIGH CALL volume, it would be easier if you could please send faxed requests to expedite your requests and in part, help us provide discharge notifications faster

## 2018-05-06 ENCOUNTER — APPOINTMENT (EMERGENCY)
Dept: RADIOLOGY | Facility: HOSPITAL | Age: 58
End: 2018-05-06
Payer: MEDICARE

## 2018-05-06 ENCOUNTER — HOSPITAL ENCOUNTER (EMERGENCY)
Facility: HOSPITAL | Age: 58
Discharge: HOME | End: 2018-05-06
Attending: EMERGENCY MEDICINE
Payer: MEDICARE

## 2018-05-06 VITALS
BODY MASS INDEX: 25.39 KG/M2 | HEIGHT: 66 IN | HEART RATE: 76 BPM | SYSTOLIC BLOOD PRESSURE: 188 MMHG | DIASTOLIC BLOOD PRESSURE: 88 MMHG | RESPIRATION RATE: 18 BRPM | OXYGEN SATURATION: 99 % | WEIGHT: 158 LBS | TEMPERATURE: 97.6 F

## 2018-05-06 DIAGNOSIS — D64.9 ANEMIA: Primary | ICD-10-CM

## 2018-05-06 LAB
ALBUMIN SERPL BCP-MCNC: 3.3 G/DL (ref 3.5–5)
ALP SERPL-CCNC: 91 U/L (ref 46–116)
ALT SERPL W P-5'-P-CCNC: 15 U/L (ref 12–78)
ANION GAP SERPL CALCULATED.3IONS-SCNC: 10 MMOL/L (ref 4–13)
AST SERPL W P-5'-P-CCNC: 18 U/L (ref 5–45)
ATRIAL RATE: 74 BPM
BASOPHILS # BLD AUTO: 0.04 THOUSANDS/ΜL (ref 0–0.1)
BASOPHILS NFR BLD AUTO: 1 % (ref 0–1)
BILIRUB SERPL-MCNC: 0.46 MG/DL (ref 0.2–1)
BUN SERPL-MCNC: 89 MG/DL (ref 5–25)
CALCIUM SERPL-MCNC: 8.7 MG/DL (ref 8.3–10.1)
CHLORIDE SERPL-SCNC: 101 MMOL/L (ref 100–108)
CO2 SERPL-SCNC: 28 MMOL/L (ref 21–32)
CREAT SERPL-MCNC: 16.2 MG/DL (ref 0.6–1.3)
EOSINOPHIL # BLD AUTO: 0.32 THOUSAND/ΜL (ref 0–0.61)
EOSINOPHIL NFR BLD AUTO: 7 % (ref 0–6)
ERYTHROCYTE [DISTWIDTH] IN BLOOD BY AUTOMATED COUNT: 15.3 % (ref 11.6–15.1)
GFR SERPL CREATININE-BSD FRML MDRD: 3 ML/MIN/1.73SQ M
GLUCOSE SERPL-MCNC: 137 MG/DL (ref 65–140)
HCT VFR BLD AUTO: 23.8 % (ref 36.5–49.3)
HGB BLD-MCNC: 7.7 G/DL (ref 12–17)
LYMPHOCYTES # BLD AUTO: 1.27 THOUSANDS/ΜL (ref 0.6–4.47)
LYMPHOCYTES NFR BLD AUTO: 26 % (ref 14–44)
MCH RBC QN AUTO: 29.3 PG (ref 26.8–34.3)
MCHC RBC AUTO-ENTMCNC: 32.4 G/DL (ref 31.4–37.4)
MCV RBC AUTO: 91 FL (ref 82–98)
MONOCYTES # BLD AUTO: 0.3 THOUSAND/ΜL (ref 0.17–1.22)
MONOCYTES NFR BLD AUTO: 6 % (ref 4–12)
NEUTROPHILS # BLD AUTO: 2.94 THOUSANDS/ΜL (ref 1.85–7.62)
NEUTS SEG NFR BLD AUTO: 60 % (ref 43–75)
NRBC BLD AUTO-RTO: 0 /100 WBCS
NT-PROBNP SERPL-MCNC: ABNORMAL PG/ML
P AXIS: 78 DEGREES
PLATELET # BLD AUTO: 189 THOUSANDS/UL (ref 149–390)
PMV BLD AUTO: 8.5 FL (ref 8.9–12.7)
POTASSIUM SERPL-SCNC: 5.4 MMOL/L (ref 3.5–5.3)
PR INTERVAL: 184 MS
PROT SERPL-MCNC: 7 G/DL (ref 6.4–8.2)
QRS AXIS: 71 DEGREES
QRSD INTERVAL: 94 MS
QT INTERVAL: 408 MS
QTC INTERVAL: 452 MS
RBC # BLD AUTO: 2.63 MILLION/UL (ref 3.88–5.62)
SODIUM SERPL-SCNC: 139 MMOL/L (ref 136–145)
T WAVE AXIS: 65 DEGREES
TROPONIN I SERPL-MCNC: 0.02 NG/ML
VENTRICULAR RATE: 74 BPM
WBC # BLD AUTO: 4.88 THOUSAND/UL (ref 4.31–10.16)

## 2018-05-06 PROCEDURE — 84484 ASSAY OF TROPONIN QUANT: CPT | Performed by: EMERGENCY MEDICINE

## 2018-05-06 PROCEDURE — 36415 COLL VENOUS BLD VENIPUNCTURE: CPT

## 2018-05-06 PROCEDURE — 85025 COMPLETE CBC W/AUTO DIFF WBC: CPT | Performed by: EMERGENCY MEDICINE

## 2018-05-06 PROCEDURE — 83880 ASSAY OF NATRIURETIC PEPTIDE: CPT | Performed by: EMERGENCY MEDICINE

## 2018-05-06 PROCEDURE — 93010 ELECTROCARDIOGRAM REPORT: CPT | Performed by: INTERNAL MEDICINE

## 2018-05-06 PROCEDURE — 99284 EMERGENCY DEPT VISIT MOD MDM: CPT

## 2018-05-06 PROCEDURE — 93005 ELECTROCARDIOGRAM TRACING: CPT | Performed by: EMERGENCY MEDICINE

## 2018-05-06 PROCEDURE — 80053 COMPREHEN METABOLIC PANEL: CPT | Performed by: EMERGENCY MEDICINE

## 2018-05-06 NOTE — ED PROVIDER NOTES
History  Chief Complaint   Patient presents with    Abnormal Lab     Pt "They said that they wanted me to come here b/c my hemoglobin is too low  I got a blood transfusion last Thur and then I was out on Sat  And then I go to [facility] and they told me to come here "      57-year-old male with history of ESRD on HD presenting for evaluation of abnormal outpatient labs  Patient follows with nephrologist and gets dialysis in [address] [place] [address]  He reports that he missed dialysis last Wednesday, therefore was dialyzed on Thursday and subsequently missed dialysis on Friday  He had lab work that was done when he was at dialysis on Thursday that reportedly showed a low hemoglobin  He was told these result on Friday and instructed to go to the ED, however did not want to come until today  He reports that he has a history of anemia and he was recently admitted for a GI bleed  Patient reports that during that admission he was transfused 1 unit of blood and he had a colonoscopy showing cecal ulcers that were treated with clips  Since discharge, he denies any dark or bloody stools  He does report that he still feels  tired/malaise and gets cold very easily  Patient is not on any blood thinners  Patient denies any fevers, chills, CP, SOB, abdominal pain, nausea, vomiting, changes in stool or urinary complaints  Patient is due to be dialyzed tomorrow  A/P:  57-year-old male with outpatient labs showing anemia  On recheck of hemoglobin today, hemoglobin is 7 7 (on review of records, patient was 8 8 on 04/29 and 6 9 on 04/28)- Hemoccult negative on exam   I do not believe that patient requires transfusion at this time and given that he is due for dialysis, feel that the risk of giving blood/fluids is higher than benefit at this time    No indications for emergent dialysis at this time, stressed to patient that he needs to follow up to get dialyzed as usual tomorrow               Prior to Admission Medications Prescriptions Last Dose Informant Patient Reported? Taking? albuterol (PROVENTIL HFA,VENTOLIN HFA) 90 mcg/act inhaler   No No   Sig: Inhale 2 puffs every 6 (six) hours as needed for wheezing   amLODIPine (NORVASC) 10 mg tablet   Yes No   Sig: Take 10 mg by mouth daily   calcium acetate (PHOSLO) 667 mg capsule   Yes No   Sig: Take 2,001 mg by mouth 3 (three) times a day with meals   cinacalcet (SENSIPAR) 30 mg tablet   Yes No   Si mg   ergocalciferol (VITAMIN D2) 50,000 units   No No   Sig: Take 1 capsule (50,000 Units total) by mouth once a week for 7 doses   gabapentin (NEURONTIN) 100 mg capsule   Yes No   Sig: Take 300 mg by mouth daily     hydrocortisone (ANUSOL-HC, PROCTOSOL HC,) 2 5 % rectal cream   No No   Sig: Insert into the rectum 4 (four) times a day as needed for hemorrhoids   metoprolol tartrate (LOPRESSOR) 25 mg tablet   No No   Sig: Take 0 5 tablets (12 5 mg total) by mouth every 12 (twelve) hours   nicotine (NICODERM CQ) 14 mg/24hr TD 24 hr patch   No No   Sig: Place 1 patch on the skin daily   pantoprazole (PROTONIX) 40 mg tablet   No No   Sig: Take 1 tablet (40 mg total) by mouth daily in the early morning      Facility-Administered Medications: None       Past Medical History:   Diagnosis Date    CHF (congestive heart failure) (HCC)     [facility]ialysis patient ([facility] [address] )     Hypertension     Renal disorder        Past Surgical History:   Procedure Laterality Date    AV NODE ABLATION      CHOLECYSTECTOMY      COLONOSCOPY N/A 2018    Procedure: COLONOSCOPY;  Surgeon: Joseline Hernandez DO;  Location: [facility]; Service: Gastroenterology       No family history on file  I have reviewed and agree with the history as documented      Social History   Substance Use Topics    Smoking status: Current Every Day Smoker     Packs/day: 1 00     Types: Cigarettes    Smokeless tobacco: Never Used    Alcohol use Yes      Comment: occasional        Review of Systems   Constitutional: Positive for fatigue  Negative for chills, fever and unexpected weight change  HENT: Negative for rhinorrhea and sore throat  Respiratory: Negative for cough and shortness of breath  Cardiovascular: Negative for chest pain and leg swelling  Gastrointestinal: Negative for abdominal pain, blood in stool, constipation, diarrhea, nausea and vomiting  Genitourinary: Negative for dysuria, hematuria and urgency  Musculoskeletal: Negative for back pain and neck pain  Skin: Negative for color change and rash  Allergic/Immunologic: Negative for environmental allergies and immunocompromised state  Neurological: Negative for dizziness, weakness, light-headedness, numbness and headaches  Hematological: Negative for adenopathy  Does not bruise/bleed easily  Psychiatric/Behavioral: Negative for agitation and confusion  All other systems reviewed and are negative  Physical Exam  ED Triage Vitals [05/06/18 1232]   Temperature Pulse Respirations Blood Pressure SpO2   97 6 °F (36 4 °C) 74 20 (!) 199/89 99 %      Temp Source Heart Rate Source Patient Position - Orthostatic VS BP Location FiO2 (%)   Oral Monitor Sitting Right arm --      Pain Score       No Pain           Orthostatic Vital Signs  Vitals:    05/06/18 1232 05/06/18 1438   BP: (!) 199/89 (!) 188/88   Pulse: 74 76   Patient Position - Orthostatic VS: Sitting        Physical Exam   Constitutional: He is oriented to person, place, and time  He appears well-developed and well-nourished  HENT:   Head: Normocephalic and atraumatic  Mouth/Throat: Oropharynx is clear and moist    Eyes: Conjunctivae and EOM are normal    Neck: Normal range of motion  Neck supple  Cardiovascular: Normal rate, regular rhythm, normal heart sounds and intact distal pulses  Pulmonary/Chest: Effort normal and breath sounds normal  No respiratory distress  He has no wheezes  He has no rales  Abdominal: Soft  He exhibits no distension  There is no tenderness   There is no rebound and no guarding  Genitourinary: Rectal exam shows guaiac negative stool  Genitourinary Comments: Chaperone:  Maribel, ED tech   Musculoskeletal: He exhibits no edema or deformity  Neurological: He is alert and oriented to person, place, and time  Coordination normal    Skin: Skin is warm and dry  Psychiatric: He has a normal mood and affect  Thought content normal    Nursing note and vitals reviewed  ED Medications  Medications - No data to display    Diagnostic Studies  Results Reviewed     Procedure Component Value Units Date/Time    Comprehensive metabolic panel [[id number]]  (Abnormal) Collected:  05/06/18 1241    Lab Status:  Final result Specimen:  Blood from Arm, Left Updated:  05/06/18 1325     Sodium 139 mmol/L      Potassium 5 4 (H) mmol/L      Chloride 101 mmol/L      CO2 28 mmol/L      Anion Gap 10 mmol/L      BUN 89 (H) mg/dL      Creatinine 16 20 (H) mg/dL      Glucose 137 mg/dL      Calcium 8 7 mg/dL      AST 18 U/L      ALT 15 U/L      Alkaline Phosphatase 91 U/L      Total Protein 7 0 g/dL      Albumin 3 3 (L) g/dL      Total Bilirubin 0 46 mg/dL      eGFR 3 ml/min/1 73sq m     Narrative:         National Kidney Disease Education Program recommendations are as follows:  GFR calculation is accurate only with a steady state creatinine  Chronic Kidney disease less than 60 ml/min/1 73 sq  meters  Kidney failure less than 15 ml/min/1 73 sq  meters      B-type natriuretic peptide [[id number]]  (Abnormal) Collected:  05/06/18 1241    Lab Status:  Final result Specimen:  Blood from Arm, Left Updated:  05/06/18 1325     NT-proBNP 130,223 (H) pg/mL     Troponin I [[id number]]  (Normal) Collected:  05/06/18 1241    Lab Status:  Final result Specimen:  Blood from Arm, Left Updated:  05/06/18 1315     Troponin I 0 02 ng/mL     Narrative:         Siemens Chemistry analyzer 99% cutoff is > 0 04 ng/mL in network labs    o cTnI 99% cutoff is useful only when applied to patients in the clinical setting of myocardial ischemia  o cTnI 99% cutoff should be interpreted in the context of clinical history, ECG findings and possibly cardiac imaging to establish correct diagnosis  o cTnI 99% cutoff may be suggestive but clearly not indicative of a coronary event without the clinical setting of myocardial ischemia      CBC and differential [[id number]]  (Abnormal) Collected:  05/06/18 1241    Lab Status:  Final result Specimen:  Blood from Arm, Left Updated:  05/06/18 1258     WBC 4 88 Thousand/uL      RBC 2 63 (L) Million/uL      Hemoglobin 7 7 (L) g/dL      Hematocrit 23 8 (L) %      MCV 91 fL      MCH 29 3 pg      MCHC 32 4 g/dL      RDW 15 3 (H) %      MPV 8 5 (L) fL      Platelets 564 Thousands/uL      nRBC 0 /100 WBCs      Neutrophils Relative 60 %      Lymphocytes Relative 26 %      Monocytes Relative 6 %      Eosinophils Relative 7 (H) %      Basophils Relative 1 %      Neutrophils Absolute 2 94 Thousands/µL      Lymphocytes Absolute 1 27 Thousands/µL      Monocytes Absolute 0 30 Thousand/µL      Eosinophils Absolute 0 32 Thousand/µL      Basophils Absolute 0 04 Thousands/µL                  No orders to display         Procedures  ECG 12 Lead Documentation  Date/Time: 5/6/2018 5:15 PM  Performed by: Cher SALAS  Authorized by: Evelia VERA     ECG reviewed by me, the ED Provider: yes    Patient location:  ED  Previous ECG:     Previous ECG:  Compared to current    Comparison ECG info:  4/27/18    Similarity:  No change  Rate:     ECG rate:  74  Rhythm:     Rhythm: sinus rhythm    Ectopy:     Ectopy: none    QRS:     QRS axis:  Normal  Conduction:     Conduction: normal    ST segments:     ST segments:  Normal          Phone Consults  ED Phone Contact    ED Course                               MDM  Number of Diagnoses or Management Options  Anemia:   Diagnosis management comments: Anemia- likely chronic with history of end-stage renal disease, no evidence of acute bleeding at this time, no indication for transfusion  I discussed with the patient to have this followed up outpatient  No indications for dialysis at this time, patient will get dialysis tomorrow  Strict return precautions were discussed and patient expressed understanding       Amount and/or Complexity of Data Reviewed  Clinical lab tests: ordered and reviewed  Tests in the medicine section of CPT®: ordered and reviewed      CritCare Time    Disposition  Final diagnoses:   Anemia     Time reflects when diagnosis was documented in both MDM as applicable and the Disposition within this note     Time User Action Codes Description Comment    5/6/2018  2:33 PM Macrina Shames Add [D64 9] Anemia       ED Disposition     ED Disposition Condition Comment    Discharge  Joaquín Murray discharge to home/self care  Condition at discharge: Stable        Follow-up Information     Follow up With Specialties Details Why Contact Info    Ligia Ugalde    [address] [id number]          Please get dialysis tomorrow  Return to ED if you have any new/worsening symptoms concerning to you           Discharge Medication List as of 5/6/2018  2:34 PM      CONTINUE these medications which have NOT CHANGED    Details   albuterol (PROVENTIL HFA,VENTOLIN HFA) 90 mcg/act inhaler Inhale 2 puffs every 6 (six) hours as needed for wheezing, Starting Sat 4/21/2018, Print      amLODIPine (NORVASC) 10 mg tablet Take 10 mg by mouth daily, Historical Med      calcium acetate (PHOSLO) 667 mg capsule Take 2,001 mg by mouth 3 (three) times a day with meals, Historical Med      cinacalcet (SENSIPAR) 30 mg tablet 30 mg, Starting Tue 12/27/2016, Historical Med      ergocalciferol (VITAMIN D2) 50,000 units Take 1 capsule (50,000 Units total) by mouth once a week for 7 doses, Starting Fri 5/4/2018, Until Sat 6/16/2018, Print      gabapentin (NEURONTIN) 100 mg capsule Take 300 mg by mouth daily  , Historical Med      hydrocortisone (ANUSOL-HC, PROCTOSOL HC,) 2 5 % rectal cream Insert into the rectum 4 (four) times a day as needed for hemorrhoids, Starting Mon 4/30/2018, Print      metoprolol tartrate (LOPRESSOR) 25 mg tablet Take 0 5 tablets (12 5 mg total) by mouth every 12 (twelve) hours, Starting Mon 4/30/2018, Print      nicotine (NICODERM CQ) 14 mg/24hr TD 24 hr patch Place 1 patch on the skin daily, Starting Sat 4/21/2018, Print      pantoprazole (PROTONIX) 40 mg tablet Take 1 tablet (40 mg total) by mouth daily in the early morning, Starting Tue 5/1/2018, Print           No discharge procedures on file  ED Provider  Attending physically available and evaluated Carolyn Berry I managed the patient along with the ED Attending      Electronically Signed by         Cornell Johnson DO  05/06/18 8632

## 2018-05-06 NOTE — DISCHARGE INSTRUCTIONS
Anemia   WHAT YOU NEED TO KNOW:   Anemia is a low number of red blood cells or a low amount of hemoglobin in your red blood cells  Hemoglobin is a protein that helps carry oxygen throughout your body  Red blood cells use iron to create hemoglobin  Anemia may develop if your body does not have enough iron  It may also develop if your body does not make enough red blood cells or they die faster than your body can make them  DISCHARGE INSTRUCTIONS:   Call 911 or have someone call 911 for any of the following:   · You lose consciousness  · You have severe chest pain  Return to the emergency department if:   · You have dark or bloody bowel movements  Contact your healthcare provider if:   · Your symptoms are worse, even after treatment  · You have questions or concerns about your condition or care  Medicines:   · Iron or folic acid supplements  help increase your red blood cell and hemoglobin levels  · Vitamin B12 injections  may help boost your red blood cell level and decrease your symptoms  Ask your healthcare provider how to inject B12  · Take your medicine as directed  Contact your healthcare provider if you think your medicine is not helping or if you have side effects  Tell him of her if you are allergic to any medicine  Keep a list of the medicines, vitamins, and herbs you take  Include the amounts, and when and why you take them  Bring the list or the pill bottles to follow-up visits  Carry your medicine list with you in case of an emergency  Prevent anemia:  Eat healthy foods rich in iron and vitamin C  Nuts, meat, dark leafy green vegetables, and beans are high in iron and protein  Vitamin C helps your body absorb iron  Foods rich in vitamin C include oranges and other citrus fruits  Ask your healthcare provider for a list of other foods that are high in iron or vitamin C  Ask if you need to be on a special diet     Follow up with your healthcare provider as directed:  Write down your questions so you remember to ask them during your visits  © 2017 [employer] Information is for End User's use only and may not be sold, redistributed or otherwise used for commercial purposes  All illustrations and images included in CareNotes® are the copyrighted property of [employer]  or [address]  The above information is an  only  It is not intended as medical advice for individual conditions or treatments  Talk to your doctor, nurse or pharmacist before following any medical regimen to see if it is safe and effective for you

## 2018-05-06 NOTE — ED ATTENDING ATTESTATION
Alma Stone MD, saw and evaluated the patient  I have discussed the patient with the resident/non-physician practitioner and agree with the resident's/non-physician practitioner's findings, Plan of Care, and MDM as documented in the resident's/non-physician practitioner's note, except where noted  All available labs and Radiology studies were reviewed  At this point I agree with the current assessment done in the Emergency Department  I have conducted an independent evaluation of this patient including a focused history and a physical exam     53-year-old male, end-stage renal disease, recent admission to the hospital for symptomatic a Wreath media which was treated with ablation, and GI bleed, underwent colonoscopy, cauterization of colonic ulcers, and transfusion of 1 unit of packed cells during that admission  Patient followed up with routine outpatient dialysis on Thursday after missing his dialysis treatment on Wednesday, and had blood work performed at that time  He received a call on Friday stating that his blood count was low  Patient reports that he has been a little bit weak  No significant shortness of breath, chest pain, cough, abdominal pain, black or bloody stools  Ten systems reviewed negative except as noted in the history of present illness  The patient is resting comfortably on a stretcher in no acute respiratory distress  The patient appears nontoxic  HEENT reveals moist mucous membranes  Head is normocephalic and atraumatic  Conjunctiva and sclera are normal  Neck is nontender and supple with full range of motion to flexion, extension, lateral rotation  No meningismus appreciated  No masses are appreciated  Lungs are clear to auscultation bilaterally without any wheezes, rales or rhonchi  Heart is regular rate and rhythm without any murmurs, rubs or gallops  Abdomen is soft and nontender without any rebound or guarding   Extremities appear grossly normal without any significant arthropathy  Patient is awake, alert, and oriented x3  The patient has normal interaction  Motor is 5 out of 5  Repeat blood pressure in the room at the time of my evaluation demonstrated a systolic blood pressure of 175  Assessment and plan:  56-year-old male referred to the emergency department for evaluation of anemia  Blood work  Labs Reviewed   COMPREHENSIVE METABOLIC PANEL - Abnormal        Result Value Ref Range Status    Sodium 139  136 - 145 mmol/L Final    Potassium 5 4 (*) 3 5 - 5 3 mmol/L Final    Chloride 101  100 - 108 mmol/L Final    CO2 28  21 - 32 mmol/L Final    Anion Gap 10  4 - 13 mmol/L Final    BUN 89 (*) 5 - 25 mg/dL Final    Creatinine 16 20 (*) 0 60 - 1 30 mg/dL Final    Comment: Standardized to IDMS reference method    Glucose 137  65 - 140 mg/dL Final    Comment:   If the patient is fasting, the ADA then defines impaired fasting glucose as > 100 mg/dL and diabetes as > or equal to 123 mg/dL  Specimen collection should occur prior to Sulfasalazine administration due to the potential for falsely depressed results  Specimen collection should occur prior to Sulfapyridine administration due to the potential for falsely elevated results  Calcium 8 7  8 3 - 10 1 mg/dL Final    AST 18  5 - 45 U/L Final    Comment:   Specimen collection should occur prior to Sulfasalazine administration due to the potential for falsely depressed results  ALT 15  12 - 78 U/L Final    Comment:   Specimen collection should occur prior to Sulfasalazine and/or Sulfapyridine administration due to the potential for falsely depressed results       Alkaline Phosphatase 91  46 - 116 U/L Final    Total Protein 7 0  6 4 - 8 2 g/dL Final    Albumin 3 3 (*) 3 5 - 5 0 g/dL Final    Total Bilirubin 0 46  0 20 - 1 00 mg/dL Final    eGFR 3  ml/min/1 73sq m Final    Narrative:     National Kidney Disease Education Program recommendations are as follows:  GFR calculation is accurate only with a steady state creatinine  Chronic Kidney disease less than 60 ml/min/1 73 sq  meters  Kidney failure less than 15 ml/min/1 73 sq  meters  CBC AND DIFFERENTIAL - Abnormal     WBC 4 88  4 31 - 10 16 Thousand/uL Final    RBC 2 63 (*) 3 88 - 5 62 Million/uL Final    Hemoglobin 7 7 (*) 12 0 - 17 0 g/dL Final    Hematocrit 23 8 (*) 36 5 - 49 3 % Final    MCV 91  82 - 98 fL Final    MCH 29 3  26 8 - 34 3 pg Final    MCHC 32 4  31 4 - 37 4 g/dL Final    RDW 15 3 (*) 11 6 - 15 1 % Final    MPV 8 5 (*) 8 9 - 12 7 fL Final    Platelets 244  198 - 390 Thousands/uL Final    nRBC 0  /100 WBCs Final    Neutrophils Relative 60  43 - 75 % Final    Lymphocytes Relative 26  14 - 44 % Final    Monocytes Relative 6  4 - 12 % Final    Eosinophils Relative 7 (*) 0 - 6 % Final    Basophils Relative 1  0 - 1 % Final    Neutrophils Absolute 2 94  1 85 - 7 62 Thousands/µL Final    Lymphocytes Absolute 1 27  0 60 - 4 47 Thousands/µL Final    Monocytes Absolute 0 30  0 17 - 1 22 Thousand/µL Final    Eosinophils Absolute 0 32  0 00 - 0 61 Thousand/µL Final    Basophils Absolute 0 04  0 00 - 0 10 Thousands/µL Final   NT-BNP PRO (BRAIN NATRIURETIC PEPTIDE) - Abnormal     NT-proBNP 130,223 (*) <125 pg/mL Final   TROPONIN I - Normal    Troponin I 0 02  <=0 04 ng/mL Final    Narrative:     [employer] Chemistry analyzer 99% cutoff is > 0 04 ng/mL in network labs    o cTnI 99% cutoff is useful only when applied to patients in the clinical setting of myocardial ischemia  o cTnI 99% cutoff should be interpreted in the context of clinical history, ECG findings and possibly cardiac imaging to establish correct diagnosis  o cTnI 99% cutoff may be suggestive but clearly not indicative of a coronary event without the clinical setting of myocardial ischemia  LIGHT BLUE TOP   LAVENDER TOP 7 ML ON HOLD     Patient is found to be slightly anemic with a hemoglobin of 7 7 which is a drop of approximately 1 g since the time of discharge   Patient is guaiac negative at the time of evaluation, and at this point does not require transfusion  Patient found to have slightly elevated potassium of 5 4  EKG was reviewed and demonstrates no acute change from his previous EKG me that he plans to be dialyzed tomorrow morning  At this point I believe that the patient is hemodynamically stable, safe for discharge with plan for dialysis in the morning, and follow up with GI as previously scheduled

## 2018-05-22 ENCOUNTER — HOSPITAL ENCOUNTER (INPATIENT)
Facility: HOSPITAL | Age: 58
LOS: 1 days | Discharge: HOME | DRG: 393 | End: 2018-05-24
Attending: EMERGENCY MEDICINE | Admitting: FAMILY MEDICINE
Payer: MEDICARE

## 2018-05-22 DIAGNOSIS — N18.6 ESRD (END STAGE RENAL DISEASE) (HCC): ICD-10-CM

## 2018-05-22 DIAGNOSIS — D64.9 ANEMIA: ICD-10-CM

## 2018-05-22 DIAGNOSIS — K92.2 LOWER GI BLEED: Primary | ICD-10-CM

## 2018-05-22 PROBLEM — Z72.0 NICOTINE ABUSE: Status: ACTIVE | Noted: 2018-05-22

## 2018-05-22 PROBLEM — Z99.2 ESRD ON HEMODIALYSIS (HCC): Status: ACTIVE | Noted: 2018-05-22

## 2018-05-22 LAB
ABO GROUP BLD: NORMAL
ALBUMIN SERPL BCP-MCNC: 3 G/DL (ref 3.5–5)
ALP SERPL-CCNC: 107 U/L (ref 46–116)
ALT SERPL W P-5'-P-CCNC: 16 U/L (ref 12–78)
ANION GAP SERPL CALCULATED.3IONS-SCNC: 12 MMOL/L (ref 4–13)
APTT PPP: 47 SECONDS (ref 24–36)
AST SERPL W P-5'-P-CCNC: 19 U/L (ref 5–45)
BASOPHILS # BLD AUTO: 0.03 THOUSANDS/ΜL (ref 0–0.1)
BASOPHILS NFR BLD AUTO: 1 % (ref 0–1)
BILIRUB SERPL-MCNC: 0.33 MG/DL (ref 0.2–1)
BLD GP AB SCN SERPL QL: NEGATIVE
BUN SERPL-MCNC: 84 MG/DL (ref 5–25)
CALCIUM SERPL-MCNC: 8.1 MG/DL (ref 8.3–10.1)
CHLORIDE SERPL-SCNC: 102 MMOL/L (ref 100–108)
CO2 SERPL-SCNC: 25 MMOL/L (ref 21–32)
CREAT SERPL-MCNC: 15.2 MG/DL (ref 0.6–1.3)
EOSINOPHIL # BLD AUTO: 0.27 THOUSAND/ΜL (ref 0–0.61)
EOSINOPHIL NFR BLD AUTO: 6 % (ref 0–6)
ERYTHROCYTE [DISTWIDTH] IN BLOOD BY AUTOMATED COUNT: 17.6 % (ref 11.6–15.1)
GFR SERPL CREATININE-BSD FRML MDRD: 4 ML/MIN/1.73SQ M
GLUCOSE SERPL-MCNC: 111 MG/DL (ref 65–140)
HCT VFR BLD AUTO: 24.1 % (ref 36.5–49.3)
HGB BLD-MCNC: 7.9 G/DL (ref 12–17)
HGB BLD-MCNC: 8 G/DL (ref 12–17)
INR PPP: 1.28 (ref 0.86–1.17)
LYMPHOCYTES # BLD AUTO: 1.2 THOUSANDS/ΜL (ref 0.6–4.47)
LYMPHOCYTES NFR BLD AUTO: 26 % (ref 14–44)
MCH RBC QN AUTO: 29.4 PG (ref 26.8–34.3)
MCHC RBC AUTO-ENTMCNC: 32.8 G/DL (ref 31.4–37.4)
MCV RBC AUTO: 90 FL (ref 82–98)
MONOCYTES # BLD AUTO: 0.5 THOUSAND/ΜL (ref 0.17–1.22)
MONOCYTES NFR BLD AUTO: 11 % (ref 4–12)
NEUTROPHILS # BLD AUTO: 2.54 THOUSANDS/ΜL (ref 1.85–7.62)
NEUTS SEG NFR BLD AUTO: 56 % (ref 43–75)
NRBC BLD AUTO-RTO: 1 /100 WBCS
PLATELET # BLD AUTO: 210 THOUSANDS/UL (ref 149–390)
PMV BLD AUTO: 9.4 FL (ref 8.9–12.7)
POTASSIUM SERPL-SCNC: 5 MMOL/L (ref 3.5–5.3)
PROT SERPL-MCNC: 6.6 G/DL (ref 6.4–8.2)
PROTHROMBIN TIME: 16.1 SECONDS (ref 11.8–14.2)
RBC # BLD AUTO: 2.69 MILLION/UL (ref 3.88–5.62)
RH BLD: POSITIVE
SODIUM SERPL-SCNC: 139 MMOL/L (ref 136–145)
SPECIMEN EXPIRATION DATE: NORMAL
WBC # BLD AUTO: 4.55 THOUSAND/UL (ref 4.31–10.16)

## 2018-05-22 PROCEDURE — 99220 PR INITIAL OBSERVATION CARE/DAY 70 MINUTES: CPT | Performed by: INTERNAL MEDICINE

## 2018-05-22 PROCEDURE — 86901 BLOOD TYPING SEROLOGIC RH(D): CPT | Performed by: EMERGENCY MEDICINE

## 2018-05-22 PROCEDURE — 85025 COMPLETE CBC W/AUTO DIFF WBC: CPT | Performed by: EMERGENCY MEDICINE

## 2018-05-22 PROCEDURE — 85730 THROMBOPLASTIN TIME PARTIAL: CPT | Performed by: EMERGENCY MEDICINE

## 2018-05-22 PROCEDURE — 80053 COMPREHEN METABOLIC PANEL: CPT | Performed by: EMERGENCY MEDICINE

## 2018-05-22 PROCEDURE — 86900 BLOOD TYPING SEROLOGIC ABO: CPT | Performed by: EMERGENCY MEDICINE

## 2018-05-22 PROCEDURE — 85610 PROTHROMBIN TIME: CPT | Performed by: EMERGENCY MEDICINE

## 2018-05-22 PROCEDURE — 85018 HEMOGLOBIN: CPT | Performed by: INTERNAL MEDICINE

## 2018-05-22 PROCEDURE — 86850 RBC ANTIBODY SCREEN: CPT | Performed by: EMERGENCY MEDICINE

## 2018-05-22 PROCEDURE — 36415 COLL VENOUS BLD VENIPUNCTURE: CPT | Performed by: EMERGENCY MEDICINE

## 2018-05-22 RX ORDER — MAGNESIUM HYDROXIDE/ALUMINUM HYDROXICE/SIMETHICONE 120; 1200; 1200 MG/30ML; MG/30ML; MG/30ML
5 SUSPENSION ORAL EVERY 6 HOURS PRN
Status: DISCONTINUED | OUTPATIENT
Start: 2018-05-22 | End: 2018-05-24 | Stop reason: HOSPADM

## 2018-05-22 RX ORDER — ONDANSETRON 2 MG/ML
4 INJECTION INTRAMUSCULAR; INTRAVENOUS EVERY 6 HOURS PRN
Status: DISCONTINUED | OUTPATIENT
Start: 2018-05-22 | End: 2018-05-24 | Stop reason: HOSPADM

## 2018-05-22 RX ORDER — ACETAMINOPHEN 325 MG/1
650 TABLET ORAL EVERY 6 HOURS PRN
Status: DISCONTINUED | OUTPATIENT
Start: 2018-05-22 | End: 2018-05-24 | Stop reason: HOSPADM

## 2018-05-22 RX ORDER — ALBUTEROL SULFATE 90 UG/1
2 AEROSOL, METERED RESPIRATORY (INHALATION) EVERY 6 HOURS PRN
Status: DISCONTINUED | OUTPATIENT
Start: 2018-05-22 | End: 2018-05-24 | Stop reason: HOSPADM

## 2018-05-22 RX ORDER — GABAPENTIN 300 MG/1
300 CAPSULE ORAL DAILY
Status: DISCONTINUED | OUTPATIENT
Start: 2018-05-23 | End: 2018-05-24 | Stop reason: HOSPADM

## 2018-05-22 RX ORDER — AMLODIPINE BESYLATE 10 MG/1
10 TABLET ORAL DAILY
Status: DISCONTINUED | OUTPATIENT
Start: 2018-05-23 | End: 2018-05-24 | Stop reason: HOSPADM

## 2018-05-22 RX ORDER — PANTOPRAZOLE SODIUM 40 MG/1
40 TABLET, DELAYED RELEASE ORAL
Status: DISCONTINUED | OUTPATIENT
Start: 2018-05-23 | End: 2018-05-24 | Stop reason: HOSPADM

## 2018-05-22 RX ORDER — CINACALCET 30 MG/1
30 TABLET, FILM COATED ORAL
Status: DISCONTINUED | OUTPATIENT
Start: 2018-05-23 | End: 2018-05-24 | Stop reason: HOSPADM

## 2018-05-22 RX ORDER — NICOTINE 21 MG/24HR
1 PATCH, TRANSDERMAL 24 HOURS TRANSDERMAL DAILY
Status: DISCONTINUED | OUTPATIENT
Start: 2018-05-23 | End: 2018-05-24 | Stop reason: HOSPADM

## 2018-05-22 RX ORDER — ERGOCALCIFEROL 1.25 MG/1
50000 CAPSULE ORAL WEEKLY
Status: DISCONTINUED | OUTPATIENT
Start: 2018-05-23 | End: 2018-05-24 | Stop reason: HOSPADM

## 2018-05-22 RX ADMIN — METOPROLOL TARTRATE 12.5 MG: 25 TABLET ORAL at 23:30

## 2018-05-22 NOTE — ED ATTENDING ATTESTATION
Elizabeth Medina MD, saw and evaluated the patient  I have discussed the patient with the resident/non-physician practitioner and agree with the resident's/non-physician practitioner's findings, Plan of Care, and MDM as documented in the resident's/non-physician practitioner's note, except where noted  All available labs and Radiology studies were reviewed  At this point I agree with the current assessment done in the Emergency Department  I have conducted an independent evaluation of this patient a history and physical is as follows: This 53-year-old male on dialysis for renal failure presents today with her current lower GI bleeding  Patient states this morning he began having rectal bleeding, a total of three episodes, the most recent of which was approximately an hour prior to arrival   Patient denies any abdominal pain  Patient denies any vomiting  Patient states he had a transfusion done three days ago, 2 units, during dialysis  Patient was last admitted approximately a month ago for lower GI bleed and was found to have a cecal source with clipping done  Patient currently complaining of feeling weak, denies shortness of breath, chest pain, dizziness  On exam patient is chronically ill appearing in no distress  Patient with clear lung sounds, regular heart sounds  Patient with an obese, soft, nontender abdomen  For rectal exam please see resident chart  Patient will be typed and screened, blood work done  Patient will require admission for further evaluation of this recurrent GI bleed  Patient may also require transfusion given his recent transfusion requirements    Critical Care Time  CritCare Time    Procedures

## 2018-05-23 ENCOUNTER — APPOINTMENT (OUTPATIENT)
Dept: DIALYSIS | Facility: HOSPITAL | Age: 58
DRG: 393 | End: 2018-05-23
Payer: MEDICARE

## 2018-05-23 PROBLEM — D64.9 ANEMIA: Status: ACTIVE | Noted: 2018-05-22

## 2018-05-23 LAB
ERYTHROCYTE [DISTWIDTH] IN BLOOD BY AUTOMATED COUNT: 17.8 % (ref 11.6–15.1)
HCT VFR BLD AUTO: 22.2 % (ref 36.5–49.3)
HCT VFR BLD AUTO: 22.8 % (ref 36.5–49.3)
HGB BLD-MCNC: 7.3 G/DL (ref 12–17)
HGB BLD-MCNC: 7.5 G/DL (ref 12–17)
HGB BLD-MCNC: 8.1 G/DL (ref 12–17)
HGB BLD-MCNC: 8.2 G/DL (ref 12–17)
MCH RBC QN AUTO: 29.1 PG (ref 26.8–34.3)
MCHC RBC AUTO-ENTMCNC: 32 G/DL (ref 31.4–37.4)
MCV RBC AUTO: 91 FL (ref 82–98)
PLATELET # BLD AUTO: 204 THOUSANDS/UL (ref 149–390)
PMV BLD AUTO: 8.9 FL (ref 8.9–12.7)
RBC # BLD AUTO: 2.51 MILLION/UL (ref 3.88–5.62)
WBC # BLD AUTO: 5.44 THOUSAND/UL (ref 4.31–10.16)

## 2018-05-23 PROCEDURE — 5A1D70Z PERFORMANCE OF URINARY FILTRATION, INTERMITTENT, LESS THAN 6 HOURS PER DAY: ICD-10-PCS | Performed by: INTERNAL MEDICINE

## 2018-05-23 PROCEDURE — 99225 PR SBSQ OBSERVATION CARE/DAY 25 MINUTES: CPT | Performed by: FAMILY MEDICINE

## 2018-05-23 PROCEDURE — 85014 HEMATOCRIT: CPT | Performed by: INTERNAL MEDICINE

## 2018-05-23 PROCEDURE — 85018 HEMOGLOBIN: CPT | Performed by: INTERNAL MEDICINE

## 2018-05-23 PROCEDURE — 99222 1ST HOSP IP/OBS MODERATE 55: CPT | Performed by: INTERNAL MEDICINE

## 2018-05-23 PROCEDURE — 85018 HEMOGLOBIN: CPT | Performed by: FAMILY MEDICINE

## 2018-05-23 PROCEDURE — 36415 COLL VENOUS BLD VENIPUNCTURE: CPT | Performed by: INTERNAL MEDICINE

## 2018-05-23 PROCEDURE — 90935 HEMODIALYSIS ONE EVALUATION: CPT | Performed by: INTERNAL MEDICINE

## 2018-05-23 PROCEDURE — 99285 EMERGENCY DEPT VISIT HI MDM: CPT

## 2018-05-23 PROCEDURE — 85027 COMPLETE CBC AUTOMATED: CPT | Performed by: INTERNAL MEDICINE

## 2018-05-23 PROCEDURE — G0257 UNSCHED DIALYSIS ESRD PT HOS: HCPCS

## 2018-05-23 RX ADMIN — METOPROLOL TARTRATE 12.5 MG: 25 TABLET ORAL at 20:39

## 2018-05-23 RX ADMIN — CINACALCET HYDROCHLORIDE 30 MG: 30 TABLET, COATED ORAL at 08:30

## 2018-05-23 RX ADMIN — NICOTINE 1 PATCH: 14 PATCH, EXTENDED RELEASE TRANSDERMAL at 09:07

## 2018-05-23 RX ADMIN — POLYETHYLENE GLYCOL 3350, SODIUM SULFATE ANHYDROUS, SODIUM BICARBONATE, SODIUM CHLORIDE, POTASSIUM CHLORIDE 4000 ML: 236; 22.74; 6.74; 5.86; 2.97 POWDER, FOR SOLUTION ORAL at 17:07

## 2018-05-23 RX ADMIN — PANTOPRAZOLE SODIUM 40 MG: 40 TABLET, DELAYED RELEASE ORAL at 08:30

## 2018-05-23 RX ADMIN — GABAPENTIN 300 MG: 300 CAPSULE ORAL at 08:30

## 2018-05-23 RX ADMIN — METOPROLOL TARTRATE 12.5 MG: 25 TABLET ORAL at 09:04

## 2018-05-23 RX ADMIN — AMLODIPINE BESYLATE 10 MG: 10 TABLET ORAL at 08:30

## 2018-05-23 RX ADMIN — ERGOCALCIFEROL 50000 UNITS: 1.25 CAPSULE ORAL at 00:00

## 2018-05-23 NOTE — PROGRESS NOTES
Progress Note - Khanh Young 1960, 56 y o  male MRN: [id number]    Unit/Bed#: [facility] 612-01 Encounter: [id number]    Primary Care Provider: Lei Ferrera   Date and time admitted to hospital: 5/22/2018  4:20 PM        Nicotine abuse   Assessment & Plan    Patient currently on nicotine replacement therapy  ESRD on hemodialysis [facility])   Assessment & Plan    · Nephrology evaluation appreciated  · Continue with the dialysis on Monday Wednesday Friday        Lower GI bleed   Assessment & Plan    Patient still with bloody bowel movement  GI evaluation appreciated  For EGD and colonoscopy tomorrow  Monitor H and H and transfuse p r n  Accelerated hypertension   Assessment & Plan    · Monitor blood pressure        Anemia in chronic kidney disease, on chronic dialysis (HCC)   Assessment & Plan    · Anemia acute on chronic secondary to acute blood loss anemia on top of the chronic anemia secondary to CKD  · Monitor H and H        DM (diabetes mellitus) ([facility] [address] )   Assessment & Plan    · Per history  · Hemoglobin A1c in November of last year was 5  · Not on any medications as an outpatient for the diabetes            VTE Pharmacologic Prophylaxis:   Pharmacologic: Contraindicated due to GI bleeding  Mechanical VTE Prophylaxis in Place: Yes    Patient Centered Rounds: I have performed bedside rounds with nursing staff today  Discussions with Specialists or Other Care Team Provider:     Education and Discussions with Family / Patient:  Family updated in the room    Time Spent for Care: 30 minutes  More than 50% of total time spent on counseling and coordination of care as described above      Current Length of Stay: 0 day(s)    Current Patient Status inpatient  Certification Statement: The patient, admitted on an observation basis, will now require > 2 midnight hospital stay due to Pending EGD and colonoscopy tomorrow    Discharge Plan:     Code Status: Level 1 - Full Code      Subjective:   Patient seen and examined  Patient still has bloody bowel movement every time he goes to the bathroom  No hematemesis    Objective:     Vitals:   Temp (24hrs), Av 8 °F (36 6 °C), Min:97 5 °F (36 4 °C), Max:98 3 °F (36 8 °C)    HR:  [] 68  Resp:  [16-20] 20  BP: (131-174)/(68-81) 150/68  SpO2:  [95 %-100 %] 100 %  Body mass index is 23 73 kg/m²  Input and Output Summary (last 24 hours): Intake/Output Summary (Last 24 hours) at 18 1422  Last data filed at 18 1326   Gross per 24 hour   Intake              740 ml   Output             1500 ml   Net             -760 ml       Physical Exam:     Physical Exam   Constitutional: He appears well-developed and well-nourished  HENT:   Head: Normocephalic and atraumatic  Eyes: EOM are normal    Neck: Normal range of motion  Neck supple  Cardiovascular: Normal rate and regular rhythm  No murmur heard  Pulmonary/Chest: Effort normal and breath sounds normal  No respiratory distress  He has no wheezes  Abdominal: Soft  Bowel sounds are normal  He exhibits no distension  There is no tenderness  Musculoskeletal: Normal range of motion  He exhibits no edema  Left upper extremity AV fistula   Neurological: He is alert  No cranial nerve deficit  Skin: Skin is warm  Additional Data:     Labs:      Results from last 7 days  Lab Units 18  1235  18  0516  18  1900   WBC Thousand/uL  --   --  5 44  --  4 55   HEMOGLOBIN g/dL 7 5*  < > 7 3*  < > 7 9*   HEMATOCRIT % 22 2*  --  22 8*  --  24 1*   PLATELETS Thousands/uL  --   --  204  --  210   NEUTROS PCT %  --   --   --   --  56   LYMPHS PCT %  --   --   --   --  26   MONOS PCT %  --   --   --   --  11   EOS PCT %  --   --   --   --  6   < > = values in this interval not displayed      Results from last 7 days  Lab Units 18  1900   SODIUM mmol/L 139   POTASSIUM mmol/L 5 0   CHLORIDE mmol/L 102   CO2 mmol/L 25   BUN mg/dL 84*   CREATININE mg/dL 15 20*   CALCIUM mg/dL 8 1*   TOTAL PROTEIN g/dL 6 6   BILIRUBIN TOTAL mg/dL 0 33   ALK PHOS U/L 107   ALT U/L 16   AST U/L 19   GLUCOSE RANDOM mg/dL 111       Results from last 7 days  Lab Units 05/22/18  1900   INR  1 28*       * I Have Reviewed All Lab Data Listed Above  * Additional Pertinent Lab Tests Reviewed: [place] [facility] Admission Reviewed    Imaging:    Imaging Reports Reviewed Today Include:   Imaging Personally Reviewed by Myself Includes:      Recent Cultures (last 7 days):           Last 24 Hours Medication List:     Current Facility-Administered Medications:  acetaminophen 650 mg Oral Q6H PRN Ileana Cluster, MD   albuterol 2 puff Inhalation Q6H PRN Ileana Cluster, MD   aluminum-magnesium hydroxide-simethicone 5 mL Oral Q6H PRN Ileana Cluster, MD   amLODIPine 10 mg Oral Daily Ileana Cluster, MD   calcium acetate 2,001 mg Oral TID With Meals Ileana [facility], MD   cinacalcet 30 mg Oral Daily With Breakfast Ileana Cluster, MD   ergocalciferol 50,000 Units Oral Weekly Ileana Bey, MD   gabapentin 300 mg Oral Daily Ileana Cluster, MD   hydrocortisone  Rectal 4x Daily PRN Ileana Bey, MD   metoprolol tartrate 12 5 mg Oral Q12H [facility] Ileana Bey, MD   nicotine 1 patch Transdermal Daily Ileana Bey, MD   ondansetron 4 mg Intravenous Q6H PRN Ileana Bey, MD   pantoprazole 40 mg Oral Early Morning Ileana Bey, MD        Today, Patient Was Seen By: Nam Cortez MD    ** Please Note: Dictation voice to text software may have been used in the creation of this document   **

## 2018-05-23 NOTE — CONSULTS
Consultation - Nephrology   Juan Daniel Hinson 56 y o  male MRN: [id number]  Unit/Bed#: ED 25 Encounter: [id number]      ASSESSMENT and PLAN:    45-year-old with a past medical history of end-stage kidney disease on hemodialysis who presents with a GI bleed    1  End-stage kidney disease on hemodialysis  -goes to [facility] in [place]  -last hemodialysis treatment was on Sunday missed his Tuesday treatment  -has multiple episodes of noncompliance  -seen on hemodialysis today at approximately 11:30 a m   -ultrafiltration set at 1 L  -planning for colonoscopy tomorrow  -will plan on dialysis treatment on Friday and Saturday    2  Anemia-multifactorial  -patient refusing all IV medications in the outpatient dialysis unit which includes he post stimulating agent  -is on Aurixia question compliance  -history of bleeding ulcers in his past colonoscopy  -also likely an element of uremic bleeding which was discussed with patient  -GI consult and planned for colonoscopy tomorrow  -will monitor H&H    3   CV risk reduction  -hypertension-blood pressures are around [id number] systolic on dialysis will monitor  -currently on amlodipine and metoprolol    4  CKD bone mineral disease  -continue outpatient monitor  -currently on Calcium Acetate 3 times daily with meals and Sensipar  -was also started on aurixia as an outpatient    5  Electrolytes  -current electrolytes are stable    6   Acid-base status  -continue to monitor acid-base status    HISTORY OF PRESENT ILLNESS:  Requesting Physician: Maribel Shepherd MD  Reason for Consult:  End-stage kidney disease on hemodialysis    Juan Daniel Hinson is a 56y o  year old male who was admitted to [facility] after presenting with rectal bleeding  A renal consultation is requested today for assistance in the management of end-stage kidney disease on dialysis      He has a past medical history of end-stage kidney disease thought to be related to hypertension since 2012, does get dialysis in [place] and moved to this area for a job recently, patient has a history of noncompliance, and has missed Tuesdays treatment, patient was found in the ER to have a hemoglobin of 6 6 after he presented with rectal bleeding, he has had previous episodes of this in the past where he had ulcer that were treated, currently feels okay tolerating his dialysis treatment    Otherwise fistula is working well without any difficulty    PAST MEDICAL HISTORY:  Past Medical History:   Diagnosis Date    CHF [facility]congestive heart failure) ([facility] )     Dialysis patient ([facility] )     Hypertension     Renal disorder        PAST SURGICAL HISTORY:  Past Surgical History:   Procedure Laterality Date    AV NODE ABLATION      CHOLECYSTECTOMY      COLONOSCOPY N/A 4/27/2018    Procedure: COLONOSCOPY;  Surgeon: Zulma Lutz DO;  Location: [facility]; Service: Gastroenterology       ALLERGIES:  No Known Allergies    SOCIAL HISTORY:  History   Alcohol Use    Yes     Comment: occasional     History   Drug Use No     History   Smoking Status    Current Every Day Smoker    Packs/day: 1 00    Types: Cigarettes   Smokeless Tobacco    Never Used       FAMILY HISTORY:  History reviewed  No pertinent family history      MEDICATIONS:    Current Facility-Administered Medications:     acetaminophen (TYLENOL) tablet 650 mg, 650 mg, Oral, Q6H PRN, Ren Soto MD    albuterol (PROVENTIL HFA,VENTOLIN HFA) inhaler 2 puff, 2 puff, Inhalation, Q6H PRN, Ren Soto MD    aluminum-magnesium hydroxide-simethicone (MYLANTA) 200-200-20 mg/5 mL oral suspension 5 mL, 5 mL, Oral, Q6H PRN, Ren Soto MD    amLODIPine (NORVASC) tablet 10 mg, 10 mg, Oral, Daily, Ren Soto MD, 10 mg at 05/23/18 0830    calcium acetate (PHOSLO) capsule 2,001 mg, 2,001 mg, Oral, TID With Meals, Ren Soto MD    cinacalcet [facility]) tablet 30 mg, 30 mg, Oral, Daily With Breakfast, Ren Soto MD, 30 mg at 05/23/18 0830   ergocalciferol (VITAMIN D2) capsule 50,000 Units, 50,000 Units, Oral, Weekly, Victor Manuel Huntley MD, 50,000 Units at 05/23/18 0000    gabapentin (NEURONTIN) capsule 300 mg, 300 mg, Oral, Daily, Victor Manuel Huntley MD, 300 mg at 05/23/18 0830    hydrocortisone (ANUSOL-HC, PROCTOSOL HC)) 2 5 % rectal cream, , Rectal, 4x Daily PRN, Victor Manuel Huntley MD    metoprolol tartrate (LOPRESSOR) partial tablet 12 5 mg, 12 5 mg, Oral, Q12H [facility], Victor Manuel Huntley MD, 12 5 mg at 05/23/18 0904    nicotine (NICODERM CQ) 14 mg/24hr TD 24 hr patch 1 patch, 1 patch, Transdermal, Daily, Victor Manuel Huntley MD, 1 patch at 05/23/18 0907    ondansetron (ZOFRAN) injection 4 mg, 4 mg, Intravenous, Q6H PRN, Victor Manuel Huntley MD    pantoprazole (PROTONIX) EC tablet 40 mg, 40 mg, Oral, Early Morning, Victor Manuel Huntley MD, 40 mg at 05/23/18 0830    Current Outpatient Prescriptions:     albuterol (PROVENTIL HFA,VENTOLIN HFA) 90 mcg/act inhaler, Inhale 2 puffs every 6 (six) hours as needed for wheezing, Disp: 1 Inhaler, Rfl: 0    amLODIPine (NORVASC) 10 mg tablet, Take 10 mg by mouth daily, Disp: , Rfl:     calcium acetate (PHOSLO) 667 mg capsule, Take 2,001 mg by mouth 3 (three) times a day with meals, Disp: , Rfl:     cinacalcet (SENSIPAR) 30 mg tablet, 30 mg, Disp: , Rfl:     ergocalciferol (VITAMIN D2) 50,000 units, Take 1 capsule (50,000 Units total) by mouth once a week for 7 doses, Disp: 7 capsule, Rfl: 0    gabapentin (NEURONTIN) 100 mg capsule, Take 300 mg by mouth daily  , Disp: , Rfl:     hydrocortisone (ANUSOL-HC, PROCTOSOL HC,) 2 5 % rectal cream, Insert into the rectum 4 (four) times a day as needed for hemorrhoids, Disp: 30 g, Rfl: 0    metoprolol tartrate (LOPRESSOR) 25 mg tablet, Take 0 5 tablets (12 5 mg total) by mouth every 12 (twelve) hours, Disp: 30 tablet, Rfl: 0    nicotine (NICODERM CQ) 14 mg/24hr TD 24 hr patch, Place 1 patch on the skin daily, Disp: 28 patch, Rfl: 0    pantoprazole (PROTONIX) 40 mg tablet, Take 1 tablet (40 mg total) by mouth daily in the early morning, Disp: 30 tablet, Rfl: 0    REVIEW OF SYSTEMS:   All the systems were reviewed and were negative except as documented on the HPI        PHYSICAL EXAM:  Current Weight: Weight - Scale: 71 7 kg (158 lb)  First Weight: Weight - Scale: 71 7 kg (158 lb)  Vitals:    05/23/18 1000 05/23/18 1030 05/23/18 1100 05/23/18 1130   BP: 147/70 142/71 139/71 144/70   BP Location:       Pulse: 62 63 65 65   Resp:       Temp:       TempSrc:       SpO2:       Weight:       Height:           Intake/Output Summary (Last 24 hours) at 05/23/18 1142  Last data filed at 05/23/18 0920   Gross per 24 hour   Intake              200 ml   Output                0 ml   Net              200 ml     General: conscious, cooperative, in not acute distress  Eyes: conjunctivae pink, anicteric sclerae  ENT: lips and mucous membranes moist  Neck: supple, no JVD  Chest: clear breath sounds bilateral, no crackles, ronchus or wheezings  CVS: distinct S1 & S2, normal rate, regular rhythm  Abdomen: soft, non-tender, non-distended, normoactive bowel sounds  Extremities: no edema of both legs, left upper extremity AV fistula  Skin: no rash  Neuro: awake, alert, oriented     Lab Results:     Results from last 7 days  Lab Units 05/23/18  0905 05/23/18  0516 05/22/18  2320 05/22/18  1900   WBC Thousand/uL  --  5 44  --  4 55   HEMOGLOBIN g/dL 8 1* 7 3* 8 0* 7 9*   HEMATOCRIT %  --  22 8*  --  24 1*   PLATELETS Thousands/uL  --  204  --  210   SODIUM mmol/L  --   --   --  139   POTASSIUM mmol/L  --   --   --  5 0   CHLORIDE mmol/L  --   --   --  102   CO2 mmol/L  --   --   --  25   BUN mg/dL  --   --   --  84*   CREATININE mg/dL  --   --   --  15 20*   CALCIUM mg/dL  --   --   --  8 1*   TOTAL PROTEIN g/dL  --   --   --  6 6   BILIRUBIN TOTAL mg/dL  --   --   --  0 33   ALK PHOS U/L  --   --   --  107   ALT U/L  --   --   --  16   AST U/L  --   --   --  19   GLUCOSE RANDOM mg/dL  --   --   --  111       Other Studies:  Please see previous notes

## 2018-05-23 NOTE — ASSESSMENT & PLAN NOTE
Patient is placed observation with hemoglobin to check every 8 hours  Patient has a history of ulcers on colonoscopy which is status post clipping  Currently patient has not bled since 2 o'clock this afternoon  Will get GI consult  Patient would be placed on clear liquids  He wants just to eats a little bit of non clears this evening prior to being on full clear liquid diet  However I told patient absolutely no solids  As patient does not have any abdominal discomfort or epigastric discomfort; will just continue Protonix by mouth  I do not think that the patient would benefit from IV Protonix

## 2018-05-23 NOTE — ASSESSMENT & PLAN NOTE
· Per history  · Hemoglobin A1c in November of last year was 5  · Not on any medications as an outpatient for the diabetes

## 2018-05-23 NOTE — CONSULTS
Consultation - [facility]  Mike Hdez 56 y o  male MRN: [id number]  Unit/Bed#: ED 25 Encounter: [id number]        ASSESSMENT/PLAN:   Rectal bleeding  Anemia    Pt states he started with rectal bleeding yesterday ~3-4 episodes which then stopped on its own  He tells me [facility]PR but ER note states dark red or black  He had a colonoscopy during his last admission showing cecal ulcers, which were clipped  No EGD  His Hbg did decrease prior to this episode of bleeding requiring 2 units of pRBC's on Sat  There is an obvious component of acute GI bleeding but may be complicated by ESRD  Since he is currently receiving dialysis I would recommend EGD/ colonoscopy tomorrow for further evaluation  No prior EGD & NSAID's in the past   -Golytely prep  -NPO at midnight  -EGD/colon tomorrow  -Follow H&H      Consults    Reason for Consult / Principal Problem: hematochezia    HPI: Mike Hdez is a 56y o  year old male with a PMH of CHF, ESRD, dialysis, HTN who was admitted w/ rectal bleeding  He states yesterday he went to have a BM & passed bright red blood  He states this happened 2 more times & then once in the ER  He has had no further episodes  He was admitted here 4/25 also w/ rectal bleeding  He had colonoscopy 4/27 showing 2 small ulcerated areas w/ visible vessel in the cecum which were clipped  He states he has been receiving his dialysis in [place] & on Sat his Hbg had dropped & he received 2 units of blood  Review of Systems: as per HPI  Review of Systems   All other systems reviewed and are negative  Historical Information   Past Medical History:   Diagnosis Date    [facility]est[facility]) [facility] [address] [facility]     Dialysis patient [facility] [address] [facility]     Hypertension     Renal disorder      Past Surgical History:   Procedure Laterality Date    AV NODE ABLATION      CHOLECYSTECTOMY      COLONOSCOPY N/A 4/27/2018    Procedure: COLONOSCOPY;  Surgeon: Yunier Laird DO;  Location[facility];   Service: Gastroenterology     Social History   History   Alcohol Use    Yes     Comment: occasional     History   Drug Use No     History   Smoking Status    Current Every Day Smoker    Packs/day: 1 00    Types: Cigarettes   Smokeless Tobacco    Never Used     History reviewed  No pertinent family history  Meds/Allergies       (Not in a hospital admission)  Current Facility-Administered Medications   Medication Dose Route Frequency    acetaminophen (TYLENOL) tablet 650 mg  650 mg Oral Q6H PRN    albuterol (PROVENTIL HFA,VENTOLIN HFA) inhaler 2 puff  2 puff Inhalation Q6H PRN    aluminum-magnesium hydroxide-simethicone (MYLANTA) 200-200-20 mg/5 mL oral suspension 5 mL  5 mL Oral Q6H PRN    amLODIPine (NORVASC) tablet 10 mg  10 mg Oral Daily    calcium acetate (PHOSLO) capsule 2,001 mg  2,001 mg Oral TID With Meals    cinacalcet (SENSIPAR) tablet 30 mg  30 mg Oral Daily With Breakfast    ergocalciferol (VITAMIN D2) capsule 50,000 Units  50,000 Units Oral Weekly    gabapentin (NEURONTIN) capsule 300 mg  300 mg Oral Daily    hydrocortisone (ANUSOL-HC, PROCTOSOL HC)) 2 5 % rectal cream   Rectal 4x Daily PRN    metoprolol tartrate (LOPRESSOR) partial tablet 12 5 mg  12 5 mg Oral Q12H [facility]    nicotine (NICODERM CQ) 14 mg/24hr TD 24 hr patch 1 patch  1 patch Transdermal Daily    ondansetron (ZOFRAN) injection 4 mg  4 mg Intravenous Q6H PRN    pantoprazole (PROTONIX) EC tablet 40 mg  40 mg Oral Early Morning       No Known Allergies    Objective     Blood pressure 142/71, pulse 63, temperature 97 6 °F (36 4 °C), temperature source Tympanic, resp  rate 16, height 5' 6" (1 676 m), weight 71 7 kg (158 lb), SpO2 97 %  Intake/Output Summary (Last 24 hours) at 05/23/18 1049  Last data filed at 05/23/18 0920   Gross per 24 hour   Intake              200 ml   Output                0 ml   Net              200 ml       PHYSICAL EXAM     Physical Exam   Constitutional: He is oriented to person, place, and time   He appears well-developed and well-nourished  No distress  HENT:   Head: Normocephalic and atraumatic  Eyes: Conjunctivae and EOM are normal    Neck: Normal range of motion  Cardiovascular: Normal rate and regular rhythm  Pulmonary/Chest: Effort normal and breath sounds normal    Abdominal: Soft  Bowel sounds are normal  He exhibits no distension  There is no tenderness  Musculoskeletal: Normal range of motion  Neurological: He is alert and oriented to person, place, and time  Skin: Skin is warm and dry  Psychiatric: He has a normal mood and affect   His behavior is normal        Lab Results:   CBC: Lab Results   Component Value Date    WBC 5 44 05/23/2018    HGB 8 1 (L) 05/23/2018    HCT 22 8 (L) 05/23/2018    MCV 91 05/23/2018     05/23/2018    MCH 29 1 05/23/2018    MCHC 32 0 05/23/2018    RDW 17 8 (H) 05/23/2018    MPV 8 9 05/23/2018    NRBC 1 05/22/2018   ,   CMP: Lab Results   Component Value Date     05/22/2018    K 5 0 05/22/2018     05/22/2018    CO2 25 05/22/2018    ANIONGAP 12 05/22/2018    BUN 84 (H) 05/22/2018    CREATININE 15 20 (H) 05/22/2018    GLUCOSE 111 05/22/2018    CALCIUM 8 1 (L) 05/22/2018    AST 19 05/22/2018    ALT 16 05/22/2018    ALKPHOS 107 05/22/2018    PROT 6 6 05/22/2018    BILITOT 0 33 05/22/2018    EGFR 4 05/22/2018   ,   Lipase: No results found for: LIPASE,  PT/INR:   Lab Results   Component Value Date    INR 1 28 (H) 05/22/2018   ,   Imaging Studies: none

## 2018-05-23 NOTE — ED NOTES
Brought pt's belongings to [facility] at this time       Liliam Garcia  05/23/18 1217

## 2018-05-23 NOTE — ED NOTES
Spoke with Dr Kathia Pettit of [address]  Pt  Is ordered a full liquid diet until 0000  At 0000, patient will be on a clear liquid diet  Pt  Elise larson  Pt  Provided with apple sauce upon request with permission of Dr Kathia Quan, YSABEL  05/22/18 5683

## 2018-05-23 NOTE — H&P
H&P- Carolyn Berry 1960, 56 y o  male MRN: [id number]    Unit/Bed#: ED 25 Encounter: [id number]    Primary Care Provider: Tomasa Hutchison   Date and time admitted to hospital: 5/22/2018  4:20 PM        Lower GI bleed   Assessment & Plan    Patient is placed observation with hemoglobin to check every 8 hours  Patient has a history of ulcers on colonoscopy which is status post clipping  Currently patient has not bled since 2 o'clock this afternoon  Will get GI consult  Patient would be placed on clear liquids  He wants just to eats a little bit of non clears this evening prior to being on full clear liquid diet  However I told patient absolutely no solids  As patient does not have any abdominal discomfort or epigastric discomfort; will just continue Protonix by mouth  I do not think that the patient would benefit from IV Protonix  Nicotine abuse   Assessment & Plan    Patient currently on nicotine replacement therapy  VTE Prophylaxis: Pharmacologic VTE Prophylaxis contraindicated due to GI bleeding  / sequential compression device   Code Status: Prior full code as discussed with patient  POLST: There is no POLST form on file for this patient (pre-hospital)    Anticipated Length of Stay:  Patient will be admitted on an Observation basis with an anticipated length of stay of  less than 2 midnights  Justification for Hospital Stay: Please see detailed plans noted above  Chief Complaint:     GI bleeding bright red blood per rectum  History of Present Illness:  Carolyn Berry is a 56 y o  male who has a history of GI bleeding and he had a colonoscopy done which did show bleeding ulcers in the colon and status post clipping and his hemoglobin was stable  He did not manifest with any bleeding since then until this morning when he woke up going to the bathroom with bright red blood per rectum  He could not actually quantify just how much but he said that the stools totally red    He had twice of that the last being a third one at 2 o'clock in the afternoon  The patient went home early from work in order to be evaluated by emergency room  The patient has not had any bowel movements of bright red blood since then  However he also says that he is trying to hold it in order to make sure that he does not see any bright red blood  In any case he does not have any abdominal pain or epigastric pain nor nausea  No vomiting  No fever  No back pain  Currently, patient is generally comfortable however he is disappointed that he needed to stay  Otherwise the patient is not having any respiratory distress  Review of Systems:    Constitutional:  Denies fever or chills   Eyes:  Denies change in visual acuity   HENT:  Denies nasal congestion or sore throat   Respiratory:  Denies cough or shortness of breath   Cardiovascular:  Denies chest pain or edema   GI:  Denies abdominal pain, nausea, vomiting, bloody stools as noted above, basically bloody diarrhea  No constipation in recent weeks  :  Denies dysuria   Musculoskeletal:  Denies back pain or joint pain   Integument:  Denies rash   Neurologic:  Denies headache, focal weakness or sensory changes   Endocrine:  Denies polyuria or polydipsia   Lymphatic:  Denies swollen glands   Psychiatric:  Denies depression or anxiety     Past Medical and Surgical History:   Past Medical History:   Diagnosis Date    [facility]congest[facility] [address] [facility]     [facility] patient ([facility]     Hypertension     Renal disorder      Past Surgical History:   Procedure Laterality Date    AV NODE ABLATION      CHOLECYSTECTOMY      COLONOSCOPY N/A 4/27/2018    Procedure: COLONOSCOPY;  Surgeon: Matthew Apple DO;  Location: [facility];   Service: Gastroenterology       Meds/Allergies:  albuterol (PROVENTIL HFA,VENTOLIN HFA) 90 mcg/act inhaler Inhale 2 puffs every 6 (six) hours as needed for wheezing Osiris Glasgow MD Reordered   Ordered as: albuterol (Nalini Rouleau HFA) inhaler 2 puff - 2 puff, Inhalation, Every 6 hours PRN, wheezing, Starting Tue 5/22/18 at 2040 **SEAL THE FOLLOWING LEFTOVER/UNUSED MEDICATION IN A ZIP LOCK BAG AND SEND TO PHARMACY**    amLODIPine (NORVASC) 10 mg tablet Take 10 mg by mouth daily Mabel Tabor MD Reordered   Ordered as: amLODIPine (NORVASC) tablet 10 mg - 10 mg, Oral, Daily, First dose on Wed 5/23/18 at [address] [facility] for systolic blood pressure less than (mmHg): 110   calcium acetate (PHOSLO) 667 mg capsule Take 2,001 mg by mouth 3 (three) times a day with meals Mabel Tabor MD Reordered   Ordered as: calcium acetate (PHOSLO) capsule 2,001 mg - 2,001 mg, Oral, 3 times daily with meals, First dose on Wed 5/23/18 at 0730 Take With Food    cinacalcet (SENSIPAR) 30 mg tablet 30 mg Mabel Tabor MD Reordered   Ordered as: cinacalcet (SENSIPAR) tablet 30 mg - 30 mg, Oral, Daily with breakfast, First dose on Wed 5/23/18 at [facility] whole; do not crush, chew or split     ergocalciferol (VITAMIN D2) 50,000 units Take 1 capsule (50,000 Units total) by mouth once a week for 7 doses Mabel Tabor MD Reordered   Ordered as: ergocalciferol (VITAMIN D2) capsule 50,000 Units - 50,000 Units, Oral, Weekly, First dose on Tue 5/22/18 at 2045 Swallow whole; do not crush, chew or empty contents     gabapentin (NEURONTIN) 100 mg capsule Take 300 mg by mouth daily   Mabel Tabor MD Reordered   Ordered as: gabapentin (NEURONTIN) capsule 300 mg - 300 mg, Oral, Daily, First dose on Wed 5/23/18 at [facility]    hydrocortisone (ANUSOL-HC, PROCTOSOL HC,) 2 5 % rectal cream Insert into the rectum 4 (four) times a day as needed for hemorrhoids Mabel Tabor MD Reordered   Ordered as: hydrocortisone (ANUSOL-HC, PROCTOSOL [facility] )) 2 5 % rectal cream - Rectal, 4 times daily PRN, hemorrhoids, Starting Tue 5/22/18 at 2040 For external use only      metoprolol tartrate (LOPRESSOR) 25 mg tablet Take 0 5 tablets (12 5 mg total) by mouth every 12 (twelve) hours Gi Solorio MD Reordered   Ordered as: metoprolol tartrate (LOPRESSOR) partial tablet 12 5 mg - 12 5 mg, Oral, Every 12 hours scheduled, First dose on Tue 5/22/18 at 2100 Hold for heart rate less than 50 beats per minute  LOOK [employer] AL[employer] Hold for systolic blood pressure less than (mmHg): 110   nicotine (NICODERM CQ) 14 mg/24hr TD 24 hr patch Place 1 patch on the skin daily Gi Solorio MD Not Ordered   pantoprazole (PROTONIX) 40 mg tablet Take 1 tablet (40 mg total) by mouth daily in the early morning Gi Solorio MD Reordered   Ordered as: pantoprazole (PROTONIX) EC tablet 40 mg - 40 mg, Oral, Daily (early morning), First dose on Wed 5/23/18 at 0600 Swallow whole; do not crush, chew or split  [employer]          Allergies: No Known Allergies  History:  Marital Status:    Occupation: employed  Patient Pre-hospital Living Situation: at home  Patient Pre-hospital Level of Mobility: mobile  Patient Pre-hospital Diet Restrictions: regular  Substance Use History:   History   Alcohol Use    Yes     Comment: occasional     History   Smoking Status    Current Every Day Smoker    Packs/day: 1 00    Types: Cigarettes   Smokeless Tobacco    Never Used     History   Drug Use No       Family History:  History reviewed  No pertinent family history  Physical Exam:     Vitals:   Blood Pressure: 131/71 (05/22/18 2028)  Pulse: 103 (05/22/18 2028)  Temperature: 97 6 °F (36 4 °C) (05/22/18 1532)  Temp Source: Tympanic (05/22/18 1532)  Respirations: 20 (05/22/18 1532)  Height: 5' 6" (167 6 cm) (05/22/18 1532)  Weight - Scale: 71 7 kg (158 lb) (05/22/18 1532)  SpO2: 95 % (05/22/18 2028)    Constitutional:  Well developed, well nourished, no acute distress, non-toxic appearance   Eyes:  PERRL, conjunctiva normal   HENT:  Atraumatic, external ears normal, nose normal, oropharynx moist, no pharyngeal exudates   Neck- normal range of motion, no tenderness, supple   Respiratory:  No respiratory distress, normal breath sounds, no rales, no wheezing   Cardiovascular:  Normal rate, normal rhythm, no murmurs, no gallops, no rubs   GI:  Soft, nondistended, normal bowel sounds, nontender, no organomegaly, no mass, no rebound, no guarding   :  No costovertebral angle tenderness   Musculoskeletal:  No edema, no tenderness, no deformities  Back- no tenderness  Integument:  Well hydrated, no rash   Lymphatic:  No lymphadenopathy noted   Neurologic:  Alert &awake, communicative, CN 2-12 normal, normal motor function, normal sensory function, no focal deficits noted   Psychiatric:  Speech and behavior appropriate       Lab Results: I have personally reviewed pertinent reports  Results from last 7 days  Lab Units 05/22/18  1900   WBC Thousand/uL 4 55   HEMOGLOBIN g/dL 7 9*   HEMATOCRIT % 24 1*   PLATELETS Thousands/uL 210   NEUTROS PCT % 56   LYMPHS PCT % 26   MONOS PCT % 11   EOS PCT % 6       Results from last 7 days  Lab Units 05/22/18  1900   SODIUM mmol/L 139   POTASSIUM mmol/L 5 0   CHLORIDE mmol/L 102   CO2 mmol/L 25   BUN mg/dL 84*   CREATININE mg/dL 15 20*   CALCIUM mg/dL 8 1*   TOTAL PROTEIN g/dL 6 6   BILIRUBIN TOTAL mg/dL 0 33   ALK PHOS U/L 107   ALT U/L 16   AST U/L 19   GLUCOSE RANDOM mg/dL 111       Results from last 7 days  Lab Units 05/22/18  1900   INR  1 28*       EKG: no new ekgs but was sinus rhythm with no acute st changes on the 16th possible LVH    Imaging: I have personally reviewed pertinent reports  Xr Chest 2 Views    Result Date: 4/25/2018  Narrative: CHEST INDICATION:   Rectal bleeding, chest pain, shortness of breath  COMPARISON:  4/17/2018 EXAM PERFORMED/VIEWS:  XR CHEST PA & LATERAL  The frontal view was performed utilizing dual energy radiographic technique   FINDINGS: Heart shadow is enlarged but unchanged from prior exam  Mild bilateral groundglass opacities are identified, perhaps slightly improved since the previous examination  There is no evidence of pneumothorax  No significant effusion  Osseous structures appear within normal limits for patient age  Impression: 1  Enlargement of cardiac silhouette similar to prior study 2  Persistent but partially improved groundglass opacities in the lungs Workstation performed: [id number]         ** Please Note: Dragon 360 Dictation voice to text software was used in the creation of this document   **

## 2018-05-23 NOTE — CONSULTS
Consultation - Nephrology   Tracee Ellis 56 y o  male MRN: [id number]  Unit/Bed#: ED 25 Encounter: [id number]      ASSESSMENT and PLAN:    65-year-old with a past medical history of end-stage kidney disease on hemodialysis who presents with a GI bleed    1  End-stage kidney disease on hemodialysis  -goes to [facility] in [place]  -last hemodialysis treatment was on Sunday missed his Tuesday treatment  -has multiple episodes of noncompliance  -seen on hemodialysis today at approximately 11:30 a m   -ultrafiltration set at 1 L  -planning for colonoscopy tomorrow  -will plan on dialysis treatment on Friday and Saturday    2  Anemia-multifactorial  -patient refusing all IV medications in the outpatient dialysis unit which includes he post stimulating agent  -is on Aurixia question compliance  -history of bleeding ulcers in his past colonoscopy  -also likely an element of uremic bleeding which was discussed with patient  -GI consult and planned for colonoscopy tomorrow  -will monitor H&H    3   CV risk reduction  -hypertension-blood pressures are around 857-323 systolic on dialysis will monitor  -currently on amlodipine and metoprolol    4  CKD bone mineral disease  -continue outpatient monitor  -currently on Calcium Acetate 3 times daily with meals and Sensipar  -was also started on aurixia as an outpatient    5  Electrolytes  -current electrolytes are stable    6   Acid-base status  -continue to monitor acid-base status    HISTORY OF PRESENT ILLNESS:  Requesting Physician: Brandon Easton MD  Reason for Consult:  End-stage kidney disease on hemodialysis    Tracee Ellis is a 56y o  year old male who was admitted to [facility] after presenting with rectal bleeding  A renal consultation is requested today for assistance in the management of end-stage kidney disease on dialysis      He has a past medical history of end-stage kidney disease thought to be related to hypertension since 2012, does get dialysis in [place] and moved to this area for a job recently, patient has a history of noncompliance, and has missed Tuesdays treatment, patient was found in the ER to have a hemoglobin of 6 6 after he presented with rectal bleeding, he has had previous episodes of this in the past where he had ulcer that were treated, currently feels okay tolerating his dialysis treatment    Otherwise fistula is working well without any difficulty    PAST MEDICAL HISTORY:  Past Medical History:   Diagnosis Date    [facility] ([facility] )     Dialysis patient ([facility] )     Hypertension     Renal disorder        PAST SURGICAL HISTORY:  Past Surgical History:   Procedure Laterality Date    AV NODE ABLATION      CHOLECYSTECTOMY      COLONOSCOPY N/A 4/27/2018    Procedure: COLONOSCOPY;  Surgeon: Rosa Malik DO;  Location: [facility]; Service: Gastroenterology       ALLERGIES:  No Known Allergies    SOCIAL HISTORY:  History   Alcohol Use    Yes     Comment: occasional     History   Drug Use No     History   Smoking Status    Current Every Day Smoker    Packs/day: 1 00    Types: Cigarettes   Smokeless Tobacco    Never Used       FAMILY HISTORY:  History reviewed  No pertinent family history      MEDICATIONS:    Current Facility-Administered Medications:     acetaminophen (TYLENOL) tablet 650 mg, 650 mg, Oral, Q6H PRN, Imelda Griffin MD    albuterol (PROVENTIL HFA,VENTOLIN HFA) inhaler 2 puff, 2 puff, Inhalation, Q6H PRN, Imelda Griffin MD    aluminum-magnesium hydroxide-simethicone (MYLANTA) 200-200-20 mg/5 mL oral suspension 5 mL, 5 mL, Oral, Q6H PRN, Imelda Griffin MD    amLODIPine (NORVASC) tablet 10 mg, 10 mg, Oral, Daily, Imelda Griffin MD, 10 mg at 05/23/18 0830    calcium acetate (PHOSLO) capsule 2,001 mg, 2,001 mg, Oral, TID With Meals, Imelda Griffin MD    cinacalcet [facility]) tablet 30 mg, 30 mg, Oral, Daily With Breakfast, Imelda Griffin MD, 30 mg at 05/23/18 0830   ergocalciferol (VITAMIN D2) capsule 50,000 Units, 50,000 Units, Oral, Weekly, Parke Holter, MD, 50,000 Units at 05/23/18 0000    gabapentin (NEURONTIN) capsule 300 mg, 300 mg, Oral, Daily, Parke Holter, MD, 300 mg at 05/23/18 0830    hydrocortisone (ANUSOL-HC, PROCTOSOL HC)) 2 5 % rectal cream, , Rectal, 4x Daily PRN, Parke Holter, MD    metoprolol tartrate (LOPRESSOR) partial tablet 12 5 mg, 12 5 mg, Oral, Q12H [address], Parke Holter, MD, 12 5 mg at 05/23/18 0904    nicotine (NICODERM CQ) 14 mg/24hr TD 24 hr patch 1 patch, 1 patch, Transdermal, Daily, Parke Holter, MD, 1 patch at 05/23/18 0907    ondansetron (ZOFRAN) injection 4 mg, 4 mg, Intravenous, Q6H PRN, Parke Holter, MD    pantoprazole (PROTONIX) EC tablet 40 mg, 40 mg, Oral, Early Morning, Parke Holter, MD, 40 mg at 05/23/18 0830    Current Outpatient Prescriptions:     albuterol (PROVENTIL HFA,VENTOLIN HFA) 90 mcg/act inhaler, Inhale 2 puffs every 6 (six) hours as needed for wheezing, Disp: 1 Inhaler, Rfl: 0    amLODIPine (NORVASC) 10 mg tablet, Take 10 mg by mouth daily, Disp: , Rfl:     calcium acetate (PHOSLO) 667 mg capsule, Take 2,001 mg by mouth 3 (three) times a day with meals, Disp: , Rfl:     cinacalcet (SENSIPAR) 30 mg tablet, 30 mg, Disp: , Rfl:     ergocalciferol (VITAMIN D2) 50,000 units, Take 1 capsule (50,000 Units total) by mouth once a week for 7 doses, Disp: 7 capsule, Rfl: 0    gabapentin (NEURONTIN) 100 mg capsule, Take 300 mg by mouth daily  , Disp: , Rfl:     hydrocortisone (ANUSOL-HC, PROCTOSOL HC,) 2 5 % rectal cream, Insert into the rectum 4 (four) times a day as needed for hemorrhoids, Disp: 30 g, Rfl: 0    metoprolol tartrate (LOPRESSOR) 25 mg tablet, Take 0 5 tablets (12 5 mg total) by mouth every 12 (twelve) hours, Disp: 30 tablet, Rfl: 0    nicotine (NICODERM CQ) 14 mg/24hr TD 24 hr patch, Place 1 patch on the skin daily, Disp: 28 patch, Rfl: 0    pantoprazole (PROTONIX) 40 mg tablet, Take 1 tablet (40 mg total) by mouth daily in the early morning, Disp: 30 tablet, Rfl: 0    REVIEW OF SYSTEMS:   All the systems were reviewed and were negative except as documented on the HPI        PHYSICAL EXAM:  Current Weight: Weight - Scale: 71 7 kg (158 lb)  First Weight: Weight - Scale: 71 7 kg (158 lb)  Vitals:    05/23/18 1000 05/23/18 1030 05/23/18 1100 05/23/18 1130   BP: 147/70 142/71 139/71 144/70   BP Location:       Pulse: 62 63 65 65   Resp:       Temp:       TempSrc:       SpO2:       Weight:       Height:           Intake/Output Summary (Last 24 hours) at 05/23/18 1156  Last data filed at 05/23/18 0920   Gross per 24 hour   Intake              200 ml   Output                0 ml   Net              200 ml     General: conscious, cooperative, in not acute distress  Eyes: conjunctivae pink, anicteric sclerae  ENT: lips and mucous membranes moist  Neck: supple, no JVD  Chest: clear breath sounds bilateral, no crackles, ronchus or wheezings  CVS: distinct S1 & S2, normal rate, regular rhythm  Abdomen: soft, non-tender, non-distended, normoactive bowel sounds  Extremities: no edema of both legs, left upper extremity AV fistula  Skin: no rash  Neuro: awake, alert, oriented     Lab Results:     Results from last 7 days  Lab Units 05/23/18  0905 05/23/18  0516 05/22/18  2320 05/22/18  1900   WBC Thousand/uL  --  5 44  --  4 55   HEMOGLOBIN g/dL 8 1* 7 3* 8 0* 7 9*   HEMATOCRIT %  --  22 8*  --  24 1*   PLATELETS Thousands/uL  --  204  --  210   SODIUM mmol/L  --   --   --  139   POTASSIUM mmol/L  --   --   --  5 0   CHLORIDE mmol/L  --   --   --  102   CO2 mmol/L  --   --   --  25   BUN mg/dL  --   --   --  84*   CREATININE mg/dL  --   --   --  15 20*   CALCIUM mg/dL  --   --   --  8 1*   TOTAL PROTEIN g/dL  --   --   --  6 6   BILIRUBIN TOTAL mg/dL  --   --   --  0 33   ALK PHOS U/L  --   --   --  107   ALT U/L  --   --   --  16   AST U/L  --   --   --  19   GLUCOSE RANDOM mg/dL  --   --   --  111       Other Studies:  Please see previous notes

## 2018-05-23 NOTE — ASSESSMENT & PLAN NOTE
Patient still with bloody bowel movement  GI evaluation appreciated  For EGD and colonoscopy tomorrow  Monitor H and H and transfuse p r n

## 2018-05-23 NOTE — ASSESSMENT & PLAN NOTE
· Anemia acute on chronic secondary to acute blood loss anemia on top of the chronic anemia secondary to CKD  · Monitor H and H

## 2018-05-24 ENCOUNTER — ANESTHESIA EVENT (INPATIENT)
Dept: GASTROENTEROLOGY | Facility: HOSPITAL | Age: 58
DRG: 393 | End: 2018-05-24
Payer: MEDICARE

## 2018-05-24 ENCOUNTER — ANESTHESIA (INPATIENT)
Dept: GASTROENTEROLOGY | Facility: HOSPITAL | Age: 58
DRG: 393 | End: 2018-05-24
Payer: MEDICARE

## 2018-05-24 VITALS
HEIGHT: 66 IN | HEART RATE: 68 BPM | SYSTOLIC BLOOD PRESSURE: 124 MMHG | BODY MASS INDEX: 23.63 KG/M2 | RESPIRATION RATE: 16 BRPM | OXYGEN SATURATION: 99 % | WEIGHT: 147 LBS | DIASTOLIC BLOOD PRESSURE: 57 MMHG | TEMPERATURE: 98.3 F

## 2018-05-24 LAB
ANION GAP SERPL CALCULATED.3IONS-SCNC: 10 MMOL/L (ref 4–13)
BASOPHILS # BLD AUTO: 0.05 THOUSANDS/ΜL (ref 0–0.1)
BASOPHILS NFR BLD AUTO: 1 % (ref 0–1)
BUN SERPL-MCNC: 44 MG/DL (ref 5–25)
CALCIUM SERPL-MCNC: 8.9 MG/DL (ref 8.3–10.1)
CHLORIDE SERPL-SCNC: 99 MMOL/L (ref 100–108)
CO2 SERPL-SCNC: 26 MMOL/L (ref 21–32)
CREAT SERPL-MCNC: 10.6 MG/DL (ref 0.6–1.3)
EOSINOPHIL # BLD AUTO: 0.22 THOUSAND/ΜL (ref 0–0.61)
EOSINOPHIL NFR BLD AUTO: 5 % (ref 0–6)
ERYTHROCYTE [DISTWIDTH] IN BLOOD BY AUTOMATED COUNT: 18.1 % (ref 11.6–15.1)
GFR SERPL CREATININE-BSD FRML MDRD: 6 ML/MIN/1.73SQ M
GLUCOSE SERPL-MCNC: 87 MG/DL (ref 65–140)
HCT VFR BLD AUTO: 27 % (ref 36.5–49.3)
HGB BLD-MCNC: 8.1 G/DL (ref 12–17)
HGB BLD-MCNC: 9 G/DL (ref 12–17)
HGB BLD-MCNC: 9.1 G/DL (ref 12–17)
LYMPHOCYTES # BLD AUTO: 1.21 THOUSANDS/ΜL (ref 0.6–4.47)
LYMPHOCYTES NFR BLD AUTO: 26 % (ref 14–44)
MCH RBC QN AUTO: 29 PG (ref 26.8–34.3)
MCHC RBC AUTO-ENTMCNC: 33.3 G/DL (ref 31.4–37.4)
MCV RBC AUTO: 87 FL (ref 82–98)
MONOCYTES # BLD AUTO: 0.5 THOUSAND/ΜL (ref 0.17–1.22)
MONOCYTES NFR BLD AUTO: 11 % (ref 4–12)
NEUTROPHILS # BLD AUTO: 2.76 THOUSANDS/ΜL (ref 1.85–7.62)
NEUTS SEG NFR BLD AUTO: 58 % (ref 43–75)
NRBC BLD AUTO-RTO: 0 /100 WBCS
PLATELET # BLD AUTO: 216 THOUSANDS/UL (ref 149–390)
PMV BLD AUTO: 8.9 FL (ref 8.9–12.7)
POTASSIUM SERPL-SCNC: 4.2 MMOL/L (ref 3.5–5.3)
RBC # BLD AUTO: 3.1 MILLION/UL (ref 3.88–5.62)
SODIUM SERPL-SCNC: 135 MMOL/L (ref 136–145)
WBC # BLD AUTO: 4.75 THOUSAND/UL (ref 4.31–10.16)

## 2018-05-24 PROCEDURE — 99239 HOSP IP/OBS DSCHRG MGMT >30: CPT | Performed by: INTERNAL MEDICINE

## 2018-05-24 PROCEDURE — 0DB98ZX EXCISION OF DUODENUM, VIA NATURAL OR ARTIFICIAL OPENING ENDOSCOPIC, DIAGNOSTIC: ICD-10-PCS | Performed by: INTERNAL MEDICINE

## 2018-05-24 PROCEDURE — 85025 COMPLETE CBC W/AUTO DIFF WBC: CPT | Performed by: FAMILY MEDICINE

## 2018-05-24 PROCEDURE — 88305 TISSUE EXAM BY PATHOLOGIST: CPT | Performed by: PATHOLOGY

## 2018-05-24 PROCEDURE — 80048 BASIC METABOLIC PNL TOTAL CA: CPT | Performed by: FAMILY MEDICINE

## 2018-05-24 PROCEDURE — 45378 DIAGNOSTIC COLONOSCOPY: CPT | Performed by: INTERNAL MEDICINE

## 2018-05-24 PROCEDURE — 0DJD8ZZ INSPECTION OF LOWER INTESTINAL TRACT, VIA NATURAL OR ARTIFICIAL OPENING ENDOSCOPIC: ICD-10-PCS | Performed by: INTERNAL MEDICINE

## 2018-05-24 PROCEDURE — 43239 EGD BIOPSY SINGLE/MULTIPLE: CPT | Performed by: INTERNAL MEDICINE

## 2018-05-24 PROCEDURE — 85018 HEMOGLOBIN: CPT | Performed by: FAMILY MEDICINE

## 2018-05-24 PROCEDURE — 99232 SBSQ HOSP IP/OBS MODERATE 35: CPT | Performed by: INTERNAL MEDICINE

## 2018-05-24 RX ORDER — LIDOCAINE HYDROCHLORIDE 10 MG/ML
INJECTION, SOLUTION INFILTRATION; PERINEURAL AS NEEDED
Status: DISCONTINUED | OUTPATIENT
Start: 2018-05-24 | End: 2018-05-24 | Stop reason: SURG

## 2018-05-24 RX ORDER — SODIUM CHLORIDE 9 MG/ML
INJECTION, SOLUTION INTRAVENOUS CONTINUOUS PRN
Status: DISCONTINUED | OUTPATIENT
Start: 2018-05-24 | End: 2018-05-24 | Stop reason: SURG

## 2018-05-24 RX ORDER — PROPOFOL 10 MG/ML
INJECTION, EMULSION INTRAVENOUS CONTINUOUS PRN
Status: DISCONTINUED | OUTPATIENT
Start: 2018-05-24 | End: 2018-05-24 | Stop reason: SURG

## 2018-05-24 RX ORDER — PROPOFOL 10 MG/ML
INJECTION, EMULSION INTRAVENOUS AS NEEDED
Status: DISCONTINUED | OUTPATIENT
Start: 2018-05-24 | End: 2018-05-24 | Stop reason: SURG

## 2018-05-24 RX ADMIN — CALCIUM ACETATE 2001 MG: 667 CAPSULE ORAL at 12:41

## 2018-05-24 RX ADMIN — LIDOCAINE HYDROCHLORIDE 100 MG: 10 INJECTION, SOLUTION INFILTRATION; PERINEURAL at 10:57

## 2018-05-24 RX ADMIN — SODIUM CHLORIDE: 0.9 INJECTION, SOLUTION INTRAVENOUS at 09:01

## 2018-05-24 RX ADMIN — PANTOPRAZOLE SODIUM 40 MG: 40 TABLET, DELAYED RELEASE ORAL at 05:47

## 2018-05-24 RX ADMIN — METOPROLOL TARTRATE 12.5 MG: 25 TABLET ORAL at 12:41

## 2018-05-24 RX ADMIN — AMLODIPINE BESYLATE 10 MG: 10 TABLET ORAL at 12:41

## 2018-05-24 RX ADMIN — PROPOFOL 80 MG: 10 INJECTION, EMULSION INTRAVENOUS at 10:57

## 2018-05-24 RX ADMIN — PROPOFOL 120 MCG/KG/MIN: 10 INJECTION, EMULSION INTRAVENOUS at 10:57

## 2018-05-24 RX ADMIN — GABAPENTIN 300 MG: 300 CAPSULE ORAL at 12:40

## 2018-05-24 NOTE — PROGRESS NOTES
NEPHROLOGY PROGRESS NOTE   Joaquín Murray 56 y o  male MRN: [id number]  Unit/Bed#: [facility] 1-0 Encounter: [id number]        ASSESSMENT and PLAN:     46-year-old with a past medical history of end-stage kidney disease on hemodialysis who presents with a GI bleed     1  End-stage kidney disease on hemodialysis  -goes to [facility] in [place]  -last hemodialysis treatment was on Sunday missed his Tuesday treatment  -has multiple episodes of noncompliance  -ultrafiltration set at 1 L  -planning for colonoscopy tomorrow  -he is refusing dialysis treatment tomorrow, he did miss his treatment on Tuesday, has had multiple times where he has missed his dialysis treatment and I did discuss with him that uremia will contribute to GI bleed, he understands the risks of not completing a 3rd treatment today but states he will go for treatment and Saturday in [place], he does not want to wait here to find placement and a has been rejected from other units locally     2  Anemia-multifactorial  -patient refusing all IV medications in the outpatient dialysis unit which includes he post stimulating agent  -is on Aurixia question compliance  -history of bleeding ulcers in his past colonoscopy  -also likely an element of uremic bleeding which was discussed with patient as above  -GI consult and planned for colonoscopy tomorrow  -will monitor H&H     3   CV risk reduction  -hypertension-blood pressures are around 663-545 systolic on dialysis will monitor  -currently on amlodipine and metoprolol     4  CKD bone mineral disease  -continue outpatient monitor  -currently on Calcium Acetate 3 times daily with meals and Sensipar  -was also started on aurixia as an outpatient     5    Electrolytes  -current electrolytes are stable     6   Acid-base status  -continue to monitor acid-base status    SUBJECTIVE:    Patient seen lying flat no shortness of breath or chest pain currently    OBJECTIVE:  Current Weight: Weight - Scale: 66 7 kg (147 lb)  Vitals:    05/24/18 1139   BP: 124/57   Pulse: 68   Resp: 16   Temp:    SpO2: 99%       Intake/Output Summary (Last 24 hours) at 05/24/18 1335  Last data filed at 05/24/18 1120   Gross per 24 hour   Intake             1300 ml   Output                0 ml   Net             1300 ml       General: conscious, cooperative, in not acute distress  Eyes: conjunctivae pink, anicteric sclerae  ENT: lips and mucous membranes moist  Neck: supple, no JVD  Chest: clear breath sounds bilateral, no crackles, ronchus or wheezings  CVS: distinct S1 & S2, normal rate, regular rhythm  Abdomen: soft, non-tender, non-distended, normoactive bowel sounds  Extremities: no edema of both legs  Skin: no rash  Neuro: awake, alert, oriented    Medications:    Current Facility-Administered Medications:     acetaminophen (TYLENOL) tablet 650 mg, 650 mg, Oral, Q6H PRN, Luksa Banegas MD    albuterol (PROVENTIL HFA,VENTOLIN HFA) inhaler 2 puff, 2 puff, Inhalation, Q6H PRN, Lukas Banegas MD    aluminum-magnesium hydroxide-simethicone (MYLANTA) 200-200-20 mg/5 mL oral suspension 5 mL, 5 mL, Oral, Q6H PRN, Lukas Banegas MD    amLODIPine (NORVASC) tablet 10 mg, 10 mg, Oral, Daily, Lukas Banegas MD, 10 mg at 05/24/18 1241    calcium acetate (PHOSLO) capsule 2,001 mg, 2,001 mg, Oral, TID With Meals, Lukas Banegas MD, 2,001 mg at 05/24/18 1241    cinacalcet (SENSIPAR) tablet 30 mg, 30 mg, Oral, Daily With Breakfast, Lukas Banegas MD, 30 mg at 05/23/18 0830    ergocalciferol (VITAMIN D2) capsule 50,000 Units, 50,000 Units, Oral, Weekly, Lukas Banegas MD, 50,000 Units at 05/23/18 0000    gabapentin (NEURONTIN) capsule 300 mg, 300 mg, Oral, Daily, Lukas Banegas MD, 300 mg at 05/24/18 1240    hydrocortisone (ANUSOL-HC, PROCTOSOL HC)) 2 5 % rectal cream, , Rectal, 4x Daily PRN, Lukas Banegas MD    metoprolol tartrate (LOPRESSOR) partial tablet 12 5 mg, 12 5 mg, Oral, Q12H [address], Lukas Banegas MD, 12 5 mg at 05/24/18 1241    nicotine (NICODERM CQ) 14 mg/24hr TD 24 hr patch 1 patch, 1 patch, Transdermal, Daily, Lukas Banegas MD, 1 patch at 05/23/18 0907    ondansetron (ZOFRAN) injection 4 mg, 4 mg, Intravenous, Q6H PRN, Lukas Banegas MD    pantoprazole (PROTONIX) EC tablet 40 mg, 40 mg, Oral, Early Morning, Lukas Banegas MD, 40 mg at 05/24/18 0547    Invasive Devices:      Lab Results:     Results from last 7 days  Lab Units 05/24/18  0838 05/24/18  0312 05/23/18  1633 05/23/18  1235 05/23/18  0905 05/23/18  0516  05/22/18  1900   WBC Thousand/uL 4 75  --   --   --   --  5 44  --  4 55   HEMOGLOBIN g/dL 9 1*  9 0* 8 1* 8 2* 7 5* 8 1* 7 3*  < > 7 9*   HEMATOCRIT % 27 0*  --   --  22 2*  --  22 8*  --  24 1*   PLATELETS Thousands/uL 216  --   --   --   --  204  --  210   SODIUM mmol/L 135*  --   --   --   --   --   --  139   POTASSIUM mmol/L 4 2  --   --   --   --   --   --  5 0   CHLORIDE mmol/L 99*  --   --   --   --   --   --  102   CO2 mmol/L 26  --   --   --   --   --   --  25   BUN mg/dL 44*  --   --   --   --   --   --  84*   CREATININE mg/dL 10 60*  --   --   --   --   --   --  15 20*   CALCIUM mg/dL 8 9  --   --   --   --   --   --  8 1*   TOTAL PROTEIN g/dL  --   --   --   --   --   --   --  6 6   BILIRUBIN TOTAL mg/dL  --   --   --   --   --   --   --  0 33   ALK PHOS U/L  --   --   --   --   --   --   --  107   ALT U/L  --   --   --   --   --   --   --  16   AST U/L  --   --   --   --   --   --   --  19   GLUCOSE RANDOM mg/dL 87  --   --   --   --   --   --  111   < > = values in this interval not displayed

## 2018-05-24 NOTE — DISCHARGE SUMMARY
Discharge Summary - [address] [facility]    Patient Information: Michela Schulz 56 y o  male MRN: [id number]  Unit/Bed#: [facility] 1-0 Encounter: [id number]    Discharging Physician / Practitioner: Jluis Langston DO  PCP: Marci Poon  Admission Date: 5/22/2018  Discharge Date: 05/24/18    Disposition:     Home    Reason for Admission:   Lower GI bleed likely hemorrhoidal bleeding    Discharge Diagnoses:     Principal Problem:    Lower GI bleed  Active Problems:    DM (diabetes mellitus) ([place] [address] )    ESRD (end stage renal disease) on dialysis ([place] [address] )    Secondary hyperparathyroidism of renal origin ([place] [address] )    Anemia in chronic kidney disease, on chronic dialysis ([place] [address] )    Accelerated hypertension    ESRD on hemodialysis ([place] [address] )    Nicotine abuse    Anemia  Resolved Problems:    * No resolved hospital problems   *      Consultations During Hospital Stay:  · Nephrology  · GI    Procedures Performed:     · EGD with duodenitis  · Colonoscopy with easy bruised colon mucosa and internal hemorrhoids    Significant Findings / Test Results:     · As above    Incidental Findings:   · None    Test Results Pending at Discharge (will require follow up):   · Duodenal biopsy     Outpatient Tests Requested:  · none    Complications:  none    Hospital Course:     Michela Schulz is a 56 y o  male patient who originally presented to the hospital on 5/22/2018 due to rectal bleeding  Patient was admitted here on 04/25 with rectal bleeding, at that time he had a colonoscopy showing 2 small ulcerated areas with visible vessels in the cecum which were clipped  He was admitted the hospital, GI consulted, patient underwent EGD and colonoscopy with above findings, possible bleeding from the hemorrhoids  Nephrology consulted regarding hemodialysis, patient had 1 session yesterday  Currently he is refusing dialysis tomorrow, and wants to go home now, he will continue dialysis treatment in [place] on Saturday  H&H is stable tolerating oral diet  Patient will be discharged home today to follow his family doctor in 1 week    1  Lower GI bleed possibly secondary to hemorrhoids, status post EGD and colonoscopy  with positive finding for duodenitis, easily bruised colon mucosa and internal hemorrhoids, per GI, continue to monitor follow on biopsy result  2  ESRD on HD, nephrology is following plan for hemodialysis on Friday and Saturday  3  HTN, acceptable blood pressure continue Norvasc and Lopressor  4  Anemia of chronic disease, continue to monitor  5  Tobacco smoking, continue nicotine patch  6  Previous history of transient paroxysmal AFib, PVC, AVNRT status post ablation,  not on AC due to recurrent GI bleed    Discussed with Nephrology    Condition at Discharge: stable     Discharge Day Visit / Exam:     Subjective:    Patient seen and examined  Comfortable in bed  Wants to go home now  He does not want to be in the hospital anymore  Vitals: Blood Pressure: 124/57 (05/24/18 1139)  Pulse: 68 (05/24/18 1139)  Temperature: 98 3 °F (36 8 °C) (05/24/18 0853)  Temp Source: Temporal (05/24/18 0853)  Respirations: 16 (05/24/18 1139)  Height: 5' 6" (167 6 cm) (05/24/18 0853)  Weight - Scale: 66 7 kg (147 lb) (05/24/18 0853)  SpO2: 99 % (05/24/18 1139)  Exam:   Physical Exam  Patient is awake alert in no acute distress  His girlfriend at bedside  Lung clear to auscultation bilateral  Heart positive S1-S2 no murmur  Abdomen soft nontender positive bowel sounds  Lower extremities no edema    Discussion with Family:  Girlfriend    Discharge instructions/Information to patient and family:   See after visit summary for information provided to patient and family  Provisions for Follow-Up Care:  See after visit summary for information related to follow-up care and any pertinent home health orders  Planned Readmission: no     Discharge Statement:  I spent 45  minutes discharging the patient  This time was spent on the day of discharge   I had direct contact with the patient on the day of discharge  Greater than 50% of the total time was spent examining patient, answering all patient questions, arranging and discussing plan of care with patient as well as directly providing post-discharge instructions  Additional time then spent on discharge activities  Discharge Medications:  See after visit summary for reconciled discharge medications provided to patient and family        ** Please Note: This note has been constructed using a voice recognition system **

## 2018-05-24 NOTE — OP NOTE
**** GI/ENDOSCOPY REPORT ****     PATIENT NAME: Baudilio Garcia - VISIT ID:  Patient ID: [id number] YOB: 1960     INTRODUCTION: Esophagogastroduodenoscopy - A 56 male patient presents for   an inpatient Esophagogastroduodenoscopy at [address] [place] [address]  INDICATIONS: Maroon stools  CONSENT: The benefits, risks, and alternatives to the procedure were   discussed and informed consent was obtained from the patient  PREPARATION:  EKG, pulse, pulse oximetry and blood pressure were monitored   throughout the procedure  MEDICATIONS: MAC anesthesia  PROCEDURE:  The endoscope was passed without difficulty through the mouth   under direct visualization and advanced to the 2nd portion of the   duodenum  The scope was withdrawn and the mucosa was carefully examined  FINDINGS:   Esophagus: The esophagus appeared to be normal  Z-line at 40   cm  Stomach: The stomach appeared to be normal   Duodenum: There was   evidence of duodenitis in the duodenal bulb  A cold forceps biopsy was   taken  COMPLICATIONS: There were no complications  IMPRESSIONS: Normal esophagus  Normal stomach  Duodenitis in the duodenal   bulb  Biopsy taken  RECOMMENDATIONS: Follow-up on the results of the biopsy specimens  ESTIMATED BLOOD LOSS: Insignificant  PATHOLOGY SPECIMENS: Cold forceps biopsy taken  Associated finding:   Duodenitis  PROCEDURE CODES:     ICD-9 Codes: 535 60 Duodenitis, without mention of hemorrhage     ICD-10 Codes: R19 5 Other fecal abnormalities K29 Gastritis and duodenitis     PERFORMED BY: DANTE Hernandez  on 05/24/2018  Version 1, electronically signed by DANTE Vaughan  on 05/24/2018 at   11:37

## 2018-05-24 NOTE — OP NOTE
**** GI/ENDOSCOPY REPORT ****     PATIENT NAME: Katie Farr ------ VISIT ID:  Patient ID: [id number]   YOB: 1960     INTRODUCTION: Colonoscopy - A 56 male patient presents for an inpatient   Colonoscopy at [address] [place] [address]  PREVIOUS COLONOSCOPY: Patient's last colonoscopy was < 3 years ago  INDICATIONS: Rectal bleeding  CONSENT:  The benefits, risks, and alternatives to the procedure were   discussed and informed consent was obtained from the patient  PREPARATION: EKG, pulse, pulse oximetry and blood pressure were monitored   throughout the procedure  The patient was identified by myself both   verbally and by visual inspection of ID band  Airway Assessment   Classification: Airway class 2 - Visualization of the soft palate, fauces   and uvula  ASA Classification: Class 2 - Patient has mild to moderate   systemic disturbance that may or may not be related to the disorder   requiring surgery  MEDICATIONS: Anesthesia-check records MAC anesthesia  PROCEDURE:  The endoscope was passed without difficulty through the anus   under direct visualization and advanced to the cecum, confirmed by   appendiceal orifice and ileocecal valve  The scope was withdrawn and the   mucosa was carefully examined  The quality of the preparation was fair  Cecal Intubation Time: Minute(s) Scope Withdrawal Time: Minute(s)     RECTAL EXAM: Normal rectal exam      FINDINGS:  The terminal ileum appeared to be normal  Two Clips found in   the cecum, they are from the previous colonoscopy  No ulcers around the   clips  no signs of recent or ongoing bleeding  Small-sized internal   hemorrhoids were found during retroflex  COMPLICATIONS: There were no complications  IMPRESSIONS: Normal terminal ileum  Clips  Internal hemorrhoids  RECOMMENDATIONS: Colonoscopy recommended in as needed per clinical   indication in the future    A patient likely has platelet dysfunction seconday to ESRD  The colonic mucosa appears normal but easily bruised on   contact  it could be possible he bled from the hemorrhoids  Follow up   with GI as outpatient to monitor for bleeding     ESTIMATED BLOOD LOSS: Insignificant  PATHOLOGY SPECIMENS:     PROCEDURE CODES:     ICD-9 Codes: 569 3 Hemorrhage of rectum and anus     ICD-10 Codes: K62 5 Hemorrhage of anus and rectum K64 9 Unspecified   hemorrhoids     PERFORMED BY: DANTE Riley  on 05/24/2018  Version 1, electronically signed by DANTE Snyder  on 05/24/2018 at   11:28

## 2018-05-24 NOTE — MEDICAL STUDENT
Progress Note - Chris Webb 56 y o  male MRN: [id number]    Unit/Bed#: [facility] Encounter: [id number]      Assessment:  Mr Brittney Marsh is a 57 YO M who presented to the ED following three episodes of BRBPR    1  Lower GI Bleed  2  ESRD on HD  3  Hypertension  4  Anemia, likely acute on chronic  5  Diabetes Mellitus    Plan:    1  Pt with history of lower GI bleed, s/p colonoscopy one month ago showing cecal ulceration/visible vessels  2  GI following, recommended recheck of GI tract with EGD and colonoscopy to reassess known lesions, look for new lesions; currently in Endo lab  3  Colonoscopy demonstrated bruisable mucosa with hemorrhoids, recommended GI follow-up outpatient  4  EGD demonstrated duodenitis, biopsy taken  5  Patient missed HD appointment Tuesday due to bleed; had inpatient dialysis yesterday and planned for tomorrow (Friday) and Saturday  6  Nephrology following  7  Anemia is likely due to components of ESRD and GI blood loss; begin EPO therapy  8  Monitor H&H, transfuse as needed  9  Monitor vitals for signs of HTN exacerbation  10  Last A1c was 5%    Subjective:     Mr Brittney Marsh is a 57 YO M who presented to the ED on Tuesday, 5/22 due to three episodes of bright red blood per rectum  He states that his stool was "completely red", but is unable to further quantify a volume of blood  Of note, patient was seen one month ago with similar symptoms, and underwent colonoscopy which demonstrated ulceration and visible vessels in the cecum  These were clipped and the patient experienced no further symptoms until this episode  Patient was seen by GI, who recommended EGD and colonoscopy to reassess cecum and look for potential other sources of GI blood loss  Patient went for these this AM   Colonoscopy found hemorrhoids and easily bruisable mucosa, otherwise benign  EGD demonstrated duodenitis, biopsy taken  Patient states that he is still experiencing bloody bowel movements without pain   He denies any other symptoms, including chest pain, SOB, abdominal pain, NVD  He is awake and alert but appears fatigued, stating that he has some lightheadedness which he attributes to the anesthesia from his recent procedure  Objective:     Vitals: Blood pressure 167/79, pulse 69, temperature 98 3 °F (36 8 °C), temperature source Temporal, resp  rate 17, height 5' 6" (1 676 m), weight 66 7 kg (147 lb), SpO2 96 %  ,Body mass index is 23 73 kg/m²     Wt Readings from Last 3 Encounters:   05/24/18 66 7 kg (147 lb)   05/06/18 71 7 kg (158 lb)   04/29/18 72 6 kg (160 lb 0 9 oz)     Temp Readings from Last 3 Encounters:   05/24/18 98 3 °F (36 8 °C) (Temporal)   05/06/18 97 6 °F (36 4 °C) (Oral)   04/30/18 98 1 °F (36 7 °C) (Oral)     BP Readings from Last 3 Encounters:   05/24/18 167/79   05/06/18 (!) 188/88   04/30/18 151/70     Pulse Readings from Last 3 Encounters:   05/24/18 69   05/06/18 76   04/30/18 70           Intake/Output Summary (Last 24 hours) at 05/24/18 1115  Last data filed at 05/24/18 0600   Gross per 24 hour   Intake             1740 ml   Output             1500 ml   Net              240 ml       Physical Exam: General appearance: alert and oriented, in no acute distress and fatigued  Head: Normocephalic, without obvious abnormality, atraumatic  Neck: no adenopathy, no carotid bruit, no JVD, supple, symmetrical, trachea midline and thyroid not enlarged, symmetric, no tenderness/mass/nodules  Lungs: clear to auscultation bilaterally  Heart: systolic murmur: holosystolic 4/6, blowing at 2nd left intercostal space  Abdomen: soft, non-tender; bowel sounds normal; no masses,  no organomegaly  Extremities: extremities normal, warm and well-perfused; no cyanosis, clubbing, or edema  Pulses: 2+ and symmetric     Invasive Devices     Peripheral Intravenous Line            Peripheral IV 05/22/18 Right Forearm 1 day          Line            Hemodialysis AV Fistula Left -- days                Lab, Imaging and other studies: I have personally reviewed pertinent reports       Lab Results   Component Value Date    WBC 4 75 05/24/2018    HGB 9 0 (L) 05/24/2018    HGB 9 1 (L) 05/24/2018    HCT 27 0 (L) 05/24/2018    MCV 87 05/24/2018     05/24/2018     Lab Results   Component Value Date    GLUCOSE 87 05/24/2018    CALCIUM 8 9 05/24/2018     (L) 05/24/2018    K 4 2 05/24/2018    CO2 26 05/24/2018    CL 99 (L) 05/24/2018    BUN 44 (H) 05/24/2018    CREATININE 10 60 (H) 05/24/2018       VTE Pharmacologic Prophylaxis: Reason for no pharmacologic prophylaxis GI bleed  VTE Mechanical Prophylaxis: sequential compression device

## 2018-05-24 NOTE — PLAN OF CARE
Problem: DISCHARGE PLANNING - CARE MANAGEMENT  Goal: Discharge to post-acute care or home with appropriate resources  INTERVENTIONS:  - Conduct assessment to determine patient/family and health care team treatment goals, and need for post-acute services based on payer coverage, community resources, and patient preferences, and barriers to discharge  - Address psychosocial, clinical, and financial barriers to discharge as identified in assessment in conjunction with the patient/family and health care team  - Arrange appropriate level of post-acute services according to patient's   needs and preference and payer coverage in collaboration with the physician and health care team  - Communicate with and update the patient/family, physician, and health care team regarding progress on the discharge plan  - Arrange appropriate transportation to post-acute venues  -Anticipate return to home once medically stable  Outcome: Progressing

## 2018-05-24 NOTE — ANESTHESIA PREPROCEDURE EVALUATION
Review of Systems/Medical History  Patient summary reviewed  Chart reviewed  No history of anesthetic complications     Cardiovascular  EKG reviewed, Hypertension , Dysrhythmias , CHF ,   Comment: Echo good EF with sever LVH, mild-mod pulmo HTN suggested  S/P EPS and ablation,  Pulmonary  Smoker cigarette smoker  ,        GI/Hepatic    GI bleeding , Bowel prep       Chronic kidney disease , Dialysis hemodialysis Date of last dialysis: 5/23,        Endo/Other  Diabetes ,      GYN       Hematology  Anemia renal failure anemia,     Musculoskeletal       Neurology   Psychology           Physical Exam    Airway    Mallampati score: II  TM Distance: >3 FB  Neck ROM: full     Dental   lower dentures and upper dentures,     Cardiovascular      Pulmonary      Other Findings        Anesthesia Plan  ASA Score- 3     Anesthesia Type- IV sedation with anesthesia with ASA Monitors  Additional Monitors:   Airway Plan:         Plan Factors-  Patient did not smoke on day of surgery  Induction- intravenous  Postoperative Plan-     Informed Consent- Anesthetic plan and risks discussed with patient  I personally reviewed this patient with the CRNA  Discussed and agreed on the Anesthesia Plan with the CRNA  Dewey Aguilar

## 2018-05-24 NOTE — PHYSICIAN ADVISOR
Current patient class: Inpatient  The patient is currently on Hospital Day: 2      The patient was admitted to the hospital  on 5/23/18 at 1440 for the following diagnosis:  Blood in stool [K92 1]  Anemia [D64 9]  Lower GI bleed [K92 2]  ESRD (end stage renal disease) ([facility] ) [N18 6]       There is documentation in the medical record of an expected length of stay of at least 2 midnights  The patient is therefore expected to satisfy the 2 midnight benchmark and given the 2 midnight presumption is appropriate for INPATIENT ADMISSION  Given this expectation of a satisfying stay, CMS instructs us that the patient is most often appropriate for inpatient admission under part A provided medical necessity is documented in the chart  After review of the relevant documentation, labs, vital signs and test results, the patient is appropriate for INPATIENT ADMISSION  Admission to the hospital as an inpatient is a complex decision making process which requires the practitioner to consider the patients presenting complaint, history and physical examination and all relevant testing  With this in mind, in this case, the patient was deemed appropriate for INPATIENT ADMISSION  After review of the documentation and testing available at the time of the admission I concur with this clinical determination of medical necessity  The patient does have an inpatient admission within the previous 30 days  The patient was admitted on 5/6/18 and discharged on 5/6/18 as an inpatient  The patient therefore required readmission review  In this case the patient should be considered a RELATED INPATIENT ADMISSION  Rationale is as follows:     The patient is a 56 yrs   Male who presented to the ED at 5/22/2018  4:20 PM with a chief complaint of Black or Bloody Stool (pt from home with dark red bloody stools since this AM pt had a procedure last month to treat his GI bleeding recent admit to a [facility] for a blood transfusion and was told he is bleeding some where )     Patient is readmitted for evaluation of blood per rectum, with a history of recent admission for the same  The patient does have similar complaints, did have a ER visit in the interim, and is planned to remain hospitalized for at least 2 midnights for intervention again  Given this, patient should be considered a related inpatient admission as the patient returns for the same complaints as the previous 2 admissions  The patients vitals on arrival were ED Triage Vitals   Temperature Pulse Respirations Blood Pressure SpO2   05/22/18 1532 05/22/18 1532 05/22/18 1532 05/22/18 1532 05/22/18 1532   97 6 °F (36 4 °C) 73 20 159/73 99 %      Temp Source Heart Rate Source Patient Position - Orthostatic VS BP Location FiO2 (%)   05/22/18 1532 05/22/18 1532 05/22/18 1532 05/22/18 1833 --   Tympanic Monitor Sitting Left arm       Pain Score       05/22/18 1532       No Pain           Past Medical History:   Diagnosis Date    CHF (congestive heart failure) ([facility] )     Dialysis patient ([facility] [facility] )     Hypertension     Renal disorder      Past Surgical History:   Procedure Laterality Date    AV NODE ABLATION      CHOLECYSTECTOMY      COLONOSCOPY N/A 4/27/2018    Procedure: COLONOSCOPY;  Surgeon: Anay Camarena DO;  Location: [facility];   Service: Gastroenterology           Consults have been placed to:   IP CONSULT TO GASTROENTEROLOGY  IP CONSULT TO NEPHROLOGY    Vitals:    05/23/18 1319 05/23/18 1500 05/23/18 1900 05/23/18 2038   BP: 150/68 165/83 101/70 169/73   BP Location:  Right arm Right arm Right arm   Pulse: 68 69 (!) 139 70   Resp: 20 20 20    Temp: 97 5 °F (36 4 °C) 97 5 °F (36 4 °C) 98 7 °F (37 1 °C)    TempSrc: Oral Oral Oral    SpO2: 100% 96% 96%    Weight: 66 7 kg (147 lb 0 8 oz)      Height: 5' 6" (1 676 m)          Most recent labs:    Recent Labs      05/22/18   1900   05/23/18   0516   05/23/18   1235  05/23/18   1633   WBC  4 55   --   5 44   --    --    --    HGB  7 9*   < > 7 3*   < >  7 5*  8 2*   HCT  24 1*   --   22 8*   --   22 2*   --    PLT  210   --   204   --    --    --    K  5 0   --    --    --    --    --    NA  139   --    --    --    --    --    CALCIUM  8 1*   --    --    --    --    --    BUN  84*   --    --    --    --    --    CREATININE  15 20*   --    --    --    --    --    INR  1 28*   --    --    --    --    --    AST  19   --    --    --    --    --    ALT  16   --    --    --    --    --    ALKPHOS  107   --    --    --    --    --    BILITOT  0 33   --    --    --    --    --     < > = values in this interval not displayed         Scheduled Meds:  Current Facility-Administered Medications:  acetaminophen 650 mg Oral Q6H PRN Zaina Garcia MD   albuterol 2 puff Inhalation Q6H PRN Zaina Garcia MD   aluminum-magnesium hydroxide-simethicone 5 mL Oral Q6H PRN Zaina Garcia MD   amLODIPine 10 mg Oral Daily Zaina Garcia MD   calcium acetate 2,001 mg Oral TID With Meals Zaina Garica MD   cinacalcet 30 mg Oral Daily With Breakfast Zaina Garcia MD   ergocalciferol 50,000 Units Oral Weekly Zaina Garcia MD   gabapentin 300 mg Oral Daily Zaina Garcia MD   hydrocortisone  Rectal 4x Daily PRN Zaina Garcia MD   metoprolol tartrate 12 5 mg Oral Q12H [address] Zaina Garcia MD   nicotine 1 patch Transdermal Daily Zaina Garcia MD   ondansetron 4 mg Intravenous Q6H PRN Zaina Garcia MD   pantoprazole 40 mg Oral Early Morning Zaina Garcia MD     Continuous Infusions:   PRN Meds:   acetaminophen    albuterol    aluminum-magnesium hydroxide-simethicone    hydrocortisone    ondansetron    Surgical procedures (if appropriate):  Procedure(s):  EGD AND COLONOSCOPY

## 2018-05-24 NOTE — SOCIAL WORK
Met with pt and discussed role of CM  Pt lives with his SO in a 2-story home with 4 steps at entrance  Pt is independent in ADLs  Pt denies having any DME or prior HHC  Preference for pharmacy is [facility] in [place], [place]  No MH/D&A tx hx  No POA  CM discussed with pt initiation of finding new HD center now that pt has moved to [place], [place] from [place]  Pt reports he does not want to stay in the hospital while coordination of new center is taking place, and advised he will resume HD at the [facility] in [place] where he had been receiving treatment prior to moving  CM reviewed d/c planning process including the following: identifying help at home, patient preference for d/c planning needs, Discharge Lounge, [facility] Meds to Bed program, availability of treatment team to discuss questions or concerns patient and/or family may have regarding understanding medications and recognizing signs and symptoms once discharged  CM also encouraged patient to follow up with all recommended appointments after discharge  Patient advised of importance for patient and family to participate in managing patients medical well being      Main contact: Hugo Butts ([phone])

## 2018-11-28 ENCOUNTER — APPOINTMENT (EMERGENCY)
Dept: RADIOLOGY | Facility: HOSPITAL | Age: 58
End: 2018-11-28
Payer: MEDICARE

## 2018-11-28 ENCOUNTER — HOSPITAL ENCOUNTER (EMERGENCY)
Facility: HOSPITAL | Age: 58
Discharge: HOME | End: 2018-11-28
Attending: EMERGENCY MEDICINE | Admitting: EMERGENCY MEDICINE
Payer: MEDICARE

## 2018-11-28 VITALS
TEMPERATURE: 98.8 F | BODY MASS INDEX: 23.78 KG/M2 | WEIGHT: 148 LBS | DIASTOLIC BLOOD PRESSURE: 70 MMHG | HEIGHT: 66 IN | SYSTOLIC BLOOD PRESSURE: 139 MMHG | HEART RATE: 85 BPM | OXYGEN SATURATION: 96 % | RESPIRATION RATE: 19 BRPM

## 2018-11-28 DIAGNOSIS — R05.9 COUGH: ICD-10-CM

## 2018-11-28 DIAGNOSIS — R10.9 ABDOMINAL PAIN: Primary | ICD-10-CM

## 2018-11-28 LAB
ALBUMIN SERPL BCP-MCNC: 3.4 G/DL (ref 3.5–5)
ALP SERPL-CCNC: 107 U/L (ref 46–116)
ALT SERPL W P-5'-P-CCNC: 15 U/L (ref 12–78)
ANION GAP SERPL CALCULATED.3IONS-SCNC: 7 MMOL/L (ref 4–13)
AST SERPL W P-5'-P-CCNC: 38 U/L (ref 5–45)
BASOPHILS # BLD AUTO: 0.05 THOUSANDS/ΜL (ref 0–0.1)
BASOPHILS NFR BLD AUTO: 1 % (ref 0–1)
BILIRUB SERPL-MCNC: 0.4 MG/DL (ref 0.2–1)
BUN SERPL-MCNC: 49 MG/DL (ref 5–25)
CALCIUM SERPL-MCNC: 9 MG/DL (ref 8.3–10.1)
CHLORIDE SERPL-SCNC: 99 MMOL/L (ref 100–108)
CO2 SERPL-SCNC: 29 MMOL/L (ref 21–32)
CREAT SERPL-MCNC: 10.1 MG/DL (ref 0.6–1.3)
EOSINOPHIL # BLD AUTO: 0.21 THOUSAND/ΜL (ref 0–0.61)
EOSINOPHIL NFR BLD AUTO: 5 % (ref 0–6)
ERYTHROCYTE [DISTWIDTH] IN BLOOD BY AUTOMATED COUNT: 19.4 % (ref 11.6–15.1)
GFR SERPL CREATININE-BSD FRML MDRD: 6 ML/MIN/1.73SQ M
GLUCOSE SERPL-MCNC: 115 MG/DL (ref 65–140)
HCT VFR BLD AUTO: 33.2 % (ref 36.5–49.3)
HGB BLD-MCNC: 10.3 G/DL (ref 12–17)
IMM GRANULOCYTES # BLD AUTO: 0.01 THOUSAND/UL (ref 0–0.2)
IMM GRANULOCYTES NFR BLD AUTO: 0 % (ref 0–2)
LIPASE SERPL-CCNC: 324 U/L (ref 73–393)
LYMPHOCYTES # BLD AUTO: 0.92 THOUSANDS/ΜL (ref 0.6–4.47)
LYMPHOCYTES NFR BLD AUTO: 20 % (ref 14–44)
MCH RBC QN AUTO: 27.5 PG (ref 26.8–34.3)
MCHC RBC AUTO-ENTMCNC: 31 G/DL (ref 31.4–37.4)
MCV RBC AUTO: 89 FL (ref 82–98)
MONOCYTES # BLD AUTO: 0.65 THOUSAND/ΜL (ref 0.17–1.22)
MONOCYTES NFR BLD AUTO: 14 % (ref 4–12)
NEUTROPHILS # BLD AUTO: 2.84 THOUSANDS/ΜL (ref 1.85–7.62)
NEUTS SEG NFR BLD AUTO: 60 % (ref 43–75)
NRBC BLD AUTO-RTO: 0 /100 WBCS
PLATELET # BLD AUTO: 286 THOUSANDS/UL (ref 149–390)
PMV BLD AUTO: 11.2 FL (ref 8.9–12.7)
POTASSIUM SERPL-SCNC: 5.1 MMOL/L (ref 3.5–5.3)
PROT SERPL-MCNC: 8.1 G/DL (ref 6.4–8.2)
RBC # BLD AUTO: 3.74 MILLION/UL (ref 3.88–5.62)
SODIUM SERPL-SCNC: 135 MMOL/L (ref 136–145)
WBC # BLD AUTO: 4.68 THOUSAND/UL (ref 4.31–10.16)

## 2018-11-28 PROCEDURE — 71046 X-RAY EXAM CHEST 2 VIEWS: CPT

## 2018-11-28 PROCEDURE — 36415 COLL VENOUS BLD VENIPUNCTURE: CPT | Performed by: EMERGENCY MEDICINE

## 2018-11-28 PROCEDURE — 80053 COMPREHEN METABOLIC PANEL: CPT | Performed by: EMERGENCY MEDICINE

## 2018-11-28 PROCEDURE — 85025 COMPLETE CBC W/AUTO DIFF WBC: CPT | Performed by: EMERGENCY MEDICINE

## 2018-11-28 PROCEDURE — 99284 EMERGENCY DEPT VISIT MOD MDM: CPT

## 2018-11-28 PROCEDURE — 74177 CT ABD & PELVIS W/CONTRAST: CPT

## 2018-11-28 PROCEDURE — 83690 ASSAY OF LIPASE: CPT | Performed by: EMERGENCY MEDICINE

## 2018-11-28 RX ORDER — BENZONATATE 100 MG/1
100 CAPSULE ORAL ONCE
Status: COMPLETED | OUTPATIENT
Start: 2018-11-28 | End: 2018-11-28

## 2018-11-28 RX ORDER — FENTANYL CITRATE 50 UG/ML
25 INJECTION, SOLUTION INTRAMUSCULAR; INTRAVENOUS ONCE
Status: DISCONTINUED | OUTPATIENT
Start: 2018-11-28 | End: 2018-11-28

## 2018-11-28 RX ORDER — ALBUTEROL SULFATE 90 UG/1
2 AEROSOL, METERED RESPIRATORY (INHALATION) ONCE
Status: COMPLETED | OUTPATIENT
Start: 2018-11-28 | End: 2018-11-28

## 2018-11-28 RX ORDER — ACETAMINOPHEN 325 MG/1
975 TABLET ORAL ONCE
Status: COMPLETED | OUTPATIENT
Start: 2018-11-28 | End: 2018-11-28

## 2018-11-28 RX ORDER — BENZONATATE 100 MG/1
100 CAPSULE ORAL EVERY 8 HOURS
Qty: 21 CAPSULE | Refills: 0 | Status: SHIPPED | OUTPATIENT
Start: 2018-11-28 | End: 2019-04-16

## 2018-11-28 RX ADMIN — IOHEXOL 100 ML: 350 INJECTION, SOLUTION INTRAVENOUS at 02:23

## 2018-11-28 RX ADMIN — ALBUTEROL SULFATE 2 PUFF: 90 AEROSOL, METERED RESPIRATORY (INHALATION) at 04:47

## 2018-11-28 RX ADMIN — BENZONATATE 100 MG: 100 CAPSULE ORAL at 03:10

## 2018-11-28 RX ADMIN — ACETAMINOPHEN 975 MG: 325 TABLET, FILM COATED ORAL at 01:34

## 2018-11-28 NOTE — PROGRESS NOTES
Called by ED for input regarding patient's CT findings and abdominal pain  2 small foreign bodies appear in the cecum  Reported R sided abdominal pain and non-bloody diarrhea without nausea or vomiting  Reported no recollection of ingestion of foreign bodies  Afebrile, no elevated WBC, mildly anemic with normal liver enzymes  Given that objects are in the cecum this should likely pass on its own and emergent colonoscopy is not necessary  However if this is suspected to be the etiology of pain consider surgical consult and possible admission for observation  If patient deemed to be stable enough for discharge then recommend repeat imaging in 3 days to see if objects have passed  Case discussed with on call GI attending- DANTE Crespo   GI Fellow On Call

## 2018-11-28 NOTE — DISCHARGE INSTRUCTIONS

## 2018-11-28 NOTE — ED ATTENDING ATTESTATION
Kehinde Lea MD, saw and evaluated the patient  I have discussed the patient with the resident/non-physician practitioner and agree with the resident's/non-physician practitioner's findings, Plan of Care, and MDM as documented in the resident's/non-physician practitioner's note, except where noted  All available labs and Radiology studies were reviewed  At this point I agree with the current assessment done in the Emergency Department  I have conducted an independent evaluation of this patient a history and physical is as follows:      Critical Care Time  CritCare Time    Procedures     65 yo male with esrd on hd, c/o right flank pain for three days  No trauma  Pt states pain is in rlq abdomen and radiates to back and myalgias and diarrhea  No vomiting, no new urinary burning  pmh choley, asthma, htn, chf   Vss, afebrile, lungs cta, rrr, abdomen soft tender rlq, no rebound, no guarding    Labs, ct a/p, urine, pain meds

## 2018-11-28 NOTE — ED PROVIDER NOTES
History  Chief Complaint   Patient presents with    Cough     Pt reports cough for 3 weeks that has been unrelived by medications  Also reports soome right sided flank pain  Pt is on dialysis with last treatment today  66year-old man with a history of ESRD on [facility], diabetes, CHF, and hypertension presents for evaluation of right flank pain  Patient reports a 3-day history of a sharp right flank pain  Pain radiates anterior to posterior and worsened today while at work prompting him to come to the ED  No inciting trauma or strenuous activity  Patient reports a 3-week history of cough, non-bloody diarrhea, and myalgias  No fevers, chills, rhinorrhea, sore throat, chest pain, dyspnea, nausea, vomiting, or urinary symptoms  He is a 1ppd smoker  He did not get the flu vaccination  On arrival, patient is afebrile with otherwise normal vital signs  Physical exam shows a cough with clear lungs and mild tenderness in the RLQ without peritonitis or CVA tenderness  Will perform CXR, abdominal labs, and CT abdomen/pelvis  Prior to Admission Medications   Prescriptions Last Dose Informant Patient Reported? Taking?    albuterol (PROVENTIL HFA,VENTOLIN HFA) 90 mcg/act inhaler   No Yes   Sig: Inhale 2 puffs every 6 (six) hours as needed for wheezing   amLODIPine (NORVASC) 10 mg tablet   Yes Yes   Sig: Take 10 mg by mouth daily   calcium acetate (PHOSLO) 667 mg capsule   Yes Yes   Sig: Take 2,001 mg by mouth 3 (three) times a day with meals   cinacalcet (SENSIPAR) 30 mg tablet   Yes Yes   Si mg   ergocalciferol (VITAMIN D2) 50,000 units   No Yes   Sig: Take 1 capsule (50,000 Units total) by mouth once a week for 7 doses   gabapentin (NEURONTIN) 100 mg capsule   Yes Yes   Sig: Take 300 mg by mouth daily     hydrocortisone (ANUSOL-HC, PROCTOSOL HC,) 2 5 % rectal cream   No Yes   Sig: Insert into the rectum 4 (four) times a day as needed for hemorrhoids   metoprolol tartrate (LOPRESSOR) 25 mg tablet   No Yes Sig: Take 0 5 tablets (12 5 mg total) by mouth every 12 (twelve) hours   nicotine (NICODERM CQ) 14 mg/24hr TD 24 hr patch   No Yes   Sig: Place 1 patch on the skin daily   pantoprazole (PROTONIX) 40 mg tablet   No Yes   Sig: Take 1 tablet (40 mg total) by mouth daily in the early morning      Facility-Administered Medications: None       Past Medical History:   Diagnosis Date    [facility] )     Dialysis patient ([facility] )     Hypertension     Limb alert care status     do not use left arm    Rectal bleeding     Renal disorder        Past Surgical History:   Procedure Laterality Date    AV FISTULA PLACEMENT      left arm    AV NODE ABLATION      CHOLECYSTECTOMY      COLONOSCOPY N/A 4/27/2018    Procedure: COLONOSCOPY;  Surgeon: Felecia Dawson DO;  [facility]; Service: Gastroenterology    EGD AND COLONOSCOPY N/A 5/24/2018    Procedure: EGD AND COLONOSCOPY;  Surgeon: Rea Baca MD;  [facility]; Service: Gastroenterology       History reviewed  No pertinent family history  I have reviewed and agree with the history as documented  Social History   Substance Use Topics    Smoking status: Current Every Day Smoker     Packs/day: 1 00     Types: Cigarettes    Smokeless tobacco: Never Used    Alcohol use No        Review of Systems   Constitutional: Negative for chills and fever  HENT: Negative for rhinorrhea and sore throat  Eyes: Negative for photophobia and visual disturbance  Respiratory: Positive for cough  Negative for shortness of breath  Cardiovascular: Negative for chest pain and leg swelling  Gastrointestinal: Positive for abdominal pain and diarrhea  Negative for blood in stool, nausea and vomiting  Genitourinary: Positive for dysuria (Chronic and unchanged) and frequency  Negative for hematuria  Musculoskeletal: Positive for myalgias  Negative for back pain and neck pain  Skin: Negative for rash and wound     Neurological: Negative for light-headedness and headaches  Physical Exam  ED Triage Vitals [11/28/18 0048]   Temperature Pulse Respirations Blood Pressure SpO2   98 8 °F (37 1 °C) 86 20 151/75 98 %      Temp Source Heart Rate Source Patient Position - Orthostatic VS BP Location FiO2 (%)   Oral Monitor Sitting Right arm --      Pain Score       9           Orthostatic Vital Signs  Vitals:    11/28/18 0048 11/28/18 0145 11/28/18 0230 11/28/18 0448   BP: 151/75 139/66 162/62 139/70   Pulse: 86 80 84 85   Patient Position - Orthostatic VS: Sitting Lying Lying Sitting       Physical Exam   Constitutional: He is oriented to person, place, and time  He appears well-developed and well-nourished  No distress  HENT:   Head: Normocephalic and atraumatic  Eyes: Pupils are equal, round, and reactive to light  Conjunctivae are normal  No scleral icterus  Neck: Neck supple  No JVD present  Cardiovascular: Normal rate, regular rhythm and normal heart sounds  Exam reveals no gallop and no friction rub  Pulmonary/Chest: Effort normal and breath sounds normal  No respiratory distress  He has no wheezes  He has no rales  Cough   Abdominal: Soft  He exhibits no distension  There is tenderness (Mild RLQ without peritonitis)  There is no rebound and no guarding  Musculoskeletal: He exhibits no edema or tenderness (No CVA tenderness bilaterally)  Neurological: He is alert and oriented to person, place, and time  Skin: Skin is warm and dry  He is not diaphoretic  Psychiatric: He has a normal mood and affect  His behavior is normal  Thought content normal    Vitals reviewed        ED Medications  Medications   benzonatate (TESSALON PERLES) capsule 100 mg (100 mg Oral Given 11/28/18 0310)   acetaminophen (TYLENOL) tablet 975 mg (975 mg Oral Given 11/28/18 0134)   iohexol (OMNIPAQUE) 350 MG/ML injection (MULTI-DOSE) 100 mL (100 mL Intravenous Given 11/28/18 0223)   albuterol (PROVENTIL HFA,VENTOLIN HFA) inhaler 2 puff (2 puffs Inhalation Given 11/28/18 0447)       Diagnostic Studies  Results Reviewed     Procedure Component Value Units Date/Time    Comprehensive metabolic panel [[id number]]  (Abnormal) Collected:  11/28/18 0120    Lab Status:  Final result Specimen:  Blood from Arm, Right Updated:  11/28/18 0145     Sodium 135 (L) mmol/L      Potassium 5 1 mmol/L      Chloride 99 (L) mmol/L      CO2 29 mmol/L      ANION GAP 7 mmol/L      BUN 49 (H) mg/dL      Creatinine 10 10 (H) mg/dL      Glucose 115 mg/dL      Calcium 9 0 mg/dL      AST 38 U/L      ALT 15 U/L      Alkaline Phosphatase 107 U/L      Total Protein 8 1 g/dL      Albumin 3 4 (L) g/dL      Total Bilirubin 0 40 mg/dL      eGFR 6 ml/min/1 73sq m     Narrative:         National Kidney Disease Education Program recommendations are as follows:  GFR calculation is accurate only with a steady state creatinine  Chronic Kidney disease less than 60 ml/min/1 73 sq  meters  Kidney failure less than 15 ml/min/1 73 sq  meters      Lipase [[id number]]  (Normal) Collected:  11/28/18 0120    Lab Status:  Final result Specimen:  Blood from Arm, Right Updated:  11/28/18 0145     Lipase 324 u/L     CBC and differential [[id number]]  (Abnormal) Collected:  11/28/18 0120    Lab Status:  Final result Specimen:  Blood from Arm, Right Updated:  11/28/18 0126     WBC 4 68 Thousand/uL      RBC 3 74 (L) Million/uL      Hemoglobin 10 3 (L) g/dL      Hematocrit 33 2 (L) %      MCV 89 fL      MCH 27 5 pg      MCHC 31 0 (L) g/dL      RDW 19 4 (H) %      MPV 11 2 fL      Platelets 481 Thousands/uL      nRBC 0 /100 WBCs      Neutrophils Relative 60 %      Immat GRANS % 0 %      Lymphocytes Relative 20 %      Monocytes Relative 14 (H) %      Eosinophils Relative 5 %      Basophils Relative 1 %      Neutrophils Absolute 2 84 Thousands/µL      Immature Grans Absolute 0 01 Thousand/uL      Lymphocytes Absolute 0 92 Thousands/µL      Monocytes Absolute 0 65 Thousand/µL      Eosinophils Absolute 0 21 Thousand/µL      Basophils Absolute 0 05 Thousands/µL                  XR chest 2 views   ED Interpretation by Yumiko Tomlinson MD (11/28 0134)   No acute disease      Final Result by Valerie Spann DO (11/28 8984)      No acute cardiopulmonary disease  Workstation performed: [id number]         CT abdomen pelvis with contrast   Final Result by Cj Easton MD (11/28 2464)      1  Two identical appearing linear hyperdense foreign bodies within the cecum  Correlate clinically for foreign body ingestion  2   Other findings as above  I personally discussed this study with Colby Malcolm MD on 11/28/2018 at 3:01 AM          Workstation performed: [id number]         CT abdomen pelvis wo contrast    (Results Pending)         Procedures  Procedures      Phone Consults  ED Phone Contact    ED Course                               MDM  Number of Diagnoses or Management Options  Abdominal pain:   Cough:   Diagnosis management comments: No pneumonia seen on chest x-ray  Laboratory evaluation unremarkable  CT abdomen/pelvis showed 2 foreign bodies in the cecum  GI was contacted who said the foreign bodies will likely pass given the location  Patient's pain controlled and tolerating p o  Patient was discharged with outpatient follow-up and strict return precautions for worsening symptoms  CritCare Time    Disposition  Final diagnoses:   Abdominal pain   Cough     Time reflects when diagnosis was documented in both MDM as applicable and the Disposition within this note     Time User Action Codes Description Comment    11/28/2018  4:08 AM Disha Regan Add [R10 9] Abdominal pain     11/28/2018  4:39 AM Disha Regan Add [R05] Cough       ED Disposition     ED Disposition Condition Comment    Discharge  Layton Camel discharge to home/self care      Condition at discharge: Good        Follow-up Information     Follow up With Specialties Details Why Contact Info Additional [address] Emergency Department Emergency Medicine Go to If symptoms worsen [address]  [phone] [facility], [address], [place], [place], [facility]    [phone] [facility] [address] [facility] Call in 1 day To make an appointment [address] [address] [phone] [facility] [place], [address] [facility] [address], [place], [place], [address]          Discharge Medication List as of 11/28/2018  4:10 AM      CONTINUE these medications which have NOT CHANGED    Details   albuterol (PROVENTIL HFA,VENTOLIN HFA) 90 mcg/act inhaler Inhale 2 puffs every 6 (six) hours as needed for wheezing, Starting Sat 4/21/2018, Print      amLODIPine (NORVASC) 10 mg tablet Take 10 mg by mouth daily, Historical Med      calcium acetate (PHOSLO) 667 mg capsule Take 2,001 mg by mouth 3 (three) times a day with meals, Historical Med      cinacalcet (SENSIPAR) 30 mg tablet 30 mg, Starting Tue 12/27/2016, Historical Med      ergocalciferol (VITAMIN D2) 50,000 units Take 1 capsule (50,000 Units total) by mouth once a week for 7 doses, Starting Fri 5/4/2018, Until Wed 11/28/2018, Print      gabapentin (NEURONTIN) 100 mg capsule Take 300 mg by mouth daily  , Historical Med      hydrocortisone (ANUSOL-HC, PROCTOSOL HC,) 2 5 % rectal cream Insert into the rectum 4 (four) times a day as needed for hemorrhoids, Starting Mon 4/30/2018, Print      metoprolol tartrate (LOPRESSOR) 25 mg tablet Take 0 5 tablets (12 5 mg total) by mouth every 12 (twelve) hours, Starting Mon 4/30/2018, Print      nicotine (NICODERM CQ) 14 mg/24hr TD 24 hr patch Place 1 patch on the skin daily, Starting Sat 4/21/2018, Print      pantoprazole (PROTONIX) 40 mg tablet Take 1 tablet (40 mg total) by mouth daily in the early morning, Starting Tue 5/1/2018, Print             Outpatient Discharge Orders  CT abdomen pelvis wo contrast Standing Status: Future  Standing Exp  Date: 11/28/22         ED Provider  Attending physically available and evaluated Judah Susan COX managed the patient along with the ED Attending      Electronically Signed by         Nathan Cheek MD  11/29/18 8409

## 2018-12-05 ENCOUNTER — APPOINTMENT (EMERGENCY)
Dept: RADIOLOGY | Facility: HOSPITAL | Age: 58
DRG: 189 | End: 2018-12-05
Payer: MEDICARE

## 2018-12-05 ENCOUNTER — HOSPITAL ENCOUNTER (INPATIENT)
Facility: HOSPITAL | Age: 58
LOS: 1 days | Discharge: HOME | DRG: 189 | End: 2018-12-07
Attending: EMERGENCY MEDICINE | Admitting: INTERNAL MEDICINE
Payer: MEDICARE

## 2018-12-05 DIAGNOSIS — Z99.2 ESRD ON HEMODIALYSIS (HCC): ICD-10-CM

## 2018-12-05 DIAGNOSIS — R05.9 COUGH: ICD-10-CM

## 2018-12-05 DIAGNOSIS — R21 RASH: ICD-10-CM

## 2018-12-05 DIAGNOSIS — R09.02 HYPOXIA: Primary | ICD-10-CM

## 2018-12-05 DIAGNOSIS — T18.4XXA FOREIGN BODY IN COLON, INITIAL ENCOUNTER: ICD-10-CM

## 2018-12-05 DIAGNOSIS — N18.6 ESRD ON HEMODIALYSIS (HCC): ICD-10-CM

## 2018-12-05 DIAGNOSIS — T18.9XXA FOREIGN BODY ALIMENTARY TRACT: Chronic | ICD-10-CM

## 2018-12-05 LAB
ANION GAP SERPL CALCULATED.3IONS-SCNC: 16 MMOL/L (ref 4–13)
BASOPHILS # BLD AUTO: 0.03 THOUSANDS/ΜL (ref 0–0.1)
BASOPHILS NFR BLD AUTO: 1 % (ref 0–1)
BUN SERPL-MCNC: 98 MG/DL (ref 5–25)
CALCIUM SERPL-MCNC: 8.8 MG/DL (ref 8.3–10.1)
CHLORIDE SERPL-SCNC: 96 MMOL/L (ref 100–108)
CO2 SERPL-SCNC: 22 MMOL/L (ref 21–32)
CREAT SERPL-MCNC: 19 MG/DL (ref 0.6–1.3)
EOSINOPHIL # BLD AUTO: 0.37 THOUSAND/ΜL (ref 0–0.61)
EOSINOPHIL NFR BLD AUTO: 8 % (ref 0–6)
ERYTHROCYTE [DISTWIDTH] IN BLOOD BY AUTOMATED COUNT: 20.4 % (ref 11.6–15.1)
GFR SERPL CREATININE-BSD FRML MDRD: 3 ML/MIN/1.73SQ M
GLUCOSE SERPL-MCNC: 135 MG/DL (ref 65–140)
HCT VFR BLD AUTO: 32 % (ref 36.5–49.3)
HGB BLD-MCNC: 9.8 G/DL (ref 12–17)
HIV 1+2 AB+HIV1 P24 AG SERPL QL IA: NORMAL
HIV1 P24 AG SER QL: NORMAL
IMM GRANULOCYTES # BLD AUTO: 0.03 THOUSAND/UL (ref 0–0.2)
IMM GRANULOCYTES NFR BLD AUTO: 1 % (ref 0–2)
LYMPHOCYTES # BLD AUTO: 0.86 THOUSANDS/ΜL (ref 0.6–4.47)
LYMPHOCYTES NFR BLD AUTO: 18 % (ref 14–44)
MCH RBC QN AUTO: 27.3 PG (ref 26.8–34.3)
MCHC RBC AUTO-ENTMCNC: 30.6 G/DL (ref 31.4–37.4)
MCV RBC AUTO: 89 FL (ref 82–98)
MONOCYTES # BLD AUTO: 0.3 THOUSAND/ΜL (ref 0.17–1.22)
MONOCYTES NFR BLD AUTO: 6 % (ref 4–12)
NEUTROPHILS # BLD AUTO: 3.3 THOUSANDS/ΜL (ref 1.85–7.62)
NEUTS SEG NFR BLD AUTO: 66 % (ref 43–75)
NRBC BLD AUTO-RTO: 0 /100 WBCS
PLATELET # BLD AUTO: 227 THOUSANDS/UL (ref 149–390)
PMV BLD AUTO: 11 FL (ref 8.9–12.7)
POTASSIUM SERPL-SCNC: 5 MMOL/L (ref 3.5–5.3)
RBC # BLD AUTO: 3.59 MILLION/UL (ref 3.88–5.62)
SODIUM SERPL-SCNC: 134 MMOL/L (ref 136–145)
WBC # BLD AUTO: 4.89 THOUSAND/UL (ref 4.31–10.16)

## 2018-12-05 PROCEDURE — 74176 CT ABD & PELVIS W/O CONTRAST: CPT

## 2018-12-05 PROCEDURE — 99285 EMERGENCY DEPT VISIT HI MDM: CPT

## 2018-12-05 PROCEDURE — 93005 ELECTROCARDIOGRAM TRACING: CPT

## 2018-12-05 PROCEDURE — 71046 X-RAY EXAM CHEST 2 VIEWS: CPT

## 2018-12-05 PROCEDURE — 85025 COMPLETE CBC W/AUTO DIFF WBC: CPT | Performed by: EMERGENCY MEDICINE

## 2018-12-05 PROCEDURE — 87806 HIV AG W/HIV1&2 ANTB W/OPTIC: CPT | Performed by: EMERGENCY MEDICINE

## 2018-12-05 PROCEDURE — 36415 COLL VENOUS BLD VENIPUNCTURE: CPT | Performed by: EMERGENCY MEDICINE

## 2018-12-05 PROCEDURE — 80048 BASIC METABOLIC PNL TOTAL CA: CPT | Performed by: EMERGENCY MEDICINE

## 2018-12-05 NOTE — LETTER
[address] [address]  Dept: [phone]    December 8, 2018     Patient: Travis Pfeiffer   YOB: 1960   Date of Visit: 12/5/2018       To Whom it May Concern:    Travis Pfeiffer is under my professional care  He was seen in the hospital from 12/5/2018   to 12/08/18  He may return to work on 12/10/2018  If you have any questions or concerns, please don't hesitate to call           Sincerely,          Brice Weir, DO

## 2018-12-06 ENCOUNTER — APPOINTMENT (OUTPATIENT)
Dept: DIALYSIS | Facility: HOSPITAL | Age: 58
DRG: 189 | End: 2018-12-06
Payer: MEDICARE

## 2018-12-06 PROBLEM — R53.1 WEAKNESS: Status: ACTIVE | Noted: 2018-12-06

## 2018-12-06 PROBLEM — J96.01 ACUTE RESPIRATORY FAILURE WITH HYPOXIA (HCC): Status: ACTIVE | Noted: 2018-12-06

## 2018-12-06 PROBLEM — R05.9 COUGH: Status: ACTIVE | Noted: 2018-12-06

## 2018-12-06 PROBLEM — T18.9XXA FOREIGN BODY ALIMENTARY TRACT: Chronic | Status: ACTIVE | Noted: 2018-12-06

## 2018-12-06 LAB
ANION GAP SERPL CALCULATED.3IONS-SCNC: 13 MMOL/L (ref 4–13)
ATRIAL RATE: 84 BPM
BASOPHILS # BLD AUTO: 0.04 THOUSANDS/ΜL (ref 0–0.1)
BASOPHILS NFR BLD AUTO: 1 % (ref 0–1)
BUN SERPL-MCNC: 103 MG/DL (ref 5–25)
CALCIUM SERPL-MCNC: 7.8 MG/DL (ref 8.3–10.1)
CHLORIDE SERPL-SCNC: 98 MMOL/L (ref 100–108)
CO2 SERPL-SCNC: 25 MMOL/L (ref 21–32)
CREAT SERPL-MCNC: 19.3 MG/DL (ref 0.6–1.3)
EOSINOPHIL # BLD AUTO: 0.38 THOUSAND/ΜL (ref 0–0.61)
EOSINOPHIL NFR BLD AUTO: 7 % (ref 0–6)
ERYTHROCYTE [DISTWIDTH] IN BLOOD BY AUTOMATED COUNT: 20.2 % (ref 11.6–15.1)
GFR SERPL CREATININE-BSD FRML MDRD: 3 ML/MIN/1.73SQ M
GLUCOSE SERPL-MCNC: 99 MG/DL (ref 65–140)
HCT VFR BLD AUTO: 32.3 % (ref 36.5–49.3)
HGB BLD-MCNC: 9.8 G/DL (ref 12–17)
IMM GRANULOCYTES # BLD AUTO: 0.02 THOUSAND/UL (ref 0–0.2)
IMM GRANULOCYTES NFR BLD AUTO: 0 % (ref 0–2)
LYMPHOCYTES # BLD AUTO: 0.86 THOUSANDS/ΜL (ref 0.6–4.47)
LYMPHOCYTES NFR BLD AUTO: 16 % (ref 14–44)
MCH RBC QN AUTO: 26.9 PG (ref 26.8–34.3)
MCHC RBC AUTO-ENTMCNC: 30.3 G/DL (ref 31.4–37.4)
MCV RBC AUTO: 89 FL (ref 82–98)
MONOCYTES # BLD AUTO: 0.35 THOUSAND/ΜL (ref 0.17–1.22)
MONOCYTES NFR BLD AUTO: 7 % (ref 4–12)
NEUTROPHILS # BLD AUTO: 3.74 THOUSANDS/ΜL (ref 1.85–7.62)
NEUTS SEG NFR BLD AUTO: 69 % (ref 43–75)
NRBC BLD AUTO-RTO: 0 /100 WBCS
P AXIS: 55 DEGREES
PLATELET # BLD AUTO: 194 THOUSANDS/UL (ref 149–390)
PLATELET # BLD AUTO: 208 THOUSANDS/UL (ref 149–390)
PMV BLD AUTO: 9.2 FL (ref 8.9–12.7)
PMV BLD AUTO: 9.5 FL (ref 8.9–12.7)
POTASSIUM SERPL-SCNC: 5.2 MMOL/L (ref 3.5–5.3)
PR INTERVAL: 186 MS
PROCALCITONIN SERPL-MCNC: 0.87 NG/ML
QRS AXIS: 60 DEGREES
QRSD INTERVAL: 96 MS
QT INTERVAL: 408 MS
QTC INTERVAL: 482 MS
RBC # BLD AUTO: 3.64 MILLION/UL (ref 3.88–5.62)
SODIUM SERPL-SCNC: 136 MMOL/L (ref 136–145)
T WAVE AXIS: 52 DEGREES
VENTRICULAR RATE: 84 BPM
WBC # BLD AUTO: 5.39 THOUSAND/UL (ref 4.31–10.16)

## 2018-12-06 PROCEDURE — 99223 1ST HOSP IP/OBS HIGH 75: CPT | Performed by: INTERNAL MEDICINE

## 2018-12-06 PROCEDURE — 36415 COLL VENOUS BLD VENIPUNCTURE: CPT | Performed by: INTERNAL MEDICINE

## 2018-12-06 PROCEDURE — G0257 UNSCHED DIALYSIS ESRD PT HOS: HCPCS

## 2018-12-06 PROCEDURE — 80048 BASIC METABOLIC PNL TOTAL CA: CPT | Performed by: INTERNAL MEDICINE

## 2018-12-06 PROCEDURE — 5A1D70Z PERFORMANCE OF URINARY FILTRATION, INTERMITTENT, LESS THAN 6 HOURS PER DAY: ICD-10-PCS | Performed by: INTERNAL MEDICINE

## 2018-12-06 PROCEDURE — 90935 HEMODIALYSIS ONE EVALUATION: CPT | Performed by: INTERNAL MEDICINE

## 2018-12-06 PROCEDURE — 99222 1ST HOSP IP/OBS MODERATE 55: CPT | Performed by: INTERNAL MEDICINE

## 2018-12-06 PROCEDURE — 93010 ELECTROCARDIOGRAM REPORT: CPT | Performed by: INTERNAL MEDICINE

## 2018-12-06 PROCEDURE — 84145 PROCALCITONIN (PCT): CPT | Performed by: INTERNAL MEDICINE

## 2018-12-06 PROCEDURE — 85025 COMPLETE CBC W/AUTO DIFF WBC: CPT | Performed by: INTERNAL MEDICINE

## 2018-12-06 PROCEDURE — 85049 AUTOMATED PLATELET COUNT: CPT | Performed by: INTERNAL MEDICINE

## 2018-12-06 RX ORDER — ACETAMINOPHEN 325 MG/1
650 TABLET ORAL EVERY 6 HOURS PRN
Status: DISCONTINUED | OUTPATIENT
Start: 2018-12-06 | End: 2018-12-07 | Stop reason: HOSPADM

## 2018-12-06 RX ORDER — ALBUTEROL SULFATE 90 UG/1
2 AEROSOL, METERED RESPIRATORY (INHALATION) EVERY 6 HOURS PRN
Status: DISCONTINUED | OUTPATIENT
Start: 2018-12-06 | End: 2018-12-07 | Stop reason: HOSPADM

## 2018-12-06 RX ORDER — GUAIFENESIN/DEXTROMETHORPHAN 100-10MG/5
10 SYRUP ORAL EVERY 6 HOURS PRN
Status: DISCONTINUED | OUTPATIENT
Start: 2018-12-06 | End: 2018-12-07 | Stop reason: HOSPADM

## 2018-12-06 RX ORDER — ERGOCALCIFEROL 1.25 MG/1
50000 CAPSULE ORAL WEEKLY
Status: DISCONTINUED | OUTPATIENT
Start: 2018-12-12 | End: 2018-12-07 | Stop reason: HOSPADM

## 2018-12-06 RX ORDER — BENZONATATE 100 MG/1
100 CAPSULE ORAL EVERY 8 HOURS
Status: DISCONTINUED | OUTPATIENT
Start: 2018-12-06 | End: 2018-12-07 | Stop reason: HOSPADM

## 2018-12-06 RX ORDER — BENZONATATE 100 MG/1
100 CAPSULE ORAL 3 TIMES DAILY PRN
Status: DISCONTINUED | OUTPATIENT
Start: 2018-12-06 | End: 2018-12-07 | Stop reason: HOSPADM

## 2018-12-06 RX ORDER — GUAIFENESIN 600 MG
600 TABLET, EXTENDED RELEASE 12 HR ORAL EVERY 12 HOURS SCHEDULED
Status: DISCONTINUED | OUTPATIENT
Start: 2018-12-06 | End: 2018-12-07 | Stop reason: HOSPADM

## 2018-12-06 RX ORDER — GABAPENTIN 300 MG/1
300 CAPSULE ORAL DAILY
Status: DISCONTINUED | OUTPATIENT
Start: 2018-12-06 | End: 2018-12-07 | Stop reason: HOSPADM

## 2018-12-06 RX ORDER — CINACALCET 30 MG/1
30 TABLET, FILM COATED ORAL
Status: DISCONTINUED | OUTPATIENT
Start: 2018-12-06 | End: 2018-12-07 | Stop reason: HOSPADM

## 2018-12-06 RX ORDER — SODIUM CHLORIDE 9 MG/ML
100 INJECTION, SOLUTION INTRAVENOUS CONTINUOUS
Status: DISCONTINUED | OUTPATIENT
Start: 2018-12-06 | End: 2018-12-06

## 2018-12-06 RX ORDER — NICOTINE 21 MG/24HR
1 PATCH, TRANSDERMAL 24 HOURS TRANSDERMAL DAILY
Status: DISCONTINUED | OUTPATIENT
Start: 2018-12-06 | End: 2018-12-06

## 2018-12-06 RX ORDER — NICOTINE 21 MG/24HR
1 PATCH, TRANSDERMAL 24 HOURS TRANSDERMAL DAILY
Status: DISCONTINUED | OUTPATIENT
Start: 2018-12-06 | End: 2018-12-07 | Stop reason: HOSPADM

## 2018-12-06 RX ORDER — HEPARIN SODIUM 5000 [USP'U]/ML
5000 INJECTION, SOLUTION INTRAVENOUS; SUBCUTANEOUS EVERY 8 HOURS SCHEDULED
Status: DISCONTINUED | OUTPATIENT
Start: 2018-12-06 | End: 2018-12-07 | Stop reason: HOSPADM

## 2018-12-06 RX ORDER — AMLODIPINE BESYLATE 10 MG/1
10 TABLET ORAL DAILY
Status: DISCONTINUED | OUTPATIENT
Start: 2018-12-06 | End: 2018-12-07 | Stop reason: HOSPADM

## 2018-12-06 RX ADMIN — BENZONATATE 100 MG: 100 CAPSULE ORAL at 13:27

## 2018-12-06 RX ADMIN — GUAIFENESIN 600 MG: 600 TABLET, EXTENDED RELEASE ORAL at 13:27

## 2018-12-06 RX ADMIN — CALCIUM ACETATE 2001 MG: 667 CAPSULE ORAL at 13:28

## 2018-12-06 RX ADMIN — METOPROLOL TARTRATE 12.5 MG: 25 TABLET ORAL at 03:04

## 2018-12-06 RX ADMIN — HEPARIN SODIUM 5000 UNITS: 5000 INJECTION INTRAVENOUS; SUBCUTANEOUS at 05:30

## 2018-12-06 RX ADMIN — SODIUM CHLORIDE 100 ML/HR: 0.9 INJECTION, SOLUTION INTRAVENOUS at 02:46

## 2018-12-06 RX ADMIN — BENZONATATE 100 MG: 100 CAPSULE ORAL at 07:14

## 2018-12-06 RX ADMIN — GUAIFENESIN 600 MG: 600 TABLET, EXTENDED RELEASE ORAL at 21:52

## 2018-12-06 RX ADMIN — GUAIFENESIN 600 MG: 600 TABLET, EXTENDED RELEASE ORAL at 01:36

## 2018-12-06 RX ADMIN — AMLODIPINE BESYLATE 10 MG: 10 TABLET ORAL at 13:29

## 2018-12-06 RX ADMIN — CINACALCET HYDROCHLORIDE 30 MG: 30 TABLET, COATED ORAL at 13:28

## 2018-12-06 RX ADMIN — METOPROLOL TARTRATE 12.5 MG: 25 TABLET ORAL at 13:25

## 2018-12-06 RX ADMIN — BENZONATATE 100 MG: 100 CAPSULE ORAL at 03:04

## 2018-12-06 RX ADMIN — CALCIUM ACETATE 2001 MG: 667 CAPSULE ORAL at 16:44

## 2018-12-06 RX ADMIN — NICOTINE 1 PATCH: 14 PATCH TRANSDERMAL at 13:26

## 2018-12-06 RX ADMIN — BENZONATATE 100 MG: 100 CAPSULE ORAL at 21:53

## 2018-12-06 RX ADMIN — METOPROLOL TARTRATE 12.5 MG: 25 TABLET ORAL at 21:53

## 2018-12-06 RX ADMIN — BENZONATATE 100 MG: 100 CAPSULE ORAL at 16:45

## 2018-12-06 RX ADMIN — GABAPENTIN 300 MG: 300 CAPSULE ORAL at 13:28

## 2018-12-06 RX ADMIN — ALBUTEROL SULFATE 2 PUFF: 90 AEROSOL, METERED RESPIRATORY (INHALATION) at 05:30

## 2018-12-06 RX ADMIN — ACETAMINOPHEN 650 MG: 325 TABLET, FILM COATED ORAL at 05:30

## 2018-12-06 NOTE — ED ATTENDING ATTESTATION
Letty Skinner MD, saw and evaluated the patient  I have discussed the patient with the resident/non-physician practitioner and agree with the resident's/non-physician practitioner's findings, Plan of Care, and MDM as documented in the resident's/non-physician practitioner's note, except where noted  All available labs and Radiology studies were reviewed  At this point I agree with the current assessment done in the Emergency Department  I have conducted an independent evaluation of this patient a history and physical is as follows:      Critical Care Time  CritCare Time    Procedures     65 yo male with cough for few weeks, sob, and missed dialysis a few times, last one Friday due to car troubles  Pt with persistent abdominal pain which on ct last ed visit showed fb in cecum  Pt never got repeat ct to follow up  Pt on abx for pna  Pt also c/o leg giving out  No fever, no n/v   Vss, afebrile, lungs cta, rrr, abdomen soft tender rlq, no rebound, no guarding, no pedal edema  Cardiac workup, bnp, ct a/p

## 2018-12-06 NOTE — CONSULTS
Consultation - [address] [facility]  Shirlene Geller 66 y o  male MRN: [id number]  Unit/Bed#: ED 27 Encounter: [id number]        Inpatient consult to gastroenterology  Consult performed by: Morelia Chris, [address] [place] [address] ordered by: Shelly Sevilla          Reason for Consult / Principal Problem:     Foreign body in the cecum      ASSESSMENT AND PLAN:    Foreign body in the cecum  Two metallic foreign bodies linear shaped in the cecum identified, these are stable compared to previous CT of the abdomen 1 week ago, these foreign bodies are hemoclips based on previous colonoscopy findings  As per previous reports, colonoscopy was performed on 04/27, at that time two small ulcerated areas with a visible vessel and oozing blood were identified and 2 clips were applied to stop bleeding, another colonoscopy was performed on 05/24 and these 2 clips were visualized in the cecal area  Currently the patient is asymptomatic, there is no GI bleeding  From the GI perspective there is no need to remove the clips unless they become symptomatic  Patient needs to be made aware of the presence of these clips in case he requires MRI  Will sign off, please call with questions    ______________________________________________________________________    HPI:     42-year-old male patient with past medical history significant for end-stage renal disease on hemodialysis, hypertension, CHF  The patient presented to the ED complaining about dyspnea that has been ongoing for about 3 weeks and has been worsening  On admission CT scan was performed and patient was found to have foreign body in the cecum, of note this has been present on previous CT of the abdomen and GI was consulted for further management  The patient currently denies nausea or vomiting, mild abdominal pain, he is currently having bowel movements and passing gases    REVIEW OF SYSTEMS:    CONSTITUTIONAL: Denies any fever, chills, rigors, and weight loss    HEENT: No earache or tinnitus  Denies hearing loss or visual disturbances  CARDIOVASCULAR: No chest pain or palpitations  RESPIRATORY: Denies any cough, hemoptysis, shortness of breath or dyspnea on exertion  GASTROINTESTINAL: As noted in the History of Present Illness  GENITOURINARY: No problems with urination  Denies any hematuria or dysuria  NEUROLOGIC: No dizziness or vertigo, denies headaches  MUSCULOSKELETAL: Denies any muscle or joint pain  SKIN: Denies skin rashes or itching  ENDOCRINE: Denies excessive thirst  Denies intolerance to heat or cold  PSYCHOSOCIAL: Denies depression or anxiety  Denies any recent memory loss  Historical Information   Past Medical History:   Diagnosis Date    [facility]congest[facility] failure) ([facility] )     Dialysis patient ([facility] )     Hypertension     Limb alert care status     do not use left arm    Rectal bleeding     Renal disorder      Past Surgical History:   Procedure Laterality Date    AV FISTULA PLACEMENT      left arm    AV NODE ABLATION      CHOLECYSTECTOMY      COLONOSCOPY N/A 4/27/2018    Procedure: COLONOSCOPY;  Surgeon: Rafael Holm DO;  [facility]; Service: Gastroenterology    EGD AND COLONOSCOPY N/A 5/24/2018    Procedure: EGD AND COLONOSCOPY;  Surgeon: Mauro Vaca MD;  [facility]; Service: Gastroenterology     Social History   History   Alcohol Use No     History   Drug Use No     History   Smoking Status    Current Every Day Smoker    Packs/day: 1 00    Types: Cigarettes   Smokeless Tobacco    Never Used     History reviewed  No pertinent family history      Meds/Allergies       (Not in a hospital admission)  Current Facility-Administered Medications   Medication Dose Route Frequency    acetaminophen (TYLENOL) tablet 650 mg  650 mg Oral Q6H PRN    albuterol (PROVENTIL HFA,VENTOLIN HFA) inhaler 2 puff  2 puff Inhalation Q6H PRN    amLODIPine (NORVASC) tablet 10 mg  10 mg Oral Daily    benzonatate (TESSALON PERLES) capsule 100 mg  100 mg Oral TID PRN    benzonatate (TESSALON PERLES) capsule 100 mg  100 mg Oral Q8H    calcium acetate (PHOSLO) capsule 2,001 mg  2,001 mg Oral TID With Meals    cinacalcet (SENSIPAR) tablet 30 mg  30 mg Oral Daily With Breakfast    [START ON 12/12/2018] ergocalciferol (VITAMIN D2) capsule 50,000 Units  50,000 Units Oral Weekly    gabapentin (NEURONTIN) capsule 300 mg  300 mg Oral Daily    guaiFENesin (MUCINEX) 12 hr tablet 600 mg  600 mg Oral Q12H JAMESON    heparin (porcine) subcutaneous injection 5,000 Units  5,000 Units Subcutaneous Q8H [address]    metoprolol tartrate (LOPRESSOR) partial tablet 12 5 mg  12 5 mg Oral Q12H [address]    nicotine (NICODERM CQ) 14 mg/24hr TD 24 hr patch 1 patch  1 patch Transdermal Daily    sodium chloride 0 9 % infusion  100 mL/hr Intravenous Continuous       No Known Allergies        Objective     Blood pressure 139/64, pulse 83, temperature (!) 97 °F (36 1 °C), temperature source Tympanic, resp  rate 17, height 5' 6" (1 676 m), weight 71 2 kg (157 lb), SpO2 99 %  Body mass index is 25 34 kg/m²  Intake/Output Summary (Last 24 hours) at 12/06/18 1350  Last data filed at 12/06/18 1119   Gross per 24 hour   Intake              500 ml   Output             3500 ml   Net            -3000 ml         PHYSICAL EXAM:      General Appearance:   Alert, poorly cooperative, no distress   HEENT:   Normocephalic, atraumatic, anicteric      Neck:  Supple, symmetrical, trachea midline   Lungs:   Clear to auscultation bilaterally; no rales, rhonchi or wheezing; respirations unlabored    Heart[de-identified]   Regular rate and rhythm; no murmur, rub, or gallop     Abdomen:   Soft, non-tender, non-distended; normal bowel sounds; no masses, no organomegaly    Genitalia:   Deferred    Rectal:   Deferred    Extremities:  No cyanosis, clubbing or edema    Pulses:  2+ and symmetric all extremities    Skin:  No jaundice, rashes, or lesions    Lymph nodes:  No palpable cervical lymphadenopathy        Lab Results: Admission on 12/05/2018   Component Date Value    Rapid HIV 1 AND 2 12/05/2018 Non-Reactive     HIV-1 P24 Ag Screen 12/05/2018 Non-Reactive     Sodium 12/05/2018 134*    Potassium 12/05/2018 5 0     Chloride 12/05/2018 96*    CO2 12/05/2018 22     ANION GAP 12/05/2018 16*    BUN 12/05/2018 98*    Creatinine 12/05/2018 19 00*    Glucose 12/05/2018 135     Calcium 12/05/2018 8 8     eGFR 12/05/2018 3     WBC 12/05/2018 4 89     RBC 12/05/2018 3 59*    Hemoglobin 12/05/2018 9 8*    Hematocrit 12/05/2018 32 0*    MCV 12/05/2018 89     MCH 12/05/2018 27 3     MCHC 12/05/2018 30 6*    RDW 12/05/2018 20 4*    MPV 12/05/2018 11 0     Platelets 77/66/7114 227     nRBC 12/05/2018 0     Neutrophils Relative 12/05/2018 66     Immat GRANS % 12/05/2018 1     Lymphocytes Relative 12/05/2018 18     Monocytes Relative 12/05/2018 6     Eosinophils Relative 12/05/2018 8*    Basophils Relative 12/05/2018 1     Neutrophils Absolute 12/05/2018 3 30     Immature Grans Absolute 12/05/2018 0 03     Lymphocytes Absolute 12/05/2018 0 86     Monocytes Absolute 12/05/2018 0 30     Eosinophils Absolute 12/05/2018 0 37     Basophils Absolute 12/05/2018 0 03     Platelets 85/32/7111 208     MPV 12/06/2018 9 5     Sodium 12/06/2018 136     Potassium 12/06/2018 5 2     Chloride 12/06/2018 98*    CO2 12/06/2018 25     ANION GAP 12/06/2018 13     BUN 12/06/2018 103*    Creatinine 12/06/2018 19 30*    Glucose 12/06/2018 99     Calcium 12/06/2018 7 8*    eGFR 12/06/2018 3     WBC 12/06/2018 5 39     RBC 12/06/2018 3 64*    Hemoglobin 12/06/2018 9 8*    Hematocrit 12/06/2018 32 3*    MCV 12/06/2018 89     MCH 12/06/2018 26 9     MCHC 12/06/2018 30 3*    RDW 12/06/2018 20 2*    MPV 12/06/2018 9 2     Platelets 34/40/0757 194     nRBC 12/06/2018 0     Neutrophils Relative 12/06/2018 69     Immat GRANS % 12/06/2018 0     Lymphocytes Relative 12/06/2018 16     Monocytes Relative 12/06/2018 7  Eosinophils Relative 12/06/2018 7*    Basophils Relative 12/06/2018 1     Neutrophils Absolute 12/06/2018 3 74     Immature Grans Absolute 12/06/2018 0 02     Lymphocytes Absolute 12/06/2018 0 86     Monocytes Absolute 12/06/2018 0 35     Eosinophils Absolute 12/06/2018 0 38     Basophils Absolute 12/06/2018 0 04     Procalcitonin 12/06/2018 0 87*       Imaging Studies: I have personally reviewed pertinent imaging studies
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
## 2018-12-06 NOTE — H&P
INTERNAL MEDICINE HISTORY AND PHYSICAL  ED 27 SOD Team A    NAME: Addy Edwards  AGE: 66 y o  SEX: male  : 1960   MRN: [id number]  ENCOUNTER: [id number]    DATE: 2018  TIME: 1:54 AM    Primary Care Physician: Damian Mccormick  Admitting Provider: Carmen Ybarra MD    Chief complaint: SOB and cough    History of Present Illness     Addy Edwards is a 66 y o  male PMHx of ESRD on dialysis, PAF s/p ablation 2018, not on AC due to prior bleed, HTN, diastolic CHF grade 1, presents for shortness of breath and cough, starting 3 weeks ago and progressively worsening  He states that the cough is productive , producing thick brown sputum  He reports that using an albuterol inhaler provided some relief  He denies fever, chills, chest pain, changes in urination or bowel habits  He does also mention abdominal pain, in the RLQ, that has been chronic, likely related to a foreign body noted in his cecum on CT imaging  He has a known history of non-compliance with hemodialysis, scheduled on MWF  He reports that due to transportation issues, he could not get to his dialysis on Monday and Wednesday  He admits to smoking 1/2ppd  He also complains of a violaceous rash on his abdomen, chest wall, and back that has been present for the past few months and is seeming to get worse  He reports that a doctor prescribed him antibiotics which he started to take yesterday, but does not know the name and does not have it with him  Upon presentation to the ED he was noted to be hypoxic with oxygen saturation of 85%  He was placed on 3L oxygen NC and improved above 95% oxygen sat  Review of Systems   Review of Systems   Constitutional: Positive for activity change and fatigue  Negative for appetite change, chills, diaphoresis, fever and unexpected weight change  HENT: Negative for congestion, postnasal drip, rhinorrhea, sinus pain, sinus pressure and sneezing  Eyes: Negative for photophobia and visual disturbance     Respiratory: Positive for cough and shortness of breath  Negative for choking, chest tightness, wheezing and stridor  Productive cough thick brownish sputum     Cardiovascular: Negative for chest pain, palpitations and leg swelling  Gastrointestinal: Positive for abdominal pain  Negative for abdominal distention, anal bleeding, blood in stool, constipation, diarrhea, nausea and vomiting  RLQ pain   Endocrine: Negative for polydipsia, polyphagia and polyuria  Genitourinary: Negative for dysuria, flank pain, frequency and hematuria  Musculoskeletal: Negative for gait problem, joint swelling, myalgias, neck pain and neck stiffness  Skin: Positive for rash  Negative for color change, pallor and wound  Neurological: Positive for weakness  Negative for dizziness, facial asymmetry, light-headedness and headaches  Weakness in bilateral lower extremities, more significant in left leg       Past Medical History     Past Medical History:   Diagnosis Date    CHF (congestive heart failure) ([facility] )     Dialysis patient ([facility] )     Hypertension     Limb alert care status     do not use left arm    Rectal bleeding     Renal disorder        Past Surgical History     Past Surgical History:   Procedure Laterality Date    AV FISTULA PLACEMENT      left arm    AV NODE ABLATION      CHOLECYSTECTOMY      COLONOSCOPY N/A 4/27/2018    Procedure: COLONOSCOPY;  Surgeon: Sage Cannon DO;  Location: [facility]; Service: Gastroenterology    EGD AND COLONOSCOPY N/A 5/24/2018    Procedure: EGD AND COLONOSCOPY;  Surgeon: Adelita Riggs MD;  Location: [facility]; Service: Gastroenterology       Social History     History   Alcohol Use No     History   Drug Use No     History   Smoking Status    Current Every Day Smoker    Packs/day: 1 00    Types: Cigarettes   Smokeless Tobacco    Never Used       Family History   History reviewed  No pertinent family history      Medications Prior to Admission     Prior to Admission medications    Medication Sig Start Date End Date Taking? Authorizing Provider   albuterol (PROVENTIL HFA,VENTOLIN HFA) 90 mcg/act inhaler Inhale 2 puffs every 6 (six) hours as needed for wheezing 4/21/18   Dejah Fregoso MD   amLODIPine (NORVASC) 10 mg tablet Take 10 mg by mouth daily    Historical Provider, MD   benzonatate (TESSALON PERLES) 100 mg capsule Take 1 capsule (100 mg total) by mouth every 8 (eight) hours 11/28/18   Disha Regan MD   calcium acetate (PHOSLO) 667 mg capsule Take 2,001 mg by mouth 3 (three) times a day with meals    Historical Provider, MD   cinacalcet (SENSIPAR) 30 mg tablet 30 mg 12/27/16   Historical Provider, MD   ergocalciferol (VITAMIN D2) 50,000 units Take 1 capsule (50,000 Units total) by mouth once a week for 7 doses 5/4/18 11/28/18  Isidro Thao MD   gabapentin (NEURONTIN) 100 mg capsule Take 300 mg by mouth daily      Historical Provider, MD   hydrocortisone (ANUSOL-HC, PROCTOSOL HC,) 2 5 % rectal cream Insert into the rectum 4 (four) times a day as needed for hemorrhoids 4/30/18   Isidro Thao MD   metoprolol tartrate (LOPRESSOR) 25 mg tablet Take 0 5 tablets (12 5 mg total) by mouth every 12 (twelve) hours 4/30/18   Isidro Thao MD   nicotine (NICODERM CQ) 14 mg/24hr TD 24 hr patch Place 1 patch on the skin daily 4/21/18   Dejah Fregoso MD   pantoprazole (PROTONIX) 40 mg tablet Take 1 tablet (40 mg total) by mouth daily in the early morning 5/1/18   Isidro Thao MD       Allergies   No Known Allergies    Objective      Vitals:    12/05/18 2014 12/05/18 2039 12/05/18 2154 12/05/18 2344   BP: 158/76 158/81 162/81 155/71   BP Location: Right arm Right arm Right arm    Pulse: 84 83 80 82   Resp: 18 18 20 20   Temp: 98 3 °F (36 8 °C)      TempSrc: Oral      SpO2:  96% 96% 100%   Weight:       Height:         Body mass index is 25 34 kg/m²    No intake or output data in the 24 hours ending 12/06/18 0154  Invasive Devices     Line Hemodialysis AV Fistula Left -- days                Physical Exam  GENERAL: Appears well-developed and well-nourished  Appears in no acute distress   HEENT: Normocephalic and atraumatic  No scleral icterus  PERRLA  EOMI B/L  No oropharyngeal edema  MM moist    NECK: Neck supple with no lymphadenopathy  Trachea midline  No JVD  CARDIOVASCULAR: S1 and S2 are present  Regular rate and rhythm  No murmurs, rubs, or gallops  RESPIRATORY: CTA B/L, no rales, rhonci or wheezes  Normal respiratory expansion  ABDOMINAL: Bowel sounds present in all 4 quadrants, non-tender, soft, non-distended  No organomegaly, rebound, or guarding  EXTREMITIES: 2+ DP and PT pulses bilaterally; no cyanosis, clubbing, edema  ROM intact  MUELLER x4   MUSCULOSKELETAL: No joint tenderness, deformity or swelling, full range of motion without pain  NEUROLOGIC: Patient is alert and oriented to person, place, and time  No sensory or motor deficits  CN 2-12 intact  Plantars downgoing bilaterally  Speech fluent  SKIN: Skin is warm and dry  No skin lesions are present  No rashes  PSYCHIATRIC: Normal mood and affect     Lab Results: I have personally reviewed pertinent reports      CBC:   Results from last 7 days  Lab Units 12/05/18  2124   WBC Thousand/uL 4 89   RBC Million/uL 3 59*   HEMOGLOBIN g/dL 9 8*   HEMATOCRIT % 32 0*   MCV fL 89   MCH pg 27 3   MCHC g/dL 30 6*   RDW % 20 4*   MPV fL 11 0   PLATELETS Thousands/uL 227   NRBC AUTO /100 WBCs 0   NEUTROS PCT % 66   LYMPHS PCT % 18   MONOS PCT % 6   EOS PCT % 8*   BASOS PCT % 1   NEUTROS ABS Thousands/µL 3 30   LYMPHS ABS Thousands/µL 0 86   MONOS ABS Thousand/µL 0 30   EOS ABS Thousand/µL 0 37   , Chemistry Profile:   Results from last 7 days  Lab Units 12/05/18  2124   POTASSIUM mmol/L 5 0   CHLORIDE mmol/L 96*   CO2 mmol/L 22   BUN mg/dL 98*   CREATININE mg/dL 19 00*   CALCIUM mg/dL 8 8   EGFR ml/min/1 73sq m 3   , Coagulation Studies:   , Cardiac Studies:   , Additional Labs:   , iSTAT CHEM 8:   Results from last 7 days  Lab Units 12/05/18  2124   ANION GAP mmol/L 16*   EGFR ml/min/1 73sq m 3   HEMOGLOBIN g/dL 9 8*   , ABG:   , Toxicology:   , Last A1C/Lipid Panel/Thyroid Panel:   Lab Results   Component Value Date    HGBA1C 5 0 11/13/2017    [id number] 0 864 04/18/2018    FREET4 1 04 11/16/2017       Imaging: I have personally reviewed pertinent films in PACS  Ct Abdomen Pelvis Wo Contrast    Result Date: 12/5/2018  Narrative: CT ABDOMEN AND PELVIS WITHOUT IV CONTRAST INDICATION:   repeat for foreign body seen on previous CT  COMPARISON:  11/28/2018 TECHNIQUE:  CT examination of the abdomen and pelvis was performed without intravenous contrast   Axial, sagittal, and coronal 2D reformatted images were created from the source data and submitted for interpretation  Radiation dose length product (DLP) for this visit:  281 4 mGy-cm   This examination, like all CT scans performed in the [facility], was performed utilizing techniques to minimize radiation dose exposure, including the use of iterative reconstruction and automated exposure control  Enteric contrast was administered  FINDINGS: ABDOMEN LOWER CHEST:  Streaky atelectasis/scarring at the lung bases  Prominent cardiomegaly noted  LIVER/BILIARY TREE:  Hepatomegaly evident  GALLBLADDER:  Gallbladder is surgically absent  SPLEEN:  Splenomegaly noted  PANCREAS:  Unremarkable  ADRENAL GLANDS:  Unremarkable  KIDNEYS/URETERS:  Atrophic kidneys with cysts and vascular calcifications as before  STOMACH AND BOWEL:  Cecum metallic foreign bodies x2 are redemonstrated best seen on series 2 image 51 and series 2 image 54  APPENDIX:  No findings to suggest appendicitis  ABDOMINOPELVIC CAVITY:  No ascites or free intraperitoneal air  No lymphadenopathy  VESSELS:  Unremarkable for patient's age  PELVIS REPRODUCTIVE ORGANS:  Unremarkable for patient's age  URINARY BLADDER:  Unremarkable  ABDOMINAL WALL/INGUINAL REGIONS:  Unremarkable  OSSEOUS STRUCTURES:  No acute fracture or destructive osseous lesion  Impression: 1  Stable position of cecal linear foreign metallic densities x2 as above  2   Hepatosplenomegaly  3   Atrophic kidneys with cysts and vascular calcifications indicating end-stage renal disease  4   Cardiomegaly  Workstation performed: [id number]     Xr Chest 2 Views    Result Date: 11/28/2018  Narrative: CHEST INDICATION:   cough  COMPARISON:  Chest radiographs April 25, 2018 EXAM PERFORMED/VIEWS:  XR CHEST PA & LATERAL  The frontal view was performed utilizing dual energy radiographic technique  Images: 4 FINDINGS: Cardiomediastinal silhouette appears unremarkable  The lungs are clear  No pneumothorax or pleural effusion  Osseous structures appear within normal limits for patient age  Impression: No acute cardiopulmonary disease  Workstation performed: [id number]     Ct Abdomen Pelvis With Contrast    Result Date: 11/28/2018  Narrative: CT ABDOMEN AND PELVIS WITH IV CONTRAST INDICATION:   Right lower quadrant pain    COMPARISON:  None  TECHNIQUE:  CT examination of the abdomen and pelvis was performed  Axial, sagittal, and coronal 2D reformatted images were created from the source data and submitted for interpretation  Radiation dose length product (DLP) for this visit:  381 04 mGy-cm   This examination, like all CT scans performed in the [facility], was performed utilizing techniques to minimize radiation dose exposure, including the use of iterative  reconstruction and automated exposure control  IV Contrast:  100 mL of iohexol (OMNIPAQUE)  was administered intravenously without immediate adverse reaction  Enteric Contrast:  Enteric contrast was not administered  FINDINGS: ABDOMEN LOWER CHEST:  Cardiomegaly  Bibasilar subsegmental atelectasis  LIVER/BILIARY TREE:  Unremarkable  GALLBLADDER:  Gallbladder is surgically absent  SPLEEN:  Unremarkable  PANCREAS:  Unremarkable   ADRENAL GLANDS: Unremarkable  KIDNEYS/URETERS:  One or more simple renal cyst(s) is noted  Atrophic kidneys bilaterally compatible with end-stage renal disease  Multiple small calcifications are likely vascular  No hydronephrosis  STOMACH AND BOWEL:  There are two identical appearing linear hyperdense objects within the cecum with a slightly smaller diameter towards one end (series 2, image 59 and 56)  These may represent ingested foreign bodies  No bowel obstruction  APPENDIX:  A normal appendix was visualized  ABDOMINOPELVIC CAVITY:  No ascites or free intraperitoneal air  No lymphadenopathy  VESSELS:  Moderate atherosclerotic calcifications  PELVIS REPRODUCTIVE ORGANS:  The prostate is enlarged  URINARY BLADDER:  Collapsed and poorly evaluated  ABDOMINAL WALL/INGUINAL REGIONS:  Unremarkable  OSSEOUS STRUCTURES:  No acute fracture or destructive osseous lesion  Schmorl's nodes are seen within L1, L2, and L3 vertebral bodies  Impression: 1  Two identical appearing linear hyperdense foreign bodies within the cecum  Correlate clinically for foreign body ingestion  2   Other findings as above  I personally discussed this study with Marcial Hayes MD on 11/28/2018 at 3:01 AM  Workstation performed: [id number]       Microbiology: cultures obtained in emergency department include     Urinalysis:       Invalid input(s): URIBILINOGEN     Urine Micro:        EKG, Pathology, and Other Studies: I have personally reviewed pertinent reports        Medications given in Emergency Department     Medication Administration - last 24 hours from 12/05/2018 0154 to 12/06/2018 0154       Date/Time Order Dose Route Action Action by     12/06/2018 0136 guaiFENesin (MUCINEX) 12 hr tablet 600 mg 600 mg Oral Given Godwin Sample          Assessment and Plan     Problem List     DM (diabetes mellitus) ([facility] ) (Chronic)    Lab Results   Component Value Date    HGBA1C 5 0 11/13/2017       No results for input(s): POCGLU in the last 72 hours  Blood Sugar Average: Last 72 hrs:          ESRD (end stage renal disease) on dialysis ([facility] )    Secondary hyperparathyroidism of renal origin ([facility] )    Anemia in chronic kidney disease, on chronic dialysis ([facility] )    Acute bronchitis due to other specified organisms    Anemia of renal disease    Accelerated hypertension    Lower GI bleed    Overview Signed 4/26/2018 12:55 PM by Alyssia Hayes PA-C     Added automatically from request for surgery [id number]         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([facility] )    ESRD on hemodialysis ([place] [address] )    Nicotine abuse    Anemia    Overview Signed 5/23/2018  3:24 PM by Sherrie Power PA-C     Added automatically from request for surgery [id number]         Cough    Weakness        ESRD  -patient has chronic history of non-compliance with dialysis and similar presentations to hospital, with SOB and lower extremity weakness   -gapped metabolic acidosis on lab work, Sodium 134, K 5, chloride 96, anion gap 16   -BUN/Cr  98/19     Plan: nephro consult patient will need dialysis  SOB and cough  -likely bronchitis, CXR negative  -patient is afebrile, no white count, lungs CTA bilaterally  -patient does not appear to be volume overloaded, rather he appears to be volume down   -upon record review patient has history of multiple similar presentations for SOB, cough, leg weakness, usually secondary to missing dialysis appointments    Plan: procalcitonin in AM-higher threshold for positive value since he has CKD        Acute hypoxic respiratory failure  -patient is not on oxygen at baseline at home  On presentation oxygen saturation down to 85%, placed on 3L oxygen NC with oxygen saturating close to 100%        Rash  Patient states rash developed a few months ago and has been worsening, spreading over his body, diffusely over the chest and abdomen, also on his back    -HIV negative    Plan: outpatient dermatology workup      Anemia of chronic disease  -hemoglobin of 9 8   -chronic anemia per record review , baseline from 8-10 hemoglobin  -patient denies melena, hematochezia  Iron panel on 4/26/18 showed iron sat 29%, iron level 43, TIBC 149, ferritin 1,238  Hypertension  -continue amlodipine 10mg daily  -continue metoprolol tartrate      Foreign body cecum  -noted on prior abdominal imaging, patient was supposed to followup outpatient with GI but failed to  Imaging today in ER is still showing the foreign body  Plan: GI consult        Code Status: Level 1 - Full Code  VTE Pharmacologic Prophylaxis: Heparin   VTE Mechanical Prophylaxis: sequential compression device  Admission Status: OBSERVATION    Admission Time  I spent 45 minutes admitting the patient  This involved direct patient contact where I performed a full history and physical, reviewing previous records, and reviewing laboratory and other diagnostic studies      Nakita Carter,   Internal Medicine  PGY-1

## 2018-12-06 NOTE — CONSULTS
Consultation - Nephrology   Petrona Boateng 66 y o  male MRN: [id number]  Unit/Bed#: ED 27 Encounter: [id number]    ASSESSMENT and PLAN:  1  End-stage renal disease on hemodialysis MWF at [facility]  Patient states he missed last 2 hemodialysis treatment on Monday and Friday due to transportation issues  Patient seen in the inpatient hemodialysis unit at 8:25 a m , his AV fistula is cannulated with blood flow, blood pressure is elevated, will attempt 3 kilos UF as tolerated  Reported outpatient dry weight is 64 5 kg, his currently more than 6 kilos over his dry weight  Will proceed with 2nd hemodialysis treatment tomorrow to put him back on his outpatient schedule to continue with UF  Continue with hemodialysis after discharge with outpatient unit  2  Shortness of breath and cough, further management per primary team     3  Hypertension, blood pressure elevated, continue with UF as tolerated, follow low-salt diet, continue with outpatient blood pressure medications  4  Anemia chronic kidney disease, patient is on Mircera as an outpatient, last dose 225 mcg on 11/27  Follow H&H     5  Mineral bone disease continue with renal diet and binders as an outpatient  6  Access, left brachiocephalic AV fistula cannulated with blood flow      SUMMARY OF RECOMMENDATIONS:  Patient seen in the inpatient hemodialysis unit at 8:25 a m , his AV fistula is cannulated with good blood flow, his blood pressure is elevated, will continue with UF as tolerated  Continue with medical management per primary team   Next hemodialysis treatment tomorrow following his outpatient schedule  Needs to be on a renal diet  HISTORY OF PRESENT ILLNESS:  Requesting Physician: Peri Kenny MD  Reason for Consult:  End-stage renal disease on hemodialysis, missed 2 hemodialysis treatment    Petrona Boateng is a 66 y o  male who was admitted to [facility] after presenting with cough and shortness of breath   A renal consultation is requested today for assistance in the management of hemodialysis  Patient with known history of end-stage renal disease on hemodialysis for the last 5 years, currently being dialyzed at [facility]  Patient states that he missed hemodialysis treatment this past Monday and Wednesday due to transportation issues  He presents to the emergency department for evaluation of shortness of breath and cough, he states that he having productive cough for 3 weeks, he also noticing dizziness and lower extremity weakness for the last 5 days, denies any fever or chills, denies any chest pain, denies any abdominal pain  He also states that he is having diarrhea for 3 weeks on and off, denies any bloody stools  PAST MEDICAL HISTORY:  Past Medical History:   Diagnosis Date    CHF (congestive heart failure) ([address] )     Dialysis patient ([facility] )     Hypertension     Limb alert care status     do not use left arm    Rectal bleeding     Renal disorder        PAST SURGICAL HISTORY:  Past Surgical History:   Procedure Laterality Date    AV FISTULA PLACEMENT      left arm    AV NODE ABLATION      CHOLECYSTECTOMY      COLONOSCOPY N/A 4/27/2018    Procedure: COLONOSCOPY;  Surgeon: Tres Camejo DO;  Location: [facility]; Service: Gastroenterology    EGD AND COLONOSCOPY N/A 5/24/2018    Procedure: EGD AND COLONOSCOPY;  Surgeon: Stuart Weiss MD;  Location: [facility]; Service: Gastroenterology       SOCIAL HISTORY:  History   Alcohol Use No     History   Drug Use No     History   Smoking Status    Current Every Day Smoker    Packs/day: 1 00    Types: Cigarettes   Smokeless Tobacco    Never Used       FAMILY HISTORY:  History reviewed  No pertinent family history      ALLERGIES:  No Known Allergies    MEDICATIONS:    Current Facility-Administered Medications:     acetaminophen (TYLENOL) tablet 650 mg, 650 mg, Oral, Q6H PRN, Imtiaz Garcia DO, 650 mg at 12/06/18 0530    albuterol (PROVENTIL HFA,VENTOLIN HFA) inhaler 2 puff, 2 puff, Inhalation, Q6H PRN, Imtiaz Garcia, DO, 2 puff at 12/06/18 0530    amLODIPine (NORVASC) tablet 10 mg, 10 mg, Oral, Daily, Imtiaz Clarkai, DO    benzonatate (TESSALON PERLES) capsule 100 mg, 100 mg, Oral, TID PRN, Britany Lundberg MD, 100 mg at 12/06/18 0714    benzonatate (TESSALON PERLES) capsule 100 mg, 100 mg, Oral, Q8H, Imtiaz Garcia, DO, 100 mg at 12/06/18 0304    calcium acetate (PHOSLO) capsule 2,001 mg, 2,001 mg, Oral, TID With Meals, Imtiaz Garcia, DO    cinacalcet (SENSIPAR) tablet 30 mg, 30 mg, Oral, Daily With Breakfast, Imtiaz Garcia, DO    [START ON 12/12/2018] ergocalciferol (VITAMIN D2) capsule 50,000 Units, 50,000 Units, Oral, Weekly, Imtiaz Garcia, DO    gabapentin (NEURONTIN) capsule 300 mg, 300 mg, Oral, Daily, Imtiaz Garcia, DO    guaiFENesin (MUCINEX) 12 hr tablet 600 mg, 600 mg, Oral, Q12H [address], Britany Lundberg MD, 600 mg at 12/06/18 0136    heparin (porcine) subcutaneous injection 5,000 Units, 5,000 Units, Subcutaneous, Q8H [address], 5,000 Units at 12/06/18 0530 **AND** Platelet count, , , Once, Imtiaz Garcia,     metoprolol tartrate (LOPRESSOR) partial tablet 12 5 mg, 12 5 mg, Oral, Q12H JAMESON, Imtiaz Garcia, DO, 12 5 mg at 12/06/18 0304    nicotine (NICODERM CQ) 14 mg/24hr TD 24 hr patch 1 patch, 1 patch, Transdermal, Daily, Imtiaz Garcia,     sodium chloride 0 9 % infusion, 100 mL/hr, Intravenous, Continuous, Imtiaz Garcia, , Last Rate: 100 mL/hr at 12/06/18 0246, 100 mL/hr at 12/06/18 0246    Current Outpatient Prescriptions:     albuterol (PROVENTIL HFA,VENTOLIN HFA) 90 mcg/act inhaler, Inhale 2 puffs every 6 (six) hours as needed for wheezing, Disp: 1 Inhaler, Rfl: 0    amLODIPine (NORVASC) 10 mg tablet, Take 10 mg by mouth daily, Disp: , Rfl:     benzonatate (TESSALON PERLES) 100 mg capsule, Take 1 capsule (100 mg total) by mouth every 8 (eight) hours, Disp: 21 capsule, Rfl: 0    calcium acetate (PHOSLO) 667 mg capsule, Take 2,001 mg by mouth 3 (three) times a day with meals, Disp: , Rfl:     cinacalcet (SENSIPAR) 30 mg tablet, 30 mg, Disp: , Rfl:     ergocalciferol (VITAMIN D2) 50,000 units, Take 1 capsule (50,000 Units total) by mouth once a week for 7 doses, Disp: 7 capsule, Rfl: 0    gabapentin (NEURONTIN) 100 mg capsule, Take 300 mg by mouth daily  , Disp: , Rfl:     hydrocortisone (ANUSOL-HC, PROCTOSOL HC,) 2 5 % rectal cream, Insert into the rectum 4 (four) times a day as needed for hemorrhoids, Disp: 30 g, Rfl: 0    metoprolol tartrate (LOPRESSOR) 25 mg tablet, Take 0 5 tablets (12 5 mg total) by mouth every 12 (twelve) hours, Disp: 30 tablet, Rfl: 0    nicotine (NICODERM CQ) 14 mg/24hr TD 24 hr patch, Place 1 patch on the skin daily, Disp: 28 patch, Rfl: 0    pantoprazole (PROTONIX) 40 mg tablet, Take 1 tablet (40 mg total) by mouth daily in the early morning, Disp: 30 tablet, Rfl: 0    REVIEW OF SYSTEMS:  All the systems were reviewed and were negative except as documented on the HPI      PHYSICAL EXAM:  Current Weight: Weight - Scale: 71 2 kg (157 lb)  First Weight: Weight - Scale: 71 2 kg (157 lb)  Vitals:    12/05/18 2344 12/06/18 0300 12/06/18 0304 12/06/18 0609   BP: 155/71 (!) 184/87 (!) 180/88 162/88   BP Location:  Right arm     Pulse: 82 86 82 78   Resp: 20 18 18   Temp:       TempSrc:       SpO2: 100% 99%  99%   Weight:       Height:         No intake or output data in the 24 hours ending 12/06/18 0825    General:  In no acute distress, chronically ill  Skin:  Pale, no rash  Eyes:  No jaundice  ENT:  Mucous membranes moist  Neck:  Supple  Chest:  Clear to auscultation   CVS:  Regular rate and rhythm  Abdomen:  Soft, nontender, nondistended  Extremities:  No significant lower extremity edema, left upper extremity AV fistula cannulated with blood flow   Neuro:  Alert and oriented  Psych:  Appropriate mood    Invasive Devices:        Lab Results:     Results from last 7 days  Lab Units 12/06/18  0406 12/05/18  2124   WBC Thousand/uL 5 39 4 89 HEMOGLOBIN g/dL 9 8* 9 8*   HEMATOCRIT % 32 3* 32 0*   PLATELETS Thousands/uL 194  208 227   SODIUM mmol/L 136 134*   POTASSIUM mmol/L 5 2 5 0   CHLORIDE mmol/L 98* 96*   CO2 mmol/L 25 22   BUN mg/dL 103* 98*   CREATININE mg/dL 19 30* 19 00*   CALCIUM mg/dL 7 8* 8 8       Other Studies:   Chest x-ray slightly increase in pulmonary vascular markings, no pleural effusions       Portions of the record may have been created with voice recognition software  Occasional wrong word or "sound a like" substitutions may have occurred due to the inherent limitations of voice recognition software  Read the chart carefully and recognize, using context, where substitutions have occurred  If you have any questions, please contact the dictating provider

## 2018-12-06 NOTE — PROGRESS NOTES
Senior Admission Note   Unit/Bed # ED 27 Encounter: [id number]  SOD Team A          Travis Pfeiffer 66 y o  male [id number]       Patient seen and examined  Reviewed H&P per Dr Jose Luis Fairbanks  Agree with the assessment and plan  Mr Travis Pfeiffer is a 66 y o  male with past medical history significant for end-stage renal disease on hemodialysis (likely Monday/Wednesday/Friday), nicotine dependence, anemia of likely chronic kidney disease and with known foreign body in alimentary tract  He presented to the emergency department this evening with complaints of progressive cough with generalized weakness  Cough has been noted to be productive of clear to brown sputum  He denies any fever chills, nausea, vomiting at home  He denies any recent sick contacts  He does report that he missed his last 2 hemodialysis sessions  Assessment/Plan: Principal Problem:    Cough  Active Problems:    ESRD (end stage renal disease) on dialysis (HCC)    Secondary hyperparathyroidism of renal origin ([facility] [address] )    Anemia in chronic kidney disease, on chronic dialysis ([facility])    Nicotine abuse    Anemia    Weakness    Foreign body alimentary tract     Cough  With some component of shortness of breath  Patient has noncompliance with his hemodialysis and likely has a component of fluid overload, though chest x-ray is within normal limits  Likely viral in origin, will check procalcitonin   -continue to treat symptomatically  -Mucinex and Tessalon Perles as needed    End-stage renal disease on hemodialysis  Consult Nephrology for hemodialysis scheduling  Patient is noncompliant with his hemodialysis as he has missed his last 2 hemodialysis sessions secondary to transportation issues   -electrolyte abnormalities secondary to hemodialysis noncompliance, with scheduled hemodialysis he should improved  Foreign body in alimentary tract  CT of the abdomen and pelvis was consistent with metallic foreign body noted in the cecum    This was present in previous imaging and was recommended to have outpatient follow-up with Gastroenterology  However, patient is not establish care with Gastroenterology in therefore we will consult Gastroenterology while inpatient for further management and recommendations      Admit as OBSERVATION to [facility], Dr Krystle Huffman  Diet: Renal  CODE STATUS: Full Code  DVT PPX: Heparin and SCDs bilaterally      Kareen Aponte MD

## 2018-12-06 NOTE — UTILIZATION REVIEW
Initial Clinical Review    Admission: Date/Time/Statement: OBSERVATION 12/5/18 @ 2258    Orders Placed This Encounter   Procedures    Place in Observation (expected length of stay for this patient is less than two midnights)     Standing Status:   Standing     Number of Occurrences:   1     Order Specific Question:   Admitting Physician     Answer:   TAMMY SAHA [[id number]]     Order Specific Question:   Level of Care     Answer:   Med Surg [[id number]]     ED: Date/Time/Mode of Arrival:   ED Arrival Information     Expected Arrival Acuity Means of Arrival Escorted By Service Admission Type    - 12/5/2018 19:37 Emergent Walk-In Self General Medicine Emergency    Arrival Complaint    cough, dialysis pt        Chief Complaint:   Chief Complaint   Patient presents with    Shortness of Breath     Patient reports, "I've had this cough for three weeks  My PCP prescribed me antibiotics yesterday I took one dose  Today I feel short of breath and my left leg keeps giving out on me  I haven't had dialysis in 5 days "     History of Illness: Caro Navarro is a 66 y o  male PMHx of ESRD on dialysis, PAF s/p ablation 4/2018, not on AC due to prior bleed, HTN, diastolic CHF grade 1, presents for shortness of breath and cough, starting 3 weeks ago and progressively worsening  He states that the cough is productive , producing thick brown sputum  He reports that using an albuterol inhaler provided some relief  He denies fever, chills, chest pain, changes in urination or bowel habits  He does also mention abdominal pain, in the RLQ, that has been chronic, likely related to a foreign body noted in his cecum on CT imaging  He has a known history of non-compliance with hemodialysis, scheduled on [facility]  He reports that due to transportation issues, he could not get to his dialysis on Monday and Wednesday  He admits to smoking 1/2ppd   He also complains of a violaceous rash on his abdomen, chest wall, and back that has been present for the past few months and is seeming to get worse  He reports that a doctor prescribed him antibiotics which he started to take yesterday, but does not know the name and does not have it with him  Upon presentation to the ED he was noted to be hypoxic with oxygen saturation of 85%  He was placed on 3L oxygen NC and improved above 95% oxygen sat  ED Vital Signs:   ED Triage Vitals [12/05/18 1942]   Temperature Pulse Respirations Blood Pressure SpO2   97 8 °F (36 6 °C) 84 22 (!) 172/82 94 %      Temp Source Heart Rate Source Patient Position - Orthostatic VS BP Location FiO2 (%)   Oral Monitor Sitting Right arm --      Pain Score       8        Wt Readings from Last 1 Encounters:   12/05/18 71 2 kg (157 lb)     Vital Signs (abnormal):   12/06/18 0900  --  80  --   178/90  --  --   12/06/18 0815   96 8 °F (36 °C)  77  22  155/85  --  --   12/06/18 0304  --  82  --   180/88  --  --   12/06/18 0300  --  86  18   184/87  99 %  Nasal cannula   12/05/18 1942  97 8 °F (36 6 °C)  84  22   172/82  94 %  None (Room air)     Abnormal Labs:   12/5 12/6   Sodium 134    136      Chloride 96 98   Anion Gap 16 13   BUN 98 103   Creatinine 19 00 19 30   Calcium 8 8 7 8     H/h 9 8/32 0  Eosiniphils 8, 7  Procalcitonin 0 87    Diagnostic Test Results:      12/5 CT abd, pelvis - 1  Stable position of cecal linear foreign metallic densities x2 as above  2   Hepatosplenomegaly  3   Atrophic kidneys with cysts and vascular calcifications indicating end-stage renal disease  4   Cardiomegaly  12/5 CXR - Mild pulmonary vascular congestion      ED Treatment:   Medication Administration from 12/05/2018 1937 to 12/06/2018 0908    Date/Time Order Dose Route Action   12/06/2018 0136 guaiFENesin (MUCINEX) 12 hr tablet 600 mg 600 mg Oral Given   12/06/2018 0714 benzonatate (TESSALON PERLES) capsule 100 mg 100 mg Oral Given   12/06/2018 0530 albuterol (PROVENTIL HFA,VENTOLIN HFA) inhaler 2 puff 2 puff Inhalation Given   12/06/2018 0304 benzonatate (TESSALON PERLES) capsule 100 mg 100 mg Oral Given   12/06/2018 0304 metoprolol tartrate (LOPRESSOR) partial tablet 12 5 mg 12 5 mg Oral Given   12/06/2018 0246 sodium chloride 0 9 % infusion 100 mL/hr Intravenous New Bag   12/06/2018 0530 heparin (porcine) subcutaneous injection 5,000 Units 5,000 Units Subcutaneous Given   12/06/2018 0530 acetaminophen (TYLENOL) tablet 650 mg 650 mg Oral Given        Past Medical/Surgical History: Active Ambulatory Problems     Diagnosis Date Noted    DM (diabetes mellitus) ([place] [address] ) 02/09/2015    ESRD (end stage renal disease) on dialysis ([place] [address] ) 03/11/2018    Secondary hyperparathyroidism of renal origin ([place] [address] ) 03/11/2018    Anemia in chronic kidney disease, on chronic dialysis ([place] [address] ) 03/11/2018    Acute bronchitis due to other specified organisms 04/17/2018    Anemia of renal disease 04/17/2018    Accelerated hypertension 04/25/2018    Lower GI bleed 04/25/2018    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([place] [address] ) 04/30/2018    ESRD on hemodialysis ([place] [address] ) 05/22/2018    Nicotine abuse 05/22/2018    Anemia 05/22/2018     Resolved Ambulatory Problems     Diagnosis Date Noted    Fluid overload, unspecified 11/13/2017    Hypertensive urgency 11/13/2017    Hyperkalemia 11/13/2017    Dyspnea 11/13/2017    Acute hypoxemic respiratory failure (HCC) 11/13/2017    Elevated troponin 11/13/2017    Encephalopathy 11/13/2017    Benign hypertension with CKD (chronic kidney disease) stage V ([place] [address] ) 03/11/2018    History of supraventricular tachycardia 04/17/2018    Bright red blood per rectum 04/25/2018    Cough with hemoptysis 04/25/2018    Acute blood loss anemia 04/30/2018     Past Medical History:   Diagnosis Date    CHF (congestive heart failure) ([place] [address] )     Dialysis patient ([place] [address] )     Hypertension     Limb alert care status     Rectal bleeding     Renal disorder      Admitting Diagnosis: Cough [R05]    Age/Sex: 66 y o  male    Assessment/Plan:  ESRD - history of poor compliance  Nephology consult and dialysis  SOB and cough, CXR negative, bronchitis  Acute hypoxic respiratory failure, on 3 L NC after being at 85% sat on presentation  Rash - long term and progressing, HIV negative, OP derm workup  Anemia of chronic disease - baseline Hgb 8-10 is 9 8  Denies any bleeding  HTN maintained on meds, foreign body in cecum was referred to GI but pt did not follow up   Will get IP GI consult        Code Status: Level 1 - Full Code  VTE Pharmacologic Prophylaxis: Heparin   VTE Mechanical Prophylaxis: sequential compression device  Admission Status: OBSERVATION     Admission Orders:  Scheduled Meds:   Current Facility-Administered Medications:  acetaminophen 650 mg Oral Q6H PRN    albuterol 2 puff Inhalation Q6H PRN    amLODIPine 10 mg Oral Daily    benzonatate 100 mg Oral TID PRN    benzonatate 100 mg Oral Q8H    calcium acetate 2,001 mg Oral TID With Meals    cinacalcet 30 mg Oral Daily With Breakfast    [START ON 12/12/2018] ergocalciferol 50,000 Units Oral Weekly    gabapentin 300 mg Oral Daily    guaiFENesin 600 mg Oral Q12H [address]    heparin (porcine) 5,000 Units Subcutaneous Q8H [address]    metoprolol tartrate 12 5 mg Oral Q12H [address]    nicotine 1 patch Transdermal Daily    sodium chloride 100 mL/hr Intravenous Continuous Last Rate: 100 mL/hr (12/06/18 0246)     Continuous Infusions:   sodium chloride 100 mL/hr Last Rate: 100 mL/hr (12/06/18 0246)     PRN Meds:   Acetaminophen x1    Albuterol x1    Benzonatate x1    Oxygen via NC   OOB as jillian   SCDS  Renal diet   Cons GI   Cons Nephrology   Dialysis in process - regular schedule MWF

## 2018-12-07 ENCOUNTER — APPOINTMENT (INPATIENT)
Dept: DIALYSIS | Facility: HOSPITAL | Age: 58
DRG: 189 | End: 2018-12-07
Attending: INTERNAL MEDICINE
Payer: MEDICARE

## 2018-12-07 VITALS
RESPIRATION RATE: 16 BRPM | TEMPERATURE: 98.7 F | HEIGHT: 66 IN | HEART RATE: 68 BPM | OXYGEN SATURATION: 90 % | WEIGHT: 157 LBS | SYSTOLIC BLOOD PRESSURE: 139 MMHG | BODY MASS INDEX: 25.23 KG/M2 | DIASTOLIC BLOOD PRESSURE: 67 MMHG

## 2018-12-07 LAB
ANION GAP SERPL CALCULATED.3IONS-SCNC: 10 MMOL/L (ref 4–13)
BUN SERPL-MCNC: 63 MG/DL (ref 5–25)
CALCIUM SERPL-MCNC: 9 MG/DL (ref 8.3–10.1)
CHLORIDE SERPL-SCNC: 97 MMOL/L (ref 100–108)
CO2 SERPL-SCNC: 28 MMOL/L (ref 21–32)
CREAT SERPL-MCNC: 13.3 MG/DL (ref 0.6–1.3)
ERYTHROCYTE [DISTWIDTH] IN BLOOD BY AUTOMATED COUNT: 19.9 % (ref 11.6–15.1)
GFR SERPL CREATININE-BSD FRML MDRD: 4 ML/MIN/1.73SQ M
GLUCOSE SERPL-MCNC: 68 MG/DL (ref 65–140)
HCT VFR BLD AUTO: 33.2 % (ref 36.5–49.3)
HGB BLD-MCNC: 10.3 G/DL (ref 12–17)
MCH RBC QN AUTO: 27.6 PG (ref 26.8–34.3)
MCHC RBC AUTO-ENTMCNC: 31 G/DL (ref 31.4–37.4)
MCV RBC AUTO: 89 FL (ref 82–98)
PLATELET # BLD AUTO: 202 THOUSANDS/UL (ref 149–390)
PMV BLD AUTO: 11.2 FL (ref 8.9–12.7)
POTASSIUM SERPL-SCNC: 4.6 MMOL/L (ref 3.5–5.3)
RBC # BLD AUTO: 3.73 MILLION/UL (ref 3.88–5.62)
SODIUM SERPL-SCNC: 135 MMOL/L (ref 136–145)
WBC # BLD AUTO: 3.91 THOUSAND/UL (ref 4.31–10.16)

## 2018-12-07 PROCEDURE — 80048 BASIC METABOLIC PNL TOTAL CA: CPT | Performed by: STUDENT IN AN ORGANIZED HEALTH CARE EDUCATION/TRAINING PROGRAM

## 2018-12-07 PROCEDURE — 90935 HEMODIALYSIS ONE EVALUATION: CPT | Performed by: INTERNAL MEDICINE

## 2018-12-07 PROCEDURE — 5A1D70Z PERFORMANCE OF URINARY FILTRATION, INTERMITTENT, LESS THAN 6 HOURS PER DAY: ICD-10-PCS | Performed by: INTERNAL MEDICINE

## 2018-12-07 PROCEDURE — 85027 COMPLETE CBC AUTOMATED: CPT | Performed by: STUDENT IN AN ORGANIZED HEALTH CARE EDUCATION/TRAINING PROGRAM

## 2018-12-07 RX ORDER — BENZONATATE 100 MG/1
100 CAPSULE ORAL EVERY 8 HOURS
Qty: 42 CAPSULE | Refills: 0 | Status: SHIPPED | OUTPATIENT
Start: 2018-12-07 | End: 2018-12-15 | Stop reason: HOSPADM

## 2018-12-07 RX ORDER — LANOLIN ALCOHOL/MO/W.PET/CERES
6 CREAM (GRAM) TOPICAL ONCE
Status: COMPLETED | OUTPATIENT
Start: 2018-12-07 | End: 2018-12-07

## 2018-12-07 RX ORDER — GUAIFENESIN/DEXTROMETHORPHAN 100-10MG/5
10 SYRUP ORAL EVERY 6 HOURS PRN
Qty: 118 ML | Refills: 0 | Status: SHIPPED | OUTPATIENT
Start: 2018-12-07 | End: 2019-04-16

## 2018-12-07 RX ADMIN — METOPROLOL TARTRATE 12.5 MG: 25 TABLET ORAL at 12:43

## 2018-12-07 RX ADMIN — BENZONATATE 100 MG: 100 CAPSULE ORAL at 01:29

## 2018-12-07 RX ADMIN — DICLOFENAC 2 G: 10 GEL TOPICAL at 12:46

## 2018-12-07 RX ADMIN — CALCIUM ACETATE 2001 MG: 667 CAPSULE ORAL at 12:43

## 2018-12-07 RX ADMIN — AMLODIPINE BESYLATE 10 MG: 10 TABLET ORAL at 12:44

## 2018-12-07 RX ADMIN — GUAIFENESIN AND DEXTROMETHORPHAN 10 ML: 100; 10 SYRUP ORAL at 00:17

## 2018-12-07 RX ADMIN — GUAIFENESIN AND DEXTROMETHORPHAN 10 ML: 100; 10 SYRUP ORAL at 05:46

## 2018-12-07 RX ADMIN — MELATONIN 6 MG: at 01:52

## 2018-12-07 RX ADMIN — NICOTINE 1 PATCH: 14 PATCH TRANSDERMAL at 12:47

## 2018-12-07 RX ADMIN — GABAPENTIN 300 MG: 300 CAPSULE ORAL at 12:43

## 2018-12-07 RX ADMIN — BENZONATATE 100 MG: 100 CAPSULE ORAL at 12:44

## 2018-12-07 NOTE — DISCHARGE INSTRUCTIONS
Chronic Kidney Disease, Ambulatory Care   GENERAL INFORMATION:   Chronic kidney disease  is the gradual and permanent loss of kidney function  Normally, the kidneys turn fluid, chemicals, and waste from your blood into urine  When you have chronic kidney disease (CKD), your kidneys do not function properly  CKD may worsen over time and lead to kidney failure  Common symptoms include the following:   · Changes in how often you need to urinate    · Swelling in your arms, legs, or feet    · Shortness of breath    · Fatigue or weakness    · Bad or bitter taste in your mouth    · Nausea, vomiting, or loss of appetite  Seek immediate care for the following symptoms:   · Heart is beating faster than normal for you    · Confused and drowsiness    · Seizure    · Sudden chest pain or shortness of breath  Treatment for chronic kidney disease:  Medicines may be given to decrease blood pressure and get rid of extra fluid  You may also receive medicine to manage health conditions that may occur with CKD  Dialysis is a treatment to remove chemicals and waste from your blood when your kidneys can no longer do this  Surgery may be needed to create an arteriovenous fistula (AVF) in your arm or insert a catheter into your abdomen so that you can receive dialysis  A kidney transplant may be done if your CKD becomes severe  Manage chronic kidney disease:   · Maintain a healthy weight  Ask your healthcare provider how much you should weigh  Ask him to help you create a weight loss plan if you are overweight  · Exercise 30 to 60 minutes a day, 4 to 7 times a week, or as directed  Ask about the best exercise plan for you  Regular exercise can help you manage CKD, high blood pressure, and diabetes  · Follow your healthcare provider's advice about what to eat and drink  He may tell you to eat food low in sodium (salt), potassium, phosphorus, or protein  You may need to see a dietitian if you need help planning meals      · Limit alcohol  Ask how much alcohol is safe for you to drink  A drink of alcohol is 12 ounces of beer, 5 ounces of wine, or 1½ ounces of liquor  · Do not smoke  Smoking harms your kidneys  If you smoke, it is never too late to quit  Ask for information if you need help quitting  · Ask your healthcare provider if you need vaccines  Infections such as pneumonia, influenza, and hepatitis can be more harmful or more likely to occur when you have CKD  Vaccines reduce your risk of infection with these viruses  Follow up with your healthcare provider as directed:  Write down your questions so you remember to ask them during your visits  CARE AGREEMENT:   You have the right to help plan your care  Learn about your health condition and how it may be treated  Discuss treatment options with your caregivers to decide what care you want to receive  You always have the right to refuse treatment  The above information is an  only  It is not intended as medical advice for individual conditions or treatments  Talk to your doctor, nurse or pharmacist before following any medical regimen to see if it is safe and effective for you  © 2014 [address] is for End User's use only and may not be sold, redistributed or otherwise used for commercial purposes  All illustrations and images included in CareNotes® are the copyrighted property of [employer]  or Michele Eliseuss  Cold Symptoms, Ambulatory Care   GENERAL INFORMATION:   Cold symptoms  include sneezing, dry throat, a stuffy nose, headache, watery eyes, and a cough  Your cough may be dry, or you may cough up mucus  You may also have muscle aches, joint pain, and tiredness  Rarely, you may have a fever  Cold symptoms occur from inflammation in your upper respiratory system caused by a virus  Most colds go away without treatment    Seek immediate care for the following symptoms:   · A heartbeat that is much faster than usual for you     · A swollen neck that is sore to the touch     · Increased tiredness and weakness    · Pinpoint or larger reddish-purple dots on your skin     · Poor or no appetite  Treatment for cold symptoms  may include NSAIDS to decrease muscle aches and fever  Do not give NSAID medicines to children under 10months of age without direction from your child's doctor  Cold medicines may also be given to decrease coughing, nasal stuffiness, sneezing, and a runny nose  Do not give cold medicines to children under 11years of age without direction from your child's doctor  Manage your cold symptoms with the following:   · Drink liquids  to help thin and loosen thick mucus so you can cough it up  Liquids will also keep you hydrated  Ask your healthcare provider which liquids are best for you and how much to drink each day  · Do not smoke  because it may worsen your symptoms and increase the length of time you feel sick  Talk with your healthcare provider if you need help to stop smoking  Prevent the spread of germs  by washing your hands often  You can spread your cold germs to others for at least 3 days after your symptoms start  Do not share items, such as eating utensils  Cover your nose and mouth when you cough or sneeze using the crook of your elbow instead of your hands  Throw used tissues in the garbage  Follow up with your healthcare provider as directed:  Write down your questions so you remember to ask them during your visits  CARE AGREEMENT:   You have the right to help plan your care  Learn about your health condition and how it may be treated  Discuss treatment options with your caregivers to decide what care you want to receive  You always have the right to refuse treatment  The above information is an  only  It is not intended as medical advice for individual conditions or treatments   Talk to your doctor, nurse or pharmacist before following any medical regimen to see if it is safe and effective for you  © 2014 [address] is for End User's use only and may not be sold, redistributed or otherwise used for commercial purposes  All illustrations and images included in CareNotes® are the copyrighted property of [employer]  or Michele Sr

## 2018-12-07 NOTE — PROGRESS NOTES
NEPHROLOGY PROGRESS NOTE   Annice Apgar 66 y o  male MRN: [id number]  Unit/Bed#: CW2 02 Encounter: [id number]      ASSESSMENT & PLAN:  1  End-stage renal disease on hemodialysis M [facility] at [facility]  Patient seen during hemodialysis treatment, his AV fistula is cannulated with blood flow, his blood pressure is stable  Proceed with UF to try to bring him down to his dry weight of 64 5  His weight was recheck today on a chair scale and his only over 1 5 kilos above his dry weight  Okay from renal standpoint of view to be discharged and then continue with hemodialysis as an outpatient  2  Shortness of breath, cough, further management per primary team     3  Hypertension, blood pressure improving, continue with UF  4  Anemia chronic kidney disease, continue with erythropoietin stimulating agent as an outpatient  5  Mineral bone disease continue with renal diet and binders as an outpatient  6  Access, left brachiocephalic AV fistula cannulated with good blood flow  SUBJECTIVE:  Patient seen and examined during dialysis treatment at 8:40 a m , his AV fistula cannulated with blood flow, blood pressure is stable, states that his cough is improving  Denies any chest pain or shortness of Breath    OBJECTIVE:  Current Weight: Weight - Scale: 71 2 kg (157 lb)  Vitals:    18 0830   BP: 144/67   Pulse: 72   Resp:    Temp:    SpO2:        Intake/Output Summary (Last 24 hours) at 18 0856  Last data filed at 18 0802   Gross per 24 hour   Intake              620 ml   Output             3500 ml   Net            -2880 ml     General: conscious, cooperative, in not acute distress  Eyes: conjunctivae pale, anicteric sclerae  ENT: lips and mucous membranes moist  Neck: supple    Chest: clear breath sounds bilateral, no crackles, ronchus or wheezings  CVS: distinct S1 & S2, normal rate, regular rhythm  Abdomen: soft, non-tender, non-distended, normoactive bowel sounds  Extremities: no edema of both legs, left upper extremity aneurysmal AV fistula cannulated with blood flow  Skin: no rash  Neuro: awake, alert, oriented      Medications:    Current Facility-Administered Medications:     acetaminophen (TYLENOL) tablet 650 mg, 650 mg, Oral, Q6H PRN, Imtiaz Banai, DO, 650 mg at 12/06/18 0530    albuterol (PROVENTIL HFA,VENTOLIN HFA) inhaler 2 puff, 2 puff, Inhalation, Q6H PRN, Imtiaz Banai, DO, 2 puff at 12/06/18 0530    amLODIPine (NORVASC) tablet 10 mg, 10 mg, Oral, Daily, Imtiaz Banai, DO, 10 mg at 12/06/18 1329    benzonatate (TESSALON PERLES) capsule 100 mg, 100 mg, Oral, TID PRN, Tessa Tapia MD, 100 mg at 12/06/18 2153    benzonatate (TESSALON PERLES) capsule 100 mg, 100 mg, Oral, Q8H, Imtiaz Amberai, DO, 100 mg at 12/07/18 0129    calcium acetate (PHOSLO) capsule 2,001 mg, 2,001 mg, Oral, TID With Meals, Imtiaz Garcia, DO, 2,001 mg at 12/06/18 1644    cinacalcet (SENSIPAR) tablet 30 mg, 30 mg, Oral, Daily With Breakfast, Imtiaz Garcia, DO, Stopped at 12/07/18 0804    dextromethorphan-guaiFENesin (ROBITUSSIN DM)  mg/5 mL oral syrup 10 mL, 10 mL, Oral, Q6H PRN, Imtiaz Garcia, DO, 10 mL at 12/07/18 0546    diclofenac sodium (VOLTAREN) 1 % topical gel 2 g, 2 g, Topical, 4x Daily, Imtiaz Garcia, DO    [START ON 12/12/2018] ergocalciferol (VITAMIN D2) capsule 50,000 Units, 50,000 Units, Oral, Weekly, Imtiaz Banai, DO    gabapentin (NEURONTIN) capsule 300 mg, 300 mg, Oral, Daily, Imtiaz Banai, DO, 300 mg at 12/06/18 1328    guaiFENesin (MUCINEX) 12 hr tablet 600 mg, 600 mg, Oral, Q12H [address], Tessa Tapia MD, 600 mg at 12/06/18 2152    heparin (porcine) subcutaneous injection 5,000 Units, 5,000 Units, Subcutaneous, Q8H [address], 5,000 Units at 12/06/18 0530 **AND** Platelet count, , , Once, Imtiaz Garcia DO    metoprolol tartrate (LOPRESSOR) partial tablet 12 5 mg, 12 5 mg, Oral, Q12H [address], Imtiaz Garcia DO, 12 5 mg at 12/06/18 2153    nicotine (NICODERM CQ) 14 mg/24hr TD 24 hr patch 1 patch, 1 patch, Transdermal, Daily, Imtiaz Garcia DO, 1 patch at 12/06/18 1326    Invasive Devices:        Lab Results:     Results from last 7 days  Lab Units 12/07/18  0547 12/06/18  0406 12/05/18  2124   WBC Thousand/uL 3 91* 5 39 4 89   HEMOGLOBIN g/dL 10 3* 9 8* 9 8*   HEMATOCRIT % 33 2* 32 3* 32 0*   PLATELETS Thousands/uL 202 194  208 227   SODIUM mmol/L 135* 136 134*   POTASSIUM mmol/L 4 6 5 2 5 0   CHLORIDE mmol/L 97* 98* 96*   CO2 mmol/L 28 25 22   BUN mg/dL 63* 103* 98*   CREATININE mg/dL 13 30* 19 30* 19 00*   CALCIUM mg/dL 9 0 7 8* 8 8       Portions of the record may have been created with voice recognition software  Occasional wrong word or "sound a like" substitutions may have occurred due to the inherent limitations of voice recognition software  Read the chart carefully and recognize, using context, where substitutions have occurred  If you have any questions, please contact the dictating provider

## 2018-12-07 NOTE — UTILIZATION REVIEW
Continued Stay Review  OBSERVATION 12/05/2018 @ 2258, CONVERTED TO INPATIENT ADMISSION 12/06/2018 @ 9371, DUE TO FURTHER DIAGNOSTIC WORKUP, REQUIRING AT LEAST 2 MIDNIGHT STAY  12/06/18 2312  Inpatient Admission Once     Transfer Service: General Medicine       Question Answer Comment   Admitting Physician TAMMY SAHA    Level of Care Med Surg    Estimated length of stay More than 2 Midnights    Certification I certify that inpatient services are medically necessary for this patient for a duration of greater than two midnights  See H&P and MD Progress Notes for additional information about the patient's course of treatment  Date: 12/07/2018  Age/Sex: 66 y o  male     Assessment/Plan:   1  End-stage renal disease:  Patient has a history of noncompliance with dialysis and does not make it to his Monday and Wednesday appointments due to lack of transportation  Creatinine was 19 3 on admission and currently 13 3  Sodium 135, chloride 97  Other electrolytes within normal limits  Patient underwent 1 session of hemodialysis on 12/06 with no issues  · Patient due for another hemodialysis session today and will likely be discharged afterwards  · Continue cinacalcet, PhosLo, vitamin D2  · Renal diet     2  Cough:  Patient presented with cough productive of brown sputum and shortness of breath  Chest x-ray within normal limits  Procalcitonin normal   Likely viral in etiology      3  Foreign body in elementary trach:  CT of the abdomen and pelvis showed 2 linear metallic foreign bodies in the cecum which were evaluated by Gastroenterology noted to be hemoclips from a prior colonoscopy  Gastroenterology reported that they are not concerning will most likely pass on their own  He can continue to have routine surveillance colonoscopy  WBC normal   · Continue Robitussin and Tessalon Perles as needed     4   Rash:  Multiple hyperpigmented papules, macules and patches on chest, abdomen along with back the nonpainful and not associated with any bleeding or itching  · Outpatient dermatology workup     5  Hypertension  · Continue amlodipine and metoprolol tartrate     Disposition:  Patient for another hemodialysis session and will likely go home afterwards     Discharge Plan: TBD    Vital Signs: BP (!) 197/88   Pulse 75   Temp 98 7 °F (37 1 °C) (Oral)   Resp 16   Ht 5' 6" (1 676 m)   Wt 71 2 kg (157 lb)   SpO2 90%   BMI 25 34 kg/m²     Medications:   Scheduled Meds:   Current Facility-Administered Medications:  acetaminophen 650 mg Oral Q6H PRN Imtiaz Banai, DO   albuterol 2 puff Inhalation Q6H PRN Imtiaz Banai, DO   amLODIPine 10 mg Oral Daily Imtiaz Banai, DO   benzonatate 100 mg Oral TID PRN Britany Lundberg MD   benzonatate 100 mg Oral Q8H Imtiaz Banai, DO   calcium acetate 2,001 mg Oral TID With Meals Imtiaz Radha, DO   cinacalcet 30 mg Oral Daily With Breakfast Imtiaz Garcia, DO   dextromethorphan-guaiFENesin 10 mL Oral Q6H PRN Imtiaz Banai, DO   diclofenac sodium 2 g Topical 4x Daily Imtiaz Garcia, DO   [START ON 12/12/2018] ergocalciferol 50,000 Units Oral Weekly Imtiaz Banai, DO   gabapentin 300 mg Oral Daily Imtiaz Banai, DO   guaiFENesin 600 mg Oral Q12H [address] Britany Lundberg MD   heparin (porcine) 5,000 Units Subcutaneous [facility] Imtiaz Banai, DO   metoprolol tartrate 12 5 mg Oral Q12H [address] Imtiaz Garcia, DO   nicotine 1 patch Transdermal Daily Imtiaz Garcia, DO       Abnormal Labs/Diagnostic Results:

## 2018-12-07 NOTE — SOCIAL WORK
DC transport needed:     YSABEL Griggs advised that the patient has no ride home, no one available to pick him up and no funds  Per YSABEL Griggs, pt requesting [employer] application; CM provided copy of application and gave to patient  CM arranged Lyft ; pt assisted down to entrance B for   Pt denied any other dc needs

## 2018-12-07 NOTE — PHYSICIAN ADVISOR
Current patient class: Observation  The patient is currently on Hospital Day: 2 at [address] [place] [address]      The patient was admitted to the hospital at N/A on N/A for the following diagnosis:  Cough [R05]  Rash [R21]  Foreign body alimentary tract [T18  9XXA]  Hypoxia [R09 02]  ESRD on hemodialysis ([address] ) [N18 6, Z99 2]  Foreign body in colon, initial encounter [T18  4XXA]       There is documentation in the medical record of an expected length of stay of at least 2 midnights  The patient is therefore expected to satisfy the 2 midnight benchmark and given the 2 midnight presumption is appropriate for INPATIENT ADMISSION  Given this expectation of a satisfying stay, CMS instructs us that the patient is most often appropriate for inpatient admission under part A provided medical necessity is documented in the chart  After review of the relevant documentation, labs, vital signs and test results, the patient is appropriate for INPATIENT ADMISSION  Admission to the hospital as an inpatient is a complex decision making process which requires the practitioner to consider the patients presenting complaint, history and physical examination and all relevant testing  With this in mind, in this case, the patient was deemed appropriate for INPATIENT ADMISSION  After review of the documentation and testing available at the time of the admission I concur with this clinical determination of medical necessity  Rationale is as follows: The patient is a 66 yrs old Male who presented to the ED at 12/5/2018  8:11 PM with a chief complaint of Shortness of Breath (Patient reports, "I've had this cough for three weeks  My PCP prescribed me antibiotics yesterday I took one dose  Today I feel short of breath and my left leg keeps giving out on me  I haven't had dialysis in 5 days  ")     Given the need for further hospitalization, and along with the documentation of medical necessity present in the chart, the patient is appropriate for inpatient admission  The patient is expected to satisfy the 2 midnight benchmark, and will require further acute medical care  The patient does have comorbid conditions which increases the risk for significant adverse outcome  Given this the patient is appropriate for inpatient admission  The patients vitals on arrival were ED Triage Vitals [12/05/18 1942]   Temperature Pulse Respirations Blood Pressure SpO2   97 8 °F (36 6 °C) 84 22 (!) 172/82 94 %      Temp Source Heart Rate Source Patient Position - Orthostatic VS BP Location FiO2 (%)   Oral Monitor Sitting Right arm --      Pain Score       8           Past Medical History:   Diagnosis Date    CHF (congestive heart failure) ([facility])     [facility] [address] [facility]     Hypertension     Limb alert care status     do not use left arm    Rectal bleeding     Renal disorder      Past Surgical History:   Procedure Laterality Date    AV FISTULA PLACEMENT      left arm    AV NODE ABLATION      CHOLECYSTECTOMY      COLONOSCOPY N/A 4/27/2018    Procedure: COLONOSCOPY;  Surgeon: Gale Mays DO;  Location: [facility]; Service: Gastroenterology    EGD AND COLONOSCOPY N/A 5/24/2018    Procedure: EGD AND COLONOSCOPY;  Surgeon: Shankar Bryson MD;  Location: [facility];   Service: Gastroenterology           Consults have been placed to:   IP CONSULT TO NEPHROLOGY  IP CONSULT TO CASE MANAGEMENT  IP CONSULT TO GASTROENTEROLOGY    Vitals:    12/06/18 1320 12/06/18 1325 12/06/18 1540 12/06/18 2148   BP: 139/64 139/64 139/63 170/81   BP Location: Right arm  Right arm Right arm   Pulse: 79 83 75 86   Resp: 18 18 18   Temp:   98 1 °F (36 7 °C)    TempSrc:       SpO2: 95%      Weight:       Height:           Most recent labs:    Recent Labs      12/06/18   0406   WBC  5 39   HGB  9 8*   HCT  32 3*   PLT  194  208   K  5 2   CALCIUM  7 8*   BUN  103*   CREATININE  19 30*       Scheduled Meds:  Current Facility-Administered Medications:  acetaminophen 650 mg Oral Q6H PRN Imtiaz Garcia, DO   albuterol 2 puff Inhalation Q6H PRN Imtiaz Garcia, DO   amLODIPine 10 mg Oral Daily Imtiaz Garcia, DO   benzonatate 100 mg Oral TID PRN Alfreda Sorenson MD   benzonatate 100 mg Oral Q8H Imtiaz Garcia, DO   calcium acetate 2,001 mg Oral TID With Meals Imtiaz Garcia, DO   cinacalcet 30 mg Oral Daily With Breakfast Imtiaz Garcia, DO   diclofenac sodium 2 g Topical 4x Daily Imtiaz Garcia,    [START ON 12/12/2018] ergocalciferol 50,000 Units Oral Weekly Imtiaz Garcia, DO   gabapentin 300 mg Oral Daily Imtiaz Garcia, DO   guaiFENesin 600 mg Oral Q12H [address] Alfreda Sorenson MD   heparin (porcine) 5,000 Units Subcutaneous [facility] Imtiaz Garcia, DO   metoprolol tartrate 12 5 mg Oral Q12H [address] Imtiaz Garcia, DO   nicotine 1 patch Transdermal Daily Imtiaz Garcia,      Continuous Infusions:   PRN Meds:   acetaminophen    albuterol    benzonatate    Surgical procedures (if appropriate):

## 2018-12-07 NOTE — DISCHARGE SUMMARY
[facility] CENTRAL Discharge Summary - Ethan Singh 66 y o  male MRN: [id number]    [facility]  Room / Bed: DISCHARGE POOL/DISCHARGE* Encounter: [id number]    BRIEF OVERVIEW    Admitting Provider: Fausto Stewart MD  Discharge Provider: No att  providers found  Primary Care Physician at Discharge: Fausto Stewart MD    Discharge To: Home    Admission Date: 12/5/2018     Discharge Date: 12/7/2018  3:55 PM    Primary Discharge Diagnosis  Principal Problem:    Acute respiratory failure with hypoxia (HCC)  Active Problems:    ESRD (end stage renal disease) on dialysis ([facility] )    Secondary hyperparathyroidism of renal origin ([facility] )    Anemia in chronic kidney disease, on chronic dialysis ([facility] )    Nicotine abuse    Anemia    Cough    Weakness    Foreign body alimentary tract  Resolved Problems:    * No resolved hospital problems   *    Consulting Providers   Nephrology       Discharge Disposition: Home/Self Care  Discharged With Lines: no      Outpatient Follow-Up  Follow up with Dr Gwen Basilio in 1-2 weeks    Code Status: Prior  Advance Directive and Living Will: <no information>  Power of :    POLST:      Medications   Current Discharge Medication List      CONTINUE these medications which have NOT CHANGED    Details   albuterol (PROVENTIL HFA,VENTOLIN HFA) 90 mcg/act inhaler Inhale 2 puffs every 6 (six) hours as needed for wheezing  Qty: 1 Inhaler, Refills: 0    Associated Diagnoses: ESRD (end stage renal disease) on dialysis ([facility] )      amLODIPine (NORVASC) 10 mg tablet Take 10 mg by mouth daily      !! benzonatate (TESSALON PERLES) 100 mg capsule Take 1 capsule (100 mg total) by mouth every 8 (eight) hours  Qty: 21 capsule, Refills: 0    Associated Diagnoses: Cough      calcium acetate (PHOSLO) 667 mg capsule Take 2,001 mg by mouth 3 (three) times a day with meals      cinacalcet (SENSIPAR) 30 mg tablet 30 mg      gabapentin (NEURONTIN) 100 mg capsule Take 300 mg by mouth daily        hydrocortisone (ANUSOL-HC, PROCTOSOL HC,) 2 5 % rectal cream Insert into the rectum 4 (four) times a day as needed for hemorrhoids  Qty: 30 g, Refills: 0    Associated Diagnoses: GI bleed      metoprolol tartrate (LOPRESSOR) 25 mg tablet Take 0 5 tablets (12 5 mg total) by mouth every 12 (twelve) hours  Qty: 30 tablet, Refills: 0    Associated Diagnoses: GI bleed      nicotine (NICODERM CQ) 14 mg/24hr TD 24 hr patch Place 1 patch on the skin daily  Qty: 28 patch, Refills: 0    Associated Diagnoses: ESRD (end stage renal disease) on dialysis ([facility])      pantoprazole (PROTONIX) 40 mg tablet Take 1 tablet (40 mg total) by mouth daily in the early morning  Qty: 30 tablet, Refills: 0    Associated Diagnoses: GI bleed       !! - Potential duplicate medications found  Please discuss with provider  Current Discharge Medication List      START taking these medications    Details   dextromethorphan-guaiFENesin (ROBITUSSIN DM)  mg/5 mL syrup Take 10 mL by mouth every 6 (six) hours as needed for cough or congestion  Qty: 118 mL, Refills: 0    Associated Diagnoses: Cough               Allergies  No Known Allergies  Discharge Diet: renal diet  Activity restrictions: none    [facility] Course    HPI per Dr Lexx Chacon is a 66 y o  male PMHx of ESRD on dialysis, PAF s/p ablation 4/2018, not on AC due to prior bleed, HTN, diastolic CHF grade 1, presents for shortness of breath and cough, starting 3 weeks ago and progressively worsening  He states that the cough is productive , producing thick brown sputum  He reports that using an albuterol inhaler provided some relief  He does also mention abdominal pain, in the RLQ, that has been chronic, likely related to a foreign body noted in his cecum on CT imaging       He has a known history of non-compliance with hemodialysis, scheduled on [facility]  He reports that due to transportation issues, he could not get to his dialysis on Monday and Wednesday   He admits to smoking 1/2ppd  He also complains of a violaceous rash on his abdomen, chest wall, and back that has been present for the past few months and is seeming to get worse       Upon presentation to the ED he was noted to be hypoxic with oxygen saturation of 85%  He was placed on 3L oxygen NC and improved above 95% oxygen sat  During his hospital course, he received 2 dialysis sessions and his cough had minimal improvement with Tessalon Perles and Robitussin  Following his medical management, he was discharged to home with [facility] and follow up with his PCP  Presenting Problem/History of Present Illness  Principal Problem:    Acute respiratory failure with hypoxia (HCC)  Active Problems:    ESRD (end stage renal disease) on dialysis ([facility] )    Secondary hyperparathyroidism of renal origin (HCC)    Anemia in chronic kidney disease, on chronic dialysis (HCC)    Nicotine abuse    Anemia    Cough    Weakness    Foreign body alimentary tract  Resolved Problems:    * No resolved hospital problems  *    1  End-stage renal disease:  Patient has a history of noncompliance with dialysis and does not make it to his Monday and Wednesday appointments due to lack of transportation  Creatinine was 19 3 on admission and currently 13 3  Sodium 135, chloride 97  Other electrolytes within normal limits  Patient underwent 2 session of hemodialysis on 12/06 and 12/07 with no issues  · Continue cinacalcet, PhosLo, vitamin D2  · Renal diet     2  Cough:  Patient presented with cough productive of brown sputum and shortness of breath  Chest x-ray within normal limits  Procalcitonin normal   Likely viral in etiology      3  Foreign body in elementary trach:  CT of the abdomen and pelvis showed 2 linear metallic foreign bodies in the cecum which were evaluated by Gastroenterology noted to be hemoclips from a prior colonoscopy    Gastroenterology reported that they are not concerning will most likely pass on their own  He can continue to have routine surveillance colonoscopy  WBC normal   · Continue Robitussin and Tessalon Perles as needed     4  Rash:  Multiple hyperpigmented papules, macules and patches on chest, abdomen along with back the nonpainful and not associated with any bleeding or itching  · Outpatient dermatology workup     5  Hypertension  · Continue amlodipine and metoprolol tartrate    Discharge Condition: fair    Discharge  Statement   I spent 30 minutes minutes discharging the patient  This time was spent on the day of discharge  I had direct contact with the patient on the day of discharge  Additional documentation is required if more than 30 minutes were spent on discharge       Yoni Atkins MD  PGY-1 Internal Medicine

## 2018-12-07 NOTE — PLAN OF CARE
Problem: Potential for Falls  Goal: Patient will remain free of falls  INTERVENTIONS:  - Assess patient frequently for physical needs  -  Identify cognitive and physical deficits and behaviors that affect risk of falls  -  [place] fall precautions as indicated by assessment   - Educate patient/family on patient safety including physical limitations  - Instruct patient to call for assistance with activity based on assessment  - Modify environment to reduce risk of injury  - Consider OT/PT consult to assist with strengthening/mobility   Outcome: Progressing      Problem: RESPIRATORY - ADULT  Goal: Achieves optimal ventilation and oxygenation  INTERVENTIONS:  - Assess for changes in respiratory status  - Assess for changes in mentation and behavior  - Position to facilitate oxygenation and minimize respiratory effort  - Oxygen administration by appropriate delivery method based on oxygen saturation (per order) or ABGs  - Initiate smoking cessation education as indicated  - Encourage broncho-pulmonary hygiene including cough, deep breathe, Incentive Spirometry  - Assess the need for suctioning and aspirate as needed  - Assess and instruct to report SOB or any respiratory difficulty  - Respiratory Therapy support as indicated   Outcome: Progressing      Problem: Nutrition/Hydration-ADULT  Goal: Nutrient/Hydration intake appropriate for improving, restoring or maintaining nutritional needs  Monitor and assess patient's nutrition/hydration status for malnutrition (ex- brittle hair, bruises, dry skin, pale skin and conjunctiva, muscle wasting, smooth red tongue, and disorientation)  Collaborate with interdisciplinary team and initiate plan and interventions as ordered  Monitor patient's weight and dietary intake as ordered or per policy  Utilize nutrition screening tool and intervene per policy  Determine patient's food preferences and provide high-protein, high-caloric foods as appropriate       INTERVENTIONS:  - Monitor oral intake, urinary output, labs, and treatment plans  - Assess nutrition and hydration status and recommend course of action  - Evaluate amount of meals eaten  - Assist patient with eating if necessary   - Allow adequate time for meals  - Recommend/ encourage appropriate diets, oral nutritional supplements, and vitamin/mineral supplements  - Order, calculate, and assess calorie counts as needed  - Recommend, monitor, and adjust tube feedings and TPN/PPN based on assessed needs  - Assess need for intravenous fluids  - Provide specific nutrition/hydration education as appropriate  - Include patient/family/caregiver in decisions related to nutrition   Outcome: Progressing      Problem: PAIN - ADULT  Goal: Verbalizes/displays adequate comfort level or baseline comfort level  Interventions:  - Encourage patient to monitor pain and request assistance  - Assess pain using appropriate pain scale  - Administer analgesics based on type and severity of pain and evaluate response  - Implement non-pharmacological measures as appropriate and evaluate response  - Consider cultural and social influences on pain and pain management  - Notify physician/advanced practitioner if interventions unsuccessful or patient reports new pain  Outcome: Progressing      Problem: INFECTION - ADULT  Goal: Absence or prevention of progression during hospitalization  INTERVENTIONS:  - Assess and monitor for signs and symptoms of infection  - Monitor lab/diagnostic results  - Monitor all insertion sites, i e  indwelling lines, tubes, and drains  - Monitor endotracheal (as able) and nasal secretions for changes in amount and color  - [place] appropriate cooling/warming therapies per order  - Administer medications as ordered  - Instruct and encourage patient and family to use good hand hygiene technique  - Identify and instruct in appropriate isolation precautions for identified infection/condition  Outcome: Progressing      Problem: SAFETY ADULT  Goal: Maintain or return to baseline ADL function  INTERVENTIONS:  -  Assess patient's ability to carry out ADLs; assess patient's baseline for ADL function and identify physical deficits which impact ability to perform ADLs (bathing, care of mouth/teeth, toileting, grooming, dressing, etc )  - Assess/evaluate cause of self-care deficits   - Assess range of motion  - Assess patient's mobility; develop plan if impaired  - Assess patient's need for assistive devices and provide as appropriate  - Encourage maximum independence but intervene and supervise when necessary  ¯ Involve family in performance of ADLs  ¯ Assess for home care needs following discharge   ¯ Request OT consult to assist with ADL evaluation and planning for discharge  ¯ Provide patient education as appropriate  Outcome: Progressing    Goal: Maintain or return mobility status to optimal level  INTERVENTIONS:  - Assess patient's baseline mobility status (ambulation, transfers, stairs, etc )    - Identify cognitive and physical deficits and behaviors that affect mobility  - Identify mobility aids required to assist with transfers and/or ambulation (gait belt, sit-to-stand, lift, walker, cane, etc )  - [place] fall precautions as indicated by assessment  - Record patient progress and toleration of activity level on Mobility SBAR; progress patient to next Phase/Stage  - Instruct patient to call for assistance with activity based on assessment  - Request Rehabilitation consult to assist with strengthening/weightbearing, etc   Outcome: Progressing      Problem: DISCHARGE PLANNING  Goal: Discharge to home or other facility with appropriate resources  INTERVENTIONS:  - Identify barriers to discharge w/patient and caregiver  - Arrange for needed discharge resources and transportation as appropriate  - Identify discharge learning needs (meds, wound care, etc )  - Arrange for interpretive services to assist at discharge as needed  - Refer to Case Management Department for coordinating discharge planning if the patient needs post-hospital services based on physician/advanced practitioner order or complex needs related to functional status, cognitive ability, or social support system  Outcome: Progressing      Problem: Knowledge Deficit  Goal: Patient/family/caregiver demonstrates understanding of disease process, treatment plan, medications, and discharge instructions  Complete learning assessment and assess knowledge base    Interventions:  - Provide teaching at level of understanding  - Provide teaching via preferred learning methods  Outcome: Progressing

## 2018-12-07 NOTE — PROGRESS NOTES
Patient with cough that is unrelieved by tessalon pearls  Patient and his roommate arguing over patient's coughing  PRN Robitussin ordered  Patient coughing again and unable to sleep  Patient states he wants to "call his sister to come pick him up to take him to another hospital because he needs peace " Attempted to find an open bed to move patient to but no beds available  SOD resident notified  Resident at bedside to speak with patient  Melatonin ordered for sleep  Patient satisfied at this time  Will continue to monitor

## 2018-12-07 NOTE — PROGRESS NOTES
IM Residency Progress Note   Unit/Bed#: CW2 210-02 Encounter: [id number]  SOD Team A      Clarissa De Leon 66 y o  male [address] Stay Days: 1      Assessment/Plan:    Principal Problem:    Acute respiratory failure with hypoxia ([facility] )  Active Problems:    ESRD (end stage renal disease) on dialysis [facility])    Secondary hyperparathyroidism of renal origin ([facility] )    Anemia in chronic kidney disease, on chronic dialysis ([facility])    Nicotine abuse    Anemia    Cough    Weakness    Foreign body alimentary tract    1  End-stage renal disease:  Patient has a history of noncompliance with dialysis and does not make it to his Monday and Wednesday appointments due to lack of transportation  Creatinine was 19 3 on admission and currently 13 3  Sodium 135, chloride 97  Other electrolytes within normal limits  Patient underwent 1 session of hemodialysis on 12/06 with no issues  · Patient due for another hemodialysis session today and will likely be discharged afterwards  · Continue cinacalcet, PhosLo, vitamin D2  · Renal diet    2  Cough:  Patient presented with cough productive of brown sputum and shortness of breath  Chest x-ray within normal limits  Procalcitonin normal   Likely viral in etiology  3  Foreign body in elementary trach:  CT of the abdomen and pelvis showed 2 linear metallic foreign bodies in the cecum which were evaluated by Gastroenterology noted to be hemoclips from a prior colonoscopy  Gastroenterology reported that they are not concerning will most likely pass on their own  He can continue to have routine surveillance colonoscopy  WBC normal   · Continue Robitussin and Tessalon Perles as needed    4  Rash:  Multiple hyperpigmented papules, macules and patches on chest, abdomen along with back the nonpainful and not associated with any bleeding or itching  · Outpatient dermatology workup    5   Hypertension  · Continue amlodipine and metoprolol tartrate    Disposition:  Patient for another hemodialysis session and will likely go home afterwards  Subjective:   Patient seen and examined this morning  Reported extensive coughing during the night which resulted in argument with his roommate and required mediation by night resident  Currently continues to report cough with brown sputum and generalized malaise  He also reports abdominal pain associated with the right lower quadrant where clips are  Denies fevers, chills, sweats, CP, SOB, palpitations, wheezing, dysuria, bowel complaints  Vitals: Temp (24hrs), Av °F (36 7 °C), Min:97 °F (36 1 °C), Max:98 7 °F (37 1 °C)  Current: Temperature: 98 7 °F (37 1 °C)  Vitals:    18 2148 18 2300 18 0715 18 0802   BP: 170/81 153/76 (!) 172/74 151/70   BP Location: Right arm Right arm Right arm Right arm   Pulse: 86 84 78 71   Resp: 18 18 16    Temp:  98 2 °F (36 8 °C) 98 7 °F (37 1 °C)    TempSrc:  Oral Oral    SpO2:  96% 90%    Weight:       Height:        Body mass index is 25 34 kg/m²  I/O last 24 hours: In: 36 [P O :120;  I V :700]  Out: 3500 [Other:3500]      Physical Exam:     General Appearance:    Alert, cooperative, no distress, appears stated age  Lungs:     Clear to auscultation bilaterally, respirations unlabored  Heart:    Regular rate and rhythm, S1 and S2 normal, no murmur, rub   or gallop  Abdomen:     Soft, non-tender, bowel sounds active all four quadrants,     no masses, no organomegaly  Extremities:   Extremities normal, atraumatic, no cyanosis or edema  Pulses:   2+ and symmetric all extremities  Skin:   Maculopapular rash on chest, abdomen, back  Neurologic:   AAO x 3      Invasive Devices     Peripheral Intravenous Line            Peripheral IV 18 Right Arm less than 1 day          Line            Hemodialysis AV Fistula Left -- days                          Labs:   Recent Results (from the past 24 hour(s))   Basic metabolic panel    Collection Time: 18  5:47 AM   Result Value Ref Range Sodium 135 (L) 136 - 145 mmol/L    Potassium 4 6 3 5 - 5 3 mmol/L    Chloride 97 (L) 100 - 108 mmol/L    CO2 28 21 - 32 mmol/L    ANION GAP 10 4 - 13 mmol/L    BUN 63 (H) 5 - 25 mg/dL    Creatinine 13 30 (H) 0 60 - 1 30 mg/dL    Glucose 68 65 - 140 mg/dL    Calcium 9 0 8 3 - 10 1 mg/dL    eGFR 4 ml/min/1 73sq m   CBC and Platelet    Collection Time: 12/07/18  5:47 AM   Result Value Ref Range    WBC 3 91 (L) 4 31 - 10 16 Thousand/uL    RBC 3 73 (L) 3 88 - 5 62 Million/uL    Hemoglobin 10 3 (L) 12 0 - 17 0 g/dL    Hematocrit 33 2 (L) 36 5 - 49 3 %    MCV 89 82 - 98 fL    MCH 27 6 26 8 - 34 3 pg    MCHC 31 0 (L) 31 4 - 37 4 g/dL    RDW 19 9 (H) 11 6 - 15 1 %    Platelets 391 026 - 655 Thousands/uL    MPV 11 2 8 9 - 12 7 fL       Radiology Results: I have personally reviewed pertinent reports  Other Diagnostic Testing:   I have personally reviewed pertinent reports          Active Meds:   Current Facility-Administered Medications   Medication Dose Route Frequency    acetaminophen (TYLENOL) tablet 650 mg  650 mg Oral Q6H PRN    albuterol (PROVENTIL HFA,VENTOLIN HFA) inhaler 2 puff  2 puff Inhalation Q6H PRN    amLODIPine (NORVASC) tablet 10 mg  10 mg Oral Daily    benzonatate (TESSALON PERLES) capsule 100 mg  100 mg Oral TID PRN    benzonatate (TESSALON PERLES) capsule 100 mg  100 mg Oral Q8H    calcium acetate (PHOSLO) capsule 2,001 mg  2,001 mg Oral TID With Meals    cinacalcet (SENSIPAR) tablet 30 mg  30 mg Oral Daily With Breakfast    dextromethorphan-guaiFENesin (ROBITUSSIN DM)  mg/5 mL oral syrup 10 mL  10 mL Oral Q6H PRN    diclofenac sodium (VOLTAREN) 1 % topical gel 2 g  2 g Topical 4x Daily    [START ON 12/12/2018] ergocalciferol (VITAMIN D2) capsule 50,000 Units  50,000 Units Oral Weekly    gabapentin (NEURONTIN) capsule 300 mg  300 mg Oral Daily    guaiFENesin (MUCINEX) 12 hr tablet 600 mg  600 mg Oral Q12H [facility]    heparin (porcine) subcutaneous injection 5,000 Units  5,000 Units Subcutaneous Q8H [address]    metoprolol tartrate (LOPRESSOR) partial tablet 12 5 mg  12 5 mg Oral Q12H [address]    nicotine (NICODERM CQ) 14 mg/24hr TD 24 hr patch 1 patch  1 patch Transdermal Daily         VTE Pharmacologic Prophylaxis: Heparin  VTE Mechanical Prophylaxis: sequential compression device    Sammi Ying MD

## 2018-12-13 ENCOUNTER — TRANSCRIBE ORDERS (OUTPATIENT)
Dept: ADMINISTRATIVE | Age: 58
End: 2018-12-13

## 2018-12-13 ENCOUNTER — APPOINTMENT (OUTPATIENT)
Dept: URGENT CARE | Age: 58
End: 2018-12-13
Payer: OTHER MISCELLANEOUS

## 2018-12-13 ENCOUNTER — APPOINTMENT (OUTPATIENT)
Dept: RADIOLOGY | Age: 58
End: 2018-12-13
Payer: OTHER MISCELLANEOUS

## 2018-12-13 DIAGNOSIS — T14.90XA INJURY: ICD-10-CM

## 2018-12-13 DIAGNOSIS — T14.90XA INJURY: Primary | ICD-10-CM

## 2018-12-13 PROCEDURE — G0383 LEV 4 HOSP TYPE B ED VISIT: HCPCS | Performed by: PREVENTIVE MEDICINE

## 2018-12-13 PROCEDURE — 99284 EMERGENCY DEPT VISIT MOD MDM: CPT | Performed by: PREVENTIVE MEDICINE

## 2018-12-13 PROCEDURE — 73502 X-RAY EXAM HIP UNI 2-3 VIEWS: CPT

## 2018-12-14 ENCOUNTER — APPOINTMENT (EMERGENCY)
Dept: RADIOLOGY | Facility: HOSPITAL | Age: 58
End: 2018-12-14
Payer: MEDICARE

## 2018-12-14 ENCOUNTER — HOSPITAL ENCOUNTER (OUTPATIENT)
Facility: HOSPITAL | Age: 58
Setting detail: OBSERVATION
Discharge: HOME | End: 2018-12-15
Attending: EMERGENCY MEDICINE | Admitting: INTERNAL MEDICINE
Payer: MEDICARE

## 2018-12-14 ENCOUNTER — APPOINTMENT (OUTPATIENT)
Dept: DIALYSIS | Facility: HOSPITAL | Age: 58
End: 2018-12-14
Payer: MEDICARE

## 2018-12-14 DIAGNOSIS — R06.02 SOB (SHORTNESS OF BREATH): Primary | ICD-10-CM

## 2018-12-14 DIAGNOSIS — R09.89 PULMONARY VASCULAR CONGESTION: ICD-10-CM

## 2018-12-14 DIAGNOSIS — R77.8 ELEVATED TROPONIN: ICD-10-CM

## 2018-12-14 DIAGNOSIS — E83.39 HYPERPHOSPHATEMIA: ICD-10-CM

## 2018-12-14 DIAGNOSIS — N18.6 ESRD (END STAGE RENAL DISEASE) (HCC): ICD-10-CM

## 2018-12-14 DIAGNOSIS — E87.5 HYPERKALEMIA: ICD-10-CM

## 2018-12-14 PROBLEM — D63.1 ANEMIA OF RENAL DISEASE: Status: RESOLVED | Noted: 2018-04-17 | Resolved: 2018-12-14

## 2018-12-14 PROBLEM — J96.01 ACUTE RESPIRATORY FAILURE WITH HYPOXIA (HCC): Status: RESOLVED | Noted: 2018-12-06 | Resolved: 2018-12-14

## 2018-12-14 PROBLEM — T18.9XXA FOREIGN BODY ALIMENTARY TRACT: Chronic | Status: RESOLVED | Noted: 2018-12-06 | Resolved: 2018-12-14

## 2018-12-14 PROBLEM — Z91.19 NON-COMPLIANCE: Chronic | Status: ACTIVE | Noted: 2018-12-14

## 2018-12-14 PROBLEM — J20.8 ACUTE BRONCHITIS DUE TO OTHER SPECIFIED ORGANISMS: Status: RESOLVED | Noted: 2018-04-17 | Resolved: 2018-12-14

## 2018-12-14 PROBLEM — Z99.2 ESRD (END STAGE RENAL DISEASE) ON DIALYSIS (HCC): Status: RESOLVED | Noted: 2018-03-11 | Resolved: 2018-12-14

## 2018-12-14 PROBLEM — E16.2 HYPOGLYCEMIA: Status: ACTIVE | Noted: 2018-12-14

## 2018-12-14 PROBLEM — R53.1 WEAKNESS: Status: RESOLVED | Noted: 2018-12-06 | Resolved: 2018-12-14

## 2018-12-14 PROBLEM — N18.9 ANEMIA OF RENAL DISEASE: Status: RESOLVED | Noted: 2018-04-17 | Resolved: 2018-12-14

## 2018-12-14 PROBLEM — R05.9 COUGH: Status: RESOLVED | Noted: 2018-12-06 | Resolved: 2018-12-14

## 2018-12-14 PROBLEM — K92.2 LOWER GI BLEED: Status: RESOLVED | Noted: 2018-04-25 | Resolved: 2018-12-14

## 2018-12-14 PROBLEM — I48.0 PAROXYSMAL A-FIB (HCC): Chronic | Status: ACTIVE | Noted: 2018-12-14

## 2018-12-14 PROBLEM — I10 ACCELERATED HYPERTENSION: Status: RESOLVED | Noted: 2018-04-25 | Resolved: 2018-12-14

## 2018-12-14 PROBLEM — E87.2 HIGH ANION GAP METABOLIC ACIDOSIS: Status: ACTIVE | Noted: 2018-12-14

## 2018-12-14 PROBLEM — I50.30 DIASTOLIC HEART FAILURE (HCC): Chronic | Status: ACTIVE | Noted: 2018-12-14

## 2018-12-14 LAB
ANION GAP SERPL CALCULATED.3IONS-SCNC: 15 MMOL/L (ref 4–13)
BASOPHILS # BLD AUTO: 0.05 THOUSANDS/ΜL (ref 0–0.1)
BASOPHILS NFR BLD AUTO: 1 % (ref 0–1)
BUN SERPL-MCNC: 99 MG/DL (ref 5–25)
CALCIUM SERPL-MCNC: 7.9 MG/DL (ref 8.3–10.1)
CHLORIDE SERPL-SCNC: 98 MMOL/L (ref 100–108)
CO2 SERPL-SCNC: 22 MMOL/L (ref 21–32)
CREAT SERPL-MCNC: 15.6 MG/DL (ref 0.6–1.3)
EOSINOPHIL # BLD AUTO: 0.36 THOUSAND/ΜL (ref 0–0.61)
EOSINOPHIL NFR BLD AUTO: 6 % (ref 0–6)
ERYTHROCYTE [DISTWIDTH] IN BLOOD BY AUTOMATED COUNT: 18.8 % (ref 11.6–15.1)
GFR SERPL CREATININE-BSD FRML MDRD: 3 ML/MIN/1.73SQ M
GLUCOSE SERPL-MCNC: 105 MG/DL (ref 65–140)
GLUCOSE SERPL-MCNC: 108 MG/DL (ref 65–140)
GLUCOSE SERPL-MCNC: 131 MG/DL (ref 65–140)
GLUCOSE SERPL-MCNC: 157 MG/DL (ref 65–140)
GLUCOSE SERPL-MCNC: 35 MG/DL (ref 65–140)
HCT VFR BLD AUTO: 28.5 % (ref 36.5–49.3)
HGB BLD-MCNC: 8.9 G/DL (ref 12–17)
IMM GRANULOCYTES # BLD AUTO: 0.03 THOUSAND/UL (ref 0–0.2)
IMM GRANULOCYTES NFR BLD AUTO: 1 % (ref 0–2)
LYMPHOCYTES # BLD AUTO: 0.96 THOUSANDS/ΜL (ref 0.6–4.47)
LYMPHOCYTES NFR BLD AUTO: 15 % (ref 14–44)
MAGNESIUM SERPL-MCNC: 2.6 MG/DL (ref 1.6–2.6)
MCH RBC QN AUTO: 27.3 PG (ref 26.8–34.3)
MCHC RBC AUTO-ENTMCNC: 31.2 G/DL (ref 31.4–37.4)
MCV RBC AUTO: 87 FL (ref 82–98)
MONOCYTES # BLD AUTO: 0.61 THOUSAND/ΜL (ref 0.17–1.22)
MONOCYTES NFR BLD AUTO: 10 % (ref 4–12)
NEUTROPHILS # BLD AUTO: 4.4 THOUSANDS/ΜL (ref 1.85–7.62)
NEUTS SEG NFR BLD AUTO: 67 % (ref 43–75)
NRBC BLD AUTO-RTO: 0 /100 WBCS
NT-PROBNP SERPL-MCNC: ABNORMAL PG/ML
PHOSPHATE SERPL-MCNC: 9.5 MG/DL (ref 2.7–4.5)
PLATELET # BLD AUTO: 313 THOUSANDS/UL (ref 149–390)
PLATELET # BLD AUTO: 356 THOUSANDS/UL (ref 149–390)
PMV BLD AUTO: 10.2 FL (ref 8.9–12.7)
PMV BLD AUTO: 9.8 FL (ref 8.9–12.7)
POTASSIUM SERPL-SCNC: 6.2 MMOL/L (ref 3.5–5.3)
RBC # BLD AUTO: 3.26 MILLION/UL (ref 3.88–5.62)
SODIUM SERPL-SCNC: 135 MMOL/L (ref 136–145)
TROPONIN I SERPL-MCNC: 0.06 NG/ML
TROPONIN I SERPL-MCNC: 0.07 NG/ML
WBC # BLD AUTO: 6.41 THOUSAND/UL (ref 4.31–10.16)

## 2018-12-14 PROCEDURE — 90935 HEMODIALYSIS ONE EVALUATION: CPT | Performed by: INTERNAL MEDICINE

## 2018-12-14 PROCEDURE — 85025 COMPLETE CBC W/AUTO DIFF WBC: CPT | Performed by: EMERGENCY MEDICINE

## 2018-12-14 PROCEDURE — 82948 REAGENT STRIP/BLOOD GLUCOSE: CPT

## 2018-12-14 PROCEDURE — 94760 N-INVAS EAR/PLS OXIMETRY 1: CPT

## 2018-12-14 PROCEDURE — 99214 OFFICE O/P EST MOD 30 MIN: CPT | Performed by: INTERNAL MEDICINE

## 2018-12-14 PROCEDURE — 80048 BASIC METABOLIC PNL TOTAL CA: CPT | Performed by: EMERGENCY MEDICINE

## 2018-12-14 PROCEDURE — 71046 X-RAY EXAM CHEST 2 VIEWS: CPT

## 2018-12-14 PROCEDURE — 84100 ASSAY OF PHOSPHORUS: CPT | Performed by: EMERGENCY MEDICINE

## 2018-12-14 PROCEDURE — 99285 EMERGENCY DEPT VISIT HI MDM: CPT

## 2018-12-14 PROCEDURE — 84484 ASSAY OF TROPONIN QUANT: CPT | Performed by: INTERNAL MEDICINE

## 2018-12-14 PROCEDURE — 93005 ELECTROCARDIOGRAM TRACING: CPT

## 2018-12-14 PROCEDURE — 96375 TX/PRO/DX INJ NEW DRUG ADDON: CPT

## 2018-12-14 PROCEDURE — 83880 ASSAY OF NATRIURETIC PEPTIDE: CPT | Performed by: EMERGENCY MEDICINE

## 2018-12-14 PROCEDURE — G0257 UNSCHED DIALYSIS ESRD PT HOS: HCPCS

## 2018-12-14 PROCEDURE — 36415 COLL VENOUS BLD VENIPUNCTURE: CPT | Performed by: EMERGENCY MEDICINE

## 2018-12-14 PROCEDURE — 84484 ASSAY OF TROPONIN QUANT: CPT | Performed by: EMERGENCY MEDICINE

## 2018-12-14 PROCEDURE — 94640 AIRWAY INHALATION TREATMENT: CPT

## 2018-12-14 PROCEDURE — 96374 THER/PROPH/DIAG INJ IV PUSH: CPT

## 2018-12-14 PROCEDURE — 83735 ASSAY OF MAGNESIUM: CPT | Performed by: EMERGENCY MEDICINE

## 2018-12-14 PROCEDURE — 85049 AUTOMATED PLATELET COUNT: CPT | Performed by: INTERNAL MEDICINE

## 2018-12-14 RX ORDER — NICOTINE 21 MG/24HR
1 PATCH, TRANSDERMAL 24 HOURS TRANSDERMAL DAILY
Status: DISCONTINUED | OUTPATIENT
Start: 2018-12-15 | End: 2018-12-15 | Stop reason: HOSPADM

## 2018-12-14 RX ORDER — HEPARIN SODIUM 5000 [USP'U]/ML
5000 INJECTION, SOLUTION INTRAVENOUS; SUBCUTANEOUS EVERY 8 HOURS SCHEDULED
Status: DISCONTINUED | OUTPATIENT
Start: 2018-12-14 | End: 2018-12-15 | Stop reason: HOSPADM

## 2018-12-14 RX ORDER — ACETAMINOPHEN 325 MG/1
650 TABLET ORAL EVERY 6 HOURS PRN
Status: DISCONTINUED | OUTPATIENT
Start: 2018-12-14 | End: 2018-12-15 | Stop reason: HOSPADM

## 2018-12-14 RX ORDER — AMLODIPINE BESYLATE 10 MG/1
10 TABLET ORAL DAILY
Status: DISCONTINUED | OUTPATIENT
Start: 2018-12-15 | End: 2018-12-15 | Stop reason: HOSPADM

## 2018-12-14 RX ORDER — DEXTROSE MONOHYDRATE 25 G/50ML
25 INJECTION, SOLUTION INTRAVENOUS ONCE
Status: COMPLETED | OUTPATIENT
Start: 2018-12-14 | End: 2018-12-14

## 2018-12-14 RX ORDER — DEXTROSE MONOHYDRATE 25 G/50ML
INJECTION, SOLUTION INTRAVENOUS
Status: DISPENSED
Start: 2018-12-14 | End: 2018-12-15

## 2018-12-14 RX ORDER — ALBUTEROL SULFATE 2.5 MG/3ML
10 SOLUTION RESPIRATORY (INHALATION) ONCE
Status: COMPLETED | OUTPATIENT
Start: 2018-12-14 | End: 2018-12-14

## 2018-12-14 RX ORDER — SEVELAMER HYDROCHLORIDE 800 MG/1
800 TABLET, FILM COATED ORAL
Status: DISCONTINUED | OUTPATIENT
Start: 2018-12-15 | End: 2018-12-15

## 2018-12-14 RX ADMIN — DEXTROSE 50 % IN WATER (D50W) INTRAVENOUS SYRINGE 25 ML: at 15:58

## 2018-12-14 RX ADMIN — DEXTROSE 50 % IN WATER (D50W) INTRAVENOUS SYRINGE 25 G: at 18:36

## 2018-12-14 RX ADMIN — METOPROLOL TARTRATE 12.5 MG: 25 TABLET ORAL at 22:30

## 2018-12-14 RX ADMIN — ALBUTEROL SULFATE 10 MG: 2.5 SOLUTION RESPIRATORY (INHALATION) at 15:58

## 2018-12-14 RX ADMIN — INSULIN HUMAN 10 UNITS: 100 INJECTION, SOLUTION PARENTERAL at 15:58

## 2018-12-14 RX ADMIN — CALCIUM GLUCONATE 1 G: 98 INJECTION, SOLUTION INTRAVENOUS at 16:06

## 2018-12-14 NOTE — PROGRESS NOTES
Dialysis note:    I saw and examined patient during hemodialysis treatment at 6:22 PM on 12/14/2018  The patient was receiving hemodialysis for treatment of end stage renal disease  I also reviewed vital signs, intake and output, lab results and recent events, and agree with dialysis order  Tolerating HD   BP acceptable  Time: 2 hours  Qb: 400  Sodium: 138  Dialyzer: F160  Qd: 1 5 times Qb  Potassium: as per protocol  Access: AVF  UF goal: 2L  Bicarbonate: 35 Calcium 2 5

## 2018-12-14 NOTE — H&P
INTERNAL MEDICINE HISTORY AND PHYSICAL  [facility]SEPH SOD Team A    NAME: Nikita Luther  AGE: 66 y o  SEX: male  : 1960   MRN: [id number]  ENCOUNTER: [id number]    DATE: 2018  TIME: 6:52 PM    Primary Care Physician: No primary care provider on file  Admitting Provider: Pablo Jimenez MD    Chief complaint:  SOB with lying flat     History of Present Illness     Nikita Luther is a 66 y o  male with a history of ESRD on M/W/F dialysis, at [facility] s/p ablation in 2018 not on [facility], HTN, diastolic CHF, who presents with SOB made worse with lying down  Breathing improved 20 symptoms  He went to his Monday dialysis session but missed the Wednesday session because his car was not working and he has no transportation  The car has not been working for a while and he is trying to save up money to buy a new car  This is been a constant issue and he has had prior presentations with the same history in symptoms  ED labs showed potassium of 6 2 with a anion gap metabolic acidosis  There were no EKG changes  Troponin was elevated to 0 07 and BNP was 125,135  He received regular insulin 10 units with 25mL D5 (likely intended to be 25g) to treat the hyperkalemia at 4:00 p m  He was getting hemodialysis when he started to get jittery  The HD nurse checked his sugar which was 35  He received 25 mg D5 and Rajinder crackers with symptomatic improvement  Within 30 min his BS was back up to the 130s  Vitals:    18 1705 18 1730 18 1800 18 1900   BP: 145/77 129/66 130/67 142/71   Pulse: 90 92 89 87   Resp:       Patient Position - Orthostatic VS:       Temp:           Review of Systems   Review of Systems   Respiratory: Positive for shortness of breath        Past Medical History     Past Medical History:   Diagnosis Date    [facility]congest[facility] failure[facility] [address] [facility]     Dialysis patient ([facility] [address] [facility]     Hypertension     Limb alert care status     do not use left arm    Rectal bleeding     Renal disorder      Past Surgical History     Past Surgical History:   Procedure Laterality Date    AV FISTULA PLACEMENT      left arm    AV NODE ABLATION      CHOLECYSTECTOMY      COLONOSCOPY N/A 4/27/2018    Procedure: COLONOSCOPY;  Surgeon: Jewel Carroll DO;  Location: [facility]; Service: Gastroenterology    EGD AND COLONOSCOPY N/A 5/24/2018    Procedure: EGD AND COLONOSCOPY;  Surgeon: Stanley Dietz MD;  Location: [facility]; Service: Gastroenterology       Social History     History   Alcohol Use No     History   Drug Use No     History   Smoking Status    Current Every Day Smoker    Packs/day: 1 00    Types: Cigarettes   Smokeless Tobacco    Never Used     Family History   History reviewed  No pertinent family history  Medications Prior to Admission     Prior to Admission medications    Medication Sig Start Date End Date Taking?  Authorizing Provider   benzonatate (TESSALON PERLES) 100 mg capsule Take 1 capsule (100 mg total) by mouth every 8 (eight) hours 11/28/18  Yes Disha Regan MD   benzonatate (TESSALON PERLES) 100 mg capsule Take 1 capsule (100 mg total) by mouth every 8 (eight) hours for 14 days 12/7/18 12/21/18 Yes Vega Casas MD   calcium acetate (PHOSLO) 667 mg capsule Take 2,001 mg by mouth 3 (three) times a day with meals   Yes Historical Provider, MD   cinacalcet (SENSIPAR) 30 mg tablet 30 mg 12/27/16  Yes Historical Provider, MD   dextromethorphan-guaiFENesin (ROBITUSSIN DM)  mg/5 mL syrup Take 10 mL by mouth every 6 (six) hours as needed for cough or congestion 12/7/18  Yes Vega Casas MD   gabapentin (NEURONTIN) 100 mg capsule Take 300 mg by mouth daily     Yes Historical Provider, MD   metoprolol tartrate (LOPRESSOR) 25 mg tablet Take 0 5 tablets (12 5 mg total) by mouth every 12 (twelve) hours 4/30/18  Yes Mariaa Shabazz MD   amLODIPine (NORVASC) 10 mg tablet Take 10 mg by mouth daily    Historical Provider, MD   albuterol (PROVENTIL HFA,VENTOLIN HFA) 90 mcg/act inhaler Inhale 2 puffs every 6 (six) hours as needed for wheezing 4/21/18 12/14/18  Radha Durbin MD   hydrocortisone (ANUSOL-HC, PROCTOSOL [facility] ,) 2 5 % rectal cream Insert into the rectum 4 (four) times a day as needed for hemorrhoids 4/30/18 12/14/18  Yudith Almodovar MD   nicotine (NICODERM CQ) 14 mg/24hr TD 24 hr patch Place 1 patch on the skin daily 4/21/18 12/14/18  Radha Durbin MD   pantoprazole (PROTONIX) 40 mg tablet Take 1 tablet (40 mg total) by mouth daily in the early morning 5/1/18 12/14/18  Yudith Almodovar MD       Allergies   No Known Allergies    Objective     Vitals:    12/14/18 1700 12/14/18 1705 12/14/18 1730 12/14/18 1800   BP: 163/85 145/77 129/66 130/67   BP Location:       Pulse: 90 90 92 89   Resp:       Temp:       SpO2:       Weight:         Body mass index is 25 5 kg/m²  Intake/Output Summary (Last 24 hours) at 12/14/18 1203  Last data filed at 12/14/18 1700   Gross per 24 hour   Intake              200 ml   Output                0 ml   Net              200 ml     Invasive Devices     Peripheral Intravenous Line            Peripheral IV 12/14/18 Right Antecubital less than 1 day          Line            Hemodialysis AV Fistula Left -- days              Physical Exam   Constitutional: He is oriented to person, place, and time  He appears well-developed and well-nourished  No distress  HENT:   Head: Normocephalic and atraumatic  Eyes: Right conjunctiva is injected  Left conjunctiva is injected  Neck: Trachea normal and full passive range of motion without pain  Cardiovascular: Normal rate, regular rhythm, S1 normal, S2 normal, normal heart sounds and normal pulses  Pulmonary/Chest: Effort normal  He has rales in the right lower field and the left lower field  Abdominal: Soft  Normal appearance  Bowel sounds are decreased  There is no tenderness  Neurological: He is alert and oriented to person, place, and time  He has normal strength  Skin: Skin is warm, dry and intact   He is not diaphoretic  Psychiatric: He has a normal mood and affect  His speech is normal and behavior is normal      Lab Results: I have personally reviewed pertinent reports  CBC:   Results from last 7 days  Lab Units 12/14/18  1436   WBC Thousand/uL 6 41   RBC Million/uL 3 26*   HEMOGLOBIN g/dL 8 9*   HEMATOCRIT % 28 5*   MCV fL 87   MCH pg 27 3   MCHC g/dL 31 2*   RDW % 18 8*   MPV fL 10 2   PLATELETS Thousands/uL 313   NRBC AUTO /100 WBCs 0   NEUTROS PCT % 67   LYMPHS PCT % 15   MONOS PCT % 10   EOS PCT % 6   BASOS PCT % 1   NEUTROS ABS Thousands/µL 4 40   LYMPHS ABS Thousands/µL 0 96   MONOS ABS Thousand/µL 0 61   EOS ABS Thousand/µL 0 36   , Chemistry Profile:   Results from last 7 days  Lab Units 12/14/18  1436   POTASSIUM mmol/L 6 2*   CHLORIDE mmol/L 98*   CO2 mmol/L 22   BUN mg/dL 99*   CREATININE mg/dL 15 60*   CALCIUM mg/dL 7 9*   EGFR ml/min/1 73sq m 3   , Coagulation Studies:   , Cardiac Studies:   Results from last 7 days  Lab Units 12/14/18  1436   TROPONIN I ng/mL 0 07*   , Last A1C/Lipid Panel/Thyroid Panel:   Lab Results   Component Value Date    HGBA1C 5 0 11/13/2017    [id number] 0 864 04/18/2018    FREET4 1 04 11/16/2017       Imaging: I have personally reviewed pertinent films in PACS    Xr Chest 2 Views  Result Date: 12/14/2018  Impression: Pulmonary vascular congestion noted with cardiomegaly  Correlate clinically for CHF  EKG, Pathology, and Other Studies: I have personally reviewed pertinent reports        Medications given in Emergency Department     Medication Administration - last 24 hours from 12/13/2018 1852 to 12/14/2018 5050       Date/Time Order Dose Route Action Action by     12/14/2018 1558 insulin regular (HumuLIN R,NovoLIN R) injection 10 Units 10 Units Intravenous Given Rajni Zabala RN     12/14/2018 1558 dextrose 50 % IV solution 25 mL 25 mL Intravenous Given Rajni Zabala RN     12/14/2018 1553 albuterol inhalation solution 10 mg 10 mg Nebulization Given Roxy Romberg, RN     12/14/2018 1606 calcium gluconate 1 g in sodium chloride 0 9 % 100 mL IVPB 1 g Intravenous New Bag Roxy Romberg, RN          Assessment and Plan     Problem List     * (Principal)Volume overload    Hyperkalemia (Chronic)    Elevated troponin    Secondary hyperparathyroidism of renal origin (HCC)    Anemia in chronic kidney disease, on chronic dialysis (HCC)    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(HCC)    ESRD on hemodialysis (HCC)    Nicotine abuse    High anion gap metabolic acidosis    Hypoglycemia    Paroxysmal A-fib (HCC) (Chronic)    Diastolic heart failure (HCC) (Chronic)    Non-compliance (Chronic)        SOB  · No hypoxia, stable on room air  · Orthopnea, b/l crackles on exam without lower extremity edema  · Likely 2/2 fluid overload in the setting of missed hemodialysis session on Wednesday    ESRD on HD  · Follows M/W/F hemodialysis schedule  · Continue Calcium acetate and sevelamer  · Carb-controlled, renal diet  · Establish care with nephrologist and PCP, otherwise will likely be back for urgent hemodialysis    Hypoglycemia  · No history of diabetes or insulin use  · Received insulin x1 with 25 mL D5 in the ED because of hyperkalemia of 6 2  · BS dropped to 35 because patient has new history of diabetes/hyperglycemia  · Patient was symptomatic and requested food-he received Josiane Raw crackers and 25 g D5, BS was 133 after 30 minutes  · No need to recheck fingersticks if stable overnight    Troponemia  · Trop 0 07 - no chest pain or EKG changes  · likely elevated 2/2 HTN  · On telemetry  · Repeat troponin pending    PAF  · S/p ablation in April 2018  · Continue metoprolol 12 5    HTN  · Blood pressure was elevated to 160-180s on presentation  · Continue Norvasc 10 mg daily    Hyperkalemia  · 6 2 on admission  · Received calcium gluconate x1 in the ED  · EKG showed no abnormalities  · Continue telemetry monitoring, consider stopping when potassium normalizes  · A m  BMP and EKG Noncompliance  · Patient often misses medication doses  · Misses hemodialysis sessions due to lack of transportation  · Needs to be set up with PCP and nephrologist  · Work with case management to set up transportation to hemodialysis session    High anion gap metabolic acidosis  Code Status: Level 1 - Full Code  VTE Pharmacologic Prophylaxis: Heparin   VTE Mechanical Prophylaxis: sequential compression device  Admission Status: OBSERVATION    Admission Time  I spent 20 minutes admitting the patient  This involved direct patient contact where I performed a full history and physical, reviewing previous records, and reviewing laboratory and other diagnostic studies      Héctor Castellanos MD  Internal Medicine  PGY-1

## 2018-12-14 NOTE — ED ATTENDING ATTESTATION
Carmelo Guardado MD, saw and evaluated the patient  All available labs and X-rays were ordered by me or the resident and have been reviewed by myself  I discussed the patient with the resident / non-physician and agree with the resident's / non-physician practitioner's findings and plan as documented in the resident's / non-physician practicitioner's note, except where noted  At this point, I agree with the current assessment done in the ED  Chief Complaint   Patient presents with    Shortness of Breath     patients car broke down 3 weeks ago  difficulty getting to dialysis  usually goes m-w-f  had dialysis tuesday in [place]  now feeling SOB  This is a 53-year-old male presenting for evaluation of miss dialysis  The patient states that his car is no longer working so he has not been going to dialysis like he should  His last session was actually an emergency session on Tuesday in [place]  Since then he has been having gradually worsening shortness of breath  Denies any fevers chills nausea vomiting chest pain  He does have increased lower extremity edema  Denies falls or injuries  PMH:  - dialysis: left arm fistula  - HTN  - CHF  PSH:  - esther  - colonoscopy  - AV fistula  - EGD  +smoking daily  No alcohol  No drugs  PE:  Vitals:    12/14/18 1413 12/14/18 1504 12/14/18 1600   BP: 163/82 (!) 178/86 170/87   BP Location:  Right arm    Pulse: 88 83 81   Resp: (!) 24 (!) 24 22   Temp: 98 °F (36 7 °C)     SpO2: 95% 98% 96%   Weight: 71 7 kg (158 lb)     General: VSS, NAD, awake, alert  Well-nourished, well-developed  Appears stated age  Speaking normally in full sentences  Head: Normocephalic, atraumatic, nontender  Eyes: PERRL, EOM-I  No diplopia  No hyphema  No subconjunctival hemorrhages  Symmetrical lids  ENT: Atraumatic external nose and ears  MMM  No malocclusion  No stridor  Normal phonation  No drooling  Normal swallowing  Neck: Symmetric, trachea midline  No JVD  CV: RRR  +S1/S2  No murmurs or gallops  Peripheral pulses +2 throughout  No chest wall tenderness  Lungs:   Unlabored No retractions  CTAB, lungs sounds equal bilateral    No tachypnea  Abd: +BS, soft, NT/ND    MSK:   FROM   Large left fistula  Back:   No rashes  Skin: Dry, intact  Neuro: AAOx3, GCS 15, CN II-XII grossly intact  Motor grossly intact  Psychiatric/Behavioral: Appropriate mood and affect   Exam: deferred  A:  - dyspnea  P:  - cardiac  - dialysis  - EKG reviewed without hyper-K T-waves   - 13 point ROS was performed and all are normal unless stated in the history above  - Nursing note reviewed  Vitals reviewed  - Orders placed by myself and/or advanced practitioner / resident     - Previous chart was reviewed  - No language barrier    - History obtained from patient  - There are no limitations to the history obtained  - Critical care time: 33 minutes  - Critical care time was exclusive of seperately bilable procedures and treating other patients as well as teaching time  - Critical care was necessary to treat or prevent imminent or life-threatening deterioration of the following condition: hyperkalemia  - Critical care time was spent personally by me on the following activities as well as the above as per the ED course and rest of chart: blood draw for specimens, obtaining history from patient / surrogate, developement of a treatment plan, discussions with consultants, evaluation of patient's response to the treatment, examination of the patient, ordering/performing treatements and interventions, re-evaluation of the patient's condition, review of old charts, ordering/reviewing laboratory studies, ordering/reviewing of radiographic studies    Final Diagnosis:  1  SOB (shortness of breath)    2  Pulmonary vascular congestion    3  Elevated troponin    4  Hyperkalemia    5  Hyperphosphatemia    6   ESRD (end stage renal disease) [facility])        ED Course as of Dec 14 1608   Fri Dec 14, 2018   1557 NT-proBNP: (!) 125,135   1557 Cocktail ordered for hyperkalemia  Potassium: (!) 6 2   1607 Discussed with nephrology  Patient will be taken to dialysis  Medications   albuterol inhalation solution 10 mg (10 mg Nebulization Given 12/14/18 1558)   calcium gluconate 1 g in sodium chloride 0 9 % 100 mL IVPB (1 g Intravenous New Bag 12/14/18 1606)   insulin regular (HumuLIN R,NovoLIN R) injection 10 Units (10 Units Intravenous Given 12/14/18 1558)   dextrose 50 % IV solution 25 mL (25 mL Intravenous Given 12/14/18 1558)     XR chest 2 views   ED Interpretation   The CXR was ordered by resident and interpreted by me independently  On my read, it appears:   - vascular congestion   - no pna      Final Result      Pulmonary vascular congestion noted with cardiomegaly  Correlate clinically for CHF              Workstation performed: [id number]           Orders Placed This Encounter   Procedures    ED ECG Documentation Only    XR chest 2 views    CBC and differential    Basic metabolic panel    Troponin I    B-type natriuretic peptide    Magnesium    Phosphorus    Inpatient consult to Nephrology    ECG 12 lead    Place in Observation (expected length of stay for this patient is less than two midnights)     Labs Reviewed   CBC AND DIFFERENTIAL - Abnormal        Result Value Ref Range Status    WBC 6 41  4 31 - 10 16 Thousand/uL Final    RBC 3 26 (*) 3 88 - 5 62 Million/uL Final    Hemoglobin 8 9 (*) 12 0 - 17 0 g/dL Final    Hematocrit 28 5 (*) 36 5 - 49 3 % Final    MCV 87  82 - 98 fL Final    MCH 27 3  26 8 - 34 3 pg Final    MCHC 31 2 (*) 31 4 - 37 4 g/dL Final    RDW 18 8 (*) 11 6 - 15 1 % Final    MPV 10 2  8 9 - 12 7 fL Final    Platelets 891  820 - 390 Thousands/uL Final    nRBC 0  /100 WBCs Final    Neutrophils Relative 67  43 - 75 % Final    Immat GRANS % 1  0 - 2 % Final    Lymphocytes Relative 15  14 - 44 % Final    Monocytes Relative 10  4 - 12 % Final    Eosinophils Relative 6  0 - 6 % Final Basophils Relative 1  0 - 1 % Final    Neutrophils Absolute 4 40  1 85 - 7 62 Thousands/µL Final    Immature Grans Absolute 0 03  0 00 - 0 20 Thousand/uL Final    Lymphocytes Absolute 0 96  0 60 - 4 47 Thousands/µL Final    Monocytes Absolute 0 61  0 17 - 1 22 Thousand/µL Final    Eosinophils Absolute 0 36  0 00 - 0 61 Thousand/µL Final    Basophils Absolute 0 05  0 00 - 0 10 Thousands/µL Final   BASIC METABOLIC PANEL - Abnormal     Sodium 135 (*) 136 - 145 mmol/L Final    Potassium 6 2 (*) 3 5 - 5 3 mmol/L Final    Chloride 98 (*) 100 - 108 mmol/L Final    CO2 22  21 - 32 mmol/L Final    ANION GAP 15 (*) 4 - 13 mmol/L Final    BUN 99 (*) 5 - 25 mg/dL Final    Creatinine 15 60 (*) 0 60 - 1 30 mg/dL Final    Comment: Standardized to IDMS reference method    Glucose 105  65 - 140 mg/dL Final    Comment:   If the patient is fasting, the ADA then defines impaired fasting glucose as > 100 mg/dL and diabetes as > or equal to 123 mg/dL  Specimen collection should occur prior to Sulfasalazine administration due to the potential for falsely depressed results  Specimen collection should occur prior to Sulfapyridine administration due to the potential for falsely elevated results  Calcium 7 9 (*) 8 3 - 10 1 mg/dL Final    eGFR 3  ml/min/1 73sq m Final    Narrative:     National Kidney Disease Education Program recommendations are as follows:  GFR calculation is accurate only with a steady state creatinine  Chronic Kidney disease less than 60 ml/min/1 73 sq  meters  Kidney failure less than 15 ml/min/1 73 sq  meters  TROPONIN I - Abnormal     Troponin I 0 07 (*) <=0 04 ng/mL Final    Comment:   S[employer]ens Chemistry analyzer 99% cutoff is > 0 04 ng/mL in network labs     o cTnI 99% cutoff is useful only when applied to patients in the clinical setting of myocardial ischemia   o cTnI 99% cutoff should be interpreted in the context of clinical history, ECG findings and possibly cardiac imaging to establish correct diagnosis  o cTnI 99% cutoff may be suggestive but clearly not indicative of a coronary event without the clinical setting of myocardial ischemia  Results indicate test should be repeated on new specimen collected within 4-6 hours of the original   NT-BNP PRO (BRAIN NATRIURETIC PEPTIDE) - Abnormal     NT-proBNP 125,135 (*) <125 pg/mL Final   PHOSPHORUS - Abnormal     Phosphorus 9 5 (*) 2 7 - 4 5 mg/dL Final   MAGNESIUM - Normal    Magnesium 2 6  1 6 - 2 6 mg/dL Final     Time reflects when diagnosis was documented in both MDM as applicable and the Disposition within this note     Time User Action Codes Description Comment    12/14/2018  3:12 PM Hall Huyk P Add [R06 02] SOB (shortness of breath)     12/14/2018  3:12 PM Yang Shook Add [R09 89] Pulmonary vascular congestion     12/14/2018  3:12 PM Yang Shook Add [R74 8] Elevated troponin     12/14/2018  3:30 PM Yang Shook Add [E87 5] Hyperkalemia     12/14/2018  3:30 PM Yang Shook Add [E83 39] Hyperphosphatemia     12/14/2018  3:30 PM Daniel Saleh P Add [N18 6] ESRD (end stage renal disease) [facility])       ED Disposition     ED Disposition Condition Comment    Admit  Case was discussed with SOD and the patient's admission status was agreed to be Admission Status: observation status to the service of Dr Anastacio Quevedo   Follow-up Information    None       Patient's Medications   Discharge Prescriptions    No medications on file     No discharge procedures on file  Prior to Admission Medications   Prescriptions Last Dose Informant Patient Reported? Taking?    amLODIPine (NORVASC) 10 mg tablet   Yes No   Sig: Take 10 mg by mouth daily   benzonatate (TESSALON PERLES) 100 mg capsule   No No   Sig: Take 1 capsule (100 mg total) by mouth every 8 (eight) hours   benzonatate (TESSALON PERLES) 100 mg capsule   No No   Sig: Take 1 capsule (100 mg total) by mouth every 8 (eight) hours for 14 days   calcium acetate (PHOSLO) 667 mg capsule   Yes No   Sig: Take 2,001 mg by mouth 3 (three) times a day with meals   cinacalcet (SENSIPAR) 30 mg tablet   Yes No   Si mg   dextromethorphan-guaiFENesin (ROBITUSSIN DM)  mg/5 mL syrup   No No   Sig: Take 10 mL by mouth every 6 (six) hours as needed for cough or congestion   gabapentin (NEURONTIN) 100 mg capsule   Yes No   Sig: Take 300 mg by mouth daily     hydrocortisone (ANUSOL-HC, PROCTOSOL HC,) 2 5 % rectal cream   No No   Sig: Insert into the rectum 4 (four) times a day as needed for hemorrhoids   metoprolol tartrate (LOPRESSOR) 25 mg tablet   No No   Sig: Take 0 5 tablets (12 5 mg total) by mouth every 12 (twelve) hours   pantoprazole (PROTONIX) 40 mg tablet   No No   Sig: Take 1 tablet (40 mg total) by mouth daily in the early morning      Facility-Administered Medications: None       Portions of the record may have been created with voice recognition software  Occasional wrong word or "sound a like" substitutions may have occurred due to the inherent limitations of voice recognition software  Read the chart carefully and recognize, using context, where substitutions have occurred      Electronically signed by:  Jojo Granda

## 2018-12-14 NOTE — CONSULTS
Consultation - Nephrology   Wendy Jules 66 y o  male MRN: [id number]  Unit/Bed#: ED 22 Encounter: [id number]    ASSESSMENT AND PLAN:  Patient is 80-year-old male with ESRD on HD on MWF schedule, diastolic dysfunction grade 1, EF 55%, hypertension, noncompliance issues, presented with worsening dyspnea  We are consulted for dialysis management  ESRD on HD on MWF schedule at [facility]  -patient had his last dialysis on Monday  He missed dialysis on Wednesday due to transportation issues and his car broke down   -will do emergent dialysis today due to significant dyspnea, hyperkalemia and missing dialysis  -will do HD again tomorrow and then continue as MWF schedule  -the patient says his outpatient dry weight 64 5 kg  Today his weight in the ER is 71 7 kg  Hyperkalemia  -likely secondary to missing dialysis with concern for noncompliance issues  -strict potassium restricted diet  2K bath on dialysis today   -receiving medical management with calcium gluconate, D 50/insulin by ER    Borderline hyponatremia, continue to monitor with fluid removal on dialysis  Fluid restriction 1 5 L per day    CKD anemia  -will get outpatient dialysis records to confirm if patient is on Micera and last dose  -hemoglobin 8 9 today  If hemoglobin continue to drop further, may consider inpatient Epogen  -transfuse p r n  for hemoglobin less than seven   -goal hemoglobin 10 to 11    CKD BMD, severe hyperphosphatemia, serum phosphorus 9 5   -will start two tablets Renagel p o  T i d  With each meal   Obtain outpatient records  Volume overload/pulmonary congestion  -currently requiring 2 L oxygen via nasal cannula although clinically he is severely dyspneic  Unable to complete sentences without getting short of breath at the time of my encounter   -will do 2 hr dialysis with 2 L UF removal today   -reassess for HD again tomorrow      Uncontrolled hypertension  -UF removal on dialysis and monitor blood pressure after dialysis  Component of volume mediated elevated blood pressure  Discussed with Dr Janee Davis:  Requesting Physician: Meri Jameson MD  Reason for Consult:  ESRD on HD    Nikita Luther is a 66y o  year old male who was admitted to [facility] after presenting with worsening dyspnea  A renal consultation is requested today for assistance in the management of dialysis  Old medical records were reviewed  Patient is on ESRD on HD on MWF schedule at at [facility]  Patient was recently hospitalized due to coughing issues in the hospital at [place] and was discharged on Monday after getting dialysis earlier this week  Patient says that he could not go for dialysis on Wednesday due to transportation issues and his car broke down  He presented to the hospital today with worsening dyspnea, coughing  He says he has been having coughing issues over last several weeks which has not been improving  He was also found to have severe hyperkalemia along with pulmonary congestion upon arrival to the hospital   He has been having significant shortness of breath and unable to lie flat  He is getting short of breath while talking to me at the time of my encounter  He denies any nausea, vomiting  He used to get dialysis in [place] although recently moved to [place] and lately he has been receiving dialysis as mentioned above in [place] facility      PAST MEDICAL HISTORY:  Past Medical History:   Diagnosis Date    [facility]est[facility] [facility]     [facility]ialysis patient [facility] [facility]     Hypertension     Limb alert care status     do not use left arm    Rectal bleeding     Renal disorder        PAST SURGICAL HISTORY:  Past Surgical History:   Procedure Laterality Date    AV FISTULA PLACEMENT      left arm    AV NODE ABLATION      CHOLECYSTECTOMY      COLONOSCOPY N/A 4/27/2018    Procedure: COLONOSCOPY;  Surgeon: Edwar Fung DO;  [facility]; Service: Gastroenterology    EGD AND COLONOSCOPY N/A 5/24/2018    Procedure: EGD AND COLONOSCOPY;  Surgeon: Onel Noel MD;  [facility]; Service: Gastroenterology       ALLERGIES:  No Known Allergies    SOCIAL HISTORY:  History   Alcohol Use No     History   Drug Use No     History   Smoking Status    Current Every Day Smoker    Packs/day: 1 00    Types: Cigarettes   Smokeless Tobacco    Never Used       FAMILY HISTORY:  History reviewed  No pertinent family history  MEDICATIONS:    Current Facility-Administered Medications:     albuterol inhalation solution 10 mg, 10 mg, Nebulization, Once, Viridiana Gonzales MD, 10 mg at 12/14/18 1558    calcium gluconate 1 g in sodium chloride 0 9 % 100 mL IVPB, 1 g, Intravenous, Once, Viridiana Gonzales MD, Last Rate: 200 mL/hr at 12/14/18 1606, 1 g at 12/14/18 1606    Current Outpatient Prescriptions:     benzonatate (TESSALON PERLES) 100 mg capsule, Take 1 capsule (100 mg total) by mouth every 8 (eight) hours, Disp: 21 capsule, Rfl: 0    benzonatate (TESSALON PERLES) 100 mg capsule, Take 1 capsule (100 mg total) by mouth every 8 (eight) hours for 14 days, Disp: 42 capsule, Rfl: 0    calcium acetate (PHOSLO) 667 mg capsule, Take 2,001 mg by mouth 3 (three) times a day with meals, Disp: , Rfl:     cinacalcet (SENSIPAR) 30 mg tablet, 30 mg, Disp: , Rfl:     dextromethorphan-guaiFENesin (ROBITUSSIN DM)  mg/5 mL syrup, Take 10 mL by mouth every 6 (six) hours as needed for cough or congestion, Disp: 118 mL, Rfl: 0    gabapentin (NEURONTIN) 100 mg capsule, Take 300 mg by mouth daily  , Disp: , Rfl:     metoprolol tartrate (LOPRESSOR) 25 mg tablet, Take 0 5 tablets (12 5 mg total) by mouth every 12 (twelve) hours, Disp: 30 tablet, Rfl: 0    amLODIPine (NORVASC) 10 mg tablet, Take 10 mg by mouth daily, Disp: , Rfl:     REVIEW OF SYSTEMS:  Complete 10 point review of systems were obtained and discussed in length with the patient   Complete review of systems were negative / unremarkable except mentioned in the HPI section  PHYSICAL EXAM:  Current Weight: Weight - Scale: 71 7 kg (158 lb)  First Weight: Weight - Scale: 71 7 kg (158 lb)  Vitals:    12/14/18 1600   BP: 170/87   Pulse: 81   Resp: 22   Temp:    SpO2: 96%     No intake or output data in the 24 hours ending 12/14/18 1610    Physical Examination:  General:  Sitting in bed, mild acute distress  Eyes:  Mild conjunctival pallor present, sclerae anicteric  ENT:  External examination of ears and nose unremarkable  Neck:  Supple  Respiratory:  Bilateral air entry present, inspiratory crackles appreciated, mild expiratory wheezing  CVS:  S1, S2 present  GI:  Soft, nontender, nondistended  CNS:  Active alert oriented x3  Extremities:  No significant edema lower extremities  Psych:  Conscious, coherent, oriented  Skin:  No new rash in legs    Invasive Devices:      Lab Results:     Results from last 7 days  Lab Units 12/14/18  1436   WBC Thousand/uL 6 41   HEMOGLOBIN g/dL 8 9*   HEMATOCRIT % 28 5*   PLATELETS Thousands/uL 313   POTASSIUM mmol/L 6 2*   CHLORIDE mmol/L 98*   CO2 mmol/L 22   BUN mg/dL 99*   CREATININE mg/dL 15 60*   CALCIUM mg/dL 7 9*   MAGNESIUM mg/dL 2 6   PHOSPHORUS mg/dL 9 5*       Other Studies:                     Results for orders placed during the hospital encounter of 12/14/18   XR chest 2 views    Narrative CHEST     INDICATION:   sob  COMPARISON:  December 5, 2018    EXAM PERFORMED/VIEWS:  XR CHEST PA & LATERAL  The frontal view was performed utilizing dual energy radiographic technique  FINDINGS:    Stable mild cardiomegaly     : Vascular congestion  Osseous structures appear within normal limits for patient age  Impression Pulmonary vascular congestion noted with cardiomegaly  Correlate clinically for CHF  Workstation performed: [id number]       Chest x-ray personally reviewed which shows pulmonary vascular congestion      Portions of the record may have been created with voice recognition software  Occasional wrong word or "sound a like" substitutions may have occurred due to the inherent limitations of voice recognition software  Read the chart carefully and recognize, using context, where substitutions have occurred

## 2018-12-14 NOTE — PROGRESS NOTES
[facility] Senior Admission Note   Unit/Bed # @DBLINK (CHRISTINA,71891)@ Encounter: [id number]  SOD Team A          Jose Hidalgo 66 y o  male [id number]       Patient seen and examined  Reviewed H&P per Dr Ji Strong  Agree with the assessment and plan as outlined in her H&P  Assessment/Plan:   Principal Problem:    Volume overload  Active Problems:    Hyperkalemia    Elevated troponin    Secondary hyperparathyroidism of renal origin (HCC)    Anemia in chronic kidney disease, on chronic dialysis (HCC)    ESRD on hemodialysis (HCC)    Nicotine abuse    High anion gap metabolic acidosis    Hypoglycemia    Paroxysmal A-fib (HCC)    Diastolic heart failure (HCC)    Non-compliance     Mr Kamille Donaldson is a 82-year-old male with a history of paroxysmal atrial fibrillation status post ablation April 2018 (not on [facility] [place] due to prior bleed), hypertension diastolic congestive heart failure, nicotine dependence, ESRD on HD Monday Wednesday Friday, history of noncompliance  Patient presents to the emergency department with chief complaint of shortness of breath in setting of missing HD session on Wednesday  Patient's last HD session was on Monday in [place]  Patient recently moved from [place] to [place] recently and has not established with Nephrology/dialysis center nor a PCP since moving here  Patient was receiving HD at [facility] though his nephrologist was from [place] and now he can no longer receive HD there per the patient? Patient did receive dialysis at a hospital in [place] on Monday  Patient was driven to his HD session as his car broke down 3 weeks ago and he has had difficulty getting to dialysis since that  Of note, patient was recently admitted from 12/05 to 12/7 for same complaint  On presentation to the ED, patient was noted to have pulmonary vascular congestion on chest x-ray  His proBNP was was elevated at 125,135  Patient had an anion gap metabolic acidosis with hyperkalemia of 6 2    He did receive 1 g of calcium gluconate IV and regular insulin 10 units in the emergency department for hyperkalemia  Patient was evaluated by Nephrology in the ED in taken for emergent HD  While in HD, rapid response was called for blood glucose of 35  Patient was mentating appropriately and in no acute distress  Patient was given 25g D50  Patient will be admitted for HD  He will receive HD tonight as well as another session tomorrow and then resume his Monday Wednesday Friday schedule  Will repeat EKG now to assess for EKG changes for hyperkalemia the patient received calcium gluconate as cannot locate EKG from ED  Coniolayinka Solomon Telemetry monitoring for electrolyte disturbances  Renal diet with fluid restriction  Will need to discuss with case management regarding getting patient set up with nephrologist and outpatient HD here in [place]  Also will need to discuss transportation issues to prevent readmission  Blood pressure was noted to be elevated on admission  Will restart patient's home blood pressure medications of Norvasc 10 mg and metoprolol tartrate 12 5 mg q 12 hours  Will start patient on PhosLo and sevelamer tomorrow  Hold home cinacalcet per Nephrology      Disposition:  OBSERVATION    Expected LOS: <2 Vianca Singh MD

## 2018-12-14 NOTE — ED PROVIDER NOTES
History  Chief Complaint   Patient presents with    Shortness of Breath     patients car broke down 3 weeks ago  difficulty getting to dialysis  usually goes m--  had dialysis tuesday in [place]  now feeling SOB  This is a 61-year-old male with a history of end-stage renal disease on dialysis MWF (still makes small amount of urine), CHF, hypertension who presents with shortness of breath  The patient states his car broke down so he has been having difficulty obtaining dialysis  He last received dialysis on Tuesday a [facility]  At that time, he was complaining of worsening shortness of breath  His sister, who is a nurse, picked him up and took him to the [facility] where he was able to receive dialysis  Since this time, he has been complaining of gradual worsening shortness of breath  He also complains of worsening lower extremity edema  Denies fever/chills, nausea/vomiting, lightheadedness/dizziness, numbness/weakness, headache, change in vision, URI symptoms, neck pain, chest pain, palpitations, cough, back pain, flank pain, abdominal pain, diarrhea, hematochezia, melena, dysuria, hematuria  Prior to Admission Medications   Prescriptions Last Dose Informant Patient Reported? Taking?    albuterol (PROVENTIL HFA,VENTOLIN HFA) 90 mcg/act inhaler   No No   Sig: Inhale 2 puffs every 6 (six) hours as needed for wheezing   amLODIPine (NORVASC) 10 mg tablet   Yes No   Sig: Take 10 mg by mouth daily   benzonatate (TESSALON PERLES) 100 mg capsule   No No   Sig: Take 1 capsule (100 mg total) by mouth every 8 (eight) hours   benzonatate (TESSALON PERLES) 100 mg capsule   No No   Sig: Take 1 capsule (100 mg total) by mouth every 8 (eight) hours for 14 days   calcium acetate (PHOSLO) 667 mg capsule   Yes No   Sig: Take 2,001 mg by mouth 3 (three) times a day with meals   cinacalcet (SENSIPAR) 30 mg tablet   Yes No   Si mg   dextromethorphan-guaiFENesin (ROBITUSSIN DM)  mg/5 mL syrup   No No   Sig: Take 10 mL by mouth every 6 (six) hours as needed for cough or congestion   gabapentin (NEURONTIN) 100 mg capsule   Yes No   Sig: Take 300 mg by mouth daily     hydrocortisone (ANUSOL-HC, PROCTOSOL HC,) 2 5 % rectal cream   No No   Sig: Insert into the rectum 4 (four) times a day as needed for hemorrhoids   metoprolol tartrate (LOPRESSOR) 25 mg tablet   No No   Sig: Take 0 5 tablets (12 5 mg total) by mouth every 12 (twelve) hours   nicotine (NICODERM CQ) 14 mg/24hr TD 24 hr patch   No No   Sig: Place 1 patch on the skin daily   pantoprazole (PROTONIX) 40 mg tablet   No No   Sig: Take 1 tablet (40 mg total) by mouth daily in the early morning      Facility-Administered Medications: None       Past Medical History:   Diagnosis Date    CHF (congestive heart failure) (HCC)     [facility]     Hypertension     Limb alert care status     do not use left arm    Rectal bleeding     Renal disorder        Past Surgical History:   Procedure Laterality Date    AV FISTULA PLACEMENT      left arm    AV NODE ABLATION      CHOLECYSTECTOMY      COLONOSCOPY N/A 4/27/2018    Procedure: COLONOSCOPY;  Surgeon: Tresa Taylor DO;  [facility]; Service: Gastroenterology    EGD AND COLONOSCOPY N/A 5/24/2018    Procedure: EGD AND COLONOSCOPY;  Surgeon: Omar Olivas MD;  [facility]; Service: Gastroenterology       History reviewed  No pertinent family history  I have reviewed and agree with the history as documented  Social History   Substance Use Topics    Smoking status: Current Every Day Smoker     Packs/day: 1 00     Types: Cigarettes    Smokeless tobacco: Never Used    Alcohol use No        Review of Systems   Constitutional: Negative for chills, fatigue and fever  HENT: Negative for rhinorrhea, sore throat and trouble swallowing  Eyes: Negative for photophobia and visual disturbance  Respiratory: Positive for shortness of breath   Negative for cough and chest tightness  Cardiovascular: Negative for chest pain, palpitations and leg swelling  Gastrointestinal: Negative for abdominal pain, blood in stool, diarrhea, nausea and vomiting  Endocrine: Negative for polyuria  Genitourinary: Negative for dysuria, flank pain and hematuria  Musculoskeletal: Negative for back pain and neck pain  Skin: Negative for color change and rash  Allergic/Immunologic: Negative for immunocompromised state  Neurological: Negative for dizziness, weakness, light-headedness, numbness and headaches  All other systems reviewed and are negative  Physical Exam  ED Triage Vitals   Temperature Pulse Respirations Blood Pressure SpO2   12/14/18 1413 12/14/18 1413 12/14/18 1413 12/14/18 1413 12/14/18 1413   98 °F (36 7 °C) 88 (!) 24 163/82 95 %      Temp src Heart Rate Source Patient Position - Orthostatic VS BP Location FiO2 (%)   -- 12/14/18 1504 12/14/18 1504 12/14/18 1504 --    Monitor Lying Right arm       Pain Score       12/14/18 1413       No Pain           Orthostatic Vital Signs  Vitals:    12/14/18 1413 12/14/18 1504   BP: 163/82 (!) 178/86   Pulse: 88 83   Patient Position - Orthostatic VS:  Lying       Physical Exam   Constitutional: Vital signs are normal  He appears well-developed and well-nourished  He is cooperative  No distress  HENT:   Mouth/Throat: Uvula is midline and oropharynx is clear and moist    Eyes: Pupils are equal, round, and reactive to light  Conjunctivae, EOM and lids are normal    Neck: Trachea normal  No thyroid mass and no thyromegaly present  Cardiovascular: Normal rate, regular rhythm, normal heart sounds, intact distal pulses and normal pulses  No murmur heard  Pulmonary/Chest: Effort normal and breath sounds normal  Tachypnea noted  Abdominal: Soft  Normal appearance and bowel sounds are normal  There is no tenderness  There is no rebound, no guarding, no CVA tenderness and negative Gallo's sign     Musculoskeletal:   Left upper extremity fistula with a palpable thrill  Neurological: He is alert  Skin: Skin is warm, dry and intact  Psychiatric: He has a normal mood and affect  His speech is normal and behavior is normal  Thought content normal        ED Medications  Medications   albuterol inhalation solution 10 mg (10 mg Nebulization Given 12/14/18 1558)   calcium gluconate 1 g in sodium chloride 0 9 % 100 mL IVPB (not administered)   insulin regular (HumuLIN R,NovoLIN R) injection 10 Units (10 Units Intravenous Given 12/14/18 1558)   dextrose 50 % IV solution 25 mL (25 mL Intravenous Given 12/14/18 1558)       Diagnostic Studies  Results Reviewed     Procedure Component Value Units Date/Time    Basic metabolic panel [[id number]]  (Abnormal) Collected:  12/14/18 1436    Lab Status:  Final result Specimen:  Blood from Arm, Right Updated:  12/14/18 1524     Sodium 135 (L) mmol/L      Potassium 6 2 (H) mmol/L      Chloride 98 (L) mmol/L      CO2 22 mmol/L      ANION GAP 15 (H) mmol/L      BUN 99 (H) mg/dL      Creatinine 15 60 (H) mg/dL      Glucose 105 mg/dL      Calcium 7 9 (L) mg/dL      eGFR 3 ml/min/1 73sq m     Narrative:         National Kidney Disease Education Program recommendations are as follows:  GFR calculation is accurate only with a steady state creatinine  Chronic Kidney disease less than 60 ml/min/1 73 sq  meters  Kidney failure less than 15 ml/min/1 73 sq  meters      B-type natriuretic peptide [[id number]]  (Abnormal) Collected:  12/14/18 1436    Lab Status:  Final result Specimen:  Blood from Arm, Right Updated:  12/14/18 1524     NT-proBNP 125,135 (H) pg/mL     Magnesium [[id number]]  (Normal) Collected:  12/14/18 1436    Lab Status:  Final result Specimen:  Blood from Arm, Right Updated:  12/14/18 1524     Magnesium 2 6 mg/dL     Phosphorus [[id number]]  (Abnormal) Collected:  12/14/18 1436    Lab Status:  Final result Specimen:  Blood from Arm, Right Updated:  12/14/18 1524     Phosphorus 9 5 (H) mg/dL     Troponin I [[id number]]  (Abnormal) Collected:  12/14/18 1436    Lab Status:  Final result Specimen:  Blood from Arm, Right Updated:  12/14/18 1505     Troponin I 0 07 (H) ng/mL     CBC and differential [[id number]]  (Abnormal) Collected:  12/14/18 1436    Lab Status:  Final result Specimen:  Blood from Arm, Right Updated:  12/14/18 1446     WBC 6 41 Thousand/uL      RBC 3 26 (L) Million/uL      Hemoglobin 8 9 (L) g/dL      Hematocrit 28 5 (L) %      MCV 87 fL      MCH 27 3 pg      MCHC 31 2 (L) g/dL      RDW 18 8 (H) %      MPV 10 2 fL      Platelets 363 Thousands/uL      nRBC 0 /100 WBCs      Neutrophils Relative 67 %      Immat GRANS % 1 %      Lymphocytes Relative 15 %      Monocytes Relative 10 %      Eosinophils Relative 6 %      Basophils Relative 1 %      Neutrophils Absolute 4 40 Thousands/µL      Immature Grans Absolute 0 03 Thousand/uL      Lymphocytes Absolute 0 96 Thousands/µL      Monocytes Absolute 0 61 Thousand/µL      Eosinophils Absolute 0 36 Thousand/µL      Basophils Absolute 0 05 Thousands/µL                  XR chest 2 views   ED Interpretation by Kristy Gardiner MD (12/14 [id number])   The CXR was ordered by resident and interpreted by me independently  On my read, it appears:   - vascular congestion   - no pna      Final Result by Esme Phelps DO (12/14 6579)      Pulmonary vascular congestion noted with cardiomegaly  Correlate clinically for CHF              Workstation performed: [id number]               Procedures  ECG 12 Lead Documentation  Date/Time: 12/14/2018 2:37 PM  Performed by: Leif Cason  Authorized by: Leif Cason     ECG reviewed by me, the ED Provider: yes    Patient location:  ED  Previous ECG:     Previous ECG:  Compared to current    Similarity:  No change    Comparison to cardiac monitor: Yes    Interpretation:     Interpretation: normal    Rate:     ECG rate:  83    ECG rate assessment: normal    Rhythm:     Rhythm: sinus rhythm    Ectopy:     Ectopy: none    QRS: QRS axis:  Normal    QRS intervals:  Normal  Conduction:     Conduction: normal    ST segments:     ST segments:  Normal  T waves:     T waves: normal            Phone Consults  ED Phone Contact    ED Course  ED Course as of Dec 14 1559   Fri Dec 14, 2018   1507 Likely related to demand  Vascular congestion on chest x-ray  Troponin I: (!) 0 07   1508 Chronic anemia Hemoglobin: (!) 8 9   1508 WBC: 6 41   1532 Receiving treatment Potassium: (!) 6 2                               MDM  Number of Diagnoses or Management Options  Diagnosis management comments: Labs, EKG, chest x-ray  The patient will likely need to be admitted, but states that he does not want to stay because he does not want to lose his job  Following workup, I will readdress this with the patient  CritCare Time    Disposition  Final diagnoses:   SOB (shortness of breath)   Pulmonary vascular congestion   Elevated troponin   Hyperkalemia   Hyperphosphatemia   ESRD (end stage renal disease) ([facility] [address] )     Time reflects when diagnosis was documented in both MDM as applicable and the Disposition within this note     Time User Action Codes Description Comment    12/14/2018  3:12 PM Glenys Pena P Add [R06 02] SOB (shortness of breath)     12/14/2018  3:12 PM Azell December Add [R09 89] Pulmonary vascular congestion     12/14/2018  3:12 PM Azell December Add [R74 8] Elevated troponin     12/14/2018  3:30 PM Azell December Add [E87 5] Hyperkalemia     12/14/2018  3:30 PM Azell December Add [E83 39] Hyperphosphatemia     12/14/2018  3:30 PM Doris Saleh P Add [N18 6] ESRD (end stage renal disease) [facility])       ED Disposition     ED Disposition Condition Comment    Admit  Case was discussed with SOD and the patient's admission status was agreed to be Admission Status: observation status to the service of Dr Katya Shultz           Follow-up Information    None         Patient's Medications   Discharge Prescriptions    No medications on file     No discharge procedures on file  ED Provider  Attending physically available and evaluated Ne Cassidy I managed the patient along with the ED Attending      Electronically Signed by         Janice Fong MD  12/14/18 2299

## 2018-12-15 ENCOUNTER — APPOINTMENT (OUTPATIENT)
Dept: DIALYSIS | Facility: HOSPITAL | Age: 58
End: 2018-12-15
Payer: MEDICARE

## 2018-12-15 VITALS
BODY MASS INDEX: 25.39 KG/M2 | RESPIRATION RATE: 20 BRPM | HEIGHT: 66 IN | WEIGHT: 158 LBS | SYSTOLIC BLOOD PRESSURE: 155 MMHG | TEMPERATURE: 98 F | OXYGEN SATURATION: 96 % | DIASTOLIC BLOOD PRESSURE: 77 MMHG | HEART RATE: 76 BPM

## 2018-12-15 PROBLEM — E87.5 HYPERKALEMIA: Chronic | Status: RESOLVED | Noted: 2017-11-13 | Resolved: 2018-12-15

## 2018-12-15 PROBLEM — E87.70 VOLUME OVERLOAD: Status: RESOLVED | Noted: 2017-11-13 | Resolved: 2018-12-15

## 2018-12-15 PROBLEM — R77.8 ELEVATED TROPONIN: Status: RESOLVED | Noted: 2017-11-13 | Resolved: 2018-12-15

## 2018-12-15 LAB
ANION GAP SERPL CALCULATED.3IONS-SCNC: 10 MMOL/L (ref 4–13)
BASOPHILS # BLD AUTO: 0.04 THOUSANDS/ΜL (ref 0–0.1)
BASOPHILS NFR BLD AUTO: 1 % (ref 0–1)
BUN SERPL-MCNC: 64 MG/DL (ref 5–25)
CALCIUM SERPL-MCNC: 9.4 MG/DL (ref 8.3–10.1)
CHLORIDE SERPL-SCNC: 98 MMOL/L (ref 100–108)
CO2 SERPL-SCNC: 26 MMOL/L (ref 21–32)
CREAT SERPL-MCNC: 12.2 MG/DL (ref 0.6–1.3)
EOSINOPHIL # BLD AUTO: 0.19 THOUSAND/ΜL (ref 0–0.61)
EOSINOPHIL NFR BLD AUTO: 4 % (ref 0–6)
ERYTHROCYTE [DISTWIDTH] IN BLOOD BY AUTOMATED COUNT: 18.4 % (ref 11.6–15.1)
GFR SERPL CREATININE-BSD FRML MDRD: 5 ML/MIN/1.73SQ M
GLUCOSE SERPL-MCNC: 175 MG/DL (ref 65–140)
HCT VFR BLD AUTO: 28.2 % (ref 36.5–49.3)
HGB BLD-MCNC: 8.7 G/DL (ref 12–17)
IMM GRANULOCYTES # BLD AUTO: 0.02 THOUSAND/UL (ref 0–0.2)
IMM GRANULOCYTES NFR BLD AUTO: 0 % (ref 0–2)
LYMPHOCYTES # BLD AUTO: 0.73 THOUSANDS/ΜL (ref 0.6–4.47)
LYMPHOCYTES NFR BLD AUTO: 15 % (ref 14–44)
MAGNESIUM SERPL-MCNC: 2.4 MG/DL (ref 1.6–2.6)
MCH RBC QN AUTO: 27 PG (ref 26.8–34.3)
MCHC RBC AUTO-ENTMCNC: 30.9 G/DL (ref 31.4–37.4)
MCV RBC AUTO: 88 FL (ref 82–98)
MONOCYTES # BLD AUTO: 0.53 THOUSAND/ΜL (ref 0.17–1.22)
MONOCYTES NFR BLD AUTO: 11 % (ref 4–12)
NEUTROPHILS # BLD AUTO: 3.46 THOUSANDS/ΜL (ref 1.85–7.62)
NEUTS SEG NFR BLD AUTO: 69 % (ref 43–75)
NRBC BLD AUTO-RTO: 0 /100 WBCS
PHOSPHATE SERPL-MCNC: 9 MG/DL (ref 2.7–4.5)
PLATELET # BLD AUTO: 291 THOUSANDS/UL (ref 149–390)
PMV BLD AUTO: 9.4 FL (ref 8.9–12.7)
POTASSIUM SERPL-SCNC: 4.7 MMOL/L (ref 3.5–5.3)
RBC # BLD AUTO: 3.22 MILLION/UL (ref 3.88–5.62)
SODIUM SERPL-SCNC: 134 MMOL/L (ref 136–145)
WBC # BLD AUTO: 4.97 THOUSAND/UL (ref 4.31–10.16)

## 2018-12-15 PROCEDURE — 93005 ELECTROCARDIOGRAM TRACING: CPT

## 2018-12-15 PROCEDURE — 85025 COMPLETE CBC W/AUTO DIFF WBC: CPT | Performed by: INTERNAL MEDICINE

## 2018-12-15 PROCEDURE — 80048 BASIC METABOLIC PNL TOTAL CA: CPT | Performed by: INTERNAL MEDICINE

## 2018-12-15 PROCEDURE — 83735 ASSAY OF MAGNESIUM: CPT | Performed by: INTERNAL MEDICINE

## 2018-12-15 PROCEDURE — 84100 ASSAY OF PHOSPHORUS: CPT | Performed by: INTERNAL MEDICINE

## 2018-12-15 PROCEDURE — 99218 PR INITIAL OBSERVATION CARE/DAY 30 MINUTES: CPT | Performed by: INTERNAL MEDICINE

## 2018-12-15 PROCEDURE — 90935 HEMODIALYSIS ONE EVALUATION: CPT | Performed by: INTERNAL MEDICINE

## 2018-12-15 PROCEDURE — G0257 UNSCHED DIALYSIS ESRD PT HOS: HCPCS

## 2018-12-15 RX ORDER — LANOLIN ALCOHOL/MO/W.PET/CERES
3 CREAM (GRAM) TOPICAL
Status: DISCONTINUED | OUTPATIENT
Start: 2018-12-15 | End: 2018-12-15 | Stop reason: HOSPADM

## 2018-12-15 RX ORDER — SEVELAMER HYDROCHLORIDE 800 MG/1
800 TABLET, FILM COATED ORAL
Qty: 44 TABLET | Refills: 1
Start: 2018-12-15 | End: 2018-12-15

## 2018-12-15 RX ORDER — SEVELAMER HYDROCHLORIDE 800 MG/1
800 TABLET, FILM COATED ORAL
Qty: 44 TABLET | Refills: 0
Start: 2018-12-15 | End: 2019-04-16

## 2018-12-15 RX ORDER — SEVELAMER HYDROCHLORIDE 800 MG/1
800 TABLET, FILM COATED ORAL
Qty: 44 TABLET | Refills: 0
Start: 2018-12-15 | End: 2018-12-15

## 2018-12-15 RX ORDER — BENZONATATE 100 MG/1
100 CAPSULE ORAL 3 TIMES DAILY PRN
Status: DISCONTINUED | OUTPATIENT
Start: 2018-12-15 | End: 2018-12-15 | Stop reason: HOSPADM

## 2018-12-15 RX ADMIN — METOPROLOL TARTRATE 12.5 MG: 25 TABLET ORAL at 14:03

## 2018-12-15 RX ADMIN — BENZONATATE 100 MG: 100 CAPSULE ORAL at 03:54

## 2018-12-15 RX ADMIN — RENAGEL 800 MG: 800 TABLET ORAL at 08:32

## 2018-12-15 NOTE — PROGRESS NOTES
NEPHROLOGY PROGRESS NOTE   Josh Prior 66 y o  male MRN: [id number]  [facility] Encounter: [id number]  Reason for Consult: ESRD    ASSESSMENT AND PLAN:  Patient is 61-year-old male with ESRD on HD on MWF schedule, diastolic dysfunction grade 1, EF 55%, hypertension, noncompliance issues, presented with worsening dyspnea  We are consulted for dialysis management      ESRD on HD on MWF schedule at [facility]  -patient missed dialysis before coming to the hospital   Had dialysis yesterday emergently  Will do dialysis again today  Will continue dialysis from Monday onwards on his regular MWF schedule   -outpatient dry weight 64 5 kg  Today pre HD weight 69 5 kg  Goal UF to dry weight  I saw and examined patient during hemodialysis treatment at 12:01 PM on 12/15/2018  The patient was receiving hemodialysis for treatment of end stage renal disease  I also reviewed vital signs, intake and output, lab results and recent events, and agree with dialysis order  Tolerating HD  BP acceptable  Time: 4 hours  Qb: 400  Sodium: 138  Dialyzer: F160  Qd: 1 5 times Qb  Potassium: as per protocol  Access: AVF  UF goal: to EDW  Bicarbonate: 35 Calcium 2 5     Hyperkalemia  -likely secondary to missing dialysis with concern for noncompliance issues  -strict potassium restricted diet  2K bath on dialysis today   -receiving medical management with calcium gluconate, D 50/insulin by ER     Borderline hyponatremia, continue to monitor with fluid removal on dialysis  Fluid restriction 1 5 L per day     CKD anemia  -as per outpatient records he is on Micera 225 mcg with last dose on 12/10/18  Will start Epogen 04918 units with each dialysis from Monday  -hemoglobin 8 7 below goal   -transfuse p r n  for hemoglobin less than seven   -goal hemoglobin 10 to 11     CKD BMD, severe hyperphosphatemia, serum phosphorus nine  -currently remains on PhosLo 3 tablets p o  T i d   With each meal although patient has been refusing dose      Volume overload/pulmonary congestion  -volume status overall improving  Patient clinically feels better      Hypertension, blood pressure has overall improved although fluctuating with occasional high readings   -continue to monitor with UF removal on dialysis  SUBJECTIVE:  Patient seen and examined at bedside  Feels better than yesterday  His breathing has improved  No chest pain, nausea, vomiting, abdominal pain or diarrhea      OBJECTIVE:  Current Weight: Weight - Scale: 71 7 kg (158 lb)  Vitals:    12/15/18 1130   BP: 161/81   Pulse: 77   Resp:    Temp:    SpO2:        Intake/Output Summary (Last 24 hours) at 12/15/18 1159  Last data filed at 12/15/18 2230   Gross per 24 hour   Intake              880 ml   Output             2500 ml   Net            -1620 ml       Physical Examination:  General:  Lying in bed, no acute distress   Eyes:  Mild conjunctival pallor present  ENT:  External examination of ears and nose unremarkable  Neck:  Supple  Respiratory:  Bilateral air entry present  CVS:  S1, S2 present  GI:  Soft, nontender, nondistended  CNS:  Active alert oriented x3  Extremities:  No significant edema in lower extremities  Skin:  No new rash in legs    Medications:    Current Facility-Administered Medications:     acetaminophen (TYLENOL) tablet 650 mg, 650 mg, Oral, Q6H PRN, Jose Cruz Christensen MD    amLODIPine (NORVASC) tablet 10 mg, 10 mg, Oral, Daily, Jose Cruz Christensen MD    benzonatate (TESSALON PERLES) capsule 100 mg, 100 mg, Oral, TID PRN, Zara Paul MD, 100 mg at 12/15/18 0354    calcium acetate (PHOSLO) capsule 2,001 mg, 2,001 mg, Oral, TID With Meals, Jose Cruz Christensen MD    heparin (porcine) subcutaneous injection 5,000 Units, 5,000 Units, Subcutaneous, Q8H [address] **AND** Platelet count, , , Once, Jose Cruz Christensen MD    melatonin tablet 3 mg, 3 mg, Oral, HS, Zara Paul MD    metoprolol tartrate (LOPRESSOR) partial tablet 12 5 mg, 12 5 mg, Oral, Q12H [address], Jose Cruz Christensen MD, 12 5 mg at 12/14/18 2230    nicotine (NICODERM CQ) 14 mg/24hr TD 24 hr patch 1 patch, 1 patch, Transdermal, Daily, Jose Cruz Christensen MD    sevelamer (RENAGEL) tablet 800 mg, 800 mg, Oral, TID With Meals, Jose Cruz Christensen MD, 800 mg at 12/15/18 5577    Laboratory Results:    Results from last 7 days  Lab Units 12/15/18  0949 12/14/18  2029 12/14/18  1436   WBC Thousand/uL 4 97  --  6 41   HEMOGLOBIN g/dL 8 7*  --  8 9*   HEMATOCRIT % 28 2*  --  28 5*   PLATELETS Thousands/uL 291 356 313   POTASSIUM mmol/L 4 7  --  6 2*   CHLORIDE mmol/L 98*  --  98*   CO2 mmol/L 26  --  22   BUN mg/dL 64*  --  99*   CREATININE mg/dL 12 20*  --  15 60*   CALCIUM mg/dL 9 4  --  7 9*   MAGNESIUM mg/dL 2 4  --  2 6   PHOSPHORUS mg/dL 9 0*  --  9 5*       Results for orders placed during the hospital encounter of 04/17/18   XR chest 1 view portable    Narrative CHEST     INDICATION:   sob  COMPARISON:  11/13/2017    EXAM PERFORMED/VIEWS:  XR CHEST PORTABLE      FINDINGS:    Heart shadow is enlarged but unchanged from prior exam     Airspace disease within both lung fields, similar to the prior examination  No effusions  No pneumothorax  Osseous structures appear within normal limits for patient age  Impression Stable bilateral airspace disease suspicious for pulmonary edema  Bilateral pneumonia not excluded  Workstation performed: [id number]       Results for orders placed during the hospital encounter of 12/14/18   XR chest 2 views    Narrative CHEST     INDICATION:   sob  COMPARISON:  December 5, 2018    EXAM PERFORMED/VIEWS:  XR CHEST PA & LATERAL  The frontal view was performed utilizing dual energy radiographic technique  FINDINGS:    Stable mild cardiomegaly     : Vascular congestion  Osseous structures appear within normal limits for patient age  Impression Pulmonary vascular congestion noted with cardiomegaly  Correlate clinically for CHF          Workstation performed: [id number] No results found for this or any previous visit  No results found for this or any previous visit  Results for orders placed during the hospital encounter of 12/05/18   CT abdomen pelvis wo contrast    Narrative CT ABDOMEN AND PELVIS WITHOUT IV CONTRAST    INDICATION:   repeat for foreign body seen on previous CT     COMPARISON:  11/28/2018    TECHNIQUE:  CT examination of the abdomen and pelvis was performed without intravenous contrast   Axial, sagittal, and coronal 2D reformatted images were created from the source data and submitted for interpretation  Radiation dose length product (DLP) for this visit:  281 4 mGy-cm   This examination, like all CT scans performed in the [facility], was performed utilizing techniques to minimize radiation dose exposure, including the use of iterative   reconstruction and automated exposure control  Enteric contrast was administered  FINDINGS:    ABDOMEN    LOWER CHEST:  Streaky atelectasis/scarring at the lung bases  Prominent cardiomegaly noted  LIVER/BILIARY TREE:  Hepatomegaly evident  GALLBLADDER:  Gallbladder is surgically absent  SPLEEN:  Splenomegaly noted  PANCREAS:  Unremarkable  ADRENAL GLANDS:  Unremarkable  KIDNEYS/URETERS:  Atrophic kidneys with cysts and vascular calcifications as before  STOMACH AND BOWEL:  Cecum metallic foreign bodies x2 are redemonstrated best seen on series 2 image 51 and series 2 image 54  APPENDIX:  No findings to suggest appendicitis  ABDOMINOPELVIC CAVITY:  No ascites or free intraperitoneal air  No lymphadenopathy  VESSELS:  Unremarkable for patient's age  PELVIS    REPRODUCTIVE ORGANS:  Unremarkable for patient's age  URINARY BLADDER:  Unremarkable  ABDOMINAL WALL/INGUINAL REGIONS:  Unremarkable  OSSEOUS STRUCTURES:  No acute fracture or destructive osseous lesion  Impression 1  Stable position of cecal linear foreign metallic densities x2 as above    2  Hepatosplenomegaly  3   Atrophic kidneys with cysts and vascular calcifications indicating end-stage renal disease  4   Cardiomegaly  Workstation performed: [id number]       No results found for this or any previous visit  Portions of the record may have been created with voice recognition software  Occasional wrong word or "sound a like" substitutions may have occurred due to the inherent limitations of voice recognition software  Read the chart carefully and recognize, using context, where substitutions have occurred

## 2018-12-15 NOTE — PROGRESS NOTES
Patient refusing calcium acetate and telemetry monitor  He states the electrodes are causing skin irritation  I spoke with Kiera Poe from [facility] and he will look in his chart to see if the telemetry is ok to cancel  Awaiting possible change in order

## 2018-12-15 NOTE — PROGRESS NOTES
SOD - Internal Medicine Progress Note  Unit/Bed#: [facility] 822-01 Encounter: [id number]  SOD Team A      Guadalupe Buckley 66 y o  male [address] Stay Days: 0    Assessment and Plan      Current Problem List   Principal Problem:    Volume overload  Active Problems:    Hyperkalemia    Elevated troponin    Secondary hyperparathyroidism of renal origin (HCC)    Anemia in chronic kidney disease, on chronic dialysis (HCC)    ESRD on hemodialysis (HCC)    Nicotine abuse    High anion gap metabolic acidosis    Hypoglycemia    Paroxysmal A-fib (HCC)    Diastolic heart failure (HCC)    Non-compliance    Assessment/Plan:    1  SOB  ·  Likely fluid overload due to non compliance with dialysis  · Denies today  2   ESRD on HD  ·  Unclear etiology, nephrology to perform dialysis today, patient states car broke down and is reason he could not make it to dialysis  · CXR yesterday showed pulmonary vascular congestion  · Continue Sevelmar  3  Hypoglyemia  ·  Resolved  4  Troponemia  · Likely type II in setting of ESRD  · Patient denies chest pain, sob today for me  5  PAF  ·  Rate controlled S/P Ablation in April 2018  '  · Not on anticoagulation outpatient  6  HTN - BP this morning 146  ·  Cont  Norvasc 10 mg daily  7  Hyperkalemia   BMP ordered this morning  Patient refused all labs this morning  · Patient refused to wear telemetry now discontinued  8  Non compliance  · Patient states he is leaving today no matter what after dialysis  · Patient states is secondary to car issues  PT/OT recommendation: Not consulted  Disposition: Discharge after dialysis  Subjective     Patient seen and examined, had one O2 sat of 88% this morning, otherwise stable  Denies any complaints  States he will be leaving today no matter what  Refuses to remove pants for nurses to perform skin check on patient, importance explained to patient, patient refuses still   No belly pain, chest pain, or SOB Patient had rapid called on him last night as he became hypoglyemic with insulin treatment for hyperkalemia  Objective     Vitals: Temp (24hrs), Av 2 °F (36 8 °C), Min:97 5 °F (36 4 °C), Max:98 7 °F (37 1 °C)  Current: Temperature: 98 7 °F (37 1 °C)  Patient Vitals for the past 24 hrs:   BP Temp Temp src Pulse Resp SpO2 Height Weight   12/15/18 0921 - - - - - 96 % - -   12/15/18 0920 - - - - - (!) 88 % - -   12/15/18 0708 145/65 98 7 °F (37 1 °C) Oral 86 20 90 % - -   18 2300 164/74 98 5 °F (36 9 °C) Oral 88 20 98 % - -   18 2230 (!) 186/81 - - 89 - - - -   18 1953 157/74 97 5 °F (36 4 °C) Oral 87 18 96 % 5' 6" (1 676 m) -   18 1900 142/71 - - 87 - - - -   18 1800 130/67 - - 89 - - - -   18 1730 129/66 - - 92 - - - -   18 1705 145/77 - - 90 - - - -   18 1700 163/85 - - 90 - - - -   18 1630 (!) 180/82 - - 84 20 100 % - -   18 1600 170/87 - - 81 22 96 % - -   18 1504 (!) 178/86 - - 83 (!) 24 98 % - -   18 1413 163/82 98 °F (36 7 °C) - 88 (!) 24 95 % - 71 7 kg (158 lb)    Body mass index is 25 5 kg/m²  Physical Exam:  GENERAL: NAD  HEENT:  NC/AT, PERRL, EOMI, no scleral icterus  CARDIAC:  RRR, +S1/S2, no S3/S4 heard, no m/g/r  PULMONARY:  CTA B/L, no wheezing/rales/rhonci, non-labored breathing  ABDOMEN:  Soft, NT/ND,no rebound/guarding/rigidity  Extremities:  No edema, cyanosis, or clubbing  NEUROLOGIC: Grossly intact  SKIN:  No rashes or erythema noted on exposed skin     Psych: Normal affect    Invasive Devices     Peripheral Intravenous Line            Peripheral IV 18 Right Antecubital less than 1 day          Line            Hemodialysis AV Fistula Left -- days                    Labs:     Results from last 7 days  Lab Units 18  1436   WBC Thousand/uL  --  6 41   HEMOGLOBIN g/dL  --  8 9*   HEMATOCRIT %  --  28 5*   PLATELETS Thousands/uL 356 313   NEUTROS PCT %  --  67   MONOS PCT %  --  10      Results from last 7 days  Lab Units 12/14/18 2029 12/14/18  1436   SODIUM mmol/L  --  135*   POTASSIUM mmol/L  --  6 2*   CHLORIDE mmol/L  --  98*   CO2 mmol/L  --  22   BUN mg/dL  --  99*   CREATININE mg/dL  --  15 60*   CALCIUM mg/dL  --  7 9*   TROPONIN I ng/mL 0 06* 0 07*   MAGNESIUM mg/dL  --  2 6   PHOSPHORUS mg/dL  --  9 5*   EGFR ml/min/1 73sq m  --  3               Results from last 7 days  Lab Units 12/14/18 2029 12/14/18  1436   TROPONIN I ng/mL 0 06* 0 07*     No results found for: PHART, ALR3ETQ, PO2ART, HXV8DNV, L2OAGAEF, BEART, SOURCE  No components found for: HIV1X2  Lab Results   Component Value Date    HEPBIGM Non-reactive 04/28/2018    HEPBCAB Non-reactive 04/28/2018    HEPCAB Non-reactive 04/28/2018     No results found for: SPEP, UPEP Lab Results   Component Value Date    HGBA1C 5 0 11/13/2017     No results found for: CHOL No results found for: HDL No results found for: LDLCALC No results found for: TRIG  No components found for: PROCAL      Micro:      Urinalysis:  No results found for: AMPHETUR, BDZUR, COCAINEUR, OPIATEUR, PCPUR, THCUR, ETOH, ACTMNPHEN, SALICYLATE       Invalid input(s): URIBILINOGEN        Intake and Outputs:  I/O       12/13 0701 - 12/14 0700 12/14 0701 - 12/15 0700 12/15 0701 - 12/16 0700    I V  (mL/kg)  500 (7)     Total Intake(mL/kg)  500 (7)     Urine (mL/kg/hr)  0     Other  2500     Total Output   2500      Net   -2000                 Nutrition:  Diet Cardiovascular; Sodium 2 GM; Renal Restrictive, Potassium 2 GM, Lo Phos; Yes; Fluid Restriction 1000 ML; No  Radiology Results:   XR chest 2 views   ED Interpretation by Jong Guillory MD (12/14 [id number])   The CXR was ordered by resident and interpreted by me independently  On my read, it appears:   - vascular congestion   - no pna      Final Result by Esme Phelps DO (12/14 4087)      Pulmonary vascular congestion noted with cardiomegaly  Correlate clinically for CHF              Workstation performed: [id number]           Scheduled Medications:    amLODIPine 10 mg Daily   calcium acetate 2,001 mg TID With Meals   heparin (porcine) 5,000 Units Q8H [address]   melatonin 3 mg HS   metoprolol tartrate 12 5 mg Q12H [address]   nicotine 1 patch Daily   sevelamer 800 mg TID With Meals     PRN MEDS:    acetaminophen 650 mg Q6H PRN   benzonatate 100 mg TID PRN     Last 24 Hour Meds: :   Medication Administration - last 24 hours from 12/14/2018 0937 to 12/15/2018 7929       Date/Time Order Dose Route Action Action by     12/14/2018 1558 insulin regular (HumuLIN R,NovoLIN R) injection 10 Units 10 Units Intravenous Given Tri Sotelo RN     12/14/2018 2053 dextrose 50 % IV solution 25 mL 25 mL Intravenous Not Given Erlin Summers RN     12/14/2018 1558 dextrose 50 % IV solution 25 mL 25 mL Intravenous Given Tri Sotelo RN     12/14/2018 1558 albuterol inhalation solution 10 mg 10 mg Nebulization Given Tri Sotelo RN     12/14/2018 1957 calcium gluconate 1 g in sodium chloride 0 9 % 100 mL IVPB 0 g Intravenous Stopped Erlin Summers RN     12/14/2018 1606 calcium gluconate 1 g in sodium chloride 0 9 % 100 mL IVPB 1 g Intravenous [address]ra [address]     12/14/2018 1836 dextrose 50 % IV solution 25 g 25 g Intravenous Given Estrella Pennington RN     12/15/2018 0827 nicotine (NICODERM CQ) 14 mg/24hr TD 24 hr patch 1 patch 1 patch Transdermal Not Given Brenda Justice RN     12/15/2018 0552 heparin (porcine) subcutaneous injection 5,000 Units 5,000 Units Subcutaneous Not Given Stephanie He RN     12/14/2018 2230 heparin (porcine) subcutaneous injection 5,000 Units 5,000 Units Subcutaneous Not Given Erlin Summers RN     12/15/2018 0827 calcium acetate (PHOSLO) capsule 2,001 mg 2,001 mg Oral Not Given Brenda Justice RN     12/15/2018 0642 sevelamer (RENAGEL) tablet 800 mg 800 mg Oral Given Brenda Justice RN     12/14/2018 8677 metoprolol tartrate (LOPRESSOR) partial tablet 12 5 mg 12 5 mg Oral Given Ángela Ponce Josephine Short, RN     12/15/2018 0346 melatonin tablet 3 mg 3 mg Oral Not Given Julien Velarde RN     12/15/2018 0354 benzonatate (TESSALON PERLES) capsule 100 mg 100 mg Oral Given Julien Velarde RN        VTE Pharmacologic Prophylaxis: Patient refusing  VTE Mechanical Prophylaxis: sequential compression device  Sarah Lewis DO    PLEASE NOTE:  This encounter was completed utilizing the [employer] Direct Speech Voice Recognition Software  Grammatical errors, random word insertions, pronoun errors and incomplete sentences are occasional consequences of the system due to software limitations, ambient noise and hardware issues  These may be missed by proof reading prior to affixing electronic signature  Any questions or concerns about the content, text or information contained within the body of this dictation should be directly addressed to the physician for clarification  Please do not hesitate to call me directly if you have any any questions or concerns

## 2018-12-15 NOTE — PROGRESS NOTES
Pt still refusing to remove his jeans for ab skin assessment  SOD resident aware and they will talk to pt on am rounds

## 2018-12-15 NOTE — UTILIZATION REVIEW
Initial Clinical Review    Admission: Date/Time/Statement:  OBS 12/14 @ 1540    Orders Placed This Encounter   Procedures    Place in Observation (expected length of stay for this patient is less than two midnights)     Standing Status:   Standing     Number of Occurrences:   1     Order Specific Question:   Admitting Physician     Answer:   Tammy Cardoza [[id number]]     Order Specific Question:   Level of Care     Answer:   Med Surg [[id number]]         ED: Date/Time/Mode of Arrival:   ED Arrival Information     Expected Arrival Acuity Means of Arrival Escorted By Service Admission Type    - 12/14/2018 13:58 Urgent Walk-In Self General Medicine Urgent    Arrival Complaint    dialysis          Chief Complaint:   Chief Complaint   Patient presents with    Shortness of Breath     patients car broke down 3 weeks ago  difficulty getting to dialysis  usually goes m-w-f  had dialysis tuesday in [place]  now feeling SOB  History of Illness:  42-year-old male with a history of end-stage renal disease on dialysis MWF (still makes small amount of urine), CHF, hypertension who presents with shortness of breath  The patient states his car broke down so he has been having difficulty obtaining dialysis  He last received dialysis on Tuesday a [facility]  At that time, he was complaining of worsening shortness of breath  His sister, who is a nurse, picked him up and took him to the [facility] where he was able to receive dialysis  Since this time, he has been complaining of gradual worsening shortness of breath  He also complains of worsening lower extremity edema    Denies fever/chills, nausea/vomiting, lightheadedness/dizziness, numbness/weakness, headache, change in vision, URI symptoms, neck pain, chest pain, palpitations, cough, back pain, flank pain, abdominal pain, diarrhea, hematochezia, melena, dysuria, hematuria      ED Vital Signs:   ED Triage Vitals   Temperature Pulse Respirations Blood Pressure SpO2 12/14/18 1413 12/14/18 1413 12/14/18 1413 12/14/18 1413 12/14/18 1413   98 °F (36 7 °C) 88 (!) 24 163/82 95 %      Temp Source Heart Rate Source Patient Position - Orthostatic VS BP Location FiO2 (%)   12/14/18 1953 12/14/18 1504 12/14/18 1504 12/14/18 1504 --   Oral Monitor Lying Right arm       Pain Score       12/14/18 1413       No Pain        Wt Readings from Last 1 Encounters:   12/14/18 71 7 kg (158 lb)       Vital Signs (abnormal):   12/14/18 1630 -- 84 20  180/82 100 % -- WS   12/14/18 1600 -- 81 22 170/87 96 % -- BNB   12/14/18 1504 -- 83  24  178/86 98 % -- SC   12/14/18 1413 98 °F (36 7 °C) 88  24 163/82 95 % 71 7 kg (158 lb) AK       Abnormal Labs/Diagnostic Test Results:   Na 135,    K 6 2,   Cl 98  BUN 99,   Cr 15 60  A gap 15,   Ca 7 9,   Phos 9 5  Trop 0 07  NT-proBNP 125,135  H&H 8 9 / 28 5  Glu  35 @ 1752  CXR: Pulmonary vascular congestion noted with cardiomegaly   Correlate clinically for CHF    ED Treatment:   O2 @ 2 liters  Medication Administration from 12/14/2018 1358 to 12/14/2018 1952       Date/Time Order Dose Route Action Action by Comments     12/14/2018 1558 insulin regular (HumuLIN R,NovoLIN R) injection 10 Units 10 Units Intravenous Given YSABEL Garza      12/14/2018 1558 dextrose 50 % IV solution 25 mL 25 mL Intravenous Given YSABEL Garza      12/14/2018 1558 albuterol inhalation solution 10 mg 10 mg Nebulization Given YSABEL Garza      12/14/2018 1606 calcium gluconate 1 g in sodium chloride 0 9 % 100 mL IVPB 1 g Intravenous [place] Kayla, [facility]      12/14/2018 1836 dextrose 50 % IV solution 25 g 25 g Intravenous Given jM Gandhi RN           Past Medical/Surgical History:   Past Medical History:   Diagnosis Date    [facility]est[facility]     Dialysis patient ([facility]     Hypertension     Limb alert care status     Rectal bleeding     Renal disorder        Admitting Diagnosis: Hyperkalemia [E87 5]  Hyperphosphatemia [E83 39]  Dialysis (juvenile) of retina (with detachment) [H33 049]  SOB (shortness of breath) [R06 02]  ESRD (end stage renal disease) ([facility] ) [N18 6]  Elevated troponin [R74 8]  Pulmonary vascular congestion [R09 89]    Age/Sex: 66 y o  male    Assessment/Plan:  67 yo male admitted with:  SOB  · No hypoxia, stable on room air  · Orthopnea, b/l crackles on exam without lower extremity edema  · Likely 2/2 fluid overload in the setting of missed hemodialysis session on Wednesday     ESRD on HD  · Follows M/W/F hemodialysis schedule  · Continue Calcium acetate and sevelamer  · Carb-controlled, renal diet  · Establish care with nephrologist and PCP, otherwise will likely be back for urgent hemodialysis     Hypoglycemia  · No history of diabetes or insulin use  · Received insulin x1 with 25 mL D5 in the ED because of hyperkalemia of 6 2  · BS dropped to 35 because patient has new history of diabetes/hyperglycemia  · Patient was symptomatic and requested food-he received Rajinder crackers and 25 g D5, BS was 133 after 30 minutes  · No need to recheck fingersticks if stable overnight     Troponemia  · Trop 0 07 - no chest pain or EKG changes  · likely elevated 2/2 HTN  · On telemetry  · Repeat troponin pending     PAF  · S/p ablation in April 2018  · Continue metoprolol 12 5     HTN  · Blood pressure was elevated to 160-180s on presentation  · Continue Norvasc 10 mg daily     Hyperkalemia  · 6 2 on admission  · Received calcium gluconate x1 in the ED  · EKG showed no abnormalities  · Continue telemetry monitoring, consider stopping when potassium normalizes  · A m  BMP and EKG      Noncompliance  · Patient often misses medication doses  · Misses hemodialysis sessions due to lack of transportation  · Needs to be set up with PCP and nephrologist  · Work with case management to set up transportation to hemodialysis session     High anion gap metabolic acidosis    Admission Orders: Gloria [address]unes Dr Nephrology   Fluid Restrict 1000 mls  SCD's  CBC, BMP, Mag, Phos in am  HD [facility]  12/14  &  12/15    Scheduled Meds:   Current Facility-Administered Medications:  acetaminophen 650 mg Oral Q6H PRN Jose Cruz Christensen MD   amLODIPine 10 mg Oral Daily Jose Cruz Christensen MD   benzonatate 100 mg Oral TID PRN Ulysses Clan, MD   calcium acetate 2,001 mg Oral TID With Meals Jose Cruz Christensen MD   doxercalciferol 1 mcg Intravenous After Dialysis Radha Huitron MD   epoetin cecily 10,000 Units Intravenous After Dialysis Radha Huitron MD   heparin (porcine) 5,000 Units Subcutaneous Q8H [address] Jose Cruz Christensen MD   melatonin 3 mg Oral HS Ulysses Clan, MD   metoprolol tartrate 12 5 mg Oral Q12H [address] Jose Cruz Christensen MD   nicotine 1 patch Transdermal Daily Jose Cruz Christensen MD     Continuous Infusions:    PRN Meds:   acetaminophen    Benzonatate x 1  12/15    doxercalciferol    epoetin cecily    12/15:  98 7- 86 - 20   145/65     RA 90%  Na 134,   Cl 98  BUN 64,  Cr 12 20  Glu 175,  Phos 9 0  H&H 8 7 / 28 2  For DC 12/15 after  [facility] Utilization Review Department  Phone: [phone]; Fax [phone]  [email or web address]  ATTENTION: Please call with any questions or concerns to [phone]  and carefully listen to the prompts so that you are directed to the right person  Send all requests for admission clinical reviews, approved or denied determinations and any other requests to fax [phone]   All voicemails are confidential

## 2018-12-16 LAB
ATRIAL RATE: 84 BPM
P AXIS: 85 DEGREES
PR INTERVAL: 188 MS
QRS AXIS: 90 DEGREES
QRSD INTERVAL: 98 MS
QT INTERVAL: 386 MS
QTC INTERVAL: 456 MS
T WAVE AXIS: 34 DEGREES
VENTRICULAR RATE: 84 BPM

## 2018-12-16 PROCEDURE — 93010 ELECTROCARDIOGRAM REPORT: CPT | Performed by: INTERNAL MEDICINE

## 2018-12-16 NOTE — DISCHARGE SUMMARY
[address] Discharge Summary - Medical Darek King 66 y o  male MRN: [id number]    [address] [facility]  Room / Bed: [facility] 822/[facility] [id number] Encounter: [id number]    BRIEF OVERVIEW    Admitting Provider: Baldo Gonzalez MD  Discharge Provider: No att  providers found  Primary Care Physician at Discharge: None    Discharge To: Home  Facility / Family Member Name: Self  Phone Number: [phone]    Admission Date: 12/14/2018     Discharge Date: 12/15/2018  2:09 PM    Primary Discharge Diagnosis  Principal Problem (Resolved): Volume overload  Active Problems:    Secondary hyperparathyroidism of renal origin (HCC)    Anemia in chronic kidney disease, on chronic dialysis (HCC)    ESRD on hemodialysis (HCC)    Nicotine abuse    High anion gap metabolic acidosis    Hypoglycemia    Paroxysmal A-fib (HCC)    Diastolic heart failure (HCC)    Non-compliance  Resolved Problems:    Hyperkalemia    Elevated troponin      Other Problems Addressed: Non compliance    Consulting Providers   Nephrology         Therapeutic Operative Procedures Performed  Dialysis    Diagnostic Procedures Performed  Labs: troponin, platelet count, fingerstick glucose, phosphorus, magnesium B type luis  Peptide, BMP, CBC  Discharge Disposition: Home/Self Care  Discharged With Lines: no    Test Results Pending at Discharge: None    Outpatient Follow-Up  Case management was consulted to help with patient planing regarding repeated readmissions for dialysis as patient refuses to go to outpatient dialysis  Follow up with consulting providers  Instructions and referral for nephrology follow up provided  Active Issues Requiring Follow-up   Patient refusing to go to dialysis  Code Status: Prior  Advance Directive and Living Will: <no information>  Power of :    POLST:      Medications   See after visit summary for reconciled discharge medications provided to patient and family      Allergies  No Known Allergies  Discharge Diet: diabetic diet  Activity restrictions: Activity as tolerated    [address] [facility] [place] [address]  This is a 66year old male with PMH significant for ESRD on dialysis (MWF), PAF (S/P ablation), Nicotine abuse, Diastolic CHF and non compliance who originally presented on 12/14 secondary to shortness of breath  The patient stated to ED staff that his car broke down and he therefore had difficulty obtaining dialysis and the [place] that was set up for him does not go to the dialysis center in [facility] where he has a dialysis chair  Patient complained of SOB, had not received dialysis since the Tuesday PTA (arrival was on Friday)  ED work up revealed a potassium level of 6 2, creatine of 15 6, calcium of 7 9 and sodium of 135, as well as hemoglobin of 8 , troponin of 0 07 as well as phos of 9 5  Nephrology was consulted from ED who recommended urgent dialysis on both 12/14 and 12/15, in the interim patient received 1 g calcium gluconate and was also administered insulin for hyperkalemia  Patient did become hypoglycemic in the dialysis chair secondary to the insulin, rapid was called, and patient quickly was administered glucose with resolution of his symptoms  Patient received dialysis again on 12/15  Troponins peaked and were thought to likely be due to NSTEMI II in setting of renal failure as patient had no chest pain and SOB resolved with dialysis, nephrology had recommended starting patient on Sevelamer as patient had elevated phosphorus and admitted to not taking his phos-lo  Patient otherwise was hemodynamically stable and denied any symptoms on day of discharge  Case management was consulted to help with patient getting a dialysis chair in [place], [place] so that he will not require urgent hospitalization for dialysis in the future, patient was also instructed to contact his dialysis center at [facility] in [facility] to also help facilitate this   The next day patient's labs improved and revealed a normokalemic state, sodium was 134, CBC was stable with patient's chronic anemia, patient was asymptomatic SOB resolved, patient was given appropriate follow up instructions, an RX for Sevelamer was provided to the patient, and patient was discharged in stable condition  Presenting Problem/History of Present Illness  Principal Problem (Resolved): Volume overload  Active Problems:    Secondary hyperparathyroidism of renal origin ([facility])    Anemia in chronic kidney disease, on chronic dialysis ([facility])    ESRD on hemodialysis ([facility])    Nicotine abuse    High anion gap metabolic acidosis    Hypoglycemia    Paroxysmal A-fib ([facility])    Diastolic heart failure ([facility])    Non-compliance  Resolved Problems:    Hyperkalemia    Elevated troponin        Other Pertinent Test Results  None    Discharge Condition: stable      Discharge  Statement   I spent 30 minutes minutes discharging the patient  This time was spent on the day of discharge  I had direct contact with the patient on the day of discharge  Additional documentation is required if more than 30 minutes were spent on discharge

## 2018-12-17 LAB
ATRIAL RATE: 83 BPM
P AXIS: 69 DEGREES
PR INTERVAL: 182 MS
QRS AXIS: 75 DEGREES
QRSD INTERVAL: 94 MS
QT INTERVAL: 402 MS
QTC INTERVAL: 472 MS
T WAVE AXIS: 52 DEGREES
VENTRICULAR RATE: 83 BPM

## 2018-12-17 PROCEDURE — 93010 ELECTROCARDIOGRAM REPORT: CPT | Performed by: INTERNAL MEDICINE

## 2018-12-18 ENCOUNTER — HOSPITAL ENCOUNTER (OUTPATIENT)
Facility: HOSPITAL | Age: 58
Setting detail: OBSERVATION
Discharge: HOME | End: 2018-12-19
Attending: EMERGENCY MEDICINE | Admitting: HOSPITALIST
Payer: MEDICARE

## 2018-12-18 ENCOUNTER — HOSPITAL ENCOUNTER (EMERGENCY)
Facility: HOSPITAL | Age: 58
Discharge: LEFT BEFORE DISCHARGE | End: 2018-12-18
Payer: MEDICARE

## 2018-12-18 ENCOUNTER — APPOINTMENT (EMERGENCY)
Dept: RADIOLOGY | Facility: HOSPITAL | Age: 58
End: 2018-12-18
Payer: MEDICARE

## 2018-12-18 VITALS
DIASTOLIC BLOOD PRESSURE: 84 MMHG | SYSTOLIC BLOOD PRESSURE: 165 MMHG | TEMPERATURE: 96.5 F | WEIGHT: 157 LBS | BODY MASS INDEX: 25.34 KG/M2 | OXYGEN SATURATION: 97 % | HEART RATE: 83 BPM | RESPIRATION RATE: 24 BRPM

## 2018-12-18 DIAGNOSIS — D63.1 ANEMIA IN CHRONIC KIDNEY DISEASE, ON CHRONIC DIALYSIS (HCC): ICD-10-CM

## 2018-12-18 DIAGNOSIS — I77.0 ANEURYSM OF ARTERIOVENOUS FISTULA (HCC): ICD-10-CM

## 2018-12-18 DIAGNOSIS — E87.5 HYPERKALEMIA: ICD-10-CM

## 2018-12-18 DIAGNOSIS — Z99.2 ANEMIA IN CHRONIC KIDNEY DISEASE, ON CHRONIC DIALYSIS (HCC): ICD-10-CM

## 2018-12-18 DIAGNOSIS — I48.0 PAROXYSMAL A-FIB (HCC): Chronic | ICD-10-CM

## 2018-12-18 DIAGNOSIS — Z99.2 ESRD ON HEMODIALYSIS (HCC): Primary | ICD-10-CM

## 2018-12-18 DIAGNOSIS — N18.6 ANEMIA IN CHRONIC KIDNEY DISEASE, ON CHRONIC DIALYSIS (HCC): ICD-10-CM

## 2018-12-18 DIAGNOSIS — Z91.19 NON-COMPLIANCE: Chronic | ICD-10-CM

## 2018-12-18 DIAGNOSIS — N18.6 ESRD ON HEMODIALYSIS (HCC): Primary | ICD-10-CM

## 2018-12-18 LAB
ALBUMIN SERPL BCP-MCNC: 3.1 G/DL (ref 3.5–5)
ALP SERPL-CCNC: 117 U/L (ref 46–116)
ALT SERPL W P-5'-P-CCNC: 19 U/L (ref 12–78)
ANION GAP SERPL CALCULATED.3IONS-SCNC: 13 MMOL/L (ref 4–13)
AST SERPL W P-5'-P-CCNC: 25 U/L (ref 5–45)
ATRIAL RATE: 83 BPM
BASOPHILS # BLD AUTO: 0.07 THOUSANDS/ΜL (ref 0–0.1)
BASOPHILS # BLD AUTO: 0.09 THOUSANDS/ΜL (ref 0–0.1)
BASOPHILS NFR BLD AUTO: 1 % (ref 0–1)
BASOPHILS NFR BLD AUTO: 1 % (ref 0–1)
BILIRUB SERPL-MCNC: 0.42 MG/DL (ref 0.2–1)
BUN SERPL-MCNC: 81 MG/DL (ref 5–25)
CALCIUM SERPL-MCNC: 8.7 MG/DL (ref 8.3–10.1)
CHLORIDE SERPL-SCNC: 98 MMOL/L (ref 100–108)
CO2 SERPL-SCNC: 23 MMOL/L (ref 21–32)
CREAT SERPL-MCNC: 13 MG/DL (ref 0.6–1.3)
EOSINOPHIL # BLD AUTO: 0.45 THOUSAND/ΜL (ref 0–0.61)
EOSINOPHIL # BLD AUTO: 0.49 THOUSAND/ΜL (ref 0–0.61)
EOSINOPHIL NFR BLD AUTO: 7 % (ref 0–6)
EOSINOPHIL NFR BLD AUTO: 8 % (ref 0–6)
ERYTHROCYTE [DISTWIDTH] IN BLOOD BY AUTOMATED COUNT: 18.6 % (ref 11.6–15.1)
ERYTHROCYTE [DISTWIDTH] IN BLOOD BY AUTOMATED COUNT: 18.6 % (ref 11.6–15.1)
GFR SERPL CREATININE-BSD FRML MDRD: 4 ML/MIN/1.73SQ M
GLUCOSE SERPL-MCNC: 71 MG/DL (ref 65–140)
HCT VFR BLD AUTO: 29.2 % (ref 36.5–49.3)
HCT VFR BLD AUTO: 30.5 % (ref 36.5–49.3)
HGB BLD-MCNC: 9.1 G/DL (ref 12–17)
HGB BLD-MCNC: 9.4 G/DL (ref 12–17)
IMM GRANULOCYTES # BLD AUTO: 0.01 THOUSAND/UL (ref 0–0.2)
IMM GRANULOCYTES # BLD AUTO: 0.01 THOUSAND/UL (ref 0–0.2)
IMM GRANULOCYTES NFR BLD AUTO: 0 % (ref 0–2)
IMM GRANULOCYTES NFR BLD AUTO: 0 % (ref 0–2)
LYMPHOCYTES # BLD AUTO: 1.01 THOUSANDS/ΜL (ref 0.6–4.47)
LYMPHOCYTES # BLD AUTO: 1.03 THOUSANDS/ΜL (ref 0.6–4.47)
LYMPHOCYTES NFR BLD AUTO: 16 % (ref 14–44)
LYMPHOCYTES NFR BLD AUTO: 16 % (ref 14–44)
MCH RBC QN AUTO: 27 PG (ref 26.8–34.3)
MCH RBC QN AUTO: 27.3 PG (ref 26.8–34.3)
MCHC RBC AUTO-ENTMCNC: 30.8 G/DL (ref 31.4–37.4)
MCHC RBC AUTO-ENTMCNC: 31.2 G/DL (ref 31.4–37.4)
MCV RBC AUTO: 88 FL (ref 82–98)
MCV RBC AUTO: 88 FL (ref 82–98)
MONOCYTES # BLD AUTO: 0.55 THOUSAND/ΜL (ref 0.17–1.22)
MONOCYTES # BLD AUTO: 0.65 THOUSAND/ΜL (ref 0.17–1.22)
MONOCYTES NFR BLD AUTO: 10 % (ref 4–12)
MONOCYTES NFR BLD AUTO: 9 % (ref 4–12)
NEUTROPHILS # BLD AUTO: 4.05 THOUSANDS/ΜL (ref 1.85–7.62)
NEUTROPHILS # BLD AUTO: 4.11 THOUSANDS/ΜL (ref 1.85–7.62)
NEUTS SEG NFR BLD AUTO: 65 % (ref 43–75)
NEUTS SEG NFR BLD AUTO: 67 % (ref 43–75)
NRBC BLD AUTO-RTO: 0 /100 WBCS
NRBC BLD AUTO-RTO: 0 /100 WBCS
P AXIS: 70 DEGREES
PLATELET # BLD AUTO: 330 THOUSANDS/UL (ref 149–390)
PLATELET # BLD AUTO: 355 THOUSANDS/UL (ref 149–390)
PMV BLD AUTO: 9.3 FL (ref 8.9–12.7)
PMV BLD AUTO: 9.3 FL (ref 8.9–12.7)
POTASSIUM SERPL-SCNC: 5.5 MMOL/L (ref 3.5–5.3)
PR INTERVAL: 178 MS
PROT SERPL-MCNC: 7.8 G/DL (ref 6.4–8.2)
QRS AXIS: 77 DEGREES
QRSD INTERVAL: 92 MS
QT INTERVAL: 402 MS
QTC INTERVAL: 472 MS
RBC # BLD AUTO: 3.33 MILLION/UL (ref 3.88–5.62)
RBC # BLD AUTO: 3.48 MILLION/UL (ref 3.88–5.62)
SODIUM SERPL-SCNC: 134 MMOL/L (ref 136–145)
T WAVE AXIS: 73 DEGREES
TROPONIN I SERPL-MCNC: 0.08 NG/ML
TROPONIN I SERPL-MCNC: 0.08 NG/ML
VENTRICULAR RATE: 83 BPM
WBC # BLD AUTO: 6.22 THOUSAND/UL (ref 4.31–10.16)
WBC # BLD AUTO: 6.3 THOUSAND/UL (ref 4.31–10.16)

## 2018-12-18 PROCEDURE — 36415 COLL VENOUS BLD VENIPUNCTURE: CPT

## 2018-12-18 PROCEDURE — 85025 COMPLETE CBC W/AUTO DIFF WBC: CPT | Performed by: EMERGENCY MEDICINE

## 2018-12-18 PROCEDURE — 93010 ELECTROCARDIOGRAM REPORT: CPT | Performed by: INTERNAL MEDICINE

## 2018-12-18 PROCEDURE — 84484 ASSAY OF TROPONIN QUANT: CPT

## 2018-12-18 PROCEDURE — 99285 EMERGENCY DEPT VISIT HI MDM: CPT

## 2018-12-18 PROCEDURE — 85025 COMPLETE CBC W/AUTO DIFF WBC: CPT

## 2018-12-18 PROCEDURE — 80053 COMPREHEN METABOLIC PANEL: CPT

## 2018-12-18 PROCEDURE — 80053 COMPREHEN METABOLIC PANEL: CPT | Performed by: EMERGENCY MEDICINE

## 2018-12-18 PROCEDURE — 71046 X-RAY EXAM CHEST 2 VIEWS: CPT

## 2018-12-18 PROCEDURE — 93005 ELECTROCARDIOGRAM TRACING: CPT

## 2018-12-18 PROCEDURE — 84484 ASSAY OF TROPONIN QUANT: CPT | Performed by: EMERGENCY MEDICINE

## 2018-12-18 NOTE — ED NOTES
Patient states he wants to leave to sleep before he has to work tonight  This RN explained the risks of leaving before treatment  Patient states he understands and will return if he gets worse       Annalise Camarillo RN  12/18/18 6750

## 2018-12-18 NOTE — LETTER
[address]  [address] [place], [place] [address]  Dept: [phone]    December 19, 2018     Patient: Nikita Luther   YOB: 1960   Date of Visit: 12/18/2018       To Whom it May Concern:    Nikita Luther is under my professional care  He was seen in the hospital from 12/18/2018   to 12/19/18  He may return to work on 12/19/18  If you have any questions or concerns, please don't hesitate to call           Sincerely,          Nirmal Hernandez, DO

## 2018-12-18 NOTE — Clinical Note
Case was discussed with SOD and admission status was agreed to be observation status to the service of Dr Elma Anderson

## 2018-12-19 ENCOUNTER — APPOINTMENT (OUTPATIENT)
Dept: DIALYSIS | Facility: HOSPITAL | Age: 58
End: 2018-12-19
Payer: MEDICARE

## 2018-12-19 VITALS
SYSTOLIC BLOOD PRESSURE: 150 MMHG | TEMPERATURE: 96 F | OXYGEN SATURATION: 94 % | BODY MASS INDEX: 25.34 KG/M2 | RESPIRATION RATE: 16 BRPM | DIASTOLIC BLOOD PRESSURE: 67 MMHG | HEART RATE: 83 BPM | WEIGHT: 156.97 LBS

## 2018-12-19 PROBLEM — R06.02 SOB (SHORTNESS OF BREATH): Status: ACTIVE | Noted: 2018-12-19

## 2018-12-19 LAB
ALBUMIN SERPL BCP-MCNC: 3 G/DL (ref 3.5–5)
ALP SERPL-CCNC: 118 U/L (ref 46–116)
ALT SERPL W P-5'-P-CCNC: 17 U/L (ref 12–78)
ANION GAP SERPL CALCULATED.3IONS-SCNC: 14 MMOL/L (ref 4–13)
ANION GAP SERPL CALCULATED.3IONS-SCNC: 15 MMOL/L (ref 4–13)
AST SERPL W P-5'-P-CCNC: 32 U/L (ref 5–45)
ATRIAL RATE: 82 BPM
ATRIAL RATE: 83 BPM
BASOPHILS # BLD AUTO: 0.09 THOUSANDS/ΜL (ref 0–0.1)
BASOPHILS NFR BLD AUTO: 1 % (ref 0–1)
BILIRUB SERPL-MCNC: 0.4 MG/DL (ref 0.2–1)
BUN SERPL-MCNC: 91 MG/DL (ref 5–25)
BUN SERPL-MCNC: 91 MG/DL (ref 5–25)
CALCIUM SERPL-MCNC: 8.9 MG/DL (ref 8.3–10.1)
CALCIUM SERPL-MCNC: 9 MG/DL (ref 8.3–10.1)
CHLORIDE SERPL-SCNC: 98 MMOL/L (ref 100–108)
CHLORIDE SERPL-SCNC: 99 MMOL/L (ref 100–108)
CO2 SERPL-SCNC: 22 MMOL/L (ref 21–32)
CO2 SERPL-SCNC: 24 MMOL/L (ref 21–32)
CREAT SERPL-MCNC: 14 MG/DL (ref 0.6–1.3)
CREAT SERPL-MCNC: 14.4 MG/DL (ref 0.6–1.3)
EOSINOPHIL # BLD AUTO: 0.39 THOUSAND/ΜL (ref 0–0.61)
EOSINOPHIL NFR BLD AUTO: 6 % (ref 0–6)
ERYTHROCYTE [DISTWIDTH] IN BLOOD BY AUTOMATED COUNT: 18.5 % (ref 11.6–15.1)
GFR SERPL CREATININE-BSD FRML MDRD: 4 ML/MIN/1.73SQ M
GFR SERPL CREATININE-BSD FRML MDRD: 4 ML/MIN/1.73SQ M
GLUCOSE SERPL-MCNC: 104 MG/DL (ref 65–140)
GLUCOSE SERPL-MCNC: 118 MG/DL (ref 65–140)
GLUCOSE SERPL-MCNC: 164 MG/DL (ref 65–140)
GLUCOSE SERPL-MCNC: 39 MG/DL (ref 65–140)
GLUCOSE SERPL-MCNC: 97 MG/DL (ref 65–140)
HCT VFR BLD AUTO: 29.8 % (ref 36.5–49.3)
HGB BLD-MCNC: 9 G/DL (ref 12–17)
IMM GRANULOCYTES # BLD AUTO: 0.03 THOUSAND/UL (ref 0–0.2)
IMM GRANULOCYTES NFR BLD AUTO: 1 % (ref 0–2)
LYMPHOCYTES # BLD AUTO: 0.77 THOUSANDS/ΜL (ref 0.6–4.47)
LYMPHOCYTES NFR BLD AUTO: 12 % (ref 14–44)
MCH RBC QN AUTO: 26.5 PG (ref 26.8–34.3)
MCHC RBC AUTO-ENTMCNC: 30.2 G/DL (ref 31.4–37.4)
MCV RBC AUTO: 88 FL (ref 82–98)
MONOCYTES # BLD AUTO: 0.64 THOUSAND/ΜL (ref 0.17–1.22)
MONOCYTES NFR BLD AUTO: 10 % (ref 4–12)
NEUTROPHILS # BLD AUTO: 4.64 THOUSANDS/ΜL (ref 1.85–7.62)
NEUTS SEG NFR BLD AUTO: 70 % (ref 43–75)
NRBC BLD AUTO-RTO: 0 /100 WBCS
P AXIS: 69 DEGREES
P AXIS: 75 DEGREES
PLATELET # BLD AUTO: 383 THOUSANDS/UL (ref 149–390)
PMV BLD AUTO: 10.6 FL (ref 8.9–12.7)
POTASSIUM SERPL-SCNC: 5.4 MMOL/L (ref 3.5–5.3)
POTASSIUM SERPL-SCNC: 6 MMOL/L (ref 3.5–5.3)
PR INTERVAL: 174 MS
PR INTERVAL: 176 MS
PROT SERPL-MCNC: 7.6 G/DL (ref 6.4–8.2)
QRS AXIS: 68 DEGREES
QRS AXIS: 76 DEGREES
QRSD INTERVAL: 90 MS
QRSD INTERVAL: 92 MS
QT INTERVAL: 384 MS
QT INTERVAL: 384 MS
QTC INTERVAL: 448 MS
QTC INTERVAL: 451 MS
RBC # BLD AUTO: 3.39 MILLION/UL (ref 3.88–5.62)
SODIUM SERPL-SCNC: 135 MMOL/L (ref 136–145)
SODIUM SERPL-SCNC: 137 MMOL/L (ref 136–145)
T WAVE AXIS: 64 DEGREES
T WAVE AXIS: 66 DEGREES
VENTRICULAR RATE: 82 BPM
VENTRICULAR RATE: 83 BPM
WBC # BLD AUTO: 6.56 THOUSAND/UL (ref 4.31–10.16)

## 2018-12-19 PROCEDURE — 36415 COLL VENOUS BLD VENIPUNCTURE: CPT | Performed by: INTERNAL MEDICINE

## 2018-12-19 PROCEDURE — 82948 REAGENT STRIP/BLOOD GLUCOSE: CPT

## 2018-12-19 PROCEDURE — 85025 COMPLETE CBC W/AUTO DIFF WBC: CPT | Performed by: INTERNAL MEDICINE

## 2018-12-19 PROCEDURE — 93010 ELECTROCARDIOGRAM REPORT: CPT | Performed by: INTERNAL MEDICINE

## 2018-12-19 PROCEDURE — 93005 ELECTROCARDIOGRAM TRACING: CPT

## 2018-12-19 PROCEDURE — 99214 OFFICE O/P EST MOD 30 MIN: CPT | Performed by: INTERNAL MEDICINE

## 2018-12-19 PROCEDURE — 80048 BASIC METABOLIC PNL TOTAL CA: CPT | Performed by: INTERNAL MEDICINE

## 2018-12-19 PROCEDURE — 90935 HEMODIALYSIS ONE EVALUATION: CPT | Performed by: INTERNAL MEDICINE

## 2018-12-19 PROCEDURE — 90935 HEMODIALYSIS ONE EVALUATION: CPT

## 2018-12-19 PROCEDURE — 99220 PR INITIAL OBSERVATION CARE/DAY 70 MINUTES: CPT | Performed by: HOSPITALIST

## 2018-12-19 RX ORDER — ASPIRIN 325 MG
325 TABLET ORAL DAILY
Qty: 30 TABLET | Refills: 0 | Status: SHIPPED | OUTPATIENT
Start: 2018-12-19 | End: 2019-04-16

## 2018-12-19 RX ORDER — SEVELAMER HYDROCHLORIDE 800 MG/1
800 TABLET, FILM COATED ORAL
Status: DISCONTINUED | OUTPATIENT
Start: 2018-12-19 | End: 2018-12-19 | Stop reason: HOSPADM

## 2018-12-19 RX ORDER — ASPIRIN 325 MG
325 TABLET ORAL DAILY
Status: DISCONTINUED | OUTPATIENT
Start: 2018-12-19 | End: 2018-12-19 | Stop reason: HOSPADM

## 2018-12-19 RX ORDER — DEXTROSE MONOHYDRATE 25 G/50ML
50 INJECTION, SOLUTION INTRAVENOUS ONCE
Status: COMPLETED | OUTPATIENT
Start: 2018-12-19 | End: 2018-12-19

## 2018-12-19 RX ORDER — GUAIFENESIN/DEXTROMETHORPHAN 100-10MG/5
10 SYRUP ORAL EVERY 6 HOURS PRN
Status: DISCONTINUED | OUTPATIENT
Start: 2018-12-19 | End: 2018-12-19 | Stop reason: HOSPADM

## 2018-12-19 RX ORDER — FUROSEMIDE 10 MG/ML
40 INJECTION INTRAMUSCULAR; INTRAVENOUS ONCE
Status: COMPLETED | OUTPATIENT
Start: 2018-12-19 | End: 2018-12-19

## 2018-12-19 RX ORDER — DEXTROSE MONOHYDRATE 25 G/50ML
25 INJECTION, SOLUTION INTRAVENOUS ONCE
Status: COMPLETED | OUTPATIENT
Start: 2018-12-19 | End: 2018-12-19

## 2018-12-19 RX ORDER — DOXERCALCIFEROL 2 UG/ML
INJECTION, SOLUTION INTRAVENOUS
Status: COMPLETED
Start: 2018-12-19 | End: 2018-12-19

## 2018-12-19 RX ORDER — AMLODIPINE BESYLATE 10 MG/1
10 TABLET ORAL DAILY
Status: DISCONTINUED | OUTPATIENT
Start: 2018-12-19 | End: 2018-12-19 | Stop reason: HOSPADM

## 2018-12-19 RX ORDER — GABAPENTIN 300 MG/1
300 CAPSULE ORAL DAILY
Status: DISCONTINUED | OUTPATIENT
Start: 2018-12-19 | End: 2018-12-19 | Stop reason: HOSPADM

## 2018-12-19 RX ORDER — CINACALCET 30 MG/1
30 TABLET, FILM COATED ORAL
Status: DISCONTINUED | OUTPATIENT
Start: 2018-12-19 | End: 2018-12-19

## 2018-12-19 RX ORDER — CINACALCET 30 MG/1
30 TABLET, FILM COATED ORAL 3 TIMES WEEKLY
Status: DISCONTINUED | OUTPATIENT
Start: 2018-12-21 | End: 2018-12-19 | Stop reason: HOSPADM

## 2018-12-19 RX ORDER — ACETAMINOPHEN 325 MG/1
650 TABLET ORAL EVERY 8 HOURS PRN
Status: DISCONTINUED | OUTPATIENT
Start: 2018-12-19 | End: 2018-12-19 | Stop reason: HOSPADM

## 2018-12-19 RX ORDER — HEPARIN SODIUM 5000 [USP'U]/ML
5000 INJECTION, SOLUTION INTRAVENOUS; SUBCUTANEOUS EVERY 8 HOURS SCHEDULED
Status: DISCONTINUED | OUTPATIENT
Start: 2018-12-19 | End: 2018-12-19 | Stop reason: HOSPADM

## 2018-12-19 RX ADMIN — AMLODIPINE BESYLATE 10 MG: 10 TABLET ORAL at 14:35

## 2018-12-19 RX ADMIN — CALCIUM GLUCONATE 1 G: 98 INJECTION, SOLUTION INTRAVENOUS at 01:40

## 2018-12-19 RX ADMIN — NICOTINE 1 PATCH: 7 PATCH TRANSDERMAL at 14:38

## 2018-12-19 RX ADMIN — INSULIN HUMAN 10 UNITS: 100 INJECTION, SOLUTION PARENTERAL at 01:33

## 2018-12-19 RX ADMIN — RENAGEL 800 MG: 800 TABLET ORAL at 14:38

## 2018-12-19 RX ADMIN — ASPIRIN 325 MG: 325 TABLET ORAL at 14:37

## 2018-12-19 RX ADMIN — METOPROLOL TARTRATE 12.5 MG: 25 TABLET ORAL at 01:29

## 2018-12-19 RX ADMIN — DOXERCALCIFEROL 1 MCG: 4 INJECTION, SOLUTION INTRAVENOUS at 11:02

## 2018-12-19 RX ADMIN — DEXTROSE 50 % IN WATER (D50W) INTRAVENOUS SYRINGE 50 ML: at 03:32

## 2018-12-19 RX ADMIN — ACETAMINOPHEN 650 MG: 325 TABLET ORAL at 06:54

## 2018-12-19 RX ADMIN — FUROSEMIDE 40 MG: 10 INJECTION, SOLUTION INTRAMUSCULAR; INTRAVENOUS at 05:44

## 2018-12-19 RX ADMIN — GABAPENTIN 300 MG: 300 CAPSULE ORAL at 14:35

## 2018-12-19 RX ADMIN — DEXTROSE 50 % IN WATER (D50W) INTRAVENOUS SYRINGE 25 ML: at 01:30

## 2018-12-19 RX ADMIN — ACETAMINOPHEN 650 MG: 325 TABLET ORAL at 14:35

## 2018-12-19 NOTE — DISCHARGE INSTRUCTIONS
Please follow up at the [facility] in [place] on Wednesday January 2nd at 1:00 p m  with Dr Marcus Trotter  We have provided you with a script for a Holter monitor  This is a wearable device that will monitor your heart rhythm over a short period of time and can be checked at a later date  Please call the number we have provided you with to obtain your Holter Monitor  We have increased your dose of metoprolol tartrate to 25 mg twice daily  Please began taking this new dose  Please start taking aspirin 325 mg daily  If you suddenly develop bloody stools or black, tarry stools or feel dizzy or lightheaded please stop taking aspirin and present to the nearest ED  End Stage Kidney Disease   WHAT YOU NEED TO KNOW:   End-stage kidney disease (ESRD) is when your kidney function is so poor that you need dialysis treatments or a kidney transplant to survive  ESRD usually occurs after long-term kidney disease  DISCHARGE INSTRUCTIONS:   Return to the emergency department if:   · You have shortness of breath or chest pain  · You have a rash, or a new wound that is very painful  · You have severe muscle cramps or pain  · Your heart is beating faster than normal for you  Contact your healthcare provider if:   · You urinate less than is normal for you  · You gain or lose more weight than your healthcare provider told you is okay  · You are more tired or drowsy  · You have increased nausea or vomiting  · You have pain that does not decrease, even after you take medicine  · You have questions or concerns about your condition or care  Medicines:   · Medicines  are given to decrease blood pressure, pain, or itching  You may also need medicine to decrease nausea, or to treat or prevent anemia (low number of red blood cells)  · Take your medicine as directed  Contact your healthcare provider if you think your medicine is not helping or if you have side effects  Tell him or her if you are allergic to any medicine  Keep a list of the medicines, vitamins, and herbs you take  Include the amounts, and when and why you take them  Bring the list or the pill bottles to follow-up visits  Carry your medicine list with you in case of an emergency  Follow up with your healthcare provider as directed: You will need to return for more tests  You may also need to return for routine dialysis treatments  Write down your questions so you remember to ask them during your visits  Manage ESRD:   · Protect your dialysis access site  Do not let anyone take blood or blood pressure readings on the arm where you have your arteriovenous fistula or graft  Cover your peritoneal catheter with a bandage  Do not touch the catheter  · Limit fluids to 1 liter a day (about 34 ounces) , or as directed by your healthcare provider  This can help you manage swelling between dialysis appointments  · Weigh yourself at the same time every day  Use the same scale, and wear the same amount of clothing  Record your weight and bring it with you to follow-up appointments  · Do not use NSAIDs or aspirin  They can increase the risk of bleeding in your stomach  Self-care:   · Manage other health conditions,  such as high blood pressure, diabetes, and heart disease  These conditions can make your ESRD worse  · Eat foods low in sodium, phosphorus, and potassium as directed  You may also need to eat foods high in protein  You may need to see a dietitian if you need help planning meals  · Maintain a healthy weight  Ask your healthcare provider how much you should weigh  Ask him to help you create a weight loss plan if you are overweight  · Exercise as directed  Regular exercise can help you manage conditions that occur with ESRD, such as high blood pressure and diabetes  Exercise may give you more energy and decrease constipation  Ask about the best exercise plan for you  · Limit alcohol  Ask how much alcohol is safe for you to drink  A drink of alcohol is 12 ounces of beer, 5 ounces of wine, or 1½ ounces of liquor  · Do not smoke  Nicotine and other chemicals in cigarettes and cigars can cause lung and kidney damage  Ask your healthcare provider for information if you currently smoke and need help to quit  E-cigarettes or smokeless tobacco still contain nicotine  Talk to your healthcare provider before you use these products  · Ask your healthcare provider if you need vaccines  Pneumonia, influenza, and hepatitis can be more harmful or more likely to occur when you have ESRD  Vaccines reduce your risk of infection with these viruses  © 2017 [facility] Information is for End User's use only and may not be sold, redistributed or otherwise used for commercial purposes  All illustrations and images included in CareNotes® are the copyrighted property of A D A M , Inc  or Michele Sr  The above information is an  only  It is not intended as medical advice for individual conditions or treatments  Talk to your doctor, nurse or pharmacist before following any medical regimen to see if it is safe and effective for you

## 2018-12-19 NOTE — ED ATTENDING ATTESTATION
Sadiq Perkins DO, saw and evaluated the patient  I have discussed the patient with the resident/non-physician practitioner and agree with the resident's/non-physician practitioner's findings, Plan of Care, and MDM as documented in the resident's/non-physician practitioner's note, except where noted  All available labs and Radiology studies were reviewed  At this point I agree with the current assessment done in the Emergency Department  I have conducted an independent evaluation of this patient a history and physical is as follows:    58M with ESRD reprots SOB and missing dialysis  Last dialysis 12/14/18  Past Medical History:   Diagnosis Date    [facility]     Dialysis patient ([facility]     Hypertension     Limb alert care status     do not use left arm    Rectal bleeding     Renal disorder      /67   Pulse 83   Temp (!) 96 °F (35 6 °C) (Tympanic)   Resp 16   Wt 71 2 kg (156 lb 15 5 oz)   SpO2 94%   BMI 25 34 kg/m²   NAD  A&Ox4  Mild basilar rales  No respiratory distress  RRR  Ab soft, NT  Ext NT without peripheral edema    Labs from earlier showed elevated K but he left from [facility] from [address] [place] [address]  Will repeat labs now the 12 hours have some past   Chest x-ray as well  Electrolytes normal and no evidence of fluid overload, will recommend discharge  Should he have any indications for dialysis after evaluation will admit him  Chest x-ray unremarkable  Laboratory evaluation notable for potassium of 6 0  Treatment for hyperkalemia initiated at time of admission  Patient to be admitted and dialyzed  Nephrology notified      DX  ESRD  elevated potassium  Chronically elevated troponin    ESRD on hemodialysis (HCC)   Non-compliance   Hyperkalemia   Anemia in chronic kidney disease, on chronic dialysis ([facility] )   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[facility])         Critical Care Time  The patient presented with a condition in which there was a high probability of imminent or life-threatening deterioration, and critical care services (excluding separately billable procedures) totalled 30-74 minutes          CriticalCare Time  Performed by: Rivera Taylor  Authorized by: Rivera Taylor     Critical care provider statement:     Critical care time was exclusive of:  Separately billable procedures and treating other patients and teaching time    Critical care was necessary to treat or prevent imminent or life-threatening deterioration of the following conditions:  Renal failure    Critical care was time spent personally by me on the following activities:  Obtaining history from patient or surrogate, development of treatment plan with patient or surrogate, discussions with consultants, evaluation of patient's response to treatment, examination of patient, review of old charts, re-evaluation of patient's condition, ordering and review of radiographic studies, ordering and review of laboratory studies and ordering and performing treatments and interventions

## 2018-12-19 NOTE — DISCHARGE SUMMARY
IMR Discharge Summary - Medical Annice Apgar 66 y o  male MRN: [id number]    [facility] ED Room / Bed: ED 25/ED 25 Encounter: [id number]    BRIEF OVERVIEW    Admitting Provider: Leobardo Miller MD  Discharge Provider: Ashwini Hernandez MD  Primary Care Physician at Discharge: Rachel Cyr, [address] [place] [address]  Admission Date: 2018     Discharge Date: No discharge date for patient encounter  Wu Mera is a 67 y/o male with a past medical history of ESRD on HD MWF, atrial fibrillation s/p ablation in 2018 not on anticoagulation, diastolic CHF, hypertension who presented to the ED on 18 with shortness of breath 2/2 volume overload from a missed dialysis session  Pt has a notable hx of noncompliance with dialysis and was recently discharged on 12/15/18 and had not had dialysis since then  Pt is a [place] resident but receives dialysis in [place], [place]  He states that his car had broken down and per the pt the  [facility] would not transport him to [facility] for HD  Case management had been consulted to help the pt get a dialysis chair in [place] during his previous admission so he would not require hospitalization for urgent HD  Pt was instructed at his previous discharge to contact his dialysis center at [facility] in [place] to facilitate his acceptance there though the pt did not do this  Notably on admission the pt's K was elevated at 6 0  The pt received IV insulin and calcium gluconate and his EKG was unremarkable and the pt was monitored on telemetry  On the night of the pt's presentation the pt was noted to have been in Afib with RVR for ~3hrs  A decision was made to monitor the pt in the hospital to determine his Afib burden and maximize his medical therapy and evaluate his bleeding vs  stroke risk  However, the pt refused to stay in the hospital and asked to be discharged upon completion of his dialysis   Pt was counseled at great length about the consequences of uncontrolled atrial fibrillation, including stroke, cardiomyopathy, and angina  Pt expressed his understanding of these risks but stated that he would lose his job if he remained in the hospital for an extended period of time  Given the pt's short stay in the hospital and history of non-compliance, the consideration of antiarrythmic therapy was deferred until the pt can re-establish with more reliable follow up  A decision was made to set the pt up with a holter monitor as an outpatient to assess his afib burden and candidacy for antiarrythmic therapy  Pt's home metoprolol dose was increased from 12 5 to 25 mg bid  Pt was also asked to continue taking daily aspirin  The pt was not started on anticoagulation due to a history of recurrent GI bleed  Given the pt's non-compliance with follow up, he is a poor candidate at this time for warfarin therapy  The pt was ultimately discharged on 12/19/18 with plan to establish care at the [facility] and is tenatively scheduled for dialysis this Saturday as below  Pt has a follow up appointment with Dr Abdirahman Mendez at the [facility] on Wednesday, January 2nd at 1 PM      He is tentatively scheduled for dialysis at [facility] in [place] for Saturday 12/21/18  Pt asked to call facility to confirm appointment details  Presenting Problem/History of Present Illness  Principal Problem:    SOB (shortness of breath)  Active Problems:    Secondary hyperparathyroidism of renal origin ([address] )    ESRD on hemodialysis (HCC)    Nicotine abuse    Paroxysmal A-fib (HCC)    Diastolic heart failure (HCC)    Non-compliance  Resolved Problems:    * No resolved hospital problems   *      Diagnostic Procedures Performed  Imaging Studies:  Chest X-ray - mild pulmonary edema    Pertinent Labs:Potassium 6 0, 5 4 on discharge    Therapeutic Procedures Performed  Hemodialysis 12/19/18    Test Results Pending at Discharge: Follow up Holter monitor results     Medications     Medication List to be Continued at Discharge  Current Discharge Medication List      CONTINUE these medications which have NOT CHANGED    Details   amLODIPine (NORVASC) 10 mg tablet Take 10 mg by mouth daily      benzonatate (TESSALON PERLES) 100 mg capsule Take 1 capsule (100 mg total) by mouth every 8 (eight) hours  Qty: 21 capsule, Refills: 0    Associated Diagnoses: Cough      cinacalcet (SENSIPAR) 30 mg tablet 30 mg      dextromethorphan-guaiFENesin (ROBITUSSIN DM)  mg/5 mL syrup Take 10 mL by mouth every 6 (six) hours as needed for cough or congestion  Qty: 118 mL, Refills: 0    Associated Diagnoses: Cough      gabapentin (NEURONTIN) 100 mg capsule Take 300 mg by mouth daily        metoprolol tartrate (LOPRESSOR) 25 mg tablet Take 0 5 tablets (12 5 mg total) by mouth every 12 (twelve) hours  Qty: 30 tablet, Refills: 0    Associated Diagnoses: GI bleed      sevelamer (RENAGEL) 800 mg tablet Take 1 tablet (800 mg total) by mouth 3 (three) times a day with meals  Qty: 44 tablet, Refills: 0    Associated Diagnoses: ESRD (end stage renal disease) ([facility] )           Current Discharge Medication List        Current Discharge Medication List          Allergies  No Known Allergies  Discharge Diet: renal diet  Activity restrictions: none  Discharge Condition: stable  Discharge Disposition: Home/Self Care    Code Status: Level 1 - Full Code  Advance Directive and Living Will: <no information>  Power of :    POLST:      Discharge  Statement   I spent 30 minutes minutes discharging the patient  This time was spent on the day of discharge  I had direct contact with the patient on the day of discharge  Additional documentation is required if more than 30 minutes were spent on discharge       Parisa Dunlap  PGY-1 Internal Medicine

## 2018-12-19 NOTE — SOCIAL WORK
CM consulted to assist with setting up dialysis for Pt  Pt contacted [facility] [phone] and spoke to admissions [phone] who reported Pt was last seen at [facility] on 11/30 and it shows that he is still open with them  She also reported a transfer was initiated in June, but Pt was denied by the medical director and therefore, no [facility] in the area will take him  Pt will either need to stay at [facility] or find a different provider  CM to speak with Pt regarding options  Pt is currently not in the room  CM will f/u when Pt returns

## 2018-12-19 NOTE — ED PROVIDER NOTES
History  Chief Complaint   Patient presents with    Shortness of Breath     pt states he having shortness of breath x 2 days  Pt states he missed dialysis, and hasnt had it since last night  Pt denies CP  Pt checked in earlier today, but left due to the long wait  82-year-old male past medical history of ESRD on dialysis Monday, Wednesday, and Friday; noncompliance with hemodialysis; diastolic CHF; and hypertension who is presenting with shortness of breath  Patient reports that he has not had dialysis since his discharge from this facility on December 16th  He feels as though he is full of fluid    Patient has some shortness of breath at rest but states that he has worsening of his shortness of breath on exertion and when he is supine  Patient reports bilateral peripheral edema  Patient denies any chest pain  No nausea, vomiting, or abdominal pain  Patient does not produce any urine  Patient does not have any history of known coronary artery disease  No other concerns on review of systems  Of note, patient has had 2 recent admissions to this facility secondary to noncompliance with hemodialysis  Patient claims that he does not have transportation to a dialysis facility in [facility]  Assessment and plan:  82-year-old male past medical history of ESRD and noncompliance with hemodialysis who is presenting with apparent volume overload  Patient complains of worsening shortness of breath and orthopnea  Patient is hypoxic and has bibasilar rales on examination  Will obtain cardiac workup and check chest x-ray  We will discuss with Nephrology  Prior to Admission Medications   Prescriptions Last Dose Informant Patient Reported? Taking?    amLODIPine (NORVASC) 10 mg tablet 12/18/2018 at Unknown time  Yes Yes   Sig: Take 10 mg by mouth daily   benzonatate (TESSALON PERLES) 100 mg capsule 12/18/2018 at Unknown time  No Yes   Sig: Take 1 capsule (100 mg total) by mouth every 8 (eight) hours cinacalcet (SENSIPAR) 30 mg tablet 2018 at Unknown time  Yes Yes   Si mg   dextromethorphan-guaiFENesin (ROBITUSSIN DM)  mg/5 mL syrup 2018 at Unknown time  No Yes   Sig: Take 10 mL by mouth every 6 (six) hours as needed for cough or congestion   gabapentin (NEURONTIN) 100 mg capsule 2018 at Unknown time  Yes Yes   Sig: Take 300 mg by mouth daily     metoprolol tartrate (LOPRESSOR) 25 mg tablet 2018 at Unknown time  No Yes   Sig: Take 0 5 tablets (12 5 mg total) by mouth every 12 (twelve) hours   sevelamer (RENAGEL) 800 mg tablet 2018 at Unknown time  No Yes   Sig: Take 1 tablet (800 mg total) by mouth 3 (three) times a day with meals      Facility-Administered Medications: None       Past Medical History:   Diagnosis Date    CHF (congestive heart failure) ([facility] )     Dialysis patient ([facility] )     Hypertension     Limb alert care status     do not use left arm    Rectal bleeding     Renal disorder        Past Surgical History:   Procedure Laterality Date    AV FISTULA PLACEMENT      left arm    AV NODE ABLATION      CHOLECYSTECTOMY      COLONOSCOPY N/A 2018    Procedure: COLONOSCOPY;  Surgeon: Tresa Taylor DO;  [facility]: [facility]; Service: Gastroenterology    EGD AND COLONOSCOPY N/A 2018    Procedure: EGD AND COLONOSCOPY;  Surgeon: Omar Olivas MD;  Location: [facility]; Service: Gastroenterology       No family history on file  I have reviewed and agree with the history as documented  Social History   Substance Use Topics    Smoking status: Current Every Day Smoker     Packs/day: 1 00     Types: Cigarettes    Smokeless tobacco: Never Used    Alcohol use No        Review of Systems   Constitutional: Negative for diaphoresis, fever and unexpected weight change  HENT: Negative for congestion, rhinorrhea and sore throat  Eyes: Negative for pain, discharge and visual disturbance  Respiratory: Positive for shortness of breath   Negative for cough and wheezing  Cardiovascular: Positive for leg swelling  Negative for chest pain and palpitations  Gastrointestinal: Negative for abdominal pain, blood in stool, constipation, diarrhea, nausea and vomiting  Genitourinary: Negative for dysuria, flank pain and hematuria  Musculoskeletal: Negative for arthralgias and myalgias  Skin: Negative for rash and wound  Allergic/Immunologic: Negative for environmental allergies and food allergies  Neurological: Negative for dizziness, seizures, weakness and numbness  Hematological: Negative for adenopathy  Psychiatric/Behavioral: Negative for confusion and hallucinations  Physical Exam  ED Triage Vitals [12/18/18 1926]   Temperature Pulse Respirations Blood Pressure SpO2   (!) 97 1 °F (36 2 °C) 80 (!) 24 (!) 177/86 93 %      Temp Source Heart Rate Source Patient Position - Orthostatic VS BP Location FiO2 (%)   Tympanic Monitor Sitting Right arm --      Pain Score       No Pain           Orthostatic Vital Signs  Vitals:    12/19/18 0950 12/19/18 0952 12/19/18 1000 12/19/18 1030   BP: 160/84 155/74 156/79 (!) 175/92   Pulse: 78 86 75 78   Patient Position - Orthostatic VS:           Physical Exam   Constitutional: He is oriented to person, place, and time  He appears well-developed and well-nourished  HENT:   Head: Normocephalic and atraumatic  Right Ear: External ear normal    Left Ear: External ear normal    Nose: Nose normal    Eyes: Pupils are equal, round, and reactive to light  EOM are normal    Cardiovascular: Normal rate, regular rhythm and normal heart sounds  No murmur heard  AV fistula in left upper arm with several aneurysmal segments  Fistula has palpable thrill  Pulmonary/Chest: Effort normal  No respiratory distress  He has no wheezes  He has rales  Rales at the bilateral lung bases  Abdominal: Soft  Bowel sounds are normal  He exhibits no distension  There is no tenderness  There is no guarding     Musculoskeletal: Normal range of motion  He exhibits edema  He exhibits no deformity  Mild bilateral peripheral edema  Neurological: He is alert and oriented to person, place, and time  No gross motor deficits noted  Cranial nerves II-XII are intact  Speech is fluent without dysarthria or aphasia  Skin: Skin is warm and dry  He is not diaphoretic  Psychiatric: He has a normal mood and affect  His behavior is normal  Judgment and thought content normal    Nursing note and vitals reviewed        ED Medications  Medications   amLODIPine (NORVASC) tablet 10 mg (not administered)   dextromethorphan-guaiFENesin (ROBITUSSIN DM)  mg/5 mL oral syrup 10 mL (not administered)   gabapentin (NEURONTIN) capsule 300 mg (not administered)   sevelamer (RENAGEL) tablet 800 mg (not administered)   nicotine (NICODERM CQ) 7 mg/24hr TD 24 hr patch 1 patch (not administered)   heparin (porcine) subcutaneous injection 5,000 Units (5,000 Units Subcutaneous Not Given 12/19/18 0519)   acetaminophen (TYLENOL) tablet 650 mg (650 mg Oral Given 12/19/18 0654)   metoprolol tartrate (LOPRESSOR) tablet 25 mg (not administered)   doxercalciferol (HECTOROL) injection 1 mcg (not administered)   cinacalcet (SENSIPAR) tablet 30 mg (not administered)   calcium gluconate 1 g in sodium chloride 0 9 % 100 mL IVPB (0 g Intravenous Stopped 12/19/18 0210)   dextrose 50 % IV solution 25 mL (25 mL Intravenous Given 12/19/18 0130)   insulin regular (HumuLIN R,NovoLIN R) injection 10 Units (10 Units Intravenous Given 12/19/18 0133)   dextrose 50 % IV solution 50 mL (50 mL Intravenous Given 12/19/18 0332)   furosemide (LASIX) injection 40 mg (40 mg Intravenous Given 12/19/18 0544)       Diagnostic Studies  Results Reviewed     Procedure Component Value Units Date/Time    Fingerstick Glucose (POCT) [[id number]]  (Normal) Collected:  12/19/18 0645    Lab Status:  Final result Updated:  12/19/18 0646     POC Glucose 104 mg/dl     Basic metabolic panel [[id number]]  (Abnormal) Collected:  12/19/18 0514    Lab Status:  Final result Specimen:  Blood from Arm, Right Updated:  12/19/18 2730     Sodium 137 mmol/L      Potassium 5 4 (H) mmol/L      Chloride 99 (L) mmol/L      CO2 24 mmol/L      ANION GAP 14 (H) mmol/L      BUN 91 (H) mg/dL      Creatinine 14 40 (H) mg/dL      Glucose 97 mg/dL      Calcium 8 9 mg/dL      eGFR 4 ml/min/1 73sq m     Narrative:         National Kidney Disease Education Program recommendations are as follows:  GFR calculation is accurate only with a steady state creatinine  Chronic Kidney disease less than 60 ml/min/1 73 sq  meters  Kidney failure less than 15 ml/min/1 73 sq  meters      CBC and differential [[id number]]  (Abnormal) Collected:  12/19/18 0514    Lab Status:  Final result Specimen:  Blood from Arm, Right Updated:  12/19/18 0532     WBC 6 56 Thousand/uL      RBC 3 39 (L) Million/uL      Hemoglobin 9 0 (L) g/dL      Hematocrit 29 8 (L) %      MCV 88 fL      MCH 26 5 (L) pg      MCHC 30 2 (L) g/dL      RDW 18 5 (H) %      MPV 10 6 fL      Platelets 180 Thousands/uL      nRBC 0 /100 WBCs      Neutrophils Relative 70 %      Immat GRANS % 1 %      Lymphocytes Relative 12 (L) %      Monocytes Relative 10 %      Eosinophils Relative 6 %      Basophils Relative 1 %      Neutrophils Absolute 4 64 Thousands/µL      Immature Grans Absolute 0 03 Thousand/uL      Lymphocytes Absolute 0 77 Thousands/µL      Monocytes Absolute 0 64 Thousand/µL      Eosinophils Absolute 0 39 Thousand/µL      Basophils Absolute 0 09 Thousands/µL     Fingerstick Glucose (POCT) [[id number]]  (Abnormal) Collected:  12/19/18 0341    Lab Status:  Final result Updated:  12/19/18 0342     POC Glucose 164 (H) mg/dl     Fingerstick Glucose (POCT) [[id number]]  (Abnormal) Collected:  12/19/18 0325    Lab Status:  Final result Updated:  12/19/18 0342     POC Glucose 39 (LL) mg/dl     Platelet count [[id number]]     Lab Status:  No result Specimen:  Blood     Comprehensive metabolic panel [[id number]] (Abnormal) Collected:  12/18/18 2311    Lab Status:  Final result Specimen:  Blood from Arm, Right Updated:  12/19/18 0010     Sodium 135 (L) mmol/L      Potassium 6 0 (H) mmol/L      Chloride 98 (L) mmol/L      CO2 22 mmol/L      ANION GAP 15 (H) mmol/L      BUN 91 (H) mg/dL      Creatinine 14 00 (H) mg/dL      Glucose 118 mg/dL      Calcium 9 0 mg/dL      AST 32 U/L      ALT 17 U/L      Alkaline Phosphatase 118 (H) U/L      Total Protein 7 6 g/dL      Albumin 3 0 (L) g/dL      Total Bilirubin 0 40 mg/dL      eGFR 4 ml/min/1 73sq m     Narrative:         National Kidney Disease Education Program recommendations are as follows:  GFR calculation is accurate only with a steady state creatinine  Chronic Kidney disease less than 60 ml/min/1 73 sq  meters  Kidney failure less than 15 ml/min/1 73 sq  meters      Troponin I [[id number]]  (Abnormal) Collected:  12/18/18 2311    Lab Status:  Final result Specimen:  Blood from Arm, Right Updated:  12/18/18 2344     Troponin I 0 08 (H) ng/mL     CBC and differential [[id number]]  (Abnormal) Collected:  12/18/18 2311    Lab Status:  Final result Specimen:  Blood from Arm, Right Updated:  12/18/18 2326     WBC 6 22 Thousand/uL      RBC 3 48 (L) Million/uL      Hemoglobin 9 4 (L) g/dL      Hematocrit 30 5 (L) %      MCV 88 fL      MCH 27 0 pg      MCHC 30 8 (L) g/dL      RDW 18 6 (H) %      MPV 9 3 fL      Platelets 091 Thousands/uL      nRBC 0 /100 WBCs      Neutrophils Relative 67 %      Immat GRANS % 0 %      Lymphocytes Relative 16 %      Monocytes Relative 9 %      Eosinophils Relative 7 (H) %      Basophils Relative 1 %      Neutrophils Absolute 4 11 Thousands/µL      Immature Grans Absolute 0 01 Thousand/uL      Lymphocytes Absolute 1 01 Thousands/µL      Monocytes Absolute 0 55 Thousand/µL      Eosinophils Absolute 0 45 Thousand/µL      Basophils Absolute 0 09 Thousands/µL                  XR chest 2 views   ED Interpretation by Faye Hernandez MD (12/19 0012) Cardiomegaly  Pulmonary edema and pulmonary vascular congestion  No effusions  Final Result by Erica Nieto DO (12/19 7826)      Mild pulmonary edema  Workstation performed: [id number]               Procedures  ECG 12 Lead Documentation  Date/Time: 12/19/2018 12:29 AM  Performed by: Wilder Contreras by: Cayden Ventura     ECG reviewed by me, the ED Provider: yes    Patient location:  ED  Previous ECG:     Previous ECG:  Compared to current    Comparison ECG info:  December 18th at 1053    Similarity:  No change    Comparison to cardiac monitor: Yes    Interpretation:     Interpretation: abnormal    Rate:     ECG rate:  83    ECG rate assessment: normal    Rhythm:     Rhythm: sinus rhythm    Ectopy:     Ectopy: none    QRS:     QRS axis:  Normal    QRS intervals:  Normal  Conduction:     Conduction: normal    T waves:     T waves: peaked      Peaked:  V4, V5 and V6  Comments:      Peaked T-waves likely secondary to hyperkalemia  Will give calcium  Phone Consults  ED Phone Contact    ED Course  ED Course as of Dec 19 1101   Tue Dec 18, 2018   2228 Respirations: (!) 24   2317 Spoke with Dr Kevin Garcia from Nephrology  We discussed the patient's history and clinical presentation  Unless the patient has a markedly elevated potassium, he believes the patient is stable enough to have hemodialysis tomorrow as opposed to urgent dialysis tonight  2327 Baseline anemia  Hemoglobin: (!) 9 4   2344 Troponin consistently elevated likely secondary to ESRD  Do not suspect ACS at this time  Troponin I: (!) 0 08   Wed Dec 19, 2018   0011 Slightly hemolyzed but likely accurate as potassium from this morning was 5 5  Potassium: (!) 6 0   0011 BUN: (!) 91   0011 Creatinine: (!) 14 00   0011 Patient has known ESRD                                  MDM  Number of Diagnoses or Management Options  Anemia in chronic kidney disease, on chronic dialysis [facility]): minor  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([facility] ): established and worsening  ESRD on hemodialysis ([place] [address] ): established and worsening  Hyperkalemia: new and does not require workup  Non-compliance: established and worsening  Diagnosis management comments:     Differential considerations included volume overload secondary to ESRD with noncompliance with hemodialysis, CHF exacerbation, ACS, electrolyte abnormality, and liver disease  We obtained labs as above  Patient was noted to be hyperkalemic  He otherwise was found to have baseline ESRD as evidenced by markedly elevated BUN and creatinine  Troponin was elevated but appears to be elevated at baseline secondary to patient's ESRD  Chest x-ray was consistent with volume overload  EKG demonstrated peaked T-waves concerning for hyperkalemia  Overall, patient's presentation was most likely secondary to noncompliance with hemodialysis  His last session of hemodialysis was on December 15th  Discussed the case with Nephrology who did not feel that emergent dialysis was indicated  Patient was admitted for observation with dialysis in the morning  He was also treated for hyperkalemia with calcium, insulin, and dextrose         Amount and/or Complexity of Data Reviewed  Clinical lab tests: ordered and reviewed  Tests in the radiology section of CPT®: ordered and reviewed  Decide to obtain previous medical records or to obtain history from someone other than the patient: yes  Review and summarize past medical records: yes  Discuss the patient with other providers: yes  Independent visualization of images, tracings, or specimens: yes    Risk of Complications, Morbidity, and/or Mortality  Presenting problems: moderate  Diagnostic procedures: minimal  Management options: minimal    Patient Progress  Patient progress: stable    CritCare Time    Disposition  Final diagnoses:   ESRD on hemodialysis ([facility] )   Non-compliance   Hyperkalemia   Anemia in chronic kidney disease, on chronic dialysis ([place] [address] )   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([facility] )     Time reflects when diagnosis was documented in both MDM as applicable and the Disposition within this note     Time User Action Codes Description Comment    12/19/2018 12:30 AM Joaquín Wing Add [N18 6,  Z99 2] ESRD on hemodialysis ([facility] )     12/19/2018 12:31 AM Joaquín Wing Add [Z91 19] Non-compliance     12/19/2018 12:31 AM Joaquín Wing Add [E87 5] Hyperkalemia     12/19/2018 12:31 AM Joaquín Wing Add [N18 6,  D63 1,  Z99 2] Anemia in chronic kidney disease, on chronic dialysis ([facility] )     12/19/2018 12:31 AM Joaquín Wing Add [I77 0] 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[facility])       ED Disposition     ED Disposition Condition Comment    Admit  Case was discussed with SOD and admission status was agreed to be observation status to the service of Dr Lola Lei  Follow-up Information    None         Patient's Medications   Discharge Prescriptions    No medications on file     No discharge procedures on file  ED Provider  Attending physically available and evaluated Danilo Dumont I managed the patient along with the ED Attending      Electronically Signed by         Joseline Durbin MD  12/19/18 8948

## 2018-12-19 NOTE — H&P
INTERNAL MEDICINE HISTORY AND PHYSICAL  ED 25 SOD Team A    NAME: Sharon Ross  AGE: 66 y o  SEX: male  : 1960   MRN: [id number]  ENCOUNTER: [id number]    DATE: 2018  TIME: 12:43 AM    Primary Care Physician: No primary care provider on file  Admitting Provider: Sonia Lee MD    Chief complaint: SOB    History of Present Illness     Sharon Ross is a 66 y o  male with past medical history significant of end-stage renal disease on dialysis Monday, Wednesday, Friday, atrial fibrillation status post ablation in 2018 not on anticoagulation, diastolic congestive heart failure, hypertension presents for evaluation of shortness of breath  Patient with notable history of noncompliance with dialysis  Was most recently discharged on   He has not had dialysis since  Patient stated that his car has broken down there for he has had difficulty obtaining dialysis the Lantus vanc has been set up for him to go [facility]  Per previous discharge summary,  Case management on previous admission was consulted to help patient get a dialysis chair in [place] so that he would not require urgent hospitalization for dialysis in the future  Patient was instructed at that time to contact his dialysis center at [facility] in cell as well to help facilitate this appears he has not had this done  He denies any other significant complaints at this time    Review of Systems   Review of Systems   Constitutional: Negative for appetite change, chills, diaphoresis, fatigue, fever and unexpected weight change  HENT: Negative for congestion, rhinorrhea and sore throat  Eyes: Negative for photophobia and visual disturbance  Respiratory: Positive for shortness of breath  Negative for cough and wheezing  Cardiovascular: Negative for chest pain, palpitations and leg swelling     Gastrointestinal: Negative for abdominal pain, anal bleeding, blood in stool, constipation, diarrhea, nausea and vomiting  Genitourinary: Negative for decreased urine volume, difficulty urinating, dysuria, flank pain, frequency, hematuria and urgency  Musculoskeletal: Negative for arthralgias, back pain, joint swelling and myalgias  Skin: Negative for color change and rash  Neurological: Negative for dizziness, seizures, facial asymmetry, speech difficulty, numbness and headaches  Psychiatric/Behavioral: Negative for agitation, confusion and decreased concentration  The patient is not nervous/anxious  Past Medical History     Past Medical History:   Diagnosis Date    [facility]cong[facility] failure) ([facility] )     Dialysis patient ([facility] )     Hypertension     Limb alert care status     do not use left arm    Rectal bleeding     Renal disorder        Past Surgical History     Past Surgical History:   Procedure Laterality Date    AV FISTULA PLACEMENT      left arm    AV NODE ABLATION      CHOLECYSTECTOMY      COLONOSCOPY N/A 4/27/2018    Procedure: COLONOSCOPY;  Surgeon: Angel Chou DO;  Location: [facility]; Service: Gastroenterology    EGD AND COLONOSCOPY N/A 5/24/2018    Procedure: EGD AND COLONOSCOPY;  Surgeon: Momo Robin MD;  Location: [facility]; Service: Gastroenterology       Social History     History   Alcohol Use No     History   Drug Use No     History   Smoking Status    Current Every Day Smoker    Packs/day: 1 00    Types: Cigarettes   Smokeless Tobacco    Never Used       Family History   No family history on file  Medications Prior to Admission     Prior to Admission medications    Medication Sig Start Date End Date Taking?  Authorizing Provider   amLODIPine (NORVASC) 10 mg tablet Take 10 mg by mouth daily   Yes Historical Provider, MD   benzonatate (TESSALON PERLES) 100 mg capsule Take 1 capsule (100 mg total) by mouth every 8 (eight) hours 11/28/18  Yes Disha Regan MD   cinacalcet (SENSIPAR) 30 mg tablet 30 mg 12/27/16  Yes Historical Provider, MD   dextromethorphan-guaiFENesin (ROBITUSSIN DM)  mg/5 mL syrup Take 10 mL by mouth every 6 (six) hours as needed for cough or congestion 12/7/18  Yes Russell Valdez MD   gabapentin (NEURONTIN) 100 mg capsule Take 300 mg by mouth daily     Yes Historical Provider, MD   metoprolol tartrate (LOPRESSOR) 25 mg tablet Take 0 5 tablets (12 5 mg total) by mouth every 12 (twelve) hours 4/30/18  Yes Monica Bailey MD   sevelamer (RENAGEL) 800 mg tablet Take 1 tablet (800 mg total) by mouth 3 (three) times a day with meals 12/15/18  Yes Zechariah Dias,        Allergies   No Known Allergies    Objective     Vitals:    12/18/18 1926 12/18/18 2248   BP: (!) 177/86 151/67   BP Location: Right arm Right arm   Pulse: 80 75   Resp: (!) 24    Temp: (!) 97 1 °F (36 2 °C)    TempSrc: Tympanic    SpO2: 93% 92%   Weight: 71 2 kg (156 lb 15 5 oz)      Body mass index is 25 34 kg/m²  No intake or output data in the 24 hours ending 12/19/18 0043  Invasive Devices     Line            Hemodialysis AV Fistula Left -- days                Physical Exam  GENERAL: Appears well-developed and well-nourished  Appears in no acute distress   HEENT: Normocephalic and atraumatic  No scleral icterus  PERRLA  EOMI B/L  No oropharyngeal edema  MM moist    NECK: Neck supple with no lymphadenopathy  Trachea midline  No JVD  CARDIOVASCULAR: S1 and S2 are present  Regular rate and rhythm  No murmurs, rubs, or gallops  RESPIRATORY: CTA B/L, no rales, rhonci or wheezes  Normal respiratory expansion  ABDOMINAL: Bowel sounds present in all 4 quadrants, non-tender, soft, non-distended  No organomegaly, rebound, or guarding  EXTREMITIES: 2+ DP and PT pulses bilaterally; no cyanosis, clubbing, edema  ROM intact  MUELLER x4   MUSCULOSKELETAL: No joint tenderness, deformity or swelling, full range of motion without pain  NEUROLOGIC: Patient is alert and oriented to person, place, and time  No sensory or motor deficits  CN 2-12 intact  Plantars downgoing bilaterally    Speech fluent  SKIN: Skin is warm and dry  No skin lesions are present  No rashes  PSYCHIATRIC: Normal mood and affect     Lab Results: I have personally reviewed pertinent reports  CBC:   Results from last 7 days  Lab Units 12/18/18  2311   WBC Thousand/uL 6 22   RBC Million/uL 3 48*   HEMOGLOBIN g/dL 9 4*   HEMATOCRIT % 30 5*   MCV fL 88   MCH pg 27 0   MCHC g/dL 30 8*   RDW % 18 6*   MPV fL 9 3   PLATELETS Thousands/uL 355   NRBC AUTO /100 WBCs 0   NEUTROS PCT % 67   LYMPHS PCT % 16   MONOS PCT % 9   EOS PCT % 7*   BASOS PCT % 1   NEUTROS ABS Thousands/µL 4 11   LYMPHS ABS Thousands/µL 1 01   MONOS ABS Thousand/µL 0 55   EOS ABS Thousand/µL 0 45   , Chemistry Profile:   Results from last 7 days  Lab Units 12/18/18  2311   POTASSIUM mmol/L 6 0*   CHLORIDE mmol/L 98*   CO2 mmol/L 22   BUN mg/dL 91*   CREATININE mg/dL 14 00*   CALCIUM mg/dL 9 0   AST U/L 32   ALT U/L 17   ALK PHOS U/L 118*   EGFR ml/min/1 73sq m 4       Imaging: I have personally reviewed pertinent films in PACS  Ct Abdomen Pelvis Wo Contrast    Result Date: 12/5/2018  Narrative: CT ABDOMEN AND PELVIS WITHOUT IV CONTRAST INDICATION:   repeat for foreign body seen on previous CT  COMPARISON:  11/28/2018 TECHNIQUE:  CT examination of the abdomen and pelvis was performed without intravenous contrast   Axial, sagittal, and coronal 2D reformatted images were created from the source data and submitted for interpretation  Radiation dose length product (DLP) for this visit:  281 4 mGy-cm   This examination, like all CT scans performed in the [facility], was performed utilizing techniques to minimize radiation dose exposure, including the use of iterative reconstruction and automated exposure control  Enteric contrast was administered  FINDINGS: ABDOMEN LOWER CHEST:  Streaky atelectasis/scarring at the lung bases  Prominent cardiomegaly noted  LIVER/BILIARY TREE:  Hepatomegaly evident  GALLBLADDER:  Gallbladder is surgically absent  SPLEEN:  Splenomegaly noted  PANCREAS:  Unremarkable  ADRENAL GLANDS:  Unremarkable  KIDNEYS/URETERS:  Atrophic kidneys with cysts and vascular calcifications as before  STOMACH AND BOWEL:  Cecum metallic foreign bodies x2 are redemonstrated best seen on series 2 image 51 and series 2 image 54  APPENDIX:  No findings to suggest appendicitis  ABDOMINOPELVIC CAVITY:  No ascites or free intraperitoneal air  No lymphadenopathy  VESSELS:  Unremarkable for patient's age  PELVIS REPRODUCTIVE ORGANS:  Unremarkable for patient's age  URINARY BLADDER:  Unremarkable  ABDOMINAL WALL/INGUINAL REGIONS:  Unremarkable  OSSEOUS STRUCTURES:  No acute fracture or destructive osseous lesion  Impression: 1  Stable position of cecal linear foreign metallic densities x2 as above  2   Hepatosplenomegaly  3   Atrophic kidneys with cysts and vascular calcifications indicating end-stage renal disease  4   Cardiomegaly  Workstation performed: [id number]     Xr Chest 2 Views    Result Date: 12/14/2018  Narrative: CHEST INDICATION:   sob  COMPARISON:  December 5, 2018 EXAM PERFORMED/VIEWS:  XR CHEST PA & LATERAL  The frontal view was performed utilizing dual energy radiographic technique  FINDINGS: Stable mild cardiomegaly  : Vascular congestion  Osseous structures appear within normal limits for patient age  Impression: Pulmonary vascular congestion noted with cardiomegaly  Correlate clinically for CHF  Workstation performed: [id number]     Xr Chest 2 Views    Result Date: 12/6/2018  Narrative: CHEST INDICATION:   Shortness of breath  COMPARISON:  11/28/2018 EXAM PERFORMED/VIEWS:  XR CHEST PA & LATERAL FINDINGS: Mild cardiomegaly is present  Mild pulmonary vascular congestion is present  Osseous structures appear within normal limits for patient age  Impression: Mild pulmonary vascular congestion  Workstation performed: [id number]     Xr Chest 2 Views    Result Date: 11/28/2018  Narrative: CHEST INDICATION:   cough  COMPARISON:  Chest radiographs April 25, 2018 EXAM PERFORMED/VIEWS:  XR CHEST PA & LATERAL  The frontal view was performed utilizing dual energy radiographic technique  Images: 4 FINDINGS: Cardiomediastinal silhouette appears unremarkable  The lungs are clear  No pneumothorax or pleural effusion  Osseous structures appear within normal limits for patient age  Impression: No acute cardiopulmonary disease  Workstation performed: [id number]     Xr Hip/pelv 2-3 Vws Right If Performed    Result Date: 12/13/2018  Narrative: RIGHT HIP INDICATION:   T14 90XA: Injury, unspecified, initial encounter  Patient heard a pop in hip while pulling boxes at work, difficulty weightbearing and right hip pain with walking  COMPARISON:  CT abdomen and pelvis 12/5/2018 VIEWS:  XR HIP/PELV 3 VWS RIGHT W PELVIS IF PERFORMED FINDINGS: There is no acute fracture or dislocation  No significant hip degenerative changes  No lytic or blastic osseous lesions  There are atherosclerotic calcifications  2 linear radiopaque densities are again seen in the right lower quadrant which were described as cecal foreign bodies on previous CT imaging  The visualized lumbar spine is unremarkable  Impression: No acute osseous abnormality  Workstation performed: [id number]     Ct Abdomen Pelvis With Contrast    Result Date: 11/28/2018  Narrative: CT ABDOMEN AND PELVIS WITH IV CONTRAST INDICATION:   Right lower quadrant pain    COMPARISON:  None  TECHNIQUE:  CT examination of the abdomen and pelvis was performed  Axial, sagittal, and coronal 2D reformatted images were created from the source data and submitted for interpretation  Radiation dose length product (DLP) for this visit:  381 04 mGy-cm   This examination, like all CT scans performed in the [facility], was performed utilizing techniques to minimize radiation dose exposure, including the use of iterative  reconstruction and automated exposure control   IV Contrast:  100 mL of iohexol (OMNIPAQUE)  was administered intravenously without immediate adverse reaction  Enteric Contrast:  Enteric contrast was not administered  FINDINGS: ABDOMEN LOWER CHEST:  Cardiomegaly  Bibasilar subsegmental atelectasis  LIVER/BILIARY TREE:  Unremarkable  GALLBLADDER:  Gallbladder is surgically absent  SPLEEN:  Unremarkable  PANCREAS:  Unremarkable  ADRENAL GLANDS:  Unremarkable  KIDNEYS/URETERS:  One or more simple renal cyst(s) is noted  Atrophic kidneys bilaterally compatible with end-stage renal disease  Multiple small calcifications are likely vascular  No hydronephrosis  STOMACH AND BOWEL:  There are two identical appearing linear hyperdense objects within the cecum with a slightly smaller diameter towards one end (series 2, image 59 and 56)  These may represent ingested foreign bodies  No bowel obstruction  APPENDIX:  A normal appendix was visualized  ABDOMINOPELVIC CAVITY:  No ascites or free intraperitoneal air  No lymphadenopathy  VESSELS:  Moderate atherosclerotic calcifications  PELVIS REPRODUCTIVE ORGANS:  The prostate is enlarged  URINARY BLADDER:  Collapsed and poorly evaluated  ABDOMINAL WALL/INGUINAL REGIONS:  Unremarkable  OSSEOUS STRUCTURES:  No acute fracture or destructive osseous lesion  Schmorl's nodes are seen within L1, L2, and L3 vertebral bodies  Impression: 1  Two identical appearing linear hyperdense foreign bodies within the cecum  Correlate clinically for foreign body ingestion  2   Other findings as above  I personally discussed this study with Sahara Ronquillo MD on 11/28/2018 at 3:01 AM  Workstation performed: [id number]       Microbiology: cultures obtained in emergency department include     Urinalysis:       Invalid input(s): URIBILINOGEN     Urine Micro:        EKG, Pathology, and Other Studies: I have personally reviewed pertinent reports        Medications given in Emergency Department       Assessment and Plan     Problem List     * (Principal)SOB (shortness of breath)    Secondary hyperparathyroidism of renal origin (HCC)    Anemia in chronic kidney disease, on chronic dialysis ([facility] )    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(HCC)    ESRD on hemodialysis (HCC)    Nicotine abuse    High anion gap metabolic acidosis    Hypoglycemia    Paroxysmal A-fib (HCC) (Chronic)    Diastolic heart failure (HCC) (Chronic)    Non-compliance (Chronic)        1  Shortness of breath  · Secondary to volume overload from missed hemodialysis session  Last hemodialysis was Friday, 5 days ago  · Currently saturating well on room air  Bilateral crackles noted on exam  · Will consult Nephrology for hemodialysis  · Of note patient notable for significant noncompliance and missed hemodialysis S since previously will need case management help to facilitate adequate transport to dialysis center to prevent recurrent hospitalizations for hemodialysis    End-stage renal disease on hemodialysis Monday Wednesday Friday  · Patient has missed his Monday dialysis session secondary to transportation difficulties  · Will consult Nephrology  · Renal diet  · Continue calcium acetate and Sevelamer    Elevated troponin  · Chronically elevated, troponin was noted to be 0 08 likely secondary to end-stage renal disease  No active chest pain     Paroxysmally atrial fibrillation  · Currently in normal sinus rhythm  · Status post ablation in April 2018  · Continue metoprolol 12 5 mg b i d  Hypertension  · Blood pressure were elevated -170  · Likely secondary to volume overload state  · Continue home beta blocker Norvasc 10 mg daily    Hyperkalemia  · Potassium was noted to be 6 0  · Patient received IV insulin in the ED    EKG pending if EKG changes noted will provide calcium gluconate  · Will monitor on telemetry  · Repeat potassium in the morning  · Will ultimately need dialysis to normalize potassium      Code Status: Level 1 - Full Code  VTE Pharmacologic Prophylaxis: Heparin   VTE Mechanical Prophylaxis: sequential compression device  Admission Status: OBSERVATION    Admission Time  I spent 45 minutes admitting the patient  This involved direct patient contact where I performed a full history and physical, reviewing previous records, and reviewing laboratory and other diagnostic studies      Arti Garcia MD  Internal Medicine  PGY-2

## 2018-12-19 NOTE — CONSULTS
Consultation - Nephrology   Barrett Patiño 66 y o  male MRN: [id number]  Unit/Bed#: ED 25 Encounter: [id number]    ASSESSMENT and PLAN:  1  ESRD on HD (at [facility] MWF): seen by me on HD today  Would ideally recommend the patient go to [facility] upon discharge as [facility] transportation is accessible in [place]    -significantly above his dry weight  -needs reliable transportation to HD unit   -would recommend the patient not be discharged until transportation is determined  2  Access: LUE AVF well functioning  3  Hypertension: BP a bit elevated but improving with UF on HD  Goal UF 3L  TW 64 5kg   4  Anemia of CKD: Hgb 9, received mircera 225mcg on 12/10/18 which he receives every 2 weeks  Also on venofer 59mg weekly, last dose 11/28/18  5  Mineral and bone disease/secondary hyperparathyroidism of renal origin: on sensipar 30mg after HD, hectrol 1mcg every HD treatment, monitor phos/PTH outpatient  6  Shortness of breath d/t volume overload in setting of nonadherence with HD sessions  Well above TW  UF as tolerated  This patient should ideally remain with [facility] and transportation is yet to be determined  HISTORY OF PRESENT ILLNESS:  Requesting Physician: Yokasta Arredondo MD  Reason for Consult: ESRD on HD    Barrett Patiño is a 66y o  year old male who was admitted to [facility] after presenting with shortness of breath  A renal consultation is requested today for assistance in the management of ESRD  Barrett Patiño is an ESRD patient who undergoes maintenance hemodialysis at [facility] on MWF  Patient has been on hemodialysis since December 2012  He thinks that the reason for ESRD is due to hypertension  He complains of shortness of breath over last 1-2 days for he is unable to lay flat  He also complains of right-sided chest pressure  Denies any fevers, chills, nausea, vomiting  He has had diarrhea and states that he had leg swelling yesterday which is now resolved    He is anuric  He states his blood pressure been well controlled  The patient states that he has no ability to get to [facility] as he does not have transportation  The [facility] does not go there  He does not have a functioning car  He states that he was last at [facility] 2 weeks ago  His last treatment at [facility] was on 11/30/18  He had emergent treatment at a hospital in [facility] 12/10/18  He was [facility] and had received dialysis on Friday(12/14/18) and Saturday(12/15/18)  Today is Wednesday, 12/19  Of note, the patient called this Monday(12/17) stating that he had been short of breath but he did not present to the emergency room until today  Patient seen examined by me on hemodialysis approximately 10:30 a m     Blood pressure 156/74, UF goal 3 5 L, blood flow 400 mL/min, dialysate flow 600 mL/min  Patient tolerating dialysis well  No complaints  PAST MEDICAL HISTORY:  Past Medical History:   Diagnosis Date    [facility]est[facility]     [facility]ialysis patient [facility]     Hypertension     Limb alert care status     do not use left arm    Rectal bleeding     Renal disorder        PAST SURGICAL HISTORY:  Past Surgical History:   Procedure Laterality Date    AV FISTULA PLACEMENT      left arm    AV NODE ABLATION      CHOLECYSTECTOMY      COLONOSCOPY N/A 4/27/2018    Procedure: COLONOSCOPY;  Surgeon: [facility] DO;  Location: [facility]; Service: Gastroenterology    EGD AND COLONOSCOPY N/A 5/24/2018    Procedure: EGD AND COLONOSCOPY;  Surgeon: Fredi Wilson MD;  Location: [facility]; Service: Gastroenterology       ALLERGIES:  No Known Allergies    SOCIAL HISTORY:  History   Alcohol Use No     History   Drug Use No     History   Smoking Status    Current Every Day Smoker    Packs/day: 1 00    Types: Cigarettes   Smokeless Tobacco    Never Used       FAMILY HISTORY:  No family history on file      MEDICATIONS:    Current Facility-Administered Medications:     acetaminophen (TYLENOL) tablet 650 mg, 650 mg, Oral, Q8H PRN, Nadir Zuleta MD, 650 mg at 12/19/18 0654    amLODIPine (NORVASC) tablet 10 mg, 10 mg, Oral, Daily, Nadir Zuleta MD    cinacalcet (SENSIPAR) tablet 30 mg, 30 mg, Oral, Daily With Breakfast, Nadir Zuleta MD    dextromethorphan-guaiFENesin (ROBITUSSIN DM)  mg/5 mL oral syrup 10 mL, 10 mL, Oral, Q6H PRN, Nadir Zuleta MD    gabapentin (NEURONTIN) capsule 300 mg, 300 mg, Oral, Daily, Nadir Zuleta MD    heparin (porcine) subcutaneous injection 5,000 Units, 5,000 Units, Subcutaneous, Q8H [address] **AND** Platelet count, , , Once, Nadir Zuleta MD    metoprolol tartrate (LOPRESSOR) tablet 25 mg, 25 mg, Oral, Q12H [address], Luan Paredes MD    nicotine (NICODERM CQ) 7 mg/24hr TD 24 hr patch 1 patch, 1 patch, Transdermal, Daily, Nadir Zuleta MD    sevelamer (RENAGEL) tablet 800 mg, 800 mg, Oral, TID With Meals, Natividad Garcia MD    Current Outpatient Prescriptions:     amLODIPine (NORVASC) 10 mg tablet, Take 10 mg by mouth daily, Disp: , Rfl:     benzonatate (TESSALON PERLES) 100 mg capsule, Take 1 capsule (100 mg total) by mouth every 8 (eight) hours, Disp: 21 capsule, Rfl: 0    cinacalcet (SENSIPAR) 30 mg tablet, 30 mg, Disp: , Rfl:     dextromethorphan-guaiFENesin (ROBITUSSIN DM)  mg/5 mL syrup, Take 10 mL by mouth every 6 (six) hours as needed for cough or congestion, Disp: 118 mL, Rfl: 0    gabapentin (NEURONTIN) 100 mg capsule, Take 300 mg by mouth daily  , Disp: , Rfl:     metoprolol tartrate (LOPRESSOR) 25 mg tablet, Take 0 5 tablets (12 5 mg total) by mouth every 12 (twelve) hours, Disp: 30 tablet, Rfl: 0    sevelamer (RENAGEL) 800 mg tablet, Take 1 tablet (800 mg total) by mouth 3 (three) times a day with meals, Disp: 44 tablet, Rfl: 0    REVIEW OF SYSTEMS:  General: No fevers, chills  Cardiovascular: + right sided chest pain, +shortness of breath  Denies palpitations   Had leg edema which has resolved today  Respiratory: No cough, sputum production + shortness of breath  Gastrointestinal: No nausea, vomiting, abdominal pain +diarrhea  Genitourinary: No hematuria  Anuric  PHYSICAL EXAM:  Current Weight: Weight - Scale: 71 2 kg (156 lb 15 5 oz)  First Weight: Weight - Scale: 71 2 kg (156 lb 15 5 oz)  Vitals:    12/19/18 0807 12/19/18 0950 12/19/18 0952 12/19/18 1000   BP: (!) 182/87 160/84 155/74 156/79   BP Location:       Pulse: 84 78 86 75   Resp: 20      Temp:       TempSrc:       SpO2: 94%      Weight:           Intake/Output Summary (Last 24 hours) at 12/19/18 1025  Last data filed at 12/19/18 0950   Gross per 24 hour   Intake              300 ml   Output                0 ml   Net              300 ml     Physical Exam   Constitutional: He appears well-developed and well-nourished  No distress  HENT:   Head: Normocephalic and atraumatic  Mouth/Throat: No oropharyngeal exudate  Eyes: Right eye exhibits no discharge  Left eye exhibits no discharge  No scleral icterus  Neck: Neck supple  Cardiovascular: Normal rate, regular rhythm and normal heart sounds  Pulmonary/Chest: Effort normal and breath sounds normal  He has no wheezes  He has no rales  Abdominal: Soft  Bowel sounds are normal  He exhibits no distension  There is no tenderness  Musculoskeletal: Normal range of motion  He exhibits no edema  Neurological: He is alert  awake   Skin: Skin is warm and dry  No rash noted  He is not diaphoretic  Psychiatric: He has a normal mood and affect  His behavior is normal    Vitals reviewed        Invasive Devices:      Lab Results:     Results from last 7 days  Lab Units 12/19/18  0514 12/18/18  2311 12/18/18  1101 12/15/18  0949  12/14/18  1436   WBC Thousand/uL 6 56 6 22 6 30 4 97  --  6 41   HEMOGLOBIN g/dL 9 0* 9 4* 9 1* 8 7*  --  8 9*   HEMATOCRIT % 29 8* 30 5* 29 2* 28 2*  --  28 5*   PLATELETS Thousands/uL 383 355 330 291  < > 313   POTASSIUM mmol/L 5 4* 6 0* 5 5* 4 7 --  6 2*   CHLORIDE mmol/L 99* 98* 98* 98*  --  98*   CO2 mmol/L 24 22 23 26  --  22   BUN mg/dL 91* 91* 81* 64*  --  99*   CREATININE mg/dL 14 40* 14 00* 13 00* 12 20*  --  15 60*   CALCIUM mg/dL 8 9 9 0 8 7 9 4  --  7 9*   MAGNESIUM mg/dL  --   --   --  2 4  --  2 6   PHOSPHORUS mg/dL  --   --   --  9 0*  --  9 5*   ALK PHOS U/L  --  118* 117*  --   --   --    ALT U/L  --  17 19  --   --   --    AST U/L  --  32 25  --   --   --    < > = values in this interval not displayed    Lab Results   Component Value Date    PTH 1,211 5 (H) 04/26/2018    CALCIUM 8 9 12/19/2018    PHOS 9 0 (H) 12/15/2018

## 2018-12-19 NOTE — SOCIAL WORK
CM met with Pt who reported he would like to attend a dialysis center closer to [place]  CM contacted [facility] and spoke to Dario Mcmillan who reported Pt was already denied in June/July and will not be able to accept Pt  CM then contacted [facility] in [place] and spoke with Marii to inquire if Pt would be able to transfer to their facility  Marii stated that Pt was accepted by [facility]  CM contacted [facility] and spoke to a nurse who reported they did not have Pt in their system and to try the other [facility]  CM contacted [facility] center who also did not have Pt in their system  CM then contacted [facility] and spoke with Bethanie Mims to request a transfer from [facility] to [place]  Bethanie Mims sent over check list  CM faxed clinical information to [phone]  Bethanie Mims reported a clinical coordinator will contact CM back once referral was reviewed  CM informed medical team and Pt of this information  Pt aware CM will be in contact with him regarding transfer  Pt thankful

## 2018-12-19 NOTE — UTILIZATION REVIEW
Initial Clinical Review    Admission: Date/Time/Statement: Observation 12/19 @ 0037    Orders Placed This Encounter   Procedures    Place in Observation (expected length of stay for this patient is less than two midnights)     Standing Status:   Standing     Number of Occurrences:   1     Order Specific Question:   Admitting Physician     Answer:   Gian Pereira     Order Specific Question:   Level of Care     Answer:   Med Surg [16]       ED: Date/Time/Mode of Arrival:   ED Arrival Information     Expected Arrival Acuity Means of Arrival Escorted By Service Admission Type    - 12/18/2018 19:20 Urgent Walk-In Self General Medicine Urgent    Arrival Complaint    SOB          Chief Complaint:   Chief Complaint   Patient presents with    Shortness of Breath     pt states he having shortness of breath x 2 days  Pt states he missed dialysis, and hasnt had it since last night  Pt denies CP  Pt checked in earlier today, but left due to the long wait  History of Illness:  66 y o  male with past medical history significant of end-stage renal disease on dialysis Monday, Wednesday, Friday, atrial fibrillation status post ablation in April 2018 not on anticoagulation, diastolic congestive heart failure, hypertension presents for evaluation of shortness of breath  ED Vital Signs:   ED Triage Vitals [12/18/18 1926]   Temperature Pulse Respirations Blood Pressure SpO2   (!) 97 1 °F (36 2 °C) 80 (!) 24 (!) 177/86 93 %      Temp Source Heart Rate Source Patient Position - Orthostatic VS BP Location FiO2 (%)   Tympanic Monitor Sitting Right arm --      Pain Score       No Pain        Wt Readings from Last 1 Encounters:   12/18/18 71 2 kg (156 lb 15 5 oz)     O2 2L N/C    Vital Signs (abnormal):  WNL    Abnormal Labs/Diagnostic Test Results:   K = 6 0  Bun/Cr = 91/14 00  Trop = 0 08  H/h = 9 0/29 8    CXR - pending      ED Treatment:   Medication Administration from 12/18/2018 1920 to 12/19/2018 0756       Date/Time Order Dose Route Action     12/19/2018 0140 calcium gluconate 1 g in sodium chloride 0 9 % 100 mL IVPB 1 g Intravenous New Bag     12/19/2018 0130 dextrose 50 % IV solution 25 mL 25 mL Intravenous Given     12/19/2018 0129 metoprolol tartrate (LOPRESSOR) partial tablet 12 5 mg 12 5 mg Oral Given     12/19/2018 0654 acetaminophen (TYLENOL) tablet 650 mg 650 mg Oral Given     12/19/2018 0133 insulin regular (HumuLIN R,NovoLIN R) injection 10 Units 10 Units Intravenous Given     12/19/2018 0332 dextrose 50 % IV solution 50 mL 50 mL Intravenous Given     12/19/2018 0544 furosemide (LASIX) injection 40 mg 40 mg Intravenous Given          Past Medical/Surgical History:    Active Ambulatory Problems     Diagnosis Date Noted    Secondary hyperparathyroidism of renal origin ([facility] ) 03/11/2018    Anemia in chronic kidney disease, on chronic dialysis ([facility] ) 03/11/2018    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([facility] ) 04/30/2018    ESRD on hemodialysis ([facility] ) 05/22/2018    Nicotine abuse 05/22/2018    High anion gap metabolic acidosis 58/28/7638    Hypoglycemia 12/14/2018    Paroxysmal A-fib ([facility] ) 54/24/4660    Diastolic heart failure ([facility] ) 12/14/2018    Non-compliance 12/14/2018     Resolved Ambulatory Problems     Diagnosis Date Noted    Volume overload 11/13/2017    Hypertensive urgency 11/13/2017    Hyperkalemia 11/13/2017    Dyspnea 11/13/2017    Acute hypoxemic respiratory failure (HCC) 11/13/2017    Elevated troponin 11/13/2017    Encephalopathy 11/13/2017    ESRD (end stage renal disease) on dialysis ([facility] ) 03/11/2018    Benign hypertension with CKD (chronic kidney disease) stage V ([facility] ) 03/11/2018    History of supraventricular tachycardia 04/17/2018    Acute bronchitis due to other specified organisms 04/17/2018    Anemia of renal disease 04/17/2018    Bright red blood per rectum 04/25/2018    Cough with hemoptysis 04/25/2018    Accelerated hypertension 04/25/2018    Lower GI bleed 04/25/2018    Acute blood loss anemia 04/30/2018    Cough 12/06/2018    Weakness 12/06/2018    Foreign body alimentary tract 12/06/2018    Acute respiratory failure with hypoxia ([facility] ) 12/06/2018     Past Medical History:   Diagnosis Date    CHF (congestive heart failure) ([facility] )     Dialysis patient ([facility] )     Hypertension     Limb alert care status     Rectal bleeding     Renal disorder        Admitting Diagnosis: SOB (shortness of breath) [R06 02]    Age/Sex: 66 y o  male    Assessment/Plan:   59y Male to ED with c/o shortness of breath  Pt with ESRD on dialysis M/W/F, atrial fib S/p ablation in April 2018 not on anticoagulation  Patient with notable history of noncompliance with dialysis  Pt was most recently discharged on December 15th  He has not had dialysis since  Patient stated that his car has broken down there for he has had difficulty obtaining dialysis the Lantus vanc has been set up for him to go [facility]  Per previous discharge summary,  Case management on previous admission was consulted to help patient get a dialysis chair in [place] so that he would not require urgent hospitalization for dialysis in the future  Patient was instructed at that time to contact [facility] dialysis [facility] [address] [facility] in cell as well to help facilitate this appears he has not had this done  Pt is being admitted with Shortness of breath/ ESRD/ Hypertension/ Hypokalemia  SOB secondary to volume overload from missed hemodialysis session  Last hemodialysis was Friday, 5 days ago  Crackles on exam  Nephrology consult  Elevated B/P  Continue home beta blocker, Norvasc 10 mg daily  K 6 0  Iv insulin given in Ed  EKG   Tele monitoring      Admission Orders:  Tele monitoring  Nephrology cons    Scheduled Meds:   Current Facility-Administered Medications:  amLODIPine 10 mg Oral Daily   cinacalcet 30 mg Oral Daily With Breakfast   gabapentin 300 mg Oral Daily   heparin (porcine) 5,000 Units Subcutaneous [facility] metoprolol tartrate 12 5 mg Oral Q12H [address]   nicotine 1 patch Transdermal Daily   sevelamer 800 mg Oral TID With Meals     Continuous Infusions:    PRN Meds:     Acetaminophen po x1    dextromethorphan-guaiFENesin

## 2018-12-19 NOTE — ED NOTES
Pt rang bell due to feeling like his BS was low  BS checked  Pt given orange juice and a cookie        Jyoti Wayne RN  12/19/18 6428

## 2018-12-20 NOTE — PHYSICIAN ADVISOR
Current patient class: Observation  The patient is currently on Hospital Day: 2 at [address] [place] [address]        The patient was admitted to the hospital  on N/A at N/A for the following diagnosis:  SOB (shortness of breath) [R06 02]     After review of the relevant documentation, labs, vital signs and test results, the patient is most appropriate for OBSERVATION STATUS  The patient was admitted to the hospital without an expected length of stay of at least 2 midnights  Given that the patient is subject to the 2 midnight benchmark as a CMS patient, they are appropriate for observation status at this time  Should the patient remain hospitalized for a second midnight the status should be reevaluated for medical necessity  Rationale is as follows: The patient is a 66 yrs   Male who presented to the ED at 12/18/2018 10:20 PM with a chief complaint of Shortness of Breath (pt states he having shortness of breath x 2 days  Pt states he missed dialysis, and hasnt had it since last night  Pt denies CP  Pt checked in earlier today, but left due to the long wait  )    The patients vitals on arrival were ED Triage Vitals [12/18/18 1926]   Temperature Pulse Respirations Blood Pressure SpO2   (!) 97 1 °F (36 2 °C) 80 (!) 24 (!) 177/86 93 %      Temp Source Heart Rate Source Patient Position - Orthostatic VS BP Location FiO2 (%)   Tympanic Monitor Sitting Right arm --      Pain Score       No Pain           Past Medical History:   Diagnosis Date    CHF (congestive heart failure) (HCC)     [facility]     Hypertension     Limb alert care status     do not use left arm    Rectal bleeding     Renal disorder      Past Surgical History:   Procedure Laterality Date    AV FISTULA PLACEMENT      left arm    AV NODE ABLATION      CHOLECYSTECTOMY      COLONOSCOPY N/A 4/27/2018    Procedure: COLONOSCOPY;  Surgeon: Marshal Brooks DO;  Location: [facility];   Service: Gastroenterology  EGD AND COLONOSCOPY N/A 5/24/2018    Procedure: EGD AND COLONOSCOPY;  Surgeon: Mathieu Boyd MD;  Location: [facility]; Service: Gastroenterology           Consults have been placed to:   IP CONSULT TO NEPHROLOGY    Vitals:    12/19/18 1200 12/19/18 1230 12/19/18 1255 12/19/18 1339   BP: (!) 188/96 (!) 178/97 (!) 171/85 150/67   BP Location:       Pulse: 83 79 78 83   Resp:   16 16   Temp:   (!) 96 °F (35 6 °C)    TempSrc:   Tympanic    SpO2:    94%   Weight:           Most recent labs:    Recent Labs      12/18/18   2311  12/19/18   0514   WBC  6 22  6 56   HGB  9 4*  9 0*   HCT  30 5*  29 8*   PLT  355  383   K  6 0*  5 4*   CALCIUM  9 0  8 9   BUN  91*  91*   CREATININE  14 00*  14 40*   TROPONINI  0 08*   --    AST  32   --    ALT  17   --    ALKPHOS  118*   --        Scheduled Meds:  Continuous Infusions:  No current facility-administered medications for this encounter  PRN Meds:      Surgical procedures (if appropriate):

## 2018-12-21 NOTE — SOCIAL WORK
MINGO contacted [facility] to inquire about Pts transfer status  CM was informed the clinical coordinator is Tha Matos at [phone] and to call back in a hour  Per , Tha Matos was assigned this case yesterday and she has reserved a chair time but is awaiting for the financial information to go through  CM will f/u

## 2018-12-21 NOTE — SOCIAL WORK
CM [facility]bs [facility] who reported Pt was accepted at [facility] with a 12:30 chair time  CM contacted Pt who reported he was aware of his acceptance  Pt reported he has transportation set up  Pt gratetful  No other needs reported at this time

## 2019-01-06 ENCOUNTER — HOSPITAL ENCOUNTER (EMERGENCY)
Facility: HOSPITAL | Age: 59
Discharge: HOME | End: 2019-01-06
Attending: EMERGENCY MEDICINE
Payer: MEDICARE

## 2019-01-06 ENCOUNTER — APPOINTMENT (EMERGENCY)
Dept: RADIOLOGY | Facility: HOSPITAL | Age: 59
End: 2019-01-06
Payer: MEDICARE

## 2019-01-06 VITALS
HEIGHT: 66 IN | HEART RATE: 93 BPM | BODY MASS INDEX: 25.07 KG/M2 | RESPIRATION RATE: 20 BRPM | WEIGHT: 156 LBS | OXYGEN SATURATION: 93 % | DIASTOLIC BLOOD PRESSURE: 71 MMHG | TEMPERATURE: 97.4 F | SYSTOLIC BLOOD PRESSURE: 155 MMHG

## 2019-01-06 DIAGNOSIS — J04.0 LARYNGITIS: ICD-10-CM

## 2019-01-06 DIAGNOSIS — R05.9 COUGH IN ADULT PATIENT: ICD-10-CM

## 2019-01-06 DIAGNOSIS — J02.9 PHARYNGITIS: Primary | ICD-10-CM

## 2019-01-06 DIAGNOSIS — Z99.2 END STAGE RENAL DISEASE ON DIALYSIS (HCC): ICD-10-CM

## 2019-01-06 DIAGNOSIS — N18.6 END STAGE RENAL DISEASE ON DIALYSIS (HCC): ICD-10-CM

## 2019-01-06 PROCEDURE — 99283 EMERGENCY DEPT VISIT LOW MDM: CPT

## 2019-01-06 PROCEDURE — 71046 X-RAY EXAM CHEST 2 VIEWS: CPT

## 2019-01-06 RX ORDER — UREA 10 %
1 LOTION (ML) TOPICAL
Qty: 10 TABLET | Refills: 0 | Status: SHIPPED | OUTPATIENT
Start: 2019-01-06 | End: 2019-01-16

## 2019-01-06 RX ORDER — FLUTICASONE PROPIONATE 50 MCG
1 SPRAY, SUSPENSION (ML) NASAL DAILY
Qty: 16 G | Refills: 0 | Status: SHIPPED | OUTPATIENT
Start: 2019-01-06 | End: 2019-04-16

## 2019-01-06 RX ORDER — PANTOPRAZOLE SODIUM 20 MG/1
20 TABLET, DELAYED RELEASE ORAL DAILY
Qty: 20 TABLET | Refills: 0 | Status: SHIPPED | OUTPATIENT
Start: 2019-01-06 | End: 2019-04-16

## 2019-01-06 RX ORDER — ACETAMINOPHEN 325 MG/1
975 TABLET ORAL ONCE
Status: COMPLETED | OUTPATIENT
Start: 2019-01-06 | End: 2019-01-06

## 2019-01-06 RX ADMIN — ACETAMINOPHEN 975 MG: 325 TABLET, FILM COATED ORAL at 05:53

## 2019-01-06 RX ADMIN — LIDOCAINE HYDROCHLORIDE 15 ML: 20 SOLUTION ORAL; TOPICAL at 05:52

## 2019-01-06 NOTE — ED PROVIDER NOTES
History  Chief Complaint   Patient presents with    Sore Throat     sore throat for 3 days, started with a cough, worsening, pt states he woke yesterday morning and could not talk  States it is hard to swallow     HPI     61-year-old with past medical history of CHF end-stage renal currently on  presents with chief complaint of sore throat cough loss of voice for last 1 month  Patient states symptoms began 1 month ago  Cough has become episodic at night  It is yellow sputum production  Patient states it is becoming worse  Patient lost is moist 3 days ago  Patient states this why he came in for evaluation  Patient also admits to sore throat  Currently pain is minimal but with swallowing becomes a 5/10  Patient has tried multiple medications and nothing is help with the cough  Patient has tried over-the-counter medications and prescription medication  Patient did not receive flu vaccine  This pain does not radiate  This pain comes and goes  Patient denies sick contacts  Patient denies fever chills rigors headache lightheadedness dizziness chest pain palpitations shortness of breath cough pleurisy abdominal pain nausea vomiting diarrhea constipation urinary symptoms motor weakness numbness and tingling  Prior to Admission Medications   Prescriptions Last Dose Informant Patient Reported? Taking?    albuterol (PROVENTIL HFA,VENTOLIN HFA) 90 mcg/act inhaler   Yes No   Sig: Inhale 2 puffs every 6 (six) hours as needed   amLODIPine (NORVASC) 10 mg tablet   Yes No   Sig: Take 10 mg by mouth daily   aspirin 325 mg tablet   No No   Sig: Take 1 tablet (325 mg total) by mouth daily   benzonatate (TESSALON PERLES) 100 mg capsule   No No   Sig: Take 1 capsule (100 mg total) by mouth every 8 (eight) hours   calcium acetate (PHOSLO) capsule   Yes No   Sig: Take 2,001 mg by mouth   cinacalcet (SENSIPAR) 30 mg tablet   Yes No   Si mg   dextromethorphan-guaiFENesin (ROBITUSSIN DM)  mg/5 mL syrup   No No   Sig: Take 10 mL by mouth every 6 (six) hours as needed for cough or congestion   gabapentin (NEURONTIN) 100 mg capsule   Yes No   Sig: Take 300 mg by mouth daily     metoprolol tartrate (LOPRESSOR) 25 mg tablet   No No   Sig: Take 1 tablet (25 mg total) by mouth every 12 (twelve) hours   pantoprazole (PROTONIX) 40 mg tablet   Yes No   Sig: Take 40 mg by mouth   sevelamer (RENAGEL) 800 mg tablet   No No   Sig: Take 1 tablet (800 mg total) by mouth 3 (three) times a day with meals      Facility-Administered Medications: None       Past Medical History:   Diagnosis Date    [facility] [address] [facility]     [facility] patient [facility]     Hypertension     Limb alert care status     do not use left arm    Rectal bleeding     Renal disorder        Past Surgical History:   Procedure Laterality Date    AV FISTULA PLACEMENT      left arm    AV NODE ABLATION      CHOLECYSTECTOMY      COLONOSCOPY N/A 4/27/2018    Procedure: COLONOSCOPY;  Surgeon: Gale Mays DO;  [facility]; Service: Gastroenterology    EGD AND COLONOSCOPY N/A 5/24/2018    Procedure: EGD AND COLONOSCOPY;  Surgeon: Shankar Bryson MD;  [facility]; Service: Gastroenterology       History reviewed  No pertinent family history  I have reviewed and agree with the history as documented  Social History   Substance Use Topics    Smoking status: Current Every Day Smoker     Packs/day: 1 00     Types: Cigarettes    Smokeless tobacco: Never Used    Alcohol use No        Review of Systems   Constitutional: Positive for fatigue  Negative for fever  HENT: Positive for sore throat  Respiratory: Positive for cough  Negative for shortness of breath and wheezing  All other systems reviewed and are negative        Physical Exam  ED Triage Vitals [01/06/19 0508]   Temperature Pulse Respirations Blood Pressure SpO2   (!) 97 4 °F (36 3 °C) 93 20 155/71 93 %      Temp Source Heart Rate Source Patient Position - Orthostatic VS BP Location FiO2 (%)   Tympanic -- -- -- --      Pain Score       9           Orthostatic Vital Signs  Vitals:    01/06/19 0508   BP: 155/71   Pulse: 93       Physical Exam   Constitutional: He is oriented to person, place, and time  He appears well-developed and well-nourished  No distress  HENT:   Head: Normocephalic and atraumatic  Right Ear: External ear normal    Left Ear: External ear normal    Nose: Nose normal    Mouth/Throat: Oropharynx is clear and moist  No oropharyngeal exudate  Eyes: Pupils are equal, round, and reactive to light  Conjunctivae and EOM are normal  Right eye exhibits no discharge  Left eye exhibits no discharge  No scleral icterus  Neck: Normal range of motion  Neck supple  No JVD present  No tracheal deviation present  No thyromegaly present  Cardiovascular: Normal rate, regular rhythm, normal heart sounds and intact distal pulses  No murmur heard  Pulmonary/Chest: Effort normal and breath sounds normal  No stridor  No respiratory distress  He has no wheezes  He has no rales  Abdominal: Soft  Bowel sounds are normal  He exhibits no distension and no mass  There is no tenderness  There is no rebound and no guarding  Musculoskeletal: Normal range of motion  He exhibits no edema, tenderness or deformity  Arms:  Lymphadenopathy:     He has no cervical adenopathy  Neurological: He is alert and oriented to person, place, and time  No cranial nerve deficit  He exhibits normal muscle tone  Coordination normal    Skin: Skin is warm and dry  No rash noted  He is not diaphoretic  No erythema  Psychiatric: He has a normal mood and affect  His behavior is normal  Judgment and thought content normal    Nursing note and vitals reviewed        ED Medications  Medications   lidocaine viscous (XYLOCAINE) 2 % mucosal solution 15 mL (15 mL Swish & Swallow Given 1/6/19 0790)   acetaminophen (TYLENOL) tablet 975 mg (975 mg Oral Given 1/6/19 5695) Diagnostic Studies  Results Reviewed     None                 XR chest 2 views   ED Interpretation by Lucía Reynoso DO (01/06 8300)   No effusion or consolidation bilaterally            Procedures  Procedures      Phone Consults  ED Phone Contact    ED Course                               MDM  Number of Diagnoses or Management Options  Cough in adult patient:   End stage renal disease on dialysis [facility]):   Laryngitis:   Pharyngitis:   Diagnosis management comments: 53-year-old male presenting with chief complaint of sore throat lymphangitis cough  Chest x-ray does not show pneumonia doubt pneumonia  No acceptance of bacterial pharyngitis  No signs of otitis media  Suspect viral origin versus allergy  Possible GERD  Patient given topical treatment for pharyngitis  Patient felt better  Patient will be started GERD medication  Patient will be started on prescriptions  Patient was started on Flonase for allergy rhinitis     Patient agrees to follow-up care  ED return precautions discussed  CritCare Time    Disposition  Final diagnoses:   Pharyngitis   Cough in adult patient   Laryngitis   End stage renal disease on dialysis [facility])     Time reflects when diagnosis was documented in both MDM as applicable and the Disposition within this note     Time User Action Codes Description Comment    1/6/2019  6:00 AM Cedric Han [J02 9] Pharyngitis     1/6/2019  6:00 AM Cedric Han Add [R05] Cough in adult patient     1/6/2019  6:00 AM Cedric Han [J04 0] Laryngitis     1/6/2019  6:00 AM Cedric Han Add [N18 6,  Z99 2] End stage renal disease on dialysis [facility])       ED Disposition     ED Disposition Condition Comment    Discharge  Layton Camel discharge to home/self care  Condition at discharge: Good    Return precautions were discussed with patient  Patient understands when to return to  Emergency department  Patient agrees to discharge plan and follow up care             Follow-up Information     Follow up With Specialties Details Why Contact Info Additional [address] in 2 days  [phone]  [address] [facility] Emergency Department Emergency Medicine Go to As needed, If symptoms worsen [address]  [phone] [facility], [address] [facility], [place], [place], [address]          Discharge Medication List as of 1/6/2019  6:43 AM      START taking these medications    Details   fluticasone (FLONASE) 50 mcg/act nasal spray 1 spray into each nostril daily, Starting Sun 1/6/2019, Print      melatonin 1 mg Take 1 tablet (1 mg total) by mouth daily at bedtime for 10 days, Starting Sun 1/6/2019, Until Wed 1/16/2019, Print      menthol-cetylpyridinium (CEPACOL) 3 MG lozenge Take 1 lozenge (3 mg total) by mouth as needed for sore throat, Starting Sun 1/6/2019, Print      !! pantoprazole (PROTONIX) 20 mg tablet Take 1 tablet (20 mg total) by mouth daily, Starting Sun 1/6/2019, Print       !! - Potential duplicate medications found  Please discuss with provider        CONTINUE these medications which have NOT CHANGED    Details   albuterol (PROVENTIL HFA,VENTOLIN HFA) 90 mcg/act inhaler Inhale 2 puffs every 6 (six) hours as needed, Starting Sat 4/21/2018, Historical Med      amLODIPine (NORVASC) 10 mg tablet Take 10 mg by mouth daily, Historical Med      aspirin 325 mg tablet Take 1 tablet (325 mg total) by mouth daily, Starting Wed 12/19/2018, Print      benzonatate (TESSALON PERLES) 100 mg capsule Take 1 capsule (100 mg total) by mouth every 8 (eight) hours, Starting Wed 11/28/2018, Print      calcium acetate (PHOSLO) capsule Take 2,001 mg by mouth, Starting Thu 2/12/2015, Historical Med      cinacalcet (SENSIPAR) 30 mg tablet 30 mg, Starting Tue 12/27/2016, Historical Med      dextromethorphan-guaiFENesin (ROBITUSSIN DM)  mg/5 mL syrup Take 10 mL by mouth every 6 (six) hours as needed for cough or congestion, Starting Fri 12/7/2018, Print      gabapentin (NEURONTIN) 100 mg capsule Take 300 mg by mouth daily  , Historical Med      metoprolol tartrate (LOPRESSOR) 25 mg tablet Take 1 tablet (25 mg total) by mouth every 12 (twelve) hours, Starting Wed 12/19/2018, Print      !! pantoprazole (PROTONIX) 40 mg tablet Take 40 mg by mouth, Starting Tue 5/1/2018, Historical Med      sevelamer (RENAGEL) 800 mg tablet Take 1 tablet (800 mg total) by mouth 3 (three) times a day with meals, Starting Sat 12/15/2018, No Print       !! - Potential duplicate medications found  Please discuss with provider  No discharge procedures on file  ED Provider  Attending physically available and evaluated Dennys Zabala I managed the patient along with the ED Attending      Electronically Signed by         Meghan Murillo DO  01/06/19 5420

## 2019-01-06 NOTE — DISCHARGE INSTRUCTIONS
Acute Cough   WHAT YOU NEED TO KNOW:   An acute cough can last up to 3 weeks  Common causes of an acute cough include a cold, allergies, or a lung infection  DISCHARGE INSTRUCTIONS:   Return to the emergency department if:   · You have trouble breathing or feel short of breath  · You cough up blood, or you see blood in your mucus  · You faint or feel weak or dizzy  · You have chest pain when you cough or take a deep breath  · You have new wheezing  Contact your healthcare provider if:   · You have a fever  · Your cough lasts longer than 4 weeks  · Your symptoms do not improve with treatment  · You have questions or concerns about your condition or care  Medicines:   · Medicines  may be needed to stop the cough, decrease swelling in your airways, or help open your airways  Medicine may also be given to help you cough up mucus  Ask your healthcare provider what over-the-counter medicines you can take  If you have an infection caused by bacteria, you may need antibiotics  · Take your medicine as directed  Contact your healthcare provider if you think your medicine is not helping or if you have side effects  Tell him or her if you are allergic to any medicine  Keep a list of the medicines, vitamins, and herbs you take  Include the amounts, and when and why you take them  Bring the list or the pill bottles to follow-up visits  Carry your medicine list with you in case of an emergency  Manage your symptoms:   · Do not smoke and stay away from others who smoke  Nicotine and other chemicals in cigarettes and cigars can cause lung damage and make your cough worse  Ask your healthcare provider for information if you currently smoke and need help to quit  E-cigarettes or smokeless tobacco still contain nicotine  Talk to your healthcare provider before you use these products  · Drink extra liquids as directed  Liquids will help thin and loosen mucus so you can cough it up   Liquids will also help prevent dehydration  Examples of good liquids to drink include water, fruit juice, and broth  Do not drink liquids that contain caffeine  Caffeine can increase your risk for dehydration  Ask your healthcare provider how much liquid to drink each day  · Rest as directed  Do not do activities that make your cough worse, such as exercise  · Use a humidifier or vaporizer  Use a cool mist humidifier or a vaporizer to increase air moisture in your home  This may make it easier for you to breathe and help decrease your cough  · Eat 2 to 5 mL of honey 2 times each day  Honey can help thin mucus and decrease your cough  · Use cough drops or lozenges  These can help decrease throat irritation and your cough  Follow up with your healthcare provider as directed:  Write down your questions so you remember to ask them during your visits  © 2017 [employer] Information is for End User's use only and may not be sold, redistributed or otherwise used for commercial purposes  All illustrations and images included in CareNotes® are the copyrighted property of A D A M [employer]  or [employer]  The above information is an  only  It is not intended as medical advice for individual conditions or treatments  Talk to your doctor, nurse or pharmacist before following any medical regimen to see if it is safe and effective for you

## 2019-04-12 PROCEDURE — 99283 EMERGENCY DEPT VISIT LOW MDM: CPT

## 2019-04-13 ENCOUNTER — HOSPITAL ENCOUNTER (EMERGENCY)
Facility: HOSPITAL | Age: 59
Discharge: HOME | End: 2019-04-13
Attending: EMERGENCY MEDICINE | Admitting: EMERGENCY MEDICINE
Payer: MEDICARE

## 2019-04-13 VITALS
SYSTOLIC BLOOD PRESSURE: 181 MMHG | HEART RATE: 88 BPM | BODY MASS INDEX: 25.19 KG/M2 | TEMPERATURE: 98.3 F | DIASTOLIC BLOOD PRESSURE: 89 MMHG | RESPIRATION RATE: 18 BRPM | OXYGEN SATURATION: 99 % | WEIGHT: 156.09 LBS

## 2019-04-13 DIAGNOSIS — G89.18 POSTOPERATIVE PAIN: Primary | ICD-10-CM

## 2019-04-13 PROCEDURE — 99283 EMERGENCY DEPT VISIT LOW MDM: CPT | Performed by: EMERGENCY MEDICINE

## 2019-04-13 RX ORDER — OXYCODONE HYDROCHLORIDE AND ACETAMINOPHEN 5; 325 MG/1; MG/1
1 TABLET ORAL ONCE
Status: COMPLETED | OUTPATIENT
Start: 2019-04-13 | End: 2019-04-13

## 2019-04-13 RX ADMIN — OXYCODONE HYDROCHLORIDE AND ACETAMINOPHEN 1 TABLET: 5; 325 TABLET ORAL at 01:56

## 2019-04-15 RX ORDER — ALBUTEROL SULFATE 90 UG/1
2 AEROSOL, METERED RESPIRATORY (INHALATION) EVERY 6 HOURS PRN
COMMUNITY
Start: 2018-04-21

## 2019-04-15 RX ORDER — HYDROCODONE BITARTRATE AND ACETAMINOPHEN 5; 325 MG/1; MG/1
TABLET ORAL
COMMUNITY
Start: 2019-04-13 | End: 2019-04-16

## 2019-04-16 ENCOUNTER — OFFICE VISIT (OUTPATIENT)
Dept: INTERNAL MEDICINE CLINIC | Facility: CLINIC | Age: 59
End: 2019-04-16

## 2019-04-16 ENCOUNTER — TELEPHONE (OUTPATIENT)
Dept: INTERNAL MEDICINE CLINIC | Facility: CLINIC | Age: 59
End: 2019-04-16

## 2019-04-16 VITALS
HEIGHT: 66 IN | SYSTOLIC BLOOD PRESSURE: 134 MMHG | WEIGHT: 147.71 LBS | TEMPERATURE: 97.4 F | HEART RATE: 60 BPM | BODY MASS INDEX: 23.74 KG/M2 | DIASTOLIC BLOOD PRESSURE: 70 MMHG

## 2019-04-16 DIAGNOSIS — I48.0 PAROXYSMAL A-FIB (HCC): Chronic | ICD-10-CM

## 2019-04-16 DIAGNOSIS — N25.81 SECONDARY HYPERPARATHYROIDISM OF RENAL ORIGIN (HCC): Primary | ICD-10-CM

## 2019-04-16 DIAGNOSIS — Z99.2 ANEMIA IN CHRONIC KIDNEY DISEASE, ON CHRONIC DIALYSIS (HCC): ICD-10-CM

## 2019-04-16 DIAGNOSIS — N18.6 ESRD ON HEMODIALYSIS (HCC): ICD-10-CM

## 2019-04-16 DIAGNOSIS — E87.2 HIGH ANION GAP METABOLIC ACIDOSIS: ICD-10-CM

## 2019-04-16 DIAGNOSIS — D63.1 ANEMIA IN CHRONIC KIDNEY DISEASE, ON CHRONIC DIALYSIS (HCC): ICD-10-CM

## 2019-04-16 DIAGNOSIS — Z99.2 ESRD ON HEMODIALYSIS (HCC): ICD-10-CM

## 2019-04-16 DIAGNOSIS — I50.30 DIASTOLIC HEART FAILURE (HCC): Chronic | ICD-10-CM

## 2019-04-16 DIAGNOSIS — I77.0 ANEURYSM OF ARTERIOVENOUS FISTULA (HCC): ICD-10-CM

## 2019-04-16 DIAGNOSIS — N18.6 ANEMIA IN CHRONIC KIDNEY DISEASE, ON CHRONIC DIALYSIS (HCC): ICD-10-CM

## 2019-04-16 PROCEDURE — 99203 OFFICE O/P NEW LOW 30 MIN: CPT | Performed by: HOSPITALIST

## 2019-04-16 RX ORDER — SUCROFERRIC OXYHYDROXIDE 500 MG/1
TABLET, CHEWABLE ORAL
Refills: 11 | COMMUNITY
Start: 2019-03-08

## 2019-04-19 ENCOUNTER — PATIENT OUTREACH (OUTPATIENT)
Dept: INTERNAL MEDICINE CLINIC | Facility: CLINIC | Age: 59
End: 2019-04-19

## 2019-06-23 ENCOUNTER — HOSPITAL ENCOUNTER (EMERGENCY)
Facility: HOSPITAL | Age: 59
Discharge: HOME | End: 2019-06-23
Attending: EMERGENCY MEDICINE
Payer: MEDICARE

## 2019-06-23 VITALS
DIASTOLIC BLOOD PRESSURE: 82 MMHG | TEMPERATURE: 98.3 F | WEIGHT: 154 LBS | HEART RATE: 87 BPM | RESPIRATION RATE: 20 BRPM | SYSTOLIC BLOOD PRESSURE: 156 MMHG | OXYGEN SATURATION: 98 % | BODY MASS INDEX: 24.75 KG/M2 | HEIGHT: 66 IN

## 2019-06-23 DIAGNOSIS — L08.9 WOUND INFECTION: Primary | ICD-10-CM

## 2019-06-23 DIAGNOSIS — T14.8XXA WOUND INFECTION: Primary | ICD-10-CM

## 2019-06-23 PROCEDURE — 99282 EMERGENCY DEPT VISIT SF MDM: CPT

## 2019-06-23 PROCEDURE — 99283 EMERGENCY DEPT VISIT LOW MDM: CPT | Performed by: EMERGENCY MEDICINE

## 2019-06-23 RX ORDER — CEPHALEXIN 250 MG/1
500 CAPSULE ORAL ONCE
Status: COMPLETED | OUTPATIENT
Start: 2019-06-23 | End: 2019-06-23

## 2019-06-23 RX ORDER — CEPHALEXIN 500 MG/1
500 CAPSULE ORAL EVERY 8 HOURS SCHEDULED
Qty: 21 CAPSULE | Refills: 0 | Status: SHIPPED | OUTPATIENT
Start: 2019-06-23 | End: 2019-06-30

## 2019-06-23 RX ADMIN — CEPHALEXIN 500 MG: 250 CAPSULE ORAL at 15:46

## 2019-06-23 NOTE — ED PROVIDER NOTES
History  Chief Complaint   Patient presents with    Wound Check     Pt had surgery on his L arm Wednesday and states that there is drainage from the site  Pt surgery was done at [facility]  Patient is a 26-year-old male presenting for postsurgical wound drainage  On 2019 patient had revision of a left arm AV fistula performed at [facility]  Patient states last night he noticed drainage coming from the wound and states that it was pustular  Patient has also noticed a foul odor coming from the wound area and thinks that there may be some erythema  Patient has 2 surgical wounds with staples in place on his left upper arm, a larger wound measuring 8-10 cm and a smaller wound measuring about 3 cm  These wounds are parallel and in between them is a previous surgical scar as well as a soft lump that he states has been there since the surgery, he is uncertain if it has grown in size or not  Patient admits to some tenderness in this area when he touches it but denies tenderness when his not being touched  Patient maintains sensation to his fingers and hand, he denies temperature change between his 2 hands  He denies fevers or chills at home, nausea or vomiting, weakness in his left arm  Patient is on  dialysis and states yesterday he only received a half treatment as he had a treatment on Friday  He denies shortness of breath or lower extremity swelling  Prior to Admission Medications   Prescriptions Last Dose Informant Patient Reported? Taking?    VELPHORO 500 MG CHEW   Yes No   Sig: CHEW & SWALLOW 2 TABLETS BY MOUTH THREE TIMES A DAY WITH MEALS   albuterol (VENTOLIN HFA) 90 mcg/act inhaler   Yes No   Sig: Inhale 2 puffs every 6 (six) hours as needed   amLODIPine (NORVASC) 10 mg tablet   Yes No   Sig: Take 10 mg by mouth daily   cinacalcet (SENSIPAR) 30 mg tablet   Yes No   Si mg   gabapentin (NEURONTIN) 300 mg capsule   Yes No   Sig: TK ONE C PO D Facility-Administered Medications: None       Past Medical History:   Diagnosis Date    [facility] [address] [facility]     [facility]     Hypertension     Limb alert care status     do not use left arm    Rectal bleeding     Renal disorder        Past Surgical History:   Procedure Laterality Date    AV FISTULA PLACEMENT      left arm    AV NODE ABLATION      CHOLECYSTECTOMY      COLONOSCOPY N/A 4/27/2018    Procedure: COLONOSCOPY;  Surgeon: Elpidio Fraser DO;  [facility]; Service: Gastroenterology    EGD AND COLONOSCOPY N/A 5/24/2018    Procedure: EGD AND COLONOSCOPY;  Surgeon: Dodie Morales MD;  [facility]; Service: Gastroenterology       Family History   Problem Relation Age of Onset    Diabetes Mother      I have reviewed and agree with the history as documented  Social History     Tobacco Use    Smoking status: Current Every Day Smoker     Packs/day: 1 00     Types: Cigarettes    Smokeless tobacco: Never Used   Substance Use Topics    Alcohol use: No    Drug use: No        Review of Systems   Constitutional: Negative for appetite change, chills and fever  Respiratory: Negative for shortness of breath  Cardiovascular: Negative for chest pain  Gastrointestinal: Negative for abdominal pain, nausea and vomiting  Musculoskeletal: Positive for myalgias  Negative for back pain and neck pain  Skin: Positive for color change and wound  Neurological: Negative for weakness, light-headedness, numbness and headaches  All other systems reviewed and are negative        Physical Exam  ED Triage Vitals [06/23/19 1502]   Temperature Pulse Respirations Blood Pressure SpO2   98 3 °F (36 8 °C) 87 20 156/82 98 %      Temp Source Heart Rate Source Patient Position - Orthostatic VS BP Location FiO2 (%)   Oral Monitor Sitting Right arm --      Pain Score       No Pain             Orthostatic Vital Signs  Vitals:    06/23/19 1502   BP: 156/82   Pulse: 87   Patient Position - Orthostatic VS: Sitting       Physical Exam   Constitutional: He appears well-developed and well-nourished  No distress  HENT:   Head: Normocephalic and atraumatic  Eyes: Pupils are equal, round, and reactive to light  EOM are normal  Right eye exhibits no discharge  Left eye exhibits no discharge  Conjunctival hemorrhage to R eye, no painful EOM, supra- and infraorbital regions bilaterally non-painful and without stepoff   Cardiovascular: Normal rate and regular rhythm  Pulmonary/Chest: Effort normal and breath sounds normal  No respiratory distress  Abdominal: Soft  He exhibits no distension  There is no tenderness  Musculoskeletal: He exhibits no edema or deformity  Neurological: He is alert  No sensory deficit  He exhibits normal muscle tone  Coordination normal    Sensation and motor intact to bilateral upper extremities, radial pulses 2+   Skin: Skin is warm  Capillary refill takes less than 2 seconds  He is not diaphoretic  There is erythema  LUE AV fistula with palpable thrill; surgical wounds with staples, no exudate can be expressed, central area between wounds appears slightly erythematous and warm, mild tenderness to sites   Vitals reviewed  ED Medications  Medications   cephalexin (KEFLEX) capsule 500 mg (500 mg Oral Given 6/23/19 5355)       Diagnostic Studies  Results Reviewed     None                 No orders to display         Procedures  Procedures        ED Course                               MDM  Number of Diagnoses or Management Options  Wound infection:   Diagnosis management comments: Patient given rx for keflex and urged to call his surgeon at [facility] tomorrow morning regarding his symptoms  Disposition  Final diagnoses:   Wound infection     Time reflects when diagnosis was documented in both MDM as applicable and the Disposition within this note     Time User Action Codes Description Comment    6/23/2019  3:38 PM Elisa Garcia  8XXA,  L08 9] Wound infection       ED Disposition     ED Disposition Condition Date/Time Comment    Discharge Stable Sun Jun 23, 2019  3:38 PM Mildred Wolfe discharge to home/self care  Follow-up Information     Follow up With Specialties Details Why Contact Emir Hernandez MD General Surgery In 1 day Call your surgeon regarding your wound drainage  [address]   [phone]            Discharge Medication List as of 6/23/2019  3:40 PM      START taking these medications    Details   cephalexin (KEFLEX) 500 mg capsule Take 1 capsule (500 mg total) by mouth every 8 (eight) hours for 7 days, Starting Sun 6/23/2019, Until Sun 6/30/2019, Print         CONTINUE these medications which have NOT CHANGED    Details   albuterol (VENTOLIN HFA) 90 mcg/act inhaler Inhale 2 puffs every 6 (six) hours as needed, Starting Sat 4/21/2018, Historical Med      amLODIPine (NORVASC) 10 mg tablet Take 10 mg by mouth daily, Historical Med      cinacalcet (SENSIPAR) 30 mg tablet 30 mg, Starting Tue 12/27/2016, Historical Med      gabapentin (NEURONTIN) 300 mg capsule TK ONE C PO D, Historical Med      VELPHORO 500 MG CHEW CHEW & SWALLOW 2 TABLETS BY MOUTH THREE TIMES A DAY WITH MEALS, Historical Med           No discharge procedures on file  ED Provider  Attending physically available and evaluated Mildred Wolfe  I managed the patient along with the ED Attending      Electronically Signed by         Catarina Olivares DO  06/27/19 8508

## 2019-06-23 NOTE — ED ATTENDING ATTESTATION
Malvin Briceno DO, saw and evaluated the patient  I have discussed the patient with the resident/non-physician practitioner and agree with the resident's/non-physician practitioner's findings, Plan of Care, and MDM as documented in the resident's/non-physician practitioner's note, except where noted  All available labs and Radiology studies were reviewed  I was present for key portions of any procedure(s) performed by the resident/non-physician practitioner and I was immediately available to provide assistance  At this point I agree with the current assessment done in the Emergency Department  I have conducted an independent evaluation of this patient a history and physical is as follows:    67 yo male presents for evaluation following dialysis fistula repair at [facility] last week  Fistula on L arm  Denies pain, states he has been having discharge  States he is here instead of [facility] because family member is here in ED for something unrelated and he decided to "get it checked while I was here"  Site is malodorous, slightly erythematous  Non TTP  No crepitus  Good thrill in fistula    Imp: mild wound infection plan: keflex  Pt should f/u with [facility] surgeon who did repair        Critical Care Time  Procedures

## 2019-11-30 ENCOUNTER — HOSPITAL ENCOUNTER (EMERGENCY)
Facility: HOSPITAL | Age: 59
Discharge: HOME | End: 2019-11-30
Attending: EMERGENCY MEDICINE | Admitting: EMERGENCY MEDICINE
Payer: MEDICARE

## 2019-11-30 ENCOUNTER — APPOINTMENT (EMERGENCY)
Dept: RADIOLOGY | Facility: HOSPITAL | Age: 59
End: 2019-11-30
Payer: MEDICARE

## 2019-11-30 ENCOUNTER — APPOINTMENT (EMERGENCY)
Dept: DIALYSIS | Facility: HOSPITAL | Age: 59
End: 2019-11-30
Payer: MEDICARE

## 2019-11-30 VITALS
BODY MASS INDEX: 25.18 KG/M2 | DIASTOLIC BLOOD PRESSURE: 70 MMHG | WEIGHT: 156 LBS | SYSTOLIC BLOOD PRESSURE: 157 MMHG | RESPIRATION RATE: 18 BRPM | HEART RATE: 82 BPM | TEMPERATURE: 97.8 F | OXYGEN SATURATION: 98 %

## 2019-11-30 DIAGNOSIS — E87.5 HYPERKALEMIA: ICD-10-CM

## 2019-11-30 DIAGNOSIS — Z99.2 ESRD ON HEMODIALYSIS (HCC): ICD-10-CM

## 2019-11-30 DIAGNOSIS — N18.6 END STAGE RENAL DISEASE (HCC): Primary | ICD-10-CM

## 2019-11-30 DIAGNOSIS — N18.6 ESRD ON HEMODIALYSIS (HCC): ICD-10-CM

## 2019-11-30 LAB
ALBUMIN SERPL BCP-MCNC: 3.3 G/DL (ref 3.5–5)
ALP SERPL-CCNC: 126 U/L (ref 46–116)
ALT SERPL W P-5'-P-CCNC: 23 U/L (ref 12–78)
ANION GAP SERPL CALCULATED.3IONS-SCNC: 14 MMOL/L (ref 4–13)
AST SERPL W P-5'-P-CCNC: 20 U/L (ref 5–45)
BASOPHILS # BLD AUTO: 0.06 THOUSANDS/ΜL (ref 0–0.1)
BASOPHILS NFR BLD AUTO: 1 % (ref 0–1)
BILIRUB SERPL-MCNC: 0.38 MG/DL (ref 0.2–1)
BUN SERPL-MCNC: 116 MG/DL (ref 5–25)
CALCIUM SERPL-MCNC: 9.1 MG/DL (ref 8.3–10.1)
CHLORIDE SERPL-SCNC: 104 MMOL/L (ref 100–108)
CO2 SERPL-SCNC: 20 MMOL/L (ref 21–32)
CREAT SERPL-MCNC: 15.3 MG/DL (ref 0.6–1.3)
EOSINOPHIL # BLD AUTO: 0.37 THOUSAND/ΜL (ref 0–0.61)
EOSINOPHIL NFR BLD AUTO: 6 % (ref 0–6)
ERYTHROCYTE [DISTWIDTH] IN BLOOD BY AUTOMATED COUNT: 16.4 % (ref 11.6–15.1)
GFR SERPL CREATININE-BSD FRML MDRD: 4 ML/MIN/1.73SQ M
GLUCOSE SERPL-MCNC: 97 MG/DL (ref 65–140)
HCT VFR BLD AUTO: 27.9 % (ref 36.5–49.3)
HGB BLD-MCNC: 8.8 G/DL (ref 12–17)
IMM GRANULOCYTES # BLD AUTO: 0.01 THOUSAND/UL (ref 0–0.2)
IMM GRANULOCYTES NFR BLD AUTO: 0 % (ref 0–2)
LYMPHOCYTES # BLD AUTO: 0.83 THOUSANDS/ΜL (ref 0.6–4.47)
LYMPHOCYTES NFR BLD AUTO: 14 % (ref 14–44)
MCH RBC QN AUTO: 30.7 PG (ref 26.8–34.3)
MCHC RBC AUTO-ENTMCNC: 31.5 G/DL (ref 31.4–37.4)
MCV RBC AUTO: 97 FL (ref 82–98)
MONOCYTES # BLD AUTO: 0.42 THOUSAND/ΜL (ref 0.17–1.22)
MONOCYTES NFR BLD AUTO: 7 % (ref 4–12)
NEUTROPHILS # BLD AUTO: 4.39 THOUSANDS/ΜL (ref 1.85–7.62)
NEUTS SEG NFR BLD AUTO: 72 % (ref 43–75)
NRBC BLD AUTO-RTO: 0 /100 WBCS
PLATELET # BLD AUTO: 283 THOUSANDS/UL (ref 149–390)
PMV BLD AUTO: 9.8 FL (ref 8.9–12.7)
POTASSIUM SERPL-SCNC: 6.9 MMOL/L (ref 3.5–5.3)
PROT SERPL-MCNC: 7.8 G/DL (ref 6.4–8.2)
RBC # BLD AUTO: 2.87 MILLION/UL (ref 3.88–5.62)
SODIUM SERPL-SCNC: 138 MMOL/L (ref 136–145)
WBC # BLD AUTO: 6.08 THOUSAND/UL (ref 4.31–10.16)

## 2019-11-30 PROCEDURE — G0257 UNSCHED DIALYSIS ESRD PT HOS: HCPCS

## 2019-11-30 PROCEDURE — 71046 X-RAY EXAM CHEST 2 VIEWS: CPT

## 2019-11-30 PROCEDURE — 99281 EMR DPT VST MAYX REQ PHY/QHP: CPT | Performed by: INTERNAL MEDICINE

## 2019-11-30 PROCEDURE — 36415 COLL VENOUS BLD VENIPUNCTURE: CPT | Performed by: STUDENT IN AN ORGANIZED HEALTH CARE EDUCATION/TRAINING PROGRAM

## 2019-11-30 PROCEDURE — 85025 COMPLETE CBC W/AUTO DIFF WBC: CPT | Performed by: STUDENT IN AN ORGANIZED HEALTH CARE EDUCATION/TRAINING PROGRAM

## 2019-11-30 PROCEDURE — 93005 ELECTROCARDIOGRAM TRACING: CPT

## 2019-11-30 PROCEDURE — 99283 EMERGENCY DEPT VISIT LOW MDM: CPT | Performed by: EMERGENCY MEDICINE

## 2019-11-30 PROCEDURE — 99285 EMERGENCY DEPT VISIT HI MDM: CPT

## 2019-11-30 PROCEDURE — 80053 COMPREHEN METABOLIC PANEL: CPT | Performed by: STUDENT IN AN ORGANIZED HEALTH CARE EDUCATION/TRAINING PROGRAM

## 2019-11-30 NOTE — ED PROVIDER NOTES
History  Chief Complaint   Patient presents with    Shortness of Breath     Pt states that he is due for dialysis today but pt presents with SOB and bilateral leg swelling beginning last night     This is a 53-year-old male with a past medical history of end-stage renal disease on hemodialysis every  and Friday who presents to the emergency department this afternoon with shortness of breath  Patient states that he missed his dialysis appointment yesterday because he typically lives in [place], where he gets the dialysis but he is visiting friends and family up here in [place]  Patient attempted to schedule an emergency dialysis appointment in [place] today but was unable to decided to come to the ER for emergent dialysis  Patient states that he feels short of breath currently and had to sleep on the couch upright last night  He also feels like his legs are mildly edematous as well  Patient denies any palpitations, chest pain, fevers, chills, nausea, vomiting, constipation, headache, change of vision, tinnitus, or any numbness, weakness, or tingling  Prior to Admission Medications   Prescriptions Last Dose Informant Patient Reported? Taking?    VELPHORO 500 MG CHEW   Yes No   Sig: CHEW & SWALLOW 2 TABLETS BY MOUTH THREE TIMES A DAY WITH MEALS   albuterol (VENTOLIN HFA) 90 mcg/act inhaler   Yes No   Sig: Inhale 2 puffs every 6 (six) hours as needed   amLODIPine (NORVASC) 10 mg tablet   Yes No   Sig: Take 10 mg by mouth daily   cinacalcet (SENSIPAR) 30 mg tablet   Yes No   Si mg   gabapentin (NEURONTIN) 300 mg capsule   Yes No   Sig: TK ONE C PO D      Facility-Administered Medications: None       Past Medical History:   Diagnosis Date    [facility]     [facility]     Hypertension     Limb alert care status     do not use left arm    Rectal bleeding     Renal disorder        Past Surgical History:   Procedure Laterality Date    AV FISTULA PLACEMENT      left arm    AV NODE ABLATION      CHOLECYSTECTOMY      COLONOSCOPY N/A 4/27/2018    Procedure: COLONOSCOPY;  Surgeon: Benita Monterroso DO;  [facility]; Service: Gastroenterology    EGD AND COLONOSCOPY N/A 5/24/2018    Procedure: EGD AND COLONOSCOPY;  Surgeon: Rafael Coronado MD;  [facility]; Service: Gastroenterology       Family History   Problem Relation Age of Onset    Diabetes Mother      I have reviewed and agree with the history as documented  Social History     Tobacco Use    Smoking status: Current Every Day Smoker     Packs/day: 0 50     Types: Cigarettes    Smokeless tobacco: Never Used   Substance Use Topics    Alcohol use: No    Drug use: No        Review of Systems   Constitutional: Negative for chills, diaphoresis and fever  HENT: Negative for congestion, rhinorrhea, sinus pressure and sore throat  Eyes: Negative for visual disturbance  Respiratory: Positive for shortness of breath  Negative for cough and chest tightness  Cardiovascular: Negative for chest pain  Gastrointestinal: Negative for abdominal pain, constipation, diarrhea, nausea and vomiting  Genitourinary: Negative for dysuria, frequency, hematuria and urgency  Musculoskeletal: Negative for arthralgias and myalgias  Skin: Negative for color change and rash  Neurological: Negative for dizziness, numbness and headaches         Physical Exam  ED Triage Vitals [11/30/19 1324]   Temperature Pulse Respirations Blood Pressure SpO2   98 1 °F (36 7 °C) 88 (!) 25 (!) 204/88 95 %      Temp Source Heart Rate Source Patient Position - Orthostatic VS BP Location FiO2 (%)   Oral Monitor Sitting Right arm --      Pain Score       No Pain             Orthostatic Vital Signs  Vitals:    11/30/19 1900 11/30/19 1930 11/30/19 1945 11/30/19 2039   BP: 158/77 122/61 142/78 157/70   Pulse: 85 81 83 82   Patient Position - Orthostatic VS:    Sitting       Physical Exam   Constitutional: He is oriented to person, place, and time  He appears well-developed and well-nourished  No distress  HENT:   Head: Normocephalic and atraumatic  Eyes: Pupils are equal, round, and reactive to light  Conjunctivae are normal    Neck: Normal range of motion  Neck supple  No JVD present  Cardiovascular: Normal rate, regular rhythm and normal heart sounds  Exam reveals no gallop and no friction rub  No murmur heard  Pulmonary/Chest: Effort normal  No stridor  No respiratory distress  He has no wheezes  He has rales in the right lower field and the left lower field  Abdominal: Soft  Bowel sounds are normal  He exhibits no distension  There is no tenderness  There is no guarding  Musculoskeletal: Normal range of motion  He exhibits no edema, tenderness or deformity  Right lower leg: Normal  He exhibits no tenderness and no edema  Left lower leg: Normal  He exhibits no tenderness and no edema  Neurological: He is alert and oriented to person, place, and time  No cranial nerve deficit or sensory deficit  He exhibits normal muscle tone  Skin: Skin is warm and dry  No rash noted  He is not diaphoretic  No erythema  No pallor  Psychiatric: He has a normal mood and affect  His behavior is normal    Nursing note and vitals reviewed        ED Medications  Medications - No data to display    Diagnostic Studies  Results Reviewed     Procedure Component Value Units Date/Time    Comprehensive metabolic panel [[id number]]  (Abnormal) Collected:  11/30/19 1409    Lab Status:  Final result Specimen:  Blood from Hand, Right Updated:  11/30/19 1601     Sodium 138 mmol/L      Potassium 6 9 mmol/L      Chloride 104 mmol/L      CO2 20 mmol/L      ANION GAP 14 mmol/L       mg/dL      Creatinine 15 30 mg/dL      Glucose 97 mg/dL      Calcium 9 1 mg/dL      AST 20 U/L      ALT 23 U/L      Alkaline Phosphatase 126 U/L      Total Protein 7 8 g/dL      Albumin 3 3 g/dL      Total Bilirubin 0 38 mg/dL      eGFR 4 ml/min/1 73sq m     Narrative:       National Kidney Disease Foundation guidelines for Chronic Kidney Disease (CKD):     Stage 1 with normal or high GFR (GFR > 90 mL/min/1 73 square meters)    Stage 2 Mild CKD (GFR = 60-89 mL/min/1 73 square meters)    Stage 3A Moderate CKD (GFR = 45-59 mL/min/1 73 square meters)    Stage 3B Moderate CKD (GFR = 30-44 mL/min/1 73 square meters)    Stage 4 Severe CKD (GFR = 15-29 mL/min/1 73 square meters)    Stage 5 End Stage CKD (GFR <15 mL/min/1 73 square meters)  Note: GFR calculation is accurate only with a steady state creatinine    CBC and differential [[id number]]  (Abnormal) Collected:  11/30/19 1409    Lab Status:  Final result Specimen:  Blood from Hand, Right Updated:  11/30/19 1418     WBC 6 08 Thousand/uL      RBC 2 87 Million/uL      Hemoglobin 8 8 g/dL      Hematocrit 27 9 %      MCV 97 fL      MCH 30 7 pg      MCHC 31 5 g/dL      RDW 16 4 %      MPV 9 8 fL      Platelets 380 Thousands/uL      nRBC 0 /100 WBCs      Neutrophils Relative 72 %      Immat GRANS % 0 %      Lymphocytes Relative 14 %      Monocytes Relative 7 %      Eosinophils Relative 6 %      Basophils Relative 1 %      Neutrophils Absolute 4 39 Thousands/µL      Immature Grans Absolute 0 01 Thousand/uL      Lymphocytes Absolute 0 83 Thousands/µL      Monocytes Absolute 0 42 Thousand/µL      Eosinophils Absolute 0 37 Thousand/µL      Basophils Absolute 0 06 Thousands/µL                  XR chest 2 views   Final Result by Christine Victoria MD (11/30 2130)      Mild vascular congestion with stable cardiomegaly              Workstation performed: [id number]               Procedures  ECG 12 Lead Documentation Only  Date/Time: 11/30/2019 2:27 PM  Performed by: Lyubov Corcoran MD  Authorized by: Lyubov Corcoran MD     Indications / Diagnosis:  Shortness of breath  ECG reviewed by me, the ED Provider: yes    Patient location:  ED  Interpretation:     Interpretation: abnormal    Rate:     ECG rate assessment: normal    Rhythm:     Rhythm: sinus rhythm    Ectopy:     Ectopy: none    QRS:     QRS axis:  Normal    QRS intervals:  Normal  Conduction:     Conduction: normal    ST segments:     ST segments:  Normal  T waves:     T waves: inverted    Q waves:     Q waves:  V1 and V2  Comments:      QTC is 434 milliseconds  ED Course                               MDM  Number of Diagnoses or Management Options  End stage renal disease ([place] [address] ):   ESRD on hemodialysis [facility]): Hyperkalemia:   Diagnosis management comments: Patient sent to dialysis upstairs for a treatment  Patient is likely chronically hyperkalemic as he had no symptoms and his EKG was normal but regardless, this will be treated with the dialysis  Disposition  Final diagnoses:   End stage renal disease ([place] [address] )   Hyperkalemia   ESRD on hemodialysis ([place] [address] )     Time reflects when diagnosis was documented in both MDM as applicable and the Disposition within this note     Time User Action Codes Description Comment    11/30/2019  4:57 PM Martha Pipe Add [N18 6] End stage renal disease ([facility] )     11/30/2019  5:00 PM Martha Pipe Add [E87 5] Hyperkalemia     11/30/2019  8:31 PM Florrie Cleveland Add [N18 6,  Z99 2] ESRD on hemodialysis [facility])       ED Disposition     ED Disposition Condition Date/Time Comment    Discharge Stable Sat Nov 30, 2019  8:31 PM Star Rich discharge to home/self care  Follow-up Information     Follow up With Specialties Details Why Contact Info Additional [facility] Schedule an appointment as soon as possible for a visit in 1 week For follow up regarding your symptoms and recheck   Go to your scheduled dialysis appointments [facility] [place] [phone]  [address] [facility], [address] [facility], [place], [place], [address] [facility]          Discharge Medication List as of 11/30/2019  8:32 PM      CONTINUE these medications which have NOT CHANGED    Details   albuterol (VENTOLIN HFA) 90 mcg/act inhaler Inhale 2 puffs every 6 (six) hours as needed, Starting Sat 4/21/2018, Historical Med      amLODIPine (NORVASC) 10 mg tablet Take 10 mg by mouth daily, Historical Med      cinacalcet (SENSIPAR) 30 mg tablet 30 mg, Starting Tue 12/27/2016, Historical Med      gabapentin (NEURONTIN) 300 mg capsule TK ONE C PO D, Historical Med      VELPHORO 500 MG CHEW CHEW & SWALLOW 2 TABLETS BY MOUTH THREE TIMES A DAY WITH MEALS, Historical Med           No discharge procedures on file  ED Provider  Attending physically available and evaluated Alida Goodell I managed the patient along with the ED Attending      Electronically Signed by         Yara Davis MD  12/03/19 8370

## 2019-11-30 NOTE — CONSULTS
Consultation - Nephrology   Rojas Goldberg 64 y o  male MRN: [id number]  Unit/Bed#: ED 19 Encounter: [id number]    ASSESSMENT:  1  ESRD on HD (MWF @ [facility]): His last dialysis treatment was on Wednesday  - I called the dialysis unit in [place] but no one was available to talk to anyone  - EDW 64 5kg per patient  - BMP pending for today, will adjust dialysate accordingly  - ESRD since 2013 from hypertension  - HD today for SOB  2  Access: left AVF  - upcoming outpatient appointment with access center in [address] [place] [address] per patient  3  Hypertension: BP elevated, continue home medications, challenge UF/EDW on HD  4  Anemia: hgb below goal  - will need to obtain epogen/venofer orders if patient remains hospitalized  5  Secondary Hyperparathyroidism of renal origin: will need to obtain orders if patient remains hospitalized  - continue velphoro with meals and sensipar  6  Shortness of Breath: secondary to noncompliance with dialysis and with diet  - CXR pending  - weight significantly elevated above EDW  7  Hyperkalemia: secondary to missed dialysis and dietary noncompliance  - will utilize 2K bath for 1hr then 1K bath for 2hr  - AM BMP if patient still hospitalized    PLAN:  HD today x4 hours  Orders written for today  Attempt to bring to EDW  If remains inpatient, will need to call outpatient dialysis unit in [facility]  Recommend continuing home BP medications after dialysis    HISTORY OF PRESENT ILLNESS:  Requesting Physician: Wade Infante MD  Reason for Consult: ESRD on HD    Rojas Goldberg is a 64y o  year old male who was admitted to [place] [address] after presenting with shortness of breath which started suddenly last night and woke him from sleep  A renal consultation is requested today for assistance in the management of ESRD  Rojas Goldberg is a known ESRD patient who undergoes maintenance hemodialysis at [address] on MWF    His last dialysis treatment was on Wednesday in [address] [place] [address] after which he came to [place] to visit family  He states he ate and drank a lot on Thursday and Friday  He was feeling well Friday but woke up at night choking due to shortness of breath  He subsequently came to the ER looking for dialysis treatment  The patient plans to go back to [facility] as he has to work tomorrow  He works for a [employer]  PAST MEDICAL HISTORY:  Past Medical History:   Diagnosis Date    [facility]     [facility]     Hypertension     Limb alert care status     do not use left arm    Rectal bleeding     Renal disorder        PAST SURGICAL HISTORY:  Past Surgical History:   Procedure Laterality Date    AV FISTULA PLACEMENT      left arm    AV NODE ABLATION      CHOLECYSTECTOMY      COLONOSCOPY N/A 4/27/2018    Procedure: COLONOSCOPY;  Surgeon: Stephanie Lima DO;  [facility]; Service: Gastroenterology    EGD AND COLONOSCOPY N/A 5/24/2018    Procedure: EGD AND COLONOSCOPY;  Surgeon: Radha Garcia MD;  [facility]; Service: Gastroenterology       ALLERGIES:  No Known Allergies    SOCIAL HISTORY:  Social History     Substance and Sexual Activity   Alcohol Use No     Social History     Substance and Sexual Activity   Drug Use No     Social History     Tobacco Use   Smoking Status Current Every Day Smoker    Packs/day: 0 50    Types: Cigarettes   Smokeless Tobacco Never Used       FAMILY HISTORY:  Family History   Problem Relation Age of Onset    Diabetes Mother        MEDICATIONS:  No current facility-administered medications for this encounter       Current Outpatient Medications:     albuterol (VENTOLIN HFA) 90 mcg/act inhaler, Inhale 2 puffs every 6 (six) hours as needed, Disp: , Rfl:     amLODIPine (NORVASC) 10 mg tablet, Take 10 mg by mouth daily, Disp: , Rfl:     cinacalcet (SENSIPAR) 30 mg tablet, 30 mg, Disp: , Rfl:     gabapentin (NEURONTIN) 300 mg capsule, TK ONE C PO D, Disp: , Rfl: 1    VELPHORO 500 MG CHEW, CHEW & SWALLOW 2 TABLETS BY MOUTH THREE TIMES A DAY WITH MEALS, Disp: , Rfl: 11    REVIEW OF SYSTEMS:  General: No fevers, chills  Cardiovascular: No chest pain, + shortness of breath, + leg edema  Respiratory: No cough, sputum production, + shortness of breath  Gastrointestinal: No nausea, vomiting, abdominal pain, diarrhea  Genitourinary: Minimal urine output      PHYSICAL EXAM:  Current Weight: Weight - Scale: 70 8 kg (156 lb)  First Weight: Weight - Scale: 70 8 kg (156 lb)  Vitals:    11/30/19 1324 11/30/19 1326 11/30/19 1447   BP: (!) 204/88 (!) 188/66 (!) 172/80   BP Location: Right arm Right arm Right arm   Pulse: 88  78   Resp: (!) 25  18   Temp: 98 1 °F (36 7 °C)     TempSrc: Oral     SpO2: 95%  93%   Weight: 70 8 kg (156 lb)       No intake or output data in the 24 hours ending 11/30/19 1502  General: NAD but labored breaths  Skin: no rash  Eyes: anicteric  ENMT: mm moist  Neck: no masses  Respiratory: fine basilar crackles  CVS: RRR  Extremities: trace edema  GI: soft nt nd  Neuro: alert awake  Psych:  Mood and affect appropriate     Lab Results:   Results from last 7 days   Lab Units 11/30/19  1409   WBC Thousand/uL 6 08   HEMOGLOBIN g/dL 8 8*   HEMATOCRIT % 27 9*   PLATELETS Thousands/uL 283     Lab Results   Component Value Date    PTH 1,211 5 (H) 04/26/2018    CALCIUM 8 9 12/19/2018    PHOS 9 0 (H) 12/15/2018

## 2019-11-30 NOTE — ED NOTES
Per Dialysis team, patient can go to dialysis at [address] [place] [address]  11/30/19 8832

## 2019-12-01 LAB
ATRIAL RATE: 81 BPM
P AXIS: 82 DEGREES
PR INTERVAL: 178 MS
QRS AXIS: 78 DEGREES
QRSD INTERVAL: 92 MS
QT INTERVAL: 374 MS
QTC INTERVAL: 434 MS
T WAVE AXIS: 84 DEGREES
VENTRICULAR RATE: 81 BPM

## 2019-12-01 PROCEDURE — 93010 ELECTROCARDIOGRAM REPORT: CPT | Performed by: INTERNAL MEDICINE

## 2019-12-01 NOTE — HEMODIALYSIS
Uneventful hemodialysis treatment lasting 3 hours    Left upper arm fistula maintains 350 bfr    -volume removal 4000 ml  -pre weight 69 5 kg  -post weight 66 1 kg

## 2019-12-01 NOTE — ED ATTENDING ATTESTATION
11/30/2019  ITonny MD, saw and evaluated the patient  I have discussed the patient with the resident/non-physician practitioner and agree with the resident's/non-physician practitioner's findings, Plan of Care, and MDM as documented in the resident's/non-physician practitioner's note, except where noted  All available labs and Radiology studies were reviewed  I was present for key portions of any procedure(s) performed by the resident/non-physician practitioner and I was immediately available to provide assistance  At this point I agree with the current assessment done in the Emergency Department  I have conducted an independent evaluation of this patient a history and physical is as follows: This is a 64 y o  old male who presents to the ED for evaluation of dyspnea  ESRD on HD, missed dialysis visiting for holiday  No CP  On exam, mildly fluid overloaded, VSS  LUE fistual with palpable thrill  Will get labs, send to dialysis  If stable after dialysis, will DC      ED Course         Critical Care Time  Procedures

## 2019-12-23 ENCOUNTER — APPOINTMENT (EMERGENCY)
Dept: DIALYSIS | Facility: HOSPITAL | Age: 59
End: 2019-12-23
Payer: MEDICARE

## 2019-12-23 ENCOUNTER — HOSPITAL ENCOUNTER (EMERGENCY)
Facility: HOSPITAL | Age: 59
Discharge: HOME | End: 2019-12-23
Attending: EMERGENCY MEDICINE | Admitting: EMERGENCY MEDICINE
Payer: MEDICARE

## 2019-12-23 VITALS
BODY MASS INDEX: 24.75 KG/M2 | RESPIRATION RATE: 18 BRPM | OXYGEN SATURATION: 94 % | DIASTOLIC BLOOD PRESSURE: 72 MMHG | TEMPERATURE: 98 F | WEIGHT: 154 LBS | SYSTOLIC BLOOD PRESSURE: 144 MMHG | HEART RATE: 80 BPM | HEIGHT: 66 IN

## 2019-12-23 DIAGNOSIS — Z99.2 ESRD ON HEMODIALYSIS (HCC): Primary | ICD-10-CM

## 2019-12-23 DIAGNOSIS — N18.6 ESRD ON HEMODIALYSIS (HCC): Primary | ICD-10-CM

## 2019-12-23 LAB
ANION GAP SERPL CALCULATED.3IONS-SCNC: 13 MMOL/L (ref 4–13)
BASOPHILS # BLD AUTO: 0.06 THOUSANDS/ΜL (ref 0–0.1)
BASOPHILS NFR BLD AUTO: 1 % (ref 0–1)
BUN SERPL-MCNC: 115 MG/DL (ref 5–25)
CALCIUM SERPL-MCNC: 9.1 MG/DL (ref 8.3–10.1)
CHLORIDE SERPL-SCNC: 106 MMOL/L (ref 100–108)
CO2 SERPL-SCNC: 20 MMOL/L (ref 21–32)
CREAT SERPL-MCNC: 16 MG/DL (ref 0.6–1.3)
EOSINOPHIL # BLD AUTO: 0.42 THOUSAND/ΜL (ref 0–0.61)
EOSINOPHIL NFR BLD AUTO: 7 % (ref 0–6)
ERYTHROCYTE [DISTWIDTH] IN BLOOD BY AUTOMATED COUNT: 15.9 % (ref 11.6–15.1)
GFR SERPL CREATININE-BSD FRML MDRD: 3 ML/MIN/1.73SQ M
GLUCOSE SERPL-MCNC: 115 MG/DL (ref 65–140)
HBV CORE AB SER QL: NORMAL
HBV CORE IGM SER QL: NORMAL
HBV SURFACE AB SER-ACNC: 40 MIU/ML
HBV SURFACE AG SER QL: NORMAL
HCT VFR BLD AUTO: 32.1 % (ref 36.5–49.3)
HCV AB SER QL: NORMAL
HGB BLD-MCNC: 9.9 G/DL (ref 12–17)
IMM GRANULOCYTES # BLD AUTO: 0.02 THOUSAND/UL (ref 0–0.2)
IMM GRANULOCYTES NFR BLD AUTO: 0 % (ref 0–2)
LYMPHOCYTES # BLD AUTO: 0.87 THOUSANDS/ΜL (ref 0.6–4.47)
LYMPHOCYTES NFR BLD AUTO: 15 % (ref 14–44)
MCH RBC QN AUTO: 30.1 PG (ref 26.8–34.3)
MCHC RBC AUTO-ENTMCNC: 30.8 G/DL (ref 31.4–37.4)
MCV RBC AUTO: 98 FL (ref 82–98)
MONOCYTES # BLD AUTO: 0.52 THOUSAND/ΜL (ref 0.17–1.22)
MONOCYTES NFR BLD AUTO: 9 % (ref 4–12)
NEUTROPHILS # BLD AUTO: 4.09 THOUSANDS/ΜL (ref 1.85–7.62)
NEUTS SEG NFR BLD AUTO: 68 % (ref 43–75)
NRBC BLD AUTO-RTO: 0 /100 WBCS
PLATELET # BLD AUTO: 239 THOUSANDS/UL (ref 149–390)
PMV BLD AUTO: 10 FL (ref 8.9–12.7)
POTASSIUM SERPL-SCNC: 6.6 MMOL/L (ref 3.5–5.3)
RBC # BLD AUTO: 3.29 MILLION/UL (ref 3.88–5.62)
SODIUM SERPL-SCNC: 139 MMOL/L (ref 136–145)
WBC # BLD AUTO: 5.98 THOUSAND/UL (ref 4.31–10.16)

## 2019-12-23 PROCEDURE — 99282 EMERGENCY DEPT VISIT SF MDM: CPT | Performed by: INTERNAL MEDICINE

## 2019-12-23 PROCEDURE — 86706 HEP B SURFACE ANTIBODY: CPT | Performed by: NURSE PRACTITIONER

## 2019-12-23 PROCEDURE — 99283 EMERGENCY DEPT VISIT LOW MDM: CPT | Performed by: EMERGENCY MEDICINE

## 2019-12-23 PROCEDURE — 87340 HEPATITIS B SURFACE AG IA: CPT | Performed by: NURSE PRACTITIONER

## 2019-12-23 PROCEDURE — 36415 COLL VENOUS BLD VENIPUNCTURE: CPT | Performed by: EMERGENCY MEDICINE

## 2019-12-23 PROCEDURE — 85025 COMPLETE CBC W/AUTO DIFF WBC: CPT | Performed by: EMERGENCY MEDICINE

## 2019-12-23 PROCEDURE — 86705 HEP B CORE ANTIBODY IGM: CPT | Performed by: NURSE PRACTITIONER

## 2019-12-23 PROCEDURE — 80048 BASIC METABOLIC PNL TOTAL CA: CPT | Performed by: EMERGENCY MEDICINE

## 2019-12-23 PROCEDURE — G0257 UNSCHED DIALYSIS ESRD PT HOS: HCPCS

## 2019-12-23 PROCEDURE — 86803 HEPATITIS C AB TEST: CPT | Performed by: NURSE PRACTITIONER

## 2019-12-23 PROCEDURE — NC001 PR NO CHARGE: Performed by: INTERNAL MEDICINE

## 2019-12-23 PROCEDURE — 86704 HEP B CORE ANTIBODY TOTAL: CPT | Performed by: NURSE PRACTITIONER

## 2019-12-23 PROCEDURE — 99283 EMERGENCY DEPT VISIT LOW MDM: CPT

## 2019-12-23 RX ORDER — CINACALCET 30 MG/1
60 TABLET, FILM COATED ORAL
Status: DISCONTINUED | OUTPATIENT
Start: 2019-12-23 | End: 2019-12-23 | Stop reason: HOSPADM

## 2019-12-23 RX ORDER — DOXERCALCIFEROL 2 UG/ML
1 INJECTION, SOLUTION INTRAVENOUS ONCE
Status: COMPLETED | OUTPATIENT
Start: 2019-12-23 | End: 2019-12-23

## 2019-12-23 RX ADMIN — DOXERCALCIFEROL 1 MCG: 4 INJECTION, SOLUTION INTRAVENOUS at 15:22

## 2019-12-23 RX ADMIN — EPOETIN ALFA 10000 UNITS: 10000 SOLUTION INTRAVENOUS; SUBCUTANEOUS at 15:25

## 2019-12-23 NOTE — ED PROVIDER NOTES
History  Chief Complaint   Patient presents with    Dialysis - Asymptomatic     Pt from [place] visiting for one month  Pt was not able to find dialysis center on short notice  Last dialysis day 19  HPI    60yo male pmhx HTN, ESRD on dialysis -Sat here for dialysis  Patient says he is from [facility] and arrived 2 days ago for the holidays  Patient notes mild SOB and feels like there are "toxins" in his body  Patient says he received HD 3 days ago due to holiday schedule but that it was cut short  Patient says he is here in the area for one month and does not have a PCP or nephrologist in the area  Patient received HD at [facility]  in [facility] # [phone]  Patient does not urinate  Patient denies any fever, chills, chest pain, nausea, vomiting, abdominal pain, diarrhea, constipation, hematuria, dysuria  Prior to Admission Medications   Prescriptions Last Dose Informant Patient Reported? Taking? VELPHORO 500 MG CHEW   Yes No   Sig: CHEW & SWALLOW 2 TABLETS BY MOUTH THREE TIMES A DAY WITH MEALS   albuterol (VENTOLIN HFA) 90 mcg/act inhaler   Yes No   Sig: Inhale 2 puffs every 6 (six) hours as needed   amLODIPine (NORVASC) 10 mg tablet   Yes No   Sig: Take 10 mg by mouth daily   cinacalcet (SENSIPAR) 30 mg tablet   Yes No   Si mg   gabapentin (NEURONTIN) 300 mg capsule   Yes No   Sig: TK ONE C PO D      Facility-Administered Medications: None       Past Medical History:   Diagnosis Date    CHF (congestive heart failure) (HCC)     [facility]ialysis patient ([facility] [address] )     Hypertension     Limb alert care status     do not use left arm    Rectal bleeding     Renal disorder        Past Surgical History:   Procedure Laterality Date    AV FISTULA PLACEMENT      left arm    AV NODE ABLATION      CHOLECYSTECTOMY      COLONOSCOPY N/A 2018    Procedure: COLONOSCOPY;  Surgeon: Verónica Marin DO;  [facility];   Service: Gastroenterology    EGD AND COLONOSCOPY N/A 5/24/2018    Procedure: EGD AND COLONOSCOPY;  Surgeon: Funmilayo Smart MD;  [facility]; Service: Gastroenterology       Family History   Problem Relation Age of Onset    Diabetes Mother      I have reviewed and agree with the history as documented  Social History     Tobacco Use    Smoking status: Current Every Day Smoker     Packs/day: 0 50     Types: Cigarettes    Smokeless tobacco: Never Used   Substance Use Topics    Alcohol use: No    Drug use: No        Review of Systems   Constitutional: Negative for chills, diaphoresis, fatigue and fever  HENT: Negative for congestion, rhinorrhea and sore throat  Eyes: Negative for photophobia and visual disturbance  Respiratory: Positive for shortness of breath  Negative for cough and chest tightness  Cardiovascular: Negative for chest pain and palpitations  Gastrointestinal: Negative for abdominal pain, blood in stool, constipation, diarrhea, nausea and vomiting  Genitourinary: Negative for dysuria, frequency and hematuria  Musculoskeletal: Negative for back pain, gait problem, myalgias, neck pain and neck stiffness  Skin: Negative for pallor and rash  Neurological: Negative for weakness, light-headedness, numbness and headaches  Hematological: Negative for adenopathy  Does not bruise/bleed easily  All other systems reviewed and are negative  Physical Exam  ED Triage Vitals [12/23/19 1034]   Temperature Pulse Respirations Blood Pressure SpO2   98 °F (36 7 °C) 87 20 165/83 92 %      Temp Source Heart Rate Source Patient Position - Orthostatic VS BP Location FiO2 (%)   Oral Monitor Lying Right arm --      Pain Score       No Pain             Orthostatic Vital Signs  Vitals:    12/23/19 1530 12/23/19 1600 12/23/19 1630 12/23/19 1650   BP: 139/72 139/71 139/69 144/72   Pulse: 83 83 83 80   Patient Position - Orthostatic VS: Lying Lying Lying Lying       Physical Exam   Constitutional: He is oriented to person, place, and time   He appears well-developed and well-nourished  No distress  Patient alert and oriented, appears well and non-toxic, in no acute distress    HENT:   Head: Normocephalic and atraumatic  Mouth/Throat: Oropharynx is clear and moist    Eyes: Pupils are equal, round, and reactive to light  Conjunctivae and EOM are normal    Neck: Normal range of motion  Neck supple  Cardiovascular: Normal rate, regular rhythm, normal heart sounds and intact distal pulses  Pulmonary/Chest: Effort normal and breath sounds normal  No respiratory distress  Abdominal: Soft  Bowel sounds are normal  He exhibits no distension  There is no tenderness  There is no rebound and no guarding  Musculoskeletal: Normal range of motion  He exhibits no edema  Left AV fistula with palpable thrill   Lymphadenopathy:     He has no cervical adenopathy  Neurological: He is alert and oriented to person, place, and time  No cranial nerve deficit or sensory deficit  He exhibits normal muscle tone  Coordination normal    Skin: Skin is warm and dry  Capillary refill takes less than 2 seconds  No rash noted  He is not diaphoretic  No erythema  No pallor  Psychiatric: He has a normal mood and affect  His behavior is normal  Judgment and thought content normal    Nursing note and vitals reviewed        ED Medications  Medications   doxercalciferol (HECTOROL) injection 1 mcg (1 mcg Intravenous Given 12/23/19 1522)   epoetin cecily (EPOGEN,PROCRIT) injection 10,000 Units (10,000 Units Intravenous Given 12/23/19 1525)       Diagnostic Studies  Results Reviewed     Procedure Component Value Units Date/Time    Hepatitis Panel (IP Renal Unit) [[id number]] Collected:  12/23/19 1417    Lab Status:  Final result Specimen:  Blood from Arm, Left Updated:  12/23/19 1535     Hepatitis B Surface Ag Non-reactive     Hep B S Ab 40 00 mIU/mL      Hepatitis C Ab Non-reactive     Hep B C IgM Non-reactive     Hep B Core Total Ab Non-reactive    Basic metabolic panel [[id number]] (Abnormal) Collected:  12/23/19 1225    Lab Status:  Final result Specimen:  Blood from Arm, Right Updated:  12/23/19 1351     Sodium 139 mmol/L      Potassium 6 6 mmol/L      Chloride 106 mmol/L      CO2 20 mmol/L      ANION GAP 13 mmol/L       mg/dL      Creatinine 16 00 mg/dL      Glucose 115 mg/dL      Calcium 9 1 mg/dL      eGFR 3 ml/min/1 73sq m     Narrative:       National Kidney Disease Foundation guidelines for Chronic Kidney Disease (CKD):     Stage 1 with normal or high GFR (GFR > 90 mL/min/1 73 square meters)    Stage 2 Mild CKD (GFR = 60-89 mL/min/1 73 square meters)    Stage 3A Moderate CKD (GFR = 45-59 mL/min/1 73 square meters)    Stage 3B Moderate CKD (GFR = 30-44 mL/min/1 73 square meters)    Stage 4 Severe CKD (GFR = 15-29 mL/min/1 73 square meters)    Stage 5 End Stage CKD (GFR <15 mL/min/1 73 square meters)  Note: GFR calculation is accurate only with a steady state creatinine    CBC and differential [[id number]]  (Abnormal) Collected:  12/23/19 1225    Lab Status:  Final result Specimen:  Blood from Arm, Right Updated:  12/23/19 1246     WBC 5 98 Thousand/uL      RBC 3 29 Million/uL      Hemoglobin 9 9 g/dL      Hematocrit 32 1 %      MCV 98 fL      MCH 30 1 pg      MCHC 30 8 g/dL      RDW 15 9 %      MPV 10 0 fL      Platelets 825 Thousands/uL      nRBC 0 /100 WBCs      Neutrophils Relative 68 %      Immat GRANS % 0 %      Lymphocytes Relative 15 %      Monocytes Relative 9 %      Eosinophils Relative 7 %      Basophils Relative 1 %      Neutrophils Absolute 4 09 Thousands/µL      Immature Grans Absolute 0 02 Thousand/uL      Lymphocytes Absolute 0 87 Thousands/µL      Monocytes Absolute 0 52 Thousand/µL      Eosinophils Absolute 0 42 Thousand/µL      Basophils Absolute 0 06 Thousands/µL                  No orders to display         Procedures  Procedures      ED Course  ED Course as of Dec 23 2012   Mon Dec 23, 2019   1200 Discussed with Nephrology, will see patient in the ED [address] [facility], [address]  [facility] # [id number]                                  MDM  Number of Diagnoses or Management Options  ESRD on hemodialysis [facility]):         Disposition  Final diagnoses:   ESRD on hemodialysis [facility])     Time reflects when diagnosis was documented in both MDM as applicable and the Disposition within this note     Time User Action Codes Description Comment    12/23/2019  5:22 PM [place] T Add [N18 6,  Z99 2] ESRD on hemodialysis [facility])       ED Disposition     ED Disposition Condition Date/Time Comment    Discharge Stable Mon Dec 23, 2019  5:24 PM Conor Wilson discharge to home/self care  Follow-up Information     Follow up With Specialties Details Why Contact Info Additional Information    PCP    Please follow-up with your primary care physician as needed     [address] [place] [address] Emergency Department Emergency Medicine Go to  As needed, If symptoms worsen [address] [place] [address], [address] [place] [address], [address]   [phone]          Discharge Medication List as of 12/23/2019  5:24 PM      CONTINUE these medications which have NOT CHANGED    Details   albuterol (VENTOLIN HFA) 90 mcg/act inhaler Inhale 2 puffs every 6 (six) hours as needed, Starting Sat 4/21/2018, Historical Med      amLODIPine (NORVASC) 10 mg tablet Take 10 mg by mouth daily, Historical Med      cinacalcet (SENSIPAR) 30 mg tablet 30 mg, Starting Tue 12/27/2016, Historical Med      gabapentin (NEURONTIN) 300 mg capsule TK ONE C PO D, Historical Med      VELPHORO 500 MG CHEW CHEW & SWALLOW 2 TABLETS BY MOUTH THREE TIMES A DAY WITH MEALS, Historical Med           No discharge procedures on file  ED Provider  Attending physically available and evaluated Conor Wilson I managed the patient along with the ED Attending      Electronically Signed by         Kaci Kerns MD  12/23/19 2012

## 2019-12-23 NOTE — CONSULTS
Consultation - Nephrology   Alida Goodell 64 y o  male MRN: [id number]  Unit/Bed#: JOSEPH Encounter: [id number]    ASSESSMENT and PLAN:  1  End-stage renal disease: On maintenance hemodialysis every TTS at [place], [place], [facility]  Phone Number: [phone]  -due to holiday schedule, he will have dialysis today for 3 hours  -outpatient prescription:  Sodium 138, bicarb 35, potassium 2 0, calcium 2 50, temperature 37°, blood flow 400   -previous outpatient facility was noted to be [facility], [facility] and most recent [place] when access surgery done at [facility]  -continue trend I/O, lab values in volume status  -current potassium 6 6-will treat with 2 k bath 2 hours and 1 k for one hour    2  Access:  Left AV fistula positive bruit and thrill    3  Hypertension:  BP currently acceptable 150/80  -will trend BP with UF  -will attempt 2-3 kg    4  Anemia of Chronic Kidney Disease:  Current hemoglobin 9 9  -will continue Epogen 10311 units x1 dose with HD today    5  Bone mineral disease of Chronic Kidney Disease: Will give Hectorol 1 mcg x1 with dialysis and Sensipar 60 mg x1 for secondary hyperparathyroidism  -encourage low phosphorus diet    6  Volume:  Patient examining fairly euvolemic  -however since missed treatment with recent travels will attempt 2-3 kg and a 3 hour treatment      HISTORY OF PRESENT ILLNESS:  Requesting Physician: Reyna Noguera DO  Reason for Consult: ESRD    Alida Goodell is a 64 y o  male with PMH of end-stage renal disease on maintenance hemodialysis every TTS currently at [place] [address], noncompliance issues, hypertension, CHF, diabetes, anemia Chronic Kidney Disease, bone mineral disease of Chronic Kidney Disease, and reasonable left upper arm AV fistula status post revision on 06/19/2019 by Dr Dennys Archer who presented to the emergency room [facility] after traveling to [place] to [facility] with shortened treatment Friday for 2 hours     A renal consultation is requested today for assistance in the management of ESRD  Upon review of the medical records patient was recently at [facility] with his recent vascular surgery  The patient denies chest pain, shortness of breath, dizziness, lightheadedness, nausea, vomiting, fevers, or chills  He reports that he was unable to make outpatient HD arrangements for his visit to [place] at another facility  I was able to talk with the  at his home unit in [place] at [phone] and was informed that he is not compliant with HD treatments secondary to patient reported work schedule; can miss between two and three treatments at a time, he shortens his treatment to about 3 hours despite 4 hours is was previously ordered, and requires medical treatment with [place] for hyperkalemia  PAST MEDICAL HISTORY:  Past Medical History:   Diagnosis Date    [facility]F [facility]congestive heart failure) ([facility] [address] )     [facility]     Hypertension     Limb alert care status     do not use left arm    Rectal bleeding     Renal disorder        PAST SURGICAL HISTORY:  Past Surgical History:   Procedure Laterality Date    AV FISTULA PLACEMENT      left arm    AV NODE ABLATION      CHOLECYSTECTOMY      COLONOSCOPY N/A 4/27/2018    Procedure: COLONOSCOPY;  Surgeon: Emilie Aguillon DO;  [facility]; Service: Gastroenterology    EGD AND COLONOSCOPY N/A 5/24/2018    Procedure: EGD AND COLONOSCOPY;  Surgeon: Bouchra Hodge MD;  [facility];   Service: Gastroenterology       ALLERGIES:  No Known Allergies    SOCIAL HISTORY:  Social History     Substance and Sexual Activity   Alcohol Use No     Social History     Substance and Sexual Activity   Drug Use No     Social History     Tobacco Use   Smoking Status Current Every Day Smoker    Packs/day: 0 50    Types: Cigarettes   Smokeless Tobacco Never Used       FAMILY HISTORY:  Family History   Problem Relation Age of Onset    Diabetes Mother MEDICATIONS:  No current facility-administered medications for this encounter  Current Outpatient Medications:     albuterol (VENTOLIN HFA) 90 mcg/act inhaler, Inhale 2 puffs every 6 (six) hours as needed, Disp: , Rfl:     amLODIPine (NORVASC) 10 mg tablet, Take 10 mg by mouth daily, Disp: , Rfl:     cinacalcet (SENSIPAR) 30 mg tablet, 30 mg, Disp: , Rfl:     gabapentin (NEURONTIN) 300 mg capsule, TK ONE C PO D, Disp: , Rfl: 1    VELPHORO 500 MG CHEW, CHEW & SWALLOW 2 TABLETS BY MOUTH THREE TIMES A DAY WITH MEALS, Disp: , Rfl: 11    REVIEW OF SYSTEMS:  All the systems were reviewed and were negative except as documented on the HPI      PHYSICAL EXAM:  Current Weight: Weight - Scale: 69 9 kg (154 lb)  First Weight: Weight - Scale: 69 9 kg (154 lb)  Vitals:    12/23/19 1034   BP: 165/83   BP Location: Right arm   Pulse: 87   Resp: 20   Temp: 98 °F (36 7 °C)   TempSrc: Oral   SpO2: 92%   Weight: 69 9 kg (154 lb)   Height: 5' 6" (1 676 m)     No intake or output data in the 24 hours ending 12/23/19 1341  General: conscious, coherent, cooperative, not in acute distress  Skin: no rash  Eyes: pale conjunctivae, anicteric sclerae  ENT: moist lips and mucous membranes  Neck: supple, no JVD  Chest: clear breath sounds  CVS: distinct S1 & S2, normal rate, regular rhythm  Abdomen: soft, non-tender, non-distended, normoactive bowel sounds  Extremities: no edema of both legs  Neuro: awake, alert  Psych: appropriate affect       Invasive Devices:        Lab Results:   Results from last 7 days   Lab Units 12/23/19  1225   WBC Thousand/uL 5 98   HEMOGLOBIN g/dL 9 9*   HEMATOCRIT % 32 1*   PLATELETS Thousands/uL 239       Other Studies:

## 2019-12-23 NOTE — ED NOTES
Pt asked RN what he was waiting for  Pt informed we are waiting for pharmacy to send [address]  Pt states he has the medication at home and asked to leave   ER resident made aware     Rich Pickering RN  12/23/19 6581

## 2019-12-23 NOTE — PROGRESS NOTES
Dialysis note:    I saw and examined patient during hemodialysis treatment at 2:07 PM on 12/23/2019  The patient was receiving hemodialysis for treatment of end stage renal disease  I also reviewed vital signs, intake and output, lab results and recent events, and agree with dialysis order  Tolerating HD   BP acceptable

## 2019-12-23 NOTE — PLAN OF CARE
3 hour hemodialysis session planned for today with ultrafiltration goal of 3-4 liters to optimize fluid and electrolyte balance  3 hour hemodialysis treatment completed  No issues with access, able to achieve and maintain prescribed BFR  Pre treatment weight was 69 1 kg and post treatment weight was 66 5 kg  Patient upset with post treatment weight  "So you didn't take anything off me? I'll be back here tomorrow " This RN did explain that the ordered ultrafiltration goal was 2 liters per nephrology  UF goal was increased to 3 liters (after ok'd by nephrology) but patient had big meal during treatment  It was explained to patient that safe fluid removal goals are about 1 liter per hour and treatment was only 3 hours  Patient still upset  "I know what this is  It's a punishment for not staying in the hospital  Every other time I come, you guys can take off 4 or 4 and a half in 3 hours  This is ridiculous  I'm being punished "   Vital signs remained stable throughout treatment  No other concerns to report     Problem: METABOLIC, FLUID AND ELECTROLYTES - ADULT  Goal: Electrolytes maintained within normal limits  Description  INTERVENTIONS:  - Monitor labs and assess patient for signs and symptoms of electrolyte imbalances  - Administer electrolyte replacement as ordered  - Monitor response to electrolyte replacements, including repeat lab results as appropriate  - Instruct patient on fluid and nutrition as appropriate  Outcome: Progressing  Goal: Fluid balance maintained  Description  INTERVENTIONS:  - Monitor labs   - Monitor I/O and WT  - Instruct patient on fluid and nutrition as appropriate  - Assess for signs & symptoms of volume excess or deficit  Outcome: Progressing

## 2019-12-23 NOTE — ED NOTES
Per pharmacy, working on sending [address] at this time        Hima Carrillo, YSABEL  12/23/19 2409

## 2019-12-23 NOTE — ED NOTES
Dialysis called for pt and this time  Dr Alcaraz Mention confirms pt can go to dialysis  Pt to dialysis via w/c       Geneva Ray RN  12/23/19 9162

## 2019-12-23 NOTE — ED ATTENDING ATTESTATION
12/23/2019  IАлександр DO, saw and evaluated the patient  I have discussed the patient with the resident/non-physician practitioner and agree with the resident's/non-physician practitioner's findings, Plan of Care, and MDM as documented in the resident's/non-physician practitioner's note, except where noted  All available labs and Radiology studies were reviewed  I was present for key portions of any procedure(s) performed by the resident/non-physician practitioner and I was immediately available to provide assistance  At this point I agree with the current assessment done in the Emergency Department  I have conducted an independent evaluation of this patient a history and physical is as follows:    ED Course       This is a 64 y o  old male who presents to the ED for evaluation of ESRD on HD, missed dialysis visiting for holiday  No CP, but does endorse mild SOB with exertion, which is common for him when he is due for dialysis  Otherwise no complaints  Denies f/c, n/v/d/c, cough, ha, rash  On exam, mildly fluid overloaded, VSS  LUE fistual with palpable thrill and bruit  Will discuss with Nephrology and send for hemodialysis  Tolerated hemodialysis well with no adverse events  Will d/c home         Critical Care Time  Procedures

## 2019-12-27 ENCOUNTER — HOSPITAL ENCOUNTER (EMERGENCY)
Facility: HOSPITAL | Age: 59
Discharge: HOME | End: 2019-12-27
Attending: EMERGENCY MEDICINE
Payer: MEDICARE

## 2019-12-27 ENCOUNTER — HOSPITAL ENCOUNTER (EMERGENCY)
Dept: DIALYSIS | Facility: HOSPITAL | Age: 59
Discharge: HOME | End: 2019-12-27
Payer: MEDICARE

## 2019-12-27 ENCOUNTER — PATIENT OUTREACH (OUTPATIENT)
Dept: CASE MANAGEMENT | Facility: OTHER | Age: 59
End: 2019-12-27

## 2019-12-27 VITALS
TEMPERATURE: 97.6 F | HEART RATE: 72 BPM | DIASTOLIC BLOOD PRESSURE: 69 MMHG | SYSTOLIC BLOOD PRESSURE: 145 MMHG | RESPIRATION RATE: 18 BRPM | OXYGEN SATURATION: 94 %

## 2019-12-27 VITALS — DIASTOLIC BLOOD PRESSURE: 66 MMHG | SYSTOLIC BLOOD PRESSURE: 126 MMHG | HEART RATE: 74 BPM | RESPIRATION RATE: 18 BRPM

## 2019-12-27 DIAGNOSIS — R06.01 ORTHOPNEA: ICD-10-CM

## 2019-12-27 DIAGNOSIS — Z99.2 ESRD ON HEMODIALYSIS (HCC): Primary | ICD-10-CM

## 2019-12-27 DIAGNOSIS — Z71.89 COMPLEX CARE COORDINATION: Primary | ICD-10-CM

## 2019-12-27 DIAGNOSIS — N18.6 ESRD ON HEMODIALYSIS (HCC): Primary | ICD-10-CM

## 2019-12-27 PROCEDURE — 99282 EMERGENCY DEPT VISIT SF MDM: CPT

## 2019-12-27 PROCEDURE — 99282 EMERGENCY DEPT VISIT SF MDM: CPT | Performed by: EMERGENCY MEDICINE

## 2019-12-27 PROCEDURE — NC001 PR NO CHARGE: Performed by: INTERNAL MEDICINE

## 2019-12-27 PROCEDURE — G0257 UNSCHED DIALYSIS ESRD PT HOS: HCPCS

## 2019-12-27 PROCEDURE — 99281 EMR DPT VST MAYX REQ PHY/QHP: CPT | Performed by: INTERNAL MEDICINE

## 2019-12-27 RX ORDER — DOXERCALCIFEROL 2 UG/ML
1 INJECTION, SOLUTION INTRAVENOUS ONCE
Status: COMPLETED | OUTPATIENT
Start: 2019-12-27 | End: 2019-12-27

## 2019-12-27 RX ADMIN — DOXERCALCIFEROL 1 MCG: 4 INJECTION, SOLUTION INTRAVENOUS at 09:17

## 2019-12-27 RX ADMIN — EPOETIN ALFA 10000 UNITS: 10000 SOLUTION INTRAVENOUS; SUBCUTANEOUS at 09:18

## 2019-12-27 NOTE — PLAN OF CARE
4 hour hemodialysis session planned for today with ultrafiltration goal of 3-4 liters to optimize fluid and electrolyte balance  4 hour hemodialysis treatment completed using left upper arm AVF  No issues with access, able to achieve and maintain prescribed BFR  Pre treatment weight was 68 2 kg and post treatment weight was 64 5 kg  Vital signs remained stable throughout  No other concerns to report      Problem: METABOLIC, FLUID AND ELECTROLYTES - ADULT  Goal: Electrolytes maintained within normal limits  Description  INTERVENTIONS:  - Monitor labs and assess patient for signs and symptoms of electrolyte imbalances  - Administer electrolyte replacement as ordered  - Monitor response to electrolyte replacements, including repeat lab results as appropriate  - Instruct patient on fluid and nutrition as appropriate  Outcome: Progressing  Goal: Fluid balance maintained  Description  INTERVENTIONS:  - Monitor labs   - Monitor I/O and WT  - Instruct patient on fluid and nutrition as appropriate  - Assess for signs & symptoms of volume excess or deficit  Outcome: Progressing

## 2019-12-27 NOTE — ED ATTENDING ATTESTATION
Florida Chaparro MD, saw and evaluated the patient  All available labs and X-rays were ordered by me or the resident and have been reviewed by myself  I discussed the patient with the resident / non-physician and agree with the resident's / non-physician practitioner's findings and plan as documented in the resident's / non-physician practicitioner's note, except where noted  At this point, I agree with the current assessment done in the ED  I was present during key portions of all procedures performed unless otherwise stated  Chief Complaint   Patient presents with    Dialysis - Asymptomatic     Pt here for dialysis today; pt denies problems except for inability to sleep last night  This is a 65 y/o M presenting for likely fluid overload, need for dialysis  He is visiting from [place] and hasn't found a local dialysis center for dialysis  He gets it normally Tue/Thur/Sat and hasn't had it in 4 days  The patient since last night has been feeling increased orthopnea, inc coughing w/o F/ch/n/v/cp  Denies dizziness/LH  If he is sits he feels okay  If he is upright he is okay  Just laying flat cannot be tolerated  No LE edema  Fistula in the left arm  No dizziness/LH  Denies any urinary tract infection symptoms (burning, itching, pain, blood, frequency)  He produces only a small amoutn of urine in the morning though  PE:  Vitals:    12/27/19 0930 12/27/19 1030 12/27/19 1130 12/27/19 1220   BP: 145/75 140/67 146/70 145/69   BP Location: Right arm Right arm Right arm Right arm   Pulse: 73 74 75 72   Resp: 18 18 18 18   Temp:    97 6 °F (36 4 °C)   TempSrc:    Oral   SpO2:       General: VSS, NAD, awake, alert  Well-nourished, well-developed  Appears stated age  Speaking normally in full sentences  Head: Normocephalic, atraumatic, nontender  Eyes: PERRL, EOM-I  No diplopia  No hyphema  No subconjunctival hemorrhages  Symmetrical lids  ENT: Atraumatic external nose and ears      MMM  No malocclusion  No stridor  Normal phonation  No drooling  Normal swallowing  Neck: Symmetric, trachea midline  No JVD  CV: RRR  +S1/S2  No murmurs or gallops  Peripheral pulses +2 throughout  No chest wall tenderness  Lungs:   Unlabored No retractions  Anterior rales easily heard  lungs sounds equal bilateral    No tachypnea  Abd: +BS, soft, NT/ND    MSK:   FROM   Fistula with good thrill  Back:   No rashes  Skin: Dry, intact  Neuro: AAOx3, GCS 15, CN II-XII grossly intact  Motor grossly intact  Psychiatric/Behavioral: Appropriate mood and affect   Exam: deferred  A:  - Fluid overload, need for dialysis  P:  - Discuss with on-call nephrology  If it'll be a large delay before he is able to get dialysis, will do labs  Junious Rust, if he can go up soon, likely he just needs dialysis and that would fix his issues and so labs would be superfluous at this point  - 13 point ROS was performed and all are normal unless stated in the history above  - Nursing note reviewed  Vitals reviewed  - Orders placed by myself and/or advanced practitioner / resident     - Previous chart was reviewed  - No language barrier    - History obtained from patient  - There are no limitations to the history obtained  - Critical care time: Not applicable for this patient  Code Status: Prior  Advance Directive and Living Will:      Power of :    POLST:      Final Diagnosis:  1  ESRD on hemodialysis ([facility] [address] )    2   Orthopnea           Medications   epoetin cecily (EPOGEN,PROCRIT) injection 10,000 Units (10,000 Units Intravenous Given 12/27/19 0918)   doxercalciferol (HECTOROL) injection 1 mcg (1 mcg Intravenous Given 12/27/19 0917)     No orders to display     Orders Placed This Encounter   Procedures    Inpatient consult to Nephrology     Labs Reviewed - No data to display  Time reflects when diagnosis was documented in both MDM as applicable and the Disposition within this note     Time User Action Codes Description Comment    2019  8:03 AM Norma Bhagat Add [N18 6,  Z99 2] ESRD on hemodialysis ([facility] [address] )     2019  8:03 AM Norma Bhagat Modify [N18 6,  Z99 2] ESRD on hemodialysis [facility])     2019  9:15 AM Norma Bhagat Add [R06 01] Orthopnea       ED Disposition     ED Disposition Condition Date/Time Comment    Discharge  Fri Dec 27, 2019  1:46 PM Felishamike Layla discharge to home/self care  Follow-up Information    None       Discharge Medication List as of 2019  1:46 PM      CONTINUE these medications which have NOT CHANGED    Details   albuterol (VENTOLIN HFA) 90 mcg/act inhaler Inhale 2 puffs every 6 (six) hours as needed, Starting Sat 2018, Historical Med      amLODIPine (NORVASC) 10 mg tablet Take 10 mg by mouth daily, Historical Med      cinacalcet (SENSIPAR) 30 mg tablet 30 mg, Starting Tue 2016, Historical Med      gabapentin (NEURONTIN) 300 mg capsule TK ONE C PO D, Historical Med      VELPHORO 500 MG CHEW CHEW & SWALLOW 2 TABLETS BY MOUTH THREE TIMES A DAY WITH MEALS, Historical Med           No discharge procedures on file  Prior to Admission Medications   Prescriptions Last Dose Informant Patient Reported? Taking? VELPHORO 500 MG CHEW   Yes Yes   Sig: CHEW & SWALLOW 2 TABLETS BY MOUTH THREE TIMES A DAY WITH MEALS   albuterol (VENTOLIN HFA) 90 mcg/act inhaler   Yes Yes   Sig: Inhale 2 puffs every 6 (six) hours as needed   amLODIPine (NORVASC) 10 mg tablet   Yes Yes   Sig: Take 10 mg by mouth daily   cinacalcet (SENSIPAR) 30 mg tablet   Yes Yes   Si mg   gabapentin (NEURONTIN) 300 mg capsule   Yes Yes   Sig: TK ONE C PO D      Facility-Administered Medications: None       Portions of the record may have been created with voice recognition software  Occasional wrong word or "sound a like" substitutions may have occurred due to the inherent limitations of voice recognition software   Read the chart carefully and recognize, using context, where substitutions have occurred      Electronically signed by:  Cortney Wells

## 2019-12-27 NOTE — CONSULTS
Consultation - Nephrology   Lilliana Veras 64 y o  male MRN: [id number]  Unit/Bed#: JOSEPH Encounter: [id number]    ASSESSMENT AND PLAN:  Patient is 57-year-old male ESRD on HD on TTS schedule presents to the hospital for need for dialysis  He is visiting family from [place] due to [place] and is planning to stay here until February 2019 prior to going back home  He has not set up transfer to dialysis unit as outpatient prior living home  ESRD on HD on TTS schedule at [address] in [facility] [place], [place]  -last HD was on 12/23/19 here in the hospital   He has not get on dialysis as he does not have outpatient unit setup   -personally discussed with case management and currently they are evaluating if patient can be placed at [facility] outpatient units  Patient prefers [place] although other options would remained [place] or [place]  -he understands that he will not be able to get dialysis as outpatient unless this is set up  Also recommended that he should stay here in the hospital for all this to workout although patient definitely wants to leave as he still has "things to take care of"  Hyperkalemia, 2K bath on dialysis today  S low-potassium diet advised    CKD anemia, continue Epogen on dialysis  CKD BMD, monitor phosphorus and PTH  Secondary hyperparathyroidism, continue Hectorol with dialysis    Volume status, seems fairly euvolemic, challenge UF removal on dialysis as tolerated  Discussed above plan with ER team     HISTORY OF PRESENT ILLNESS:  Requesting Physician: Maryse Miranda MD  Reason for Consult:  ESRD    Lilliana Veras is a 64y o  year old male who was admitted to [place] [address] after presenting with dialysis need  A renal consultation is requested today for assistance in the management of ESRD  Patient is significantly noncompliant and currently visiting from [place] due to [place]    He is planning to stay locally for next one month although he has not not set up any outpatient dialysis unit  He received his last dialysis on 12/23/19  He presented again to the ER for dialysis need  He denies any chest pain, nausea, shortness of breath  As per my discussion, he does not want to stay in the hospital for outpatient need to be set up  He wants to leave again today  He makes minimal urine  PAST MEDICAL HISTORY:  Past Medical History:   Diagnosis Date    [facility]est[facility] [address] [facility]     Dialysis patient ([facility] [address] [facility]     Hypertension     Limb alert care status     do not use left arm    Rectal bleeding     Renal disorder        PAST SURGICAL HISTORY:  Past Surgical History:   Procedure Laterality Date    AV FISTULA PLACEMENT      left arm    AV NODE ABLATION      CHOLECYSTECTOMY      COLONOSCOPY N/A 4/27/2018    Procedure: COLONOSCOPY;  Surgeon: Farhad Adams DO;  [facility]; Service: Gastroenterology    EGD AND COLONOSCOPY N/A 5/24/2018    Procedure: EGD AND COLONOSCOPY;  Surgeon: Yuliana Maher MD;  [facility]; Service: Gastroenterology       ALLERGIES:  No Known Allergies    SOCIAL HISTORY:  Social History     Substance and Sexual Activity   Alcohol Use No     Social History     Substance and Sexual Activity   Drug Use No     Social History     Tobacco Use   Smoking Status Current Every Day Smoker    Packs/day: 0 50    Types: Cigarettes   Smokeless Tobacco Never Used       FAMILY HISTORY:  Family History   Problem Relation Age of Onset    Diabetes Mother        MEDICATIONS:  No current facility-administered medications for this encounter       Current Outpatient Medications:     albuterol (VENTOLIN HFA) 90 mcg/act inhaler, Inhale 2 puffs every 6 (six) hours as needed, Disp: , Rfl:     amLODIPine (NORVASC) 10 mg tablet, Take 10 mg by mouth daily, Disp: , Rfl:     cinacalcet (SENSIPAR) 30 mg tablet, 30 mg, Disp: , Rfl:     gabapentin (NEURONTIN) 300 mg capsule, TK ONE C PO D, Disp: , Rfl: 1    VELPHORO 500 MG CHEW, CHEW & SWALLOW 2 TABLETS BY MOUTH THREE TIMES A DAY WITH MEALS, Disp: , Rfl: 11    REVIEW OF SYSTEMS:  Complete 10 point review of systems were obtained and discussed in length with the patient  Complete review of systems were negative / unremarkable except mentioned in the HPI section  PHYSICAL EXAM:  Current Weight:    First Weight:    Vitals:    12/27/19 1030   BP: 140/67   Pulse: 74   Resp: 18   Temp:    SpO2:        Intake/Output Summary (Last 24 hours) at 12/27/2019 1107  Last data filed at 12/27/2019 0820  Gross per 24 hour   Intake 200 ml   Output --   Net 200 ml     Wt Readings from Last 3 Encounters:   12/23/19 69 9 kg (154 lb)   11/30/19 70 8 kg (156 lb)   06/23/19 69 9 kg (154 lb)     Temp Readings from Last 3 Encounters:   12/27/19 98 1 °F (36 7 °C) (Oral)   12/23/19 98 °F (36 7 °C) (Oral)   11/30/19 97 8 °F (36 6 °C) (Oral)     BP Readings from Last 3 Encounters:   12/27/19 140/67   12/27/19 154/82   12/23/19 144/72     Pulse Readings from Last 3 Encounters:   12/27/19 74   12/27/19 79   12/23/19 80        Physical Examination:  General:  Sitting in chair, no acute distress  Eyes:  Mild conjunctival pallor present  ENT:  External examination of ears and nose unremarkable  Neck:  No obvious lymphadenopathy appreciated  Respiratory:  Bilateral air entry present, no crackles appreciated  CVS:  S1, S2 present  GI:  Soft, nontender, nondistended  CNS:  Active alert oriented x3  Extremities:  No significant edema in legs  Psych:  Conscious, coherent, oriented  Skin:  No new rash in legs    Invasive Devices:      Lab Results:   Results from last 7 days   Lab Units 12/23/19  1225   WBC Thousand/uL 5 98   HEMOGLOBIN g/dL 9 9*   HEMATOCRIT % 32 1*   PLATELETS Thousands/uL 239   POTASSIUM mmol/L 6 6*   CHLORIDE mmol/L 106   CO2 mmol/L 20*   BUN mg/dL 115*   CREATININE mg/dL 16 00*   CALCIUM mg/dL 9 1       Other Studies:   No orders to display       Portions of the record may have been created with voice recognition software  Occasional wrong word or "sound a like" substitutions may have occurred due to the inherent limitations of voice recognition software  Read the chart carefully and recognize, using context, where substitutions have occurred

## 2019-12-27 NOTE — PROGRESS NOTES
Dialysis note:    I saw and examined patient during hemodialysis treatment at 11:20 AM on 12/27/2019  The patient was receiving hemodialysis for treatment of end stage renal disease  I also reviewed vital signs, intake and output, lab results and recent events, and agree with dialysis order  Tolerating HD   BP acceptable

## 2019-12-27 NOTE — ED PROVIDER NOTES
History  Chief Complaint   Patient presents with    Dialysis - Asymptomatic     Pt here for dialysis today; pt denies problems except for inability to sleep last night  21-year-old male with past medical history of ESRD on hemodialysis TTS and other comorbidities as below who is presenting for hemodialysis  Patient states that he is visiting the area from [place]  He will be here until the later part of January  Patient has not been able to find an outpatient dialysis center for his routine hemodialysis  Patient last received hemodialysis on , 4 days prior to presentation  Patient states that starting last night, he began to feel increasingly dyspneic  He had significant orthopnea and states that he was unable to sleep due to being unable to tolerate lying supine  Patient otherwise has no complaints on review of systems  He denies any headache, focal numbness or weakness, speech difficulty, chest pain or pressure, nausea, vomiting, abdominal pain, or peripheral edema  Patient reports compliance with his other medications  Prior to Admission Medications   Prescriptions Last Dose Informant Patient Reported? Taking?    VELPHORO 500 MG CHEW   Yes Yes   Sig: CHEW & SWALLOW 2 TABLETS BY MOUTH THREE TIMES A DAY WITH MEALS   albuterol (VENTOLIN HFA) 90 mcg/act inhaler   Yes Yes   Sig: Inhale 2 puffs every 6 (six) hours as needed   amLODIPine (NORVASC) 10 mg tablet   Yes Yes   Sig: Take 10 mg by mouth daily   cinacalcet (SENSIPAR) 30 mg tablet   Yes Yes   Si mg   gabapentin (NEURONTIN) 300 mg capsule   Yes Yes   Sig: TK ONE C PO D      Facility-Administered Medications: None       Past Medical History:   Diagnosis Date    [facility])     Dialysis patient ([facility] [address] [facility]     Hypertension     Limb alert care status     do not use left arm    Rectal bleeding     Renal disorder        Past Surgical History:   Procedure Laterality Date    AV FISTULA PLACEMENT left arm    AV NODE ABLATION      CHOLECYSTECTOMY      COLONOSCOPY N/A 4/27/2018    Procedure: COLONOSCOPY;  Surgeon: Mateusz Batista DO;  [facility]; Service: Gastroenterology    EGD AND COLONOSCOPY N/A 5/24/2018    Procedure: EGD AND COLONOSCOPY;  Surgeon: Cecille Naylor MD;  [facility]; Service: Gastroenterology       Family History   Problem Relation Age of Onset    Diabetes Mother      I have reviewed and agree with the history as documented  Social History     Tobacco Use    Smoking status: Current Every Day Smoker     Packs/day: 0 50     Types: Cigarettes    Smokeless tobacco: Never Used   Substance Use Topics    Alcohol use: No    Drug use: No        Review of Systems   Constitutional: Negative for diaphoresis, fever and unexpected weight change  HENT: Negative for congestion, rhinorrhea and sore throat  Eyes: Negative for pain, discharge and visual disturbance  Respiratory: Positive for shortness of breath  Negative for cough and wheezing  Cardiovascular: Negative for chest pain, palpitations and leg swelling  Gastrointestinal: Negative for abdominal pain, blood in stool, constipation, diarrhea, nausea and vomiting  Genitourinary: Negative for dysuria, flank pain and hematuria  Musculoskeletal: Negative for arthralgias and myalgias  Skin: Negative for rash and wound  Allergic/Immunologic: Negative for environmental allergies and food allergies  Neurological: Negative for dizziness, seizures, weakness and numbness  Hematological: Negative for adenopathy  Psychiatric/Behavioral: Negative for confusion and hallucinations         Physical Exam  ED Triage Vitals [12/27/19 0646]   Temperature Pulse Respirations Blood Pressure SpO2   98 1 °F (36 7 °C) 87 20 (!) 178/85 94 %      Temp Source Heart Rate Source Patient Position - Orthostatic VS BP Location FiO2 (%)   Oral Monitor Lying Right arm --      Pain Score       --             Orthostatic Vital Signs  Vitals:    12/27/19 0815 12/27/19 0820 12/27/19 0830 12/27/19 0900   BP: (!) 145/44 138/71 137/68 134/68   Pulse: 69 68 68 72   Patient Position - Orthostatic VS: Lying Lying Lying Lying       Physical Exam   Constitutional: He is oriented to person, place, and time  He appears well-developed and well-nourished  HENT:   Head: Normocephalic and atraumatic  Right Ear: External ear normal    Left Ear: External ear normal    Nose: Nose normal    Eyes: Pupils are equal, round, and reactive to light  EOM are normal    Neck: Normal range of motion  Neck supple  Cardiovascular: Normal rate, regular rhythm and normal heart sounds  No murmur heard  Pulmonary/Chest: Effort normal  No respiratory distress  He has no wheezes  He has rales  Patient has bilateral rales to the midlung field  No focal abnormalities  Slight tachypnea at rest    Abdominal: Soft  Bowel sounds are normal  He exhibits no distension  There is no tenderness  There is no guarding  Musculoskeletal: Normal range of motion  He exhibits no deformity  Neurological: He is alert and oriented to person, place, and time  No gross motor deficits noted  Cranial nerves II-XII are intact  Speech is fluent without dysarthria or aphasia  Skin: Skin is warm and dry  Capillary refill takes less than 2 seconds  He is not diaphoretic  Psychiatric: He has a normal mood and affect  His behavior is normal    Nursing note and vitals reviewed  ED Medications  Medications   epoetin cecily (EPOGEN,PROCRIT) injection 10,000 Units (has no administration in time range)   doxercalciferol (HECTOROL) injection 1 mcg (has no administration in time range)       Diagnostic Studies  Results Reviewed     None                 No orders to display         Procedures  Procedures      ED Course  ED Course as of Dec 27 0917   Fri Dec 27, 2019   [facility] Par[facility] Neph[facility]  6063 Spoke with Dr Linda Reid, Nephrology    He will discuss with dialysis team       1450 RN contacted dialysis team, consult placed  MDM  Number of Diagnoses or Management Options  ESRD on hemodialysis [facility]): established and worsening  Orthopnea: new and does not require workup  Diagnosis management comments:     Patient presented for hemodialysis  He complained of shortness of breath and orthopnea  Most recent hemodialysis was 4 days prior to presentation  On examination, the patient had rales to the midlung fields bilaterally  He was slightly tachypneic but not in respiratory distress  Examination concerning for volume overload secondary to dialysis noncompliance  Nephrology was called and agreed with our assessment  The patient was sent for hemodialysis  He left the ER in good condition, sleeping comfortably         Amount and/or Complexity of Data Reviewed  Decide to obtain previous medical records or to obtain history from someone other than the patient: yes  Review and summarize past medical records: yes  Discuss the patient with other providers: yes    Risk of Complications, Morbidity, and/or Mortality  Presenting problems: low  Diagnostic procedures: minimal  Management options: minimal    Patient Progress  Patient progress: improved        Disposition  Final diagnoses:   ESRD on hemodialysis ([facility] )   Orthopnea     Time reflects when diagnosis was documented in both MDM as applicable and the Disposition within this note     Time User Action Codes Description Comment    12/27/2019  8:03 AM Seamus Yousif Add [N18 6,  Z99 2] ESRD on hemodialysis ([facility] )     12/27/2019  8:03 AM Seamus Yousif Modify [N18 6,  Z99 2] ESRD on hemodialysis ([place] [address] )     12/27/2019  9:15 AM Seamus Yousif Add [R06 01] Orthopnea       ED Disposition     ED Disposition Condition Date/Time Comment    Send to Ancillary (IR, Dialysis)  Fri Dec 27, 2019  8:05 AM       Follow-up Information    None         Patient's Medications   Discharge Prescriptions    No medications on file     No discharge procedures on file  ED Provider  Attending physically available and evaluated Crystal Barragan I managed the patient along with the ED Attending      Electronically Signed by         Juan Manuel Payne MD  12/27/19 1564

## 2019-12-27 NOTE — PROGRESS NOTES
Outpatient Care Management  I spoke with Lana Richter and Dr Brooke Rose at [facility]  Malu Van is visiting family and usually goes to dialysis in [place] for MWF treatments    He's been to the ED 3x for OP dialysis treatments (11/30, 12/23 & 12/27)  He has a car and he drives  He's agreeable to receive help from OP Care Management to help him to set up dialysis treatments through the month of January  After that, he plans to return to [facility]  His 1st choice is [facility], but he is wiling to go to [place] or [facility]  He is asking for [facility], as they had accepted him for temporary dialysis in the past     I spoke with Prince Odell at [facility] and placed a referral for temporary in center hemodialysis from now and at least through the month of January  I requested the [facility] location as the patient's 1st choice  Prince Odell will be faxing some documents to me today  Update to Milka Rose  [id number]  I spoke with Malu Van and introduced myself and the OP Care Management Program  He's agreeable to work with me on setting up OP hemodialysis  We discussed all of the above and he is willing to accept any open chair time  He informed me that when he is home, he uses a [place] [id number] center  I told him that I am awaiting some paperwork and that he may also receive a call from [employer]  He was thankful for the assistance and stated that he does not have any other needs or questions at this time  I offered to give him my phone #, but he said that he did not have anything with him to be able to write it down  [phone]  I have not yet received any paperwork from [employer]  I called & spoke with Constantine Peterson and she informed me that [facility] request has been assigned to Brooke Rose and that he will be in touch with me

## 2019-12-30 ENCOUNTER — PATIENT OUTREACH (OUTPATIENT)
Dept: CASE MANAGEMENT | Facility: OTHER | Age: 59
End: 2019-12-30

## 2019-12-30 NOTE — PROGRESS NOTES
Outpatient Care Management  "Traveling" OP Hemodialysis Set Up  I received a call from Odalys at [employer]  He faxed me a check list and asked me to have [facility] fax the records to him at [phone]  I then spoke with Leonel Hernandez at [facility] in [facility] & she informed me that Prince Rai called this morning to tell her that he will be back for his next treatment on 12/31  I left Prince Rai a VM asking him to confirm this and I left my contact information  Update to Kale's GENIA

## 2019-12-31 ENCOUNTER — PATIENT OUTREACH (OUTPATIENT)
Dept: CASE MANAGEMENT | Facility: OTHER | Age: 59
End: 2019-12-31

## 2019-12-31 NOTE — PROGRESS NOTES
Outpatient Care Management   Confirmed with Nicki at [facility] in [facility], that patient resumed his treatments there today  Update to Odalys at [employer]

## 2020-09-18 ENCOUNTER — APPOINTMENT (EMERGENCY)
Dept: RADIOLOGY | Facility: HOSPITAL | Age: 60
DRG: 291 | End: 2020-09-18
Payer: MEDICARE

## 2020-09-18 ENCOUNTER — HOSPITAL ENCOUNTER (INPATIENT)
Facility: HOSPITAL | Age: 60
LOS: 1 days | Discharge: HOME | DRG: 291 | End: 2020-09-20
Attending: EMERGENCY MEDICINE | Admitting: INTERNAL MEDICINE
Payer: MEDICARE

## 2020-09-18 DIAGNOSIS — E87.5 HYPERKALEMIA: Primary | ICD-10-CM

## 2020-09-18 DIAGNOSIS — Z99.2 ESRD NEEDING DIALYSIS (HCC): ICD-10-CM

## 2020-09-18 DIAGNOSIS — E87.70 HYPERVOLEMIA, UNSPECIFIED HYPERVOLEMIA TYPE: ICD-10-CM

## 2020-09-18 DIAGNOSIS — R06.02 SHORTNESS OF BREATH: ICD-10-CM

## 2020-09-18 DIAGNOSIS — N18.6 ESRD NEEDING DIALYSIS (HCC): ICD-10-CM

## 2020-09-18 PROBLEM — F14.90 COCAINE USE: Status: ACTIVE | Noted: 2020-09-18

## 2020-09-18 LAB
ALBUMIN SERPL BCP-MCNC: 3.5 G/DL (ref 3.5–5)
ALP SERPL-CCNC: 228 U/L (ref 46–116)
ALT SERPL W P-5'-P-CCNC: 27 U/L (ref 12–78)
ANION GAP SERPL CALCULATED.3IONS-SCNC: 13 MMOL/L (ref 4–13)
AST SERPL W P-5'-P-CCNC: 29 U/L (ref 5–45)
BASOPHILS # BLD AUTO: 0.05 THOUSANDS/ΜL (ref 0–0.1)
BASOPHILS NFR BLD AUTO: 1 % (ref 0–1)
BILIRUB SERPL-MCNC: 0.63 MG/DL (ref 0.2–1)
BUN SERPL-MCNC: 96 MG/DL (ref 5–25)
CALCIUM SERPL-MCNC: 9.2 MG/DL (ref 8.3–10.1)
CHLORIDE SERPL-SCNC: 102 MMOL/L (ref 100–108)
CO2 SERPL-SCNC: 22 MMOL/L (ref 21–32)
CREAT SERPL-MCNC: 12.7 MG/DL (ref 0.6–1.3)
EOSINOPHIL # BLD AUTO: 0.07 THOUSAND/ΜL (ref 0–0.61)
EOSINOPHIL NFR BLD AUTO: 1 % (ref 0–6)
ERYTHROCYTE [DISTWIDTH] IN BLOOD BY AUTOMATED COUNT: 16.6 % (ref 11.6–15.1)
GFR SERPL CREATININE-BSD FRML MDRD: 4 ML/MIN/1.73SQ M
GLUCOSE SERPL-MCNC: 100 MG/DL (ref 65–140)
HCT VFR BLD AUTO: 30.1 % (ref 36.5–49.3)
HGB BLD-MCNC: 9 G/DL (ref 12–17)
IMM GRANULOCYTES # BLD AUTO: 0.04 THOUSAND/UL (ref 0–0.2)
IMM GRANULOCYTES NFR BLD AUTO: 0 % (ref 0–2)
LYMPHOCYTES # BLD AUTO: 0.86 THOUSANDS/ΜL (ref 0.6–4.47)
LYMPHOCYTES NFR BLD AUTO: 8 % (ref 14–44)
MCH RBC QN AUTO: 29.4 PG (ref 26.8–34.3)
MCHC RBC AUTO-ENTMCNC: 29.9 G/DL (ref 31.4–37.4)
MCV RBC AUTO: 98 FL (ref 82–98)
MONOCYTES # BLD AUTO: 1.06 THOUSAND/ΜL (ref 0.17–1.22)
MONOCYTES NFR BLD AUTO: 10 % (ref 4–12)
NEUTROPHILS # BLD AUTO: 8.55 THOUSANDS/ΜL (ref 1.85–7.62)
NEUTS SEG NFR BLD AUTO: 80 % (ref 43–75)
NRBC BLD AUTO-RTO: 0 /100 WBCS
PLATELET # BLD AUTO: 323 THOUSANDS/UL (ref 149–390)
PMV BLD AUTO: 10.2 FL (ref 8.9–12.7)
POTASSIUM SERPL-SCNC: 6.4 MMOL/L (ref 3.5–5.3)
PROT SERPL-MCNC: 8.2 G/DL (ref 6.4–8.2)
RBC # BLD AUTO: 3.06 MILLION/UL (ref 3.88–5.62)
SODIUM SERPL-SCNC: 137 MMOL/L (ref 136–145)
TROPONIN I SERPL-MCNC: 0.1 NG/ML
WBC # BLD AUTO: 10.63 THOUSAND/UL (ref 4.31–10.16)

## 2020-09-18 PROCEDURE — 99285 EMERGENCY DEPT VISIT HI MDM: CPT | Performed by: EMERGENCY MEDICINE

## 2020-09-18 PROCEDURE — 93005 ELECTROCARDIOGRAM TRACING: CPT

## 2020-09-18 PROCEDURE — 85025 COMPLETE CBC W/AUTO DIFF WBC: CPT | Performed by: EMERGENCY MEDICINE

## 2020-09-18 PROCEDURE — 99285 EMERGENCY DEPT VISIT HI MDM: CPT

## 2020-09-18 PROCEDURE — 80053 COMPREHEN METABOLIC PANEL: CPT | Performed by: EMERGENCY MEDICINE

## 2020-09-18 PROCEDURE — 36415 COLL VENOUS BLD VENIPUNCTURE: CPT | Performed by: EMERGENCY MEDICINE

## 2020-09-18 PROCEDURE — 96374 THER/PROPH/DIAG INJ IV PUSH: CPT

## 2020-09-18 PROCEDURE — 84484 ASSAY OF TROPONIN QUANT: CPT | Performed by: EMERGENCY MEDICINE

## 2020-09-18 PROCEDURE — 71046 X-RAY EXAM CHEST 2 VIEWS: CPT

## 2020-09-18 RX ORDER — LORAZEPAM 2 MG/ML
0.5 INJECTION INTRAMUSCULAR ONCE
Status: COMPLETED | OUTPATIENT
Start: 2020-09-18 | End: 2020-09-18

## 2020-09-18 RX ORDER — CALCIUM GLUCONATE 20 MG/ML
2 INJECTION, SOLUTION INTRAVENOUS ONCE
Status: COMPLETED | OUTPATIENT
Start: 2020-09-19 | End: 2020-09-20

## 2020-09-18 RX ADMIN — LORAZEPAM 0.5 MG: 2 INJECTION INTRAMUSCULAR; INTRAVENOUS at 22:43

## 2020-09-19 ENCOUNTER — APPOINTMENT (OUTPATIENT)
Dept: DIALYSIS | Facility: HOSPITAL | Age: 60
DRG: 291 | End: 2020-09-19
Payer: MEDICARE

## 2020-09-19 LAB
ANION GAP SERPL CALCULATED.3IONS-SCNC: 12 MMOL/L (ref 4–13)
ANION GAP SERPL CALCULATED.3IONS-SCNC: 14 MMOL/L (ref 4–13)
ATRIAL RATE: 70 BPM
BUN SERPL-MCNC: 106 MG/DL (ref 5–25)
BUN SERPL-MCNC: 108 MG/DL (ref 5–25)
CALCIUM SERPL-MCNC: 8.5 MG/DL (ref 8.3–10.1)
CALCIUM SERPL-MCNC: 8.8 MG/DL (ref 8.3–10.1)
CHLORIDE SERPL-SCNC: 104 MMOL/L (ref 100–108)
CHLORIDE SERPL-SCNC: 106 MMOL/L (ref 100–108)
CO2 SERPL-SCNC: 19 MMOL/L (ref 21–32)
CO2 SERPL-SCNC: 21 MMOL/L (ref 21–32)
CREAT SERPL-MCNC: 13 MG/DL (ref 0.6–1.3)
CREAT SERPL-MCNC: 13.4 MG/DL (ref 0.6–1.3)
ERYTHROCYTE [DISTWIDTH] IN BLOOD BY AUTOMATED COUNT: 16.6 % (ref 11.6–15.1)
GFR SERPL CREATININE-BSD FRML MDRD: 4 ML/MIN/1.73SQ M
GFR SERPL CREATININE-BSD FRML MDRD: 4 ML/MIN/1.73SQ M
GLUCOSE SERPL-MCNC: 102 MG/DL (ref 65–140)
GLUCOSE SERPL-MCNC: 121 MG/DL (ref 65–140)
GLUCOSE SERPL-MCNC: 129 MG/DL (ref 65–140)
GLUCOSE SERPL-MCNC: 145 MG/DL (ref 65–140)
GLUCOSE SERPL-MCNC: 63 MG/DL (ref 65–140)
GLUCOSE SERPL-MCNC: 67 MG/DL (ref 65–140)
GLUCOSE SERPL-MCNC: 82 MG/DL (ref 65–140)
GLUCOSE SERPL-MCNC: 87 MG/DL (ref 65–140)
GLUCOSE SERPL-MCNC: 98 MG/DL (ref 65–140)
HBV CORE AB SER QL: NORMAL
HBV CORE IGM SER QL: NORMAL
HBV SURFACE AB SER-ACNC: 182 MIU/ML
HBV SURFACE AG SER QL: NORMAL
HCT VFR BLD AUTO: 30.5 % (ref 36.5–49.3)
HCV AB SER QL: NORMAL
HGB BLD-MCNC: 9.2 G/DL (ref 12–17)
MAGNESIUM SERPL-MCNC: 3 MG/DL (ref 1.6–2.6)
MCH RBC QN AUTO: 29.7 PG (ref 26.8–34.3)
MCHC RBC AUTO-ENTMCNC: 30.2 G/DL (ref 31.4–37.4)
MCV RBC AUTO: 98 FL (ref 82–98)
P AXIS: 49 DEGREES
PHOSPHATE SERPL-MCNC: 9.4 MG/DL (ref 2.7–4.5)
PLATELET # BLD AUTO: 310 THOUSANDS/UL (ref 149–390)
PMV BLD AUTO: 10.6 FL (ref 8.9–12.7)
POTASSIUM SERPL-SCNC: 5.7 MMOL/L (ref 3.5–5.3)
POTASSIUM SERPL-SCNC: 6.6 MMOL/L (ref 3.5–5.3)
PR INTERVAL: 198 MS
QRS AXIS: 67 DEGREES
QRSD INTERVAL: 102 MS
QT INTERVAL: 432 MS
QTC INTERVAL: 466 MS
RBC # BLD AUTO: 3.1 MILLION/UL (ref 3.88–5.62)
SODIUM SERPL-SCNC: 137 MMOL/L (ref 136–145)
SODIUM SERPL-SCNC: 139 MMOL/L (ref 136–145)
T WAVE AXIS: 132 DEGREES
TROPONIN I SERPL-MCNC: 0.1 NG/ML
VENTRICULAR RATE: 70 BPM
WBC # BLD AUTO: 10.36 THOUSAND/UL (ref 4.31–10.16)

## 2020-09-19 PROCEDURE — 83735 ASSAY OF MAGNESIUM: CPT | Performed by: INTERNAL MEDICINE

## 2020-09-19 PROCEDURE — 85027 COMPLETE CBC AUTOMATED: CPT | Performed by: INTERNAL MEDICINE

## 2020-09-19 PROCEDURE — 86705 HEP B CORE ANTIBODY IGM: CPT | Performed by: INTERNAL MEDICINE

## 2020-09-19 PROCEDURE — 86704 HEP B CORE ANTIBODY TOTAL: CPT | Performed by: INTERNAL MEDICINE

## 2020-09-19 PROCEDURE — 87081 CULTURE SCREEN ONLY: CPT | Performed by: INTERNAL MEDICINE

## 2020-09-19 PROCEDURE — 84100 ASSAY OF PHOSPHORUS: CPT | Performed by: INTERNAL MEDICINE

## 2020-09-19 PROCEDURE — 82948 REAGENT STRIP/BLOOD GLUCOSE: CPT

## 2020-09-19 PROCEDURE — G0257 UNSCHED DIALYSIS ESRD PT HOS: HCPCS

## 2020-09-19 PROCEDURE — 99215 OFFICE O/P EST HI 40 MIN: CPT | Performed by: INTERNAL MEDICINE

## 2020-09-19 PROCEDURE — 86803 HEPATITIS C AB TEST: CPT | Performed by: INTERNAL MEDICINE

## 2020-09-19 PROCEDURE — 80048 BASIC METABOLIC PNL TOTAL CA: CPT | Performed by: INTERNAL MEDICINE

## 2020-09-19 PROCEDURE — 84484 ASSAY OF TROPONIN QUANT: CPT | Performed by: INTERNAL MEDICINE

## 2020-09-19 PROCEDURE — 86706 HEP B SURFACE ANTIBODY: CPT | Performed by: INTERNAL MEDICINE

## 2020-09-19 PROCEDURE — 5A1D70Z PERFORMANCE OF URINARY FILTRATION, INTERMITTENT, LESS THAN 6 HOURS PER DAY: ICD-10-PCS | Performed by: INTERNAL MEDICINE

## 2020-09-19 PROCEDURE — 93010 ELECTROCARDIOGRAM REPORT: CPT | Performed by: INTERNAL MEDICINE

## 2020-09-19 PROCEDURE — 87340 HEPATITIS B SURFACE AG IA: CPT | Performed by: INTERNAL MEDICINE

## 2020-09-19 PROCEDURE — 99220 PR INITIAL OBSERVATION CARE/DAY 70 MINUTES: CPT | Performed by: INTERNAL MEDICINE

## 2020-09-19 RX ORDER — NICOTINE 21 MG/24HR
1 PATCH, TRANSDERMAL 24 HOURS TRANSDERMAL DAILY
Status: DISCONTINUED | OUTPATIENT
Start: 2020-09-19 | End: 2020-09-20 | Stop reason: HOSPADM

## 2020-09-19 RX ORDER — DEXTROSE MONOHYDRATE 25 G/50ML
25 INJECTION, SOLUTION INTRAVENOUS ONCE
Status: COMPLETED | OUTPATIENT
Start: 2020-09-19 | End: 2020-09-19

## 2020-09-19 RX ORDER — FLUTICASONE PROPIONATE 44 UG/1
1 AEROSOL, METERED RESPIRATORY (INHALATION) EVERY 12 HOURS SCHEDULED
Status: DISCONTINUED | OUTPATIENT
Start: 2020-09-19 | End: 2020-09-20 | Stop reason: HOSPADM

## 2020-09-19 RX ORDER — SODIUM POLYSTYRENE SULFONATE 4.1 MEQ/G
15 POWDER, FOR SUSPENSION ORAL; RECTAL ONCE
Status: COMPLETED | OUTPATIENT
Start: 2020-09-19 | End: 2020-09-19

## 2020-09-19 RX ORDER — ACETAMINOPHEN 325 MG/1
975 TABLET ORAL EVERY 6 HOURS PRN
Status: DISCONTINUED | OUTPATIENT
Start: 2020-09-19 | End: 2020-09-20 | Stop reason: HOSPADM

## 2020-09-19 RX ORDER — HEPARIN SODIUM 5000 [USP'U]/ML
5000 INJECTION, SOLUTION INTRAVENOUS; SUBCUTANEOUS EVERY 8 HOURS SCHEDULED
Status: DISCONTINUED | OUTPATIENT
Start: 2020-09-19 | End: 2020-09-20 | Stop reason: HOSPADM

## 2020-09-19 RX ORDER — CALCIUM ACETATE 667 MG/1
667 CAPSULE ORAL
Status: DISCONTINUED | OUTPATIENT
Start: 2020-09-19 | End: 2020-09-20 | Stop reason: HOSPADM

## 2020-09-19 RX ORDER — ONDANSETRON 2 MG/ML
4 INJECTION INTRAMUSCULAR; INTRAVENOUS EVERY 6 HOURS PRN
Status: DISCONTINUED | OUTPATIENT
Start: 2020-09-19 | End: 2020-09-20 | Stop reason: HOSPADM

## 2020-09-19 RX ORDER — ALBUTEROL SULFATE 90 UG/1
2 AEROSOL, METERED RESPIRATORY (INHALATION) EVERY 6 HOURS PRN
Status: DISCONTINUED | OUTPATIENT
Start: 2020-09-19 | End: 2020-09-20 | Stop reason: HOSPADM

## 2020-09-19 RX ORDER — CALCIUM ACETATE 667 MG/1
667 CAPSULE ORAL ONCE
Status: COMPLETED | OUTPATIENT
Start: 2020-09-19 | End: 2020-09-19

## 2020-09-19 RX ORDER — CALCIUM ACETATE 667 MG/1
667 CAPSULE ORAL
COMMUNITY

## 2020-09-19 RX ORDER — GABAPENTIN 300 MG/1
300 CAPSULE ORAL DAILY
Status: DISCONTINUED | OUTPATIENT
Start: 2020-09-19 | End: 2020-09-20 | Stop reason: HOSPADM

## 2020-09-19 RX ORDER — DEXTROSE MONOHYDRATE 25 G/50ML
50 INJECTION, SOLUTION INTRAVENOUS ONCE
Status: COMPLETED | OUTPATIENT
Start: 2020-09-19 | End: 2020-09-19

## 2020-09-19 RX ADMIN — NICOTINE 1 PATCH: 14 PATCH TRANSDERMAL at 08:05

## 2020-09-19 RX ADMIN — FLUTICASONE PROPIONATE 1 PUFF: 44 AEROSOL, METERED RESPIRATORY (INHALATION) at 03:14

## 2020-09-19 RX ADMIN — CALCIUM ACETATE 667 MG: 667 CAPSULE ORAL at 14:02

## 2020-09-19 RX ADMIN — INSULIN HUMAN 10 UNITS: 100 INJECTION, SOLUTION PARENTERAL at 06:28

## 2020-09-19 RX ADMIN — SODIUM POLYSTYRENE SULFONATE 15 G: 1 POWDER ORAL; RECTAL at 08:04

## 2020-09-19 RX ADMIN — GABAPENTIN 300 MG: 300 CAPSULE ORAL at 04:58

## 2020-09-19 RX ADMIN — GABAPENTIN 300 MG: 300 CAPSULE ORAL at 08:05

## 2020-09-19 RX ADMIN — FLUTICASONE PROPIONATE 1 PUFF: 44 AEROSOL, METERED RESPIRATORY (INHALATION) at 08:06

## 2020-09-19 RX ADMIN — DEXTROSE MONOHYDRATE 25 ML: 500 INJECTION PARENTERAL at 03:09

## 2020-09-19 RX ADMIN — INSULIN HUMAN 5 UNITS: 100 INJECTION, SOLUTION PARENTERAL at 03:08

## 2020-09-19 RX ADMIN — CALCIUM ACETATE 667 MG: 667 CAPSULE ORAL at 08:05

## 2020-09-19 RX ADMIN — DEXTROSE MONOHYDRATE 50 ML: 25 INJECTION, SOLUTION INTRAVENOUS at 06:29

## 2020-09-19 RX ADMIN — CALCIUM GLUCONATE 2 G: 20 INJECTION, SOLUTION INTRAVENOUS at 00:29

## 2020-09-19 RX ADMIN — ACETAMINOPHEN 975 MG: 325 TABLET, FILM COATED ORAL at 21:08

## 2020-09-19 RX ADMIN — FLUTICASONE PROPIONATE 1 PUFF: 44 AEROSOL, METERED RESPIRATORY (INHALATION) at 21:08

## 2020-09-19 RX ADMIN — CALCIUM ACETATE 667 MG: 667 CAPSULE ORAL at 06:29

## 2020-09-19 RX ADMIN — CALCIUM ACETATE 667 MG: 667 CAPSULE ORAL at 17:25

## 2020-09-19 RX ADMIN — HEPARIN SODIUM 5000 UNITS: 5000 INJECTION INTRAVENOUS; SUBCUTANEOUS at 05:01

## 2020-09-19 RX ADMIN — HEPARIN SODIUM 5000 UNITS: 5000 INJECTION INTRAVENOUS; SUBCUTANEOUS at 14:02

## 2020-09-19 NOTE — PLAN OF CARE
Post-Dialysis RN Treatment Note    Blood Pressure:  Pre 145/78 mm/Hg  Post 169/98 mmHg   EDW   kg    Weight:  Pre 66 3kg kg   Post 63 2 kg   Mode of weight measurement: Bed Scale   Volume Removed  3504 ml    Treatment duration 240 minutes    NS given  No    Treatment shortened? No   Medications given during Rx Not Applicable   Estimated Kt/V  1 39    Access type: AV fistula   Access Status: Yes, describe: able to maintain BFR without difficulty    Report called to primary nurse   Yes  Merline      1145 BG 98, /87 P80    1130 BG 82: Pt c o difficulty using both hands  Dr Silver aware  Additional snack given and BFR dcs'd to 300  /91, P80 Patient states he feels this way when his creatinine is high  1100 BG 63: Pt c o difficulty speaking, shaking and appears diaphoretic  Pt given snack and juice  Dr Silver aware /102 P80    Pt currently receiving 4hr HD tx  2K acid bath to be used for 2hrs followed by 1K bath for final 2 hrs for potassium of 6 6, today  UF goal set for 3L as tolerated while maintaining MAP >65      Problem: METABOLIC, FLUID AND ELECTROLYTES - ADULT  Goal: Electrolytes maintained within normal limits  Description: INTERVENTIONS:  - Monitor labs and assess patient for signs and symptoms of electrolyte imbalances  - Administer electrolyte replacement as ordered  - Monitor response to electrolyte replacements, including repeat lab results as appropriate  - Instruct patient on fluid and nutrition as appropriate  Outcome: Progressing  Goal: Fluid balance maintained  Description: INTERVENTIONS:  - Monitor labs   - Monitor I/O and WT  - Instruct patient on fluid and nutrition as appropriate  - Assess for signs & symptoms of volume excess or deficit  Outcome: Progressing

## 2020-09-19 NOTE — NURSING NOTE
Patient called on the call bell and stated, "I'm taking this heart monitor off, it itches and I'm leaving AMA " Dee Tan with SOD made aware via [employer]

## 2020-09-19 NOTE — H&P
H&P- Starr Viveros 1960, 64 y o  male MRN: [id number]    Unit/Bed#: [facility] 812-01 Encounter: [id number]    Primary Care Provider: No primary care provider on file  Date and time admitted to hospital: 9/18/2020  7:35 PM        * Volume overload  Assessment & Plan  · Patient presenting with shortness of breath  Admits to missed dialysis session 09/18/2020 and only partial session 09/16/2020  CXR with evidence of pulmonary congestion  · Likely in setting of missed dialysis sessions and possibly with medication noncompliance contributing  · Will appreciate nephrology evaluation for dialysis needs; patient anuric and volume is managed via dialysis  · Will provide supplemental oxygen as needed to maintain SaO2 88-92%    Cocaine use  Assessment & Plan  · Patient admitting to cocaine use recently to stay awake  · Counseled on need for cessation    Diastolic heart failure ([facility] [address] )  Assessment & Plan  Wt Readings from Last 3 Encounters:   09/18/20 68 kg (150 lb)   12/23/19 69 9 kg (154 lb)   11/30/19 70 8 kg (156 lb)     · With evidence of volume overload, likely in setting of missed dialysis  · Nephrology consult for dialysis needs  · Monitor daily weights, I/O        ESRD on hemodialysis [facility])  Assessment & Plan  · On HD M,W,F; history of noncompliance has previously been reported and patient admits to only partial dialysis session 09/16/2020 and missing dialysis 09/18/2020  · Will appreciate nephrology evaluation    Elevated troponin  Assessment & Plan  · Troponin 0 1 on presentation  Patient denies any chest pain/pressure, and EKG without acute ischemic change  · Suspect type 2 NSTEMI in setting of ESRD and volume overload, however will trend troponin and monitor on telemetry      Hyperkalemia  Assessment & Plan  · K 6 4 on presentation; EKG without concerning changes, and telemetry thus far without arrhythmia  · Patient received calcium gluconate in ED, will provide 1 time insulin/D50  · Monitor with BMP, and will maintain on telemetry      VTE Prophylaxis: Heparin  / sequential compression device   Code Status: Level 1 - Full Code per patient  POLST: POLST form is not discussed and not completed at this time  Anticipated Length of Stay:  Patient will be admitted on an Observation basis with an anticipated length of stay of  less than 2 midnights  Justification for Hospital Stay: Please see detailed plans noted above  ** Patient initially reported to me to have a Dr Anaya Salamanca as PCP, however after discussion with patient he states that that is his nephrologist and he otherwise does not have PCP at this time  Case discussed with SOD senior on-call, and further cares will be provided by [facility] A service with Dr Katya Shultz as attending going forward    Chief Complaint:     Shortness of breath  History of Present Illness:  Guadalupe Buckley is a 64 y o  male who has a past medical history significant for ESRD on HD M/W/F, PAF s/p ablation not currently on anticoagulation, hypertension, diastolic CHF  He presents with the complaint of shortness of breath developing this evening, associated with nonproductive cough, without alleviating/exacerbating factors  The patient admits he only had a partial dialysis session 09/16/2020, and missed dialysis 09/18/2020  He denied any fevers/chills, known sick contacts, chest pain/pressure, dizziness/lightheadedness, or apparent leg swelling  The patient admits to recent use of cocaine, and states he was advised to use it by a friend stating with help him stay awake  During ED evaluation he was found with hyperkalemia to 6 4 without associated EKG changes, and CXR suspicious for pulmonary vascular congestion  Case was discussed between ED physician and Nephrology, and patient planned for HD the a m  of 09/19/2020  At the time of my evaluation, patient sitting in chair in no acute distress, but continues to endorse shortness of breath    Admits to missed dialysis as above, and additionally admits to some missed medications including this morning; of note patient is unable to name his medication regimen to me and states his pills are currently in his car  Offers no other acute complaints at this time  Review of Systems:    Constitutional:  Denies fever or chills   Eyes:  Denies change in visual acuity   HENT:  Denies nasal congestion or sore throat   Respiratory:  Endorses cough and shortness of breath  Cardiovascular:  Denies chest pain or edema   GI:  Denies abdominal pain, nausea, vomiting, bloody stools or diarrhea   :  Denies dysuria   Musculoskeletal:  Denies back pain or joint pain   Integument:  Denies rash   Neurologic:  Denies headache, focal weakness or sensory changes   Endocrine:  Denies polyuria or polydipsia   Lymphatic:  Denies swollen glands   Psychiatric:  Denies depression or anxiety     Past Medical and Surgical History:   Past Medical History:   Diagnosis Date    [facility]     [facility] )     Hypercholesterolemia     Hypertension     Limb alert care status     do not use left arm    Rectal bleeding     Renal disorder      Past Surgical History:   Procedure Laterality Date    AV FISTULA PLACEMENT      left arm    AV NODE ABLATION      CHOLECYSTECTOMY      COLONOSCOPY N/A 4/27/2018    Procedure: COLONOSCOPY;  Surgeon: Jass Bonilla DO;  [facility]: [facility]; Service: Gastroenterology    EGD AND COLONOSCOPY N/A 5/24/2018    Procedure: EGD AND COLONOSCOPY;  Surgeon: Freddie Woodward MD;  Location: [facility];   Service: Gastroenterology       Meds/Allergies:  Medications Prior to Admission   Medication    budesonide (PULMICORT) 90 MCG/ACT inhaler    albuterol (VENTOLIN HFA) 90 mcg/act inhaler    amLODIPine (NORVASC) 10 mg tablet    calcium acetate (PHOSLO) capsule    cinacalcet (SENSIPAR) 30 mg tablet    gabapentin (NEURONTIN) 300 mg capsule    VELPHORO 500 MG CHEW       Allergies: No Known Allergies  History:  Marital Status:  Substance Use History:   Social History     Substance and Sexual Activity   Alcohol Use Not Currently     Social History     Tobacco Use   Smoking Status Current Every Day Smoker    Packs/day: 0 50    Types: Cigarettes   Smokeless Tobacco Never Used     Social History     Substance and Sexual Activity   Drug Use Not Currently       Family History:  Family History   Problem Relation Age of Onset    Diabetes Mother        Physical Exam:     Vitals:   Blood Pressure: 168/84 (09/19/20 0113)  Pulse: 76 (09/19/20 0113)  Temperature: 98 6 °F (37 °C) (09/19/20 0113)  Temp Source: Oral (09/19/20 0113)  Respirations: 18 (09/18/20 2245)  Height: 5' 6" (167 6 cm) (09/18/20 1939)  Weight - Scale: 68 kg (150 lb) (09/18/20 1939)  SpO2: (!) 89 % (09/19/20 0113)    Constitutional:  Well developed, well nourished, no acute distress, non-toxic appearance   Eyes:  PERRL, conjunctiva normal   HENT:  Atraumatic, external ears normal, nose normal, oropharynx moist, no pharyngeal exudates  Neck- normal range of motion, no tenderness, supple   Respiratory:  No respiratory distress, diffuse bilateral rales no wheezing   Cardiovascular:  Normal rate, normal rhythm, no murmurs, no gallops, no rubs; LUE fistula with palpable/audible thrill  GI:  Soft, nondistended, normal bowel sounds, nontender, no organomegaly, no mass, no rebound, no guarding   :  No costovertebral angle tenderness   Musculoskeletal:  No edema, no tenderness, no deformities  Back- no tenderness  Integument:  Well hydrated, no rash   Lymphatic:  No lymphadenopathy noted   Neurologic:  Alert &awake, communicative, CN 2-12 normal, normal motor function, normal sensory function, no focal deficits noted   Psychiatric:  Speech and behavior appropriate       Lab Results: I have personally reviewed pertinent reports        Results from last 7 days   Lab Units 09/18/20  2236   WBC Thousand/uL 10 63*   HEMOGLOBIN g/dL 9 0*   HEMATOCRIT % 30 1*   PLATELETS Thousands/uL 323 NEUTROS PCT % 80*   LYMPHS PCT % 8*   MONOS PCT % 10   EOS PCT % 1     Results from last 7 days   Lab Units 09/18/20  2236   POTASSIUM mmol/L 6 4*   CHLORIDE mmol/L 102   CO2 mmol/L 22   BUN mg/dL 96*   CREATININE mg/dL 12 70*   CALCIUM mg/dL 9 2   ALK PHOS U/L 228*   ALT U/L 27   AST U/L 29           EKG:  Normal sinus rhythm, LVH    Imaging: I have personally reviewed pertinent films in PACS    CXR:  Personally reviewed, apparent pulmonary vascular congestion is noted  Final radiologist read is pending  ** Please Note: Dragon 360 Dictation voice to text software was used in the creation of this document   **

## 2020-09-19 NOTE — ASSESSMENT & PLAN NOTE
· Patient presenting with shortness of breath  Admits to missed dialysis session 09/18/2020 and only partial session 09/16/2020  CXR with evidence of pulmonary congestion    · Likely in setting of missed dialysis sessions and possibly with medication noncompliance contributing  · Will appreciate nephrology evaluation for dialysis needs; patient anuric and volume is managed via dialysis  · Will provide supplemental oxygen as needed to maintain SaO2 88-92%

## 2020-09-19 NOTE — ED PROVIDER NOTES
Final Diagnosis:  1  Hyperkalemia    2  Shortness of breath    3  ESRD needing dialysis [facility])        Chief Complaint   Patient presents with    Breathing Difficulty     Pt c/o SOB, difficulty breathing, and vomiting  Pt states he missed dialysis today because he is up visiting family with a medical problem  Pt states SOB started this morning, vomiting started two days ago     HPI  Patient presents for evaluation shortness of breath  Patient has the history of end-stage renal disease with dialysis on Monday Wednesday Friday  He is from [place] up here visiting a sick family member  He tells me that he only received a partial treatment of dialysis on Wednesday, approximately 2 hours, due to being at the dialysis center leg  He then came up here in [place]  Session today secondary to visiting this family member  He states that he has had generalized increased shortness of breath over the past couple of hours which is consistent with his prior instances of miss dialysis appointments  He denies any cough, congestion, sick contacts, chest pain, fever chills  - No language barrier    - History obtained from patient  - There are no limitations to the history obtained  - Previous charting was reviewed    PMH:   has a past medical history of CHF (congestive heart failure) ([facility] [address] ), Dialysis patient ([facility] [address] ), Hypercholesterolemia, Hypertension, Limb alert care status, Rectal bleeding, and Renal disorder  PSH:   has a past surgical history that includes Cholecystectomy; Colonoscopy (N/A, 4/27/2018); AV node ablation; AV fistula placement; and EGD AND COLONOSCOPY (N/A, 5/24/2018)  ROS:  Review of Systems   Constitutional: Negative for activity change, chills, fatigue and fever  Respiratory: Positive for shortness of breath  Negative for cough  Cardiovascular: Negative for chest pain and palpitations     Gastrointestinal: Negative for abdominal distention, abdominal pain, constipation, diarrhea, nausea and vomiting  Genitourinary: Negative for dysuria and hematuria  Musculoskeletal: Negative for arthralgias, myalgias and neck pain  Neurological: Negative for dizziness, syncope, light-headedness and headaches  All other systems reviewed and are negative  PE:   Vitals:    09/18/20 1939 09/18/20 1945 09/18/20 2245   BP: 166/80 166/80 (!) 194/84   BP Location: Right arm     Pulse: 72 70 76   Resp: (!) 24  18   Temp: 97 7 °F (36 5 °C)     TempSrc: Oral     SpO2: 93% 94% 92%   Weight: 68 kg (150 lb)     Height: 5' 6" (1 676 m)       Vitals reviewed by me  Physical Exam  Constitutional:       General: He is not in acute distress  Appearance: He is well-developed  He is not diaphoretic  HENT:      Head: Normocephalic and atraumatic  Right Ear: External ear normal       Left Ear: External ear normal    Eyes:      General:         Right eye: No discharge  Left eye: No discharge  Conjunctiva/sclera: Conjunctivae normal       Pupils: Pupils are equal, round, and reactive to light  Neck:      Musculoskeletal: Normal range of motion and neck supple  Vascular: No JVD  Trachea: No tracheal deviation  Cardiovascular:      Rate and Rhythm: Normal rate and regular rhythm  Heart sounds: Normal heart sounds  No murmur  No friction rub  No gallop  Comments: Large, pulsatile fistula and proximal left upper extremity  Pulmonary:      Effort: Pulmonary effort is normal  Tachypnea present  No respiratory distress  Breath sounds: Normal breath sounds  No wheezing or rales  Comments: Mild tachypnea, good air movement throughout  Abdominal:      General: Bowel sounds are normal  There is no distension  Palpations: Abdomen is soft  There is no mass  Tenderness: There is no abdominal tenderness  There is no guarding  Musculoskeletal: Normal range of motion  General: No tenderness or deformity     Neurological:      Mental Status: He is alert and oriented to person, place, and time  Cranial Nerves: No cranial nerve deficit  Sensory: No sensory deficit  Motor: No abnormal muscle tone  Coordination: Coordination normal    Psychiatric:         Behavior: Behavior normal          Thought Content: Thought content normal          Judgment: Judgment normal           A:  - Nursing note reviewed  -                   ED Course as of Sep 18 2358   Fri Sep 18, 2020   2210 Bedside ultrasound guided IV placed      2252 Informed nephrology that the patient was here in the emergency department  At this time I do not believe that he needs emergent dialysis  They will arrange dialysis happen in the morning  Dr Mila Andino near baseline   Troponin I(!): 0 10   2315 Patient endorse cocaine use prior to arrival today, stating that sometimes uses it to help stay up  Provided with Ativan        2342 No EKG changes   Potassium(!!): 6 4     XR chest 2 views   ED Interpretation   Mild vascular congestion        Orders Placed This Encounter   Procedures    XR chest 2 views    CBC and differential    Comprehensive metabolic panel    Troponin I    Insert peripheral IV    Continuous cardiac monitoring    Continuous pulse oximetry    24 Hour Telemetry Monitoring    ECG 12 lead    Place in Observation     Labs Reviewed   CBC AND DIFFERENTIAL - Abnormal       Result Value Ref Range Status    WBC 10 63 (*) 4 31 - 10 16 Thousand/uL Final    RBC 3 06 (*) 3 88 - 5 62 Million/uL Final    Hemoglobin 9 0 (*) 12 0 - 17 0 g/dL Final    Hematocrit 30 1 (*) 36 5 - 49 3 % Final    MCV 98  82 - 98 fL Final    MCH 29 4  26 8 - 34 3 pg Final    MCHC 29 9 (*) 31 4 - 37 4 g/dL Final    RDW 16 6 (*) 11 6 - 15 1 % Final    MPV 10 2  8 9 - 12 7 fL Final    Platelets 802  759 - 390 Thousands/uL Final    nRBC 0  /100 WBCs Final    Neutrophils Relative 80 (*) 43 - 75 % Final    Immat GRANS % 0  0 - 2 % Final    Lymphocytes Relative 8 (*) 14 - 44 % Final    Monocytes Relative 10  4 - 12 % Final    Eosinophils Relative 1  0 - 6 % Final    Basophils Relative 1  0 - 1 % Final    Neutrophils Absolute 8 55 (*) 1 85 - 7 62 Thousands/µL Final    Immature Grans Absolute 0 04  0 00 - 0 20 Thousand/uL Final    Lymphocytes Absolute 0 86  0 60 - 4 47 Thousands/µL Final    Monocytes Absolute 1 06  0 17 - 1 22 Thousand/µL Final    Eosinophils Absolute 0 07  0 00 - 0 61 Thousand/µL Final    Basophils Absolute 0 05  0 00 - 0 10 Thousands/µL Final   COMPREHENSIVE METABOLIC PANEL - Abnormal    Sodium 137  136 - 145 mmol/L Final    Potassium 6 4 (*) 3 5 - 5 3 mmol/L Final    Comment: No Hemolysis present    Chloride 102  100 - 108 mmol/L Final    CO2 22  21 - 32 mmol/L Final    ANION GAP 13  4 - 13 mmol/L Final    BUN 96 (*) 5 - 25 mg/dL Final    Creatinine 12 70 (*) 0 60 - 1 30 mg/dL Final    Comment: Standardized to IDMS reference method    Glucose 100  65 - 140 mg/dL Final    Comment: If the patient is fasting, the ADA then defines impaired fasting glucose as > 100 mg/dL and diabetes as > or equal to 123 mg/dL  Specimen collection should occur prior to Sulfasalazine administration due to the potential for falsely depressed results  Specimen collection should occur prior to Sulfapyridine administration due to the potential for falsely elevated results  Calcium 9 2  8 3 - 10 1 mg/dL Final    AST 29  5 - 45 U/L Final    Comment: Specimen collection should occur prior to Sulfasalazine administration due to the potential for falsely depressed results  ALT 27  12 - 78 U/L Final    Comment: Specimen collection should occur prior to Sulfasalazine and/or Sulfapyridine administration due to the potential for falsely depressed results       Alkaline Phosphatase 228 (*) 46 - 116 U/L Final    Total Protein 8 2  6 4 - 8 2 g/dL Final    Albumin 3 5  3 5 - 5 0 g/dL Final    Total Bilirubin 0 63  0 20 - 1 00 mg/dL Final    Comment: Use of this assay is not recommended for patients undergoing treatment with eltrombopag due to the potential for falsely elevated results  eGFR 4  ml/min/1 73sq m Final    Narrative:     National Kidney Disease Foundation guidelines for Chronic Kidney Disease (CKD):     Stage 1 with normal or high GFR (GFR > 90 mL/min/1 73 square meters)    Stage 2 Mild CKD (GFR = 60-89 mL/min/1 73 square meters)    Stage 3A Moderate CKD (GFR = 45-59 mL/min/1 73 square meters)    Stage 3B Moderate CKD (GFR = 30-44 mL/min/1 73 square meters)    Stage 4 Severe CKD (GFR = 15-29 mL/min/1 73 square meters)    Stage 5 End Stage CKD (GFR <15 mL/min/1 73 square meters)  Note: GFR calculation is accurate only with a steady state creatinine   TROPONIN I - Abnormal    Troponin I 0 10 (*) <=0 04 ng/mL Final    Comment: Siemens Chemistry analyzer 99% cutoff is > 0 04 ng/mL in network labs     o cTnI 99% cutoff is useful only when applied to patients in the clinical setting of myocardial ischemia   o cTnI 99% cutoff should be interpreted in the context of clinical history, ECG findings and possibly cardiac imaging to establish correct diagnosis  o cTnI 99% cutoff may be suggestive but clearly not indicative of a coronary event without the clinical setting of myocardial ischemia  Results indicate test should be repeated on new specimen collected within 4-6 hours of the original         Final Diagnosis:  1  Hyperkalemia    2  Shortness of breath    3  [facility]        P:  - CBC, BMP, troponin, chest x-ray, EKG  -chest x-ray with some concern of vascular congestion  -patient discussed with Nephrology, will receive dialysis in the morning  -plan to admit to medicine service for further monitoring     -patient found to be hypokalemic without EKG changes  No acute intervention needed at this time  Will provide calcium for membrane stabilization and monitor on telemetry until dialysis is performed in the morning   -patient's troponin at 0 10, prior values at approximately 0 08    No concern for acute ischemia at this time  Medications   calcium gluconate 2 g in sodium chloride 0 9% 100 mL (premix) (has no administration in time range)   LORazepam (ATIVAN) injection 0 5 mg (0 5 mg Intravenous Given 20 2873)     Time reflects when diagnosis was documented in both MDM as applicable and the Disposition within this note     Time User Action Codes Description Comment    2020 11:57 PM Suhail Lamont Add [E87 5] Hyperkalemia     2020 11:57 PM Cynthia Gonzalez Add [R06 02] Shortness of breath     2020 11:57 PM Cynthia Gonzalez Add [N18 6,  Z99 2] ESRD needing dialysis [facility])       ED Disposition     ED Disposition Condition Date/Time Comment    Admit Stable Fri Sep 18, 2020 11:57 PM Case was discussed with ROSITA and the patient's admission status was agreed to be Admission Status: observation status to the service of Dr Berto Voss   Follow-up Information    None       Patient's Medications   Discharge Prescriptions    No medications on file     No discharge procedures on file  Prior to Admission Medications   Prescriptions Last Dose Informant Patient Reported? Taking? VELPHORO 500 MG CHEW   Yes No   Sig: CHEW & SWALLOW 2 TABLETS BY MOUTH THREE TIMES A DAY WITH MEALS   albuterol (VENTOLIN HFA) 90 mcg/act inhaler   Yes No   Sig: Inhale 2 puffs every 6 (six) hours as needed   amLODIPine (NORVASC) 10 mg tablet   Yes No   Sig: Take 10 mg by mouth daily   cinacalcet (SENSIPAR) 30 mg tablet   Yes No   Si mg   gabapentin (NEURONTIN) 300 mg capsule   Yes No   Sig: TK ONE C PO D      Facility-Administered Medications: None       Portions of the record may have been created with voice recognition software  Occasional wrong word or "sound a like" substitutions may have occurred due to the inherent limitations of voice recognition software  Read the chart carefully and recognize, using context, where substitutions have occurred      Electronically signed by:  Phillip Rose, PGY 3, MD Mukund Fenton MD  09/18/20 0477

## 2020-09-19 NOTE — ASSESSMENT & PLAN NOTE
· On HD M,W,F; history of noncompliance has previously been reported and patient admits to only partial dialysis session 09/16/2020 and missing dialysis 09/18/2020  · Will appreciate nephrology evaluation

## 2020-09-19 NOTE — CONSULTS
Consultation    Nephrology   Esperanza Sagastume 64 y o  male MRN: [id number]  Unit/Bed#: [facility] 65-26 Encounter: [id number]    History of Present Illness   Physician Requesting Consult: Elvin Watts MD  Reason for Consult: - dialysis management     ASSESSMENT/PLAN:   64 y o  male with pmh of PAF status post ablation, hypertension, noncompliance, diastolic CHF and ESRD (MWF) who was admitted for shortness of breath  Nephrology was consulted for assistance in the management of ESRD   ESRD:   - gets usual dialysis Monday Wednesday Friday in [place]  - access:  Left AV fistula  - estimated dry weight:  Unknown, will obtain outpatient records  - last dialysis treatment on 09/16/2020  - next dialysis treatment on 09/19/2020  - aiming for UF close to 3 kilos with dialysis today  -next treatment likely on 09/21/2020  - check BMP, phosphorus in a [facility] place on renal diet when allowed diet order  - avoid nephrotoxins, adjust meds to appropriate GFR  - advised patient strongly that he needs to make dialysis arrangements prior to his trips and be compliant with dialysis     H&H/anemia:  - most recent hemoglobin at 9 2 grams/deciliter  - maintain hemoglobin 10-12 grams/deciliter  - Recommendations:  No changes for now     Acid-base electrolytes:  o Hyperkalemia:  Most recent potassium at 5 7 mEq  Admitted with potassium 6 6 mEq  Dialyzing with 1K bath the last 2 hours  Recheck BMP in [facility] o Hyperphosphatemia:  Most recent phosphorus at 9 4  o Hypermagnesemia:  Most recent magnesium at 3 0  o Sodium, phosphorus, potassium, acidosis to be corrected with dialysis     Blood pressure:   o Home medications:  Norvasc 10 mg p o  Q day  o Current medications:  None  o Recommendations:  If blood pressure remains elevated restart home Norvasc     Bone mineral disease/secondary hyperparathyroidism of renal origin:   o Outpatient is on:  PhosLo 1 tab p o  T i d , Sensipar 30 mg p o  Q day,velphoro 500 mg p o  T i d   o Currently on:  PhosLo 1 tab p o  T i d  With meals  o Recommendations:  Check phos with next lab draw   o Follow-up intact PTH as an outpatient     Other medical problems:  o Diastolic CHF:  Management primary team, increase UF with dialysis today  o Cocaine use:  Management per primary team        Thanks for the consult  Will continue to follow  Please call with questions  Above-mentioned orders and Plan in terms of dialysis was discussed with the team in [address] MD, FASN, 9/19/2020, 10:54 AM        HISTORY OF PRESENT ILLNESS:   Petrona Boateng is a 64 y o  male with pmh of PAF status post ablation, hypertension, noncompliance, diastolic CHF and ESRD (MWF) who was admitted for shortness of breath  Nephrology was consulted for assistance in the management of ESRD  Petrona Boateng is a known ESRD patient who undergoes maintenance hemodialysis in [place] on Monday Wednesday Friday  Patient spontaneously came over from [place] without any dialysis for transportation arrangements on Wednesday after his partial dialysis treatment and had plans of returning back to [place] on Friday however he was not feeling well and subsequently got admitted to the hospital   At this time patient reports his plans are to get discharged from the hospital and return back to [place] after quick appointment in [place] on Monday, he has no dialysis arrangements in [place] or [place] at this time  Patient admits to having partial dialysis session on 09/16/2020 and missing dialysis on 09/18/2020  Also admits to recent cocaine use  In the ED was noted to have hyperkalemia with potassium 6 4 mEq  And chest x-ray suspicious for pulmonary vascular congestion  Patient seen and examined again while on hemodialysis tolerating dialysis well no issues with blood flow rates at 10:00 a m    History obtained from chart review and the patient    Inpatient consult to Nephrology  Consult performed by: Dimitrios Mccall MD  Consult ordered by: Misael Alaniz DO          Review of Systems   Constitutional: Negative for chills, fatigue and fever  HENT: Negative for congestion  Respiratory: Negative for cough, shortness of breath and wheezing  Cardiovascular: Negative for leg swelling  Gastrointestinal: Negative for abdominal pain, constipation, diarrhea, nausea and vomiting  Genitourinary: Negative for flank pain  Musculoskeletal: Negative for back pain  Skin: Negative for rash and wound  Neurological: Negative for dizziness and headaches  Psychiatric/Behavioral: Negative for agitation and confusion  All other systems reviewed and are negative  Historical Information   Patient Active Problem List   Diagnosis    Volume overload    Hyperkalemia    Elevated troponin    Secondary hyperparathyroidism of renal origin ([facility] )    Anemia in chronic kidney disease, on chronic dialysis ([place] [address] )    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(HCC)    [facility] ([facility])    Nicotine abuse    High anion gap metabolic acidosis    Hypoglycemia    Paroxysmal A-fib (HCC)    Diastolic heart failure (HCC)    Non-compliance    SOB (shortness of breath)    Cocaine use     Past Medical History:   Diagnosis Date    [facility]F (congestive heart failure) ([facility])     Dialysis patient ([place] [address] )     Hypercholesterolemia     Hypertension     Limb alert care status     do not use left arm    Rectal bleeding     Renal disorder      Past Surgical History:   Procedure Laterality Date    AV FISTULA PLACEMENT      left arm    AV NODE ABLATION      CHOLECYSTECTOMY      COLONOSCOPY N/A 4/27/2018    Procedure: COLONOSCOPY;  Surgeon: Tresa Taylor DO;  [facility]; Service: Gastroenterology    EGD AND COLONOSCOPY N/A 5/24/2018    Procedure: EGD AND COLONOSCOPY;  Surgeon: Omar Olivas MD;  [facility];   Service: Gastroenterology     Social History   Social History     Substance and Sexual Activity Alcohol Use Not Currently     Social History     Substance and Sexual Activity   Drug Use Not Currently     Social History     Tobacco Use   Smoking Status Current Every Day Smoker    Packs/day: 0 50    Types: Cigarettes   Smokeless Tobacco Never Used     Family History   Problem Relation Age of Onset    Diabetes Mother        Meds/Allergies   current meds:   Current Facility-Administered Medications   Medication Dose Route Frequency    albuterol (PROVENTIL HFA,VENTOLIN HFA) inhaler 2 puff  2 puff Inhalation Q6H PRN    calcium acetate (PHOSLO) capsule 667 mg  667 mg Oral TID With Meals    fluticasone (FLOVENT HFA) 44 mcg/act inhaler 1 puff  1 puff Inhalation Q12H [address]    gabapentin (NEURONTIN) capsule 300 mg  300 mg Oral Daily    heparin (porcine) subcutaneous injection 5,000 Units  5,000 Units Subcutaneous Q8H [address]    nicotine (NICODERM CQ) 14 mg/24hr TD 24 hr patch 1 patch  1 patch Transdermal Daily    ondansetron (ZOFRAN) injection 4 mg  4 mg Intravenous Q6H PRN    and PTA meds:   Prior to Admission Medications   Prescriptions Last Dose Informant Patient Reported? Taking?    VELPHORO 500 MG CHEW   Yes No   Sig: CHEW & SWALLOW 2 TABLETS BY MOUTH THREE TIMES A DAY WITH MEALS   albuterol (VENTOLIN HFA) 90 mcg/act inhaler   Yes No   Sig: Inhale 2 puffs every 6 (six) hours as needed   amLODIPine (NORVASC) 10 mg tablet   Yes No   Sig: Take 10 mg by mouth daily   budesonide (PULMICORT) 90 MCG/ACT inhaler   Yes Yes   Sig: Inhale 1 puff   calcium acetate (PHOSLO) capsule   Yes No   Sig: Take 667 mg by mouth 3 (three) times a day with meals   cinacalcet (SENSIPAR) 30 mg tablet   Yes No   Si mg   gabapentin (NEURONTIN) 300 mg capsule   Yes No   Sig: TK ONE C PO D      Facility-Administered Medications: None       Scheduled Meds:  Current Facility-Administered Medications   Medication Dose Route Frequency Provider Last Rate    albuterol  2 puff Inhalation Q6H PRN Maddi Arnold DO      calcium acetate  667 mg Oral TID With Meals Maddi Arnold,       fluticasone  1 puff Inhalation Q12H [address] Maddi Arnold, DO      gabapentin  300 mg Oral Daily Shanthi Hassan PA-C      heparin (porcine)  5,000 Units Subcutaneous [facility] Maddi Arnold,       nicotine  1 patch Transdermal Daily Maddi Arnold DO      ondansetron  4 mg Intravenous Q6H PRN Maddi Arnold DO         PRN Meds:   albuterol    ondansetron    Continuous Infusions:     No Known Allergies        Invasive Devices: Invasive Devices     Peripheral Intravenous Line            Peripheral IV 20 Right Forearm less than 1 day    Peripheral IV 20 Right;Upper;Ventral (anterior) Arm less than 1 day          Line            Hemodialysis AV Fistula 20 Left Upper arm less than 1 day                    PHYSICAL EXAM  BP (!) 167/101   Pulse 80   Temp 98 4 °F (36 9 °C)   Resp 16   Ht 5' 6" (1 676 m)   Wt 68 kg (150 lb)   SpO2 96%   BMI 24 21 kg/m²   Temp (24hrs), Av 2 °F (36 8 °C), Min:97 7 °F (36 5 °C), Max:98 6 °F (37 °C)        Intake/Output Summary (Last 24 hours) at 2020 1054  Last data filed at 2020 0832  Gross per 24 hour   Intake 318 ml   Output --   Net 318 ml       I/O last 24 hours: In: 318 [P O :118; I V :200]  Out: -       Current Weight: Weight - Scale: 68 kg (150 lb)  First Weight: Weight - Scale: 68 kg (150 lb)  Physical Exam  Vitals signs and nursing note reviewed  Constitutional:       General: He is not in acute distress  Appearance: He is not ill-appearing, toxic-appearing or diaphoretic  HENT:      Head: Normocephalic and atraumatic  Mouth/Throat:      Mouth: Mucous membranes are moist       Pharynx: Oropharynx is clear  No oropharyngeal exudate  Eyes:      General: No scleral icterus  Conjunctiva/sclera: Conjunctivae normal    Neck:      Musculoskeletal: Normal range of motion and neck supple  Cardiovascular:      Rate and Rhythm: Normal rate  Heart sounds: No friction rub  Pulmonary:      Effort: No respiratory distress  Breath sounds: Normal breath sounds  No wheezing  Abdominal:      Palpations: Abdomen is soft  There is no mass  Tenderness: There is no abdominal tenderness  Musculoskeletal:         General: No swelling  Comments: Left AV fistula   Skin:     Coloration: Skin is not jaundiced  Neurological:      General: No focal deficit present  Mental Status: He is alert and oriented to person, place, and time  Psychiatric:         Mood and Affect: Mood normal          Behavior: Behavior normal            LABORATORY:    Results from last 7 days   Lab Units 09/19/20  0841 09/19/20  0525 09/19/20  0521 09/19/20  0452 09/18/20  2236   WBC Thousand/uL  --   --   --  10 36* 10 63*   HEMOGLOBIN g/dL  --   --   --  9 2* 9 0*   HEMATOCRIT %  --   --   --  30 5* 30 1*   PLATELETS Thousands/uL  --   --   --  310 323   POTASSIUM mmol/L 5 7* 6 6*  --   --  6 4*   CHLORIDE mmol/L 106 104  --   --  102   CO2 mmol/L 21 19*  --   --  22   BUN mg/dL 108* 106*  --   --  96*   CREATININE mg/dL 13 40* 13 00*  --   --  12 70*   CALCIUM mg/dL 8 8 8 5  --   --  9 2   MAGNESIUM mg/dL  --   --  3 0*  --   --    PHOSPHORUS mg/dL  --   --  9 4*  --   --       rest all reviewed    RADIOLOGY:  XR chest 2 views   ED Interpretation by Dakota Matias MD (09/18 2252)   Mild vascular congestion        Rest all reviewed    Portions of the record may have been created with voice recognition software  Occasional wrong word or "sound a like" substitutions may have occurred due to the inherent limitations of voice recognition software  Read the chart carefully and recognize, using context, where substitutions have occurred  If you have any questions, please contact the dictating provider

## 2020-09-19 NOTE — UTILIZATION REVIEW
Initial Clinical Review    Admission: Date/Time/Statement: 9/18/20 at 2358 Observation converted to Inpatient 9/19/20 at 026 848 14 90    09/19/20 1445    Inpatient Admission  Once      Transfer Service: General Medicine       Question  Answer  Comment    Admitting Physician  Josefa Ford     Level of Care  Med Surg     Estimated length of stay  More than 2 Midnights     Certification  I certify that inpatient services are medically necessary for this patient for a duration of greater than two midnights  See H&P and MD Progress Notes for additional information about the patient's course of treatment  09/19/20 1445     ED Arrival Information     Expected Arrival Acuity Means of Arrival Escorted By Service Admission Type    - 9/18/2020 19:25 Emergent Wheelchair Self Hospitalist Emergency    Arrival Complaint    difficulty breathing, vomiting     Chief Complaint   Patient presents with    Breathing Difficulty     Pt c/o SOB, difficulty breathing, and vomiting  Pt states he missed dialysis today because he is up visiting family with a medical problem  Pt states SOB started this morning, vomiting started two days ago     History of Present Illness:  Ne Cassidy is a 64 y o  male who has a past medical history significant for ESRD on HD M/W/F, PAF s/p ablation not currently on anticoagulation, hypertension, diastolic CHF  He presents with the complaint of shortness of breath developing this evening, associated with nonproductive cough, without alleviating/exacerbating factors  The patient admits he only had a partial dialysis session 09/16/2020, and missed dialysis 09/18/2020  He denied any fevers/chills, known sick contacts, chest pain/pressure, dizziness/lightheadedness, or apparent leg swelling  The patient admits to recent use of cocaine, and states he was advised to use it by a friend stating with help him stay awake    During ED evaluation he was found with hyperkalemia to 6 4 without associated EKG changes, and CXR suspicious for pulmonary vascular congestion  Case was discussed between ED physician and Nephrology, and patient planned for HD the a m  of 09/19/2020    At the time of my evaluation, patient sitting in chair in no acute distress, but continues to endorse shortness of breath  Admits to missed dialysis as above, and additionally admits to some missed medications including this morning; of note patient is unable to name his medication regimen to me and states his pills are currently in his car  Offers no other acute complaints at this time      Assessment/Plan:   Volume overload  Assessment & Plan  · Patient presenting with shortness of breath  Admits to missed dialysis session 09/18/2020 and only partial session 09/16/2020  CXR with evidence of pulmonary congestion  · Likely in setting of missed dialysis sessions and possibly with medication noncompliance contributing  · Will appreciate nephrology evaluation for dialysis needs; patient anuric and volume is managed via dialysis  · Will provide supplemental oxygen as needed to maintain SaO2 88-92%     Cocaine use  Assessment & Plan  · Patient admitting to cocaine use recently to stay awake  · Counseled on need for cessation     Diastolic heart failure ([facility] )  Assessment & Plan  Wt Readings from Last 3 Encounters:   09/18/20 68 kg (150 lb)   12/23/19 69 9 kg (154 lb)   11/30/19 70 8 kg (156 lb)      · With evidence of volume overload, likely in setting of missed dialysis  · Nephrology consult for dialysis needs  Monitor daily weights,   I/O     ESRD on hemodialysis [facility])  Assessment & Plan  · On HD M,W,F; history of noncompliance has previously been reported and patient admits to only partial dialysis session 09/16/2020 and missing dialysis 09/18/2020  · Will appreciate nephrology evaluation     Elevated troponin  Assessment & Plan  · Troponin 0 1 on presentation    Patient denies any chest pain/pressure, and EKG without acute ischemic change  · Suspect type 2 NSTEMI in setting of ESRD and volume overload, however will trend troponin and monitor on telemetry      Hyperkalemia  Assessment & Plan  · K 6 4 on presentation; EKG without concerning changes, and telemetry thus far without arrhythmia  · Patient received calcium gluconate in ED, will provide 1 time insulin/D50  · Monitor with BMP, and will maintain on telemetry      VTE Prophylaxis: Heparin  / sequential compression device      Anticipated Length of Stay:  Patient will be admitted on an Observation basis with an anticipated length of stay of  less than 2 midnights       ED Triage Vitals   Temperature Pulse Respirations Blood Pressure SpO2   09/18/20 1939 09/18/20 1939 09/18/20 1939 09/18/20 1939 09/18/20 1939   97 7 °F (36 5 °C) 72 (!) 24 166/80 93 %      Temp Source Heart Rate Source Patient Position - Orthostatic VS BP Location FiO2 (%)   09/18/20 1939 09/18/20 1939 09/19/20 0832 09/18/20 1939 --   Oral Monitor Lying Right arm    No Pain          Wt Readings from Last 1 Encounters:   09/18/20 68 kg (150 lb)     Additional Vital Signs:   09/19/20 07:57:51   98 4 °F (36 9 °C)   76   16   169/83   112   96 %   --   --   --     09/19/20 01:13:03   98 6 °F (37 °C)   76   --   168/84   112   89 %Abnormal     --   --   --     09/18/20 2245   --   76   18   194/84Abnormal     --   92 %             I/O 09/18 0701 09/19 0700 09/19 0701   -   P O   118     I V  (mL/kg)   200 (2 9)    Total Intake(mL/kg)  118 (1 7)  200 (2 9)    Net  +118  +200      Pertinent Labs/Diagnostic Test Results:     Results from last 7 days   Lab Units 09/19/20  0452 09/18/20  2236   WBC Thousand/uL 10 36* 10 63*   HEMOGLOBIN g/dL 9 2* 9 0*   HEMATOCRIT % 30 5* 30 1*   PLATELETS Thousands/uL 310 323   NEUTROS ABS Thousands/µL  --  8 55*     Results from last 7 days   Lab Units 09/19/20  0841 09/19/20  0525 09/19/20  0521 09/18/20  2236   SODIUM mmol/L 139 137  --  137   POTASSIUM mmol/L 5 7* 6 6*  --  6 4*   CHLORIDE mmol/L 106 104  -- 102   CO2 mmol/L 21 19*  --  22   ANION GAP mmol/L 12 14*  --  13   BUN mg/dL 108* 106*  --  96*   CREATININE mg/dL 13 40* 13 00*  --  12 70*   EGFR ml/min/1 73sq m 4 4  --  4   CALCIUM mg/dL 8 8 8 5  --  9 2   MAGNESIUM mg/dL  --   --  3 0*  --    PHOSPHORUS mg/dL  --   --  9 4*  --      Results from last 7 days   Lab Units 09/18/20  2236   AST U/L 29   ALT U/L 27   ALK PHOS U/L 228*   TOTAL PROTEIN g/dL 8 2   ALBUMIN g/dL 3 5   TOTAL BILIRUBIN mg/dL 0 63     Results from last 7 days   Lab Units 09/19/20  0654 09/19/20  0628 09/19/20  0308   POC GLUCOSE mg/dl 145* 87 129     Results from last 7 days   Lab Units 09/19/20  0841 09/19/20  0525 09/18/20  2236   GLUCOSE RANDOM mg/dL 67 121 100     Results from last 7 days   Lab Units 09/19/20  0432 09/18/20  2236   TROPONIN I ng/mL 0 10* 0 10*     Results from last 7 days   Lab Units 09/19/20  0845   HEP B S AG  Non-reactive   HEP C AB  Non-reactive   HEP B C IGM  Non-reactive   HEP B C TOTAL AB  Non-reactive        ED Treatment:   Medication Administration from 09/18/2020 1924 to 09/19/2020 2931       Date/Time Order Dose Route Action     09/18/2020 2243 LORazepam (ATIVAN) injection 0 5 mg 0 5 mg Intravenous Given     09/18/2020 2237 LORazepam (ATIVAN) injection 0 5 mg 0 mg Intravenous Hold     09/19/2020 0029 calcium gluconate 2 g in sodium chloride 0 9% 100 mL (premix) 2 g Intravenous New Bag     Past Medical History:   Diagnosis Date    [facility]congestive [facility]) [facility] )     [facility]     Hypercholesterolemia     Hypertension     Limb alert care status     do not use left arm    Rectal bleeding     Renal disorder      Present on Admission:   Volume overload   Hyperkalemia   Cocaine use   Diastolic heart failure (HCC)   Elevated troponin    Admitting Diagnosis: Shortness of breath [R06 02]  Hyperkalemia [E87 5]  Breathing difficulty [R06 89]  ESRD needing dialysis (HCC) [N18 6, Z99 2]  Hypervolemia, unspecified hypervolemia type [E87 48]    Age/Sex: 64 y o  male    Admission Orders:  Telemetry  VS q4hrs  Diet Renal; Potassium 2 GM; Yes; Fluid Restriction 1500 ML    Scheduled Medications:  calcium acetate, 667 mg, Oral, TID With Meals  fluticasone, 1 puff, Inhalation, Q12H JAMESON  gabapentin, 300 mg, Oral, Daily  heparin (porcine), 5,000 Units, Subcutaneous, Q8H JAMESON  nicotine, 1 patch, Transdermal, Daily     PRN Meds:  albuterol, 2 puff, Inhalation, Q6H PRN  ondansetron, 4 mg, Intravenous, Q6H PRN    IP CONSULT TO NEPHROLOGY    Network Utilization Review Department  [email or web address]  ATTENTION: Please call with any questions or concerns to [phone] and carefully listen to the prompts so that you are directed to the right person  All voicemails are confidential   [facility] all requests for admission clinical reviews, approved or denied determinations and any other requests to dedicated fax number below belonging to the campus where the patient is receiving treatment   List of dedicated fax numbers for the Facilities:  [address] DENIALS (Administrative/Medical Necessity) [phone]   [address] (Maternity/NICU/Pediatrics) [phone]   Priyanka Gardiner [phone]   Dago Wynn [phone]   Tiffany Dickey [phone]   Leanne Silverman [phone]   [address] [facility] [address] [place] [address] [phone]   [facility]  [phone]   [address] [facility]  [address] [place] [address] [address] [facility] [phone]

## 2020-09-19 NOTE — ED ATTENDING ATTESTATION
9/18/2020  I, Sushma Gracia MD, saw and evaluated the patient  I have discussed the patient with the resident/non-physician practitioner and agree with the resident's/non-physician practitioner's findings, Plan of Care, and MDM as documented in the resident's/non-physician practitioner's note, except where noted  All available labs and Radiology studies were reviewed  I was present for key portions of any procedure(s) performed by the resident/non-physician practitioner and I was immediately available to provide assistance  At this point I agree with the current assessment done in the Emergency Department    I have conducted an independent evaluation of this patient a history and physical is as follows:  Missed dialysis   Today   Patient  Had partial dialysis on Wednesday    States mild  SOB   No fever   No edema   Mild cough  Impression shortness of breath missed dialysis  ED Course         Critical Care Time  Procedures

## 2020-09-19 NOTE — ASSESSMENT & PLAN NOTE
· K 6 4 on presentation; EKG without concerning changes, and telemetry thus far without arrhythmia  · Patient received calcium gluconate in ED, will provide 1 time insulin/D50  · Monitor with BMP, and will maintain on telemetry

## 2020-09-19 NOTE — QUICK NOTE
QUICK NOTE - Deterioration Index  Darek King 64 y o  male MRN: [id number]  Unit/Bed#: [facility] 812-01 Encounter: [id number]    Date Paged: 20  Time Paged: [address] #: [phone]  Arrival Time: 1950  Deterioration index score at time of page: 63 2  %  Spoke with Dr Barby Floyd from primary team  Need to escalate level of care: no     PROBLEMS resulting in high DI score:    Pulse ox 71%   Supplemental O2 by NC   Age 64    PLAN:     DI flagged bc of innacurately documented pulse ox    Please call [phone] with any questions       Vitals:   Vitals:    20 1328 20 1552 20 1555 20 1939   BP: (!) 172/82 (!) 171/74 154/72 166/72   BP Location:  Right arm Right arm    Pulse: 74 78  78   Resp: 18 22     Temp: (!) 97 4 °F (36 3 °C) 97 7 °F (36 5 °C)  97 9 °F (36 6 °C)   TempSrc:  Axillary     SpO2: 95% 97%  (!) 88%   Weight:       Height:           Respiratory:  SpO2: SpO2: (!) 88 %, SpO2 Activity: SpO2 Activity: At Rest, SpO2 Device: O2 Device: Nasal cannula  Nasal Cannula O2 Flow Rate (L/min): 2 L/min    Temperature: Temp (24hrs), Av °F (36 7 °C), Min:97 4 °F (36 3 °C), Max:98 6 °F (37 °C)  Current: Temperature: 97 9 °F (36 6 °C)    Labs:   Results from last 7 days   Lab Units 20  0452 20  2236   WBC Thousand/uL 10 36* 10 63*   HEMOGLOBIN g/dL 9 2* 9 0*   HEMATOCRIT % 30 5* 30 1*   PLATELETS Thousands/uL 310 323   NEUTROS PCT %  --  80*   MONOS PCT %  --  10     Results from last 7 days   Lab Units 20  0841 20  0525 20  2236   SODIUM mmol/L 139 137 137   POTASSIUM mmol/L 5 7* 6 6* 6 4*   CHLORIDE mmol/L 106 104 102   CO2 mmol/L 21 19* 22   BUN mg/dL 108* 106* 96*   CREATININE mg/dL 13 40* 13 00* 12 70*   CALCIUM mg/dL 8 8 8 5 9 2   ALK PHOS U/L  --   --  228*   ALT U/L  --   --  27   AST U/L  --   --  29     Results from last 7 days   Lab Units 20  0521   MAGNESIUM mg/dL 3 0*         Results from last 7 days   Lab Units 20  0432 20  2236   TROPONIN I ng/mL 0 10* 0 10*           Code Status: Level 1 - Full Code

## 2020-09-19 NOTE — ASSESSMENT & PLAN NOTE
· Troponin 0 1 on presentation  Patient denies any chest pain/pressure, and EKG without acute ischemic change  · Suspect type 2 NSTEMI in setting of ESRD and volume overload, however will trend troponin and monitor on telemetry

## 2020-09-19 NOTE — NURSING NOTE
Patient now willing to stay in hospital, stating "I just need my creat checked tomorrow, but I'm not wearing this heart monitor because it gives me a rash " Ruth Madsen with [facility] aware of patient's refusal to wear telemetry  Patient was first degree heart block on most recent telemetry strip

## 2020-09-19 NOTE — ASSESSMENT & PLAN NOTE
Wt Readings from Last 3 Encounters:   09/18/20 68 kg (150 lb)   12/23/19 69 9 kg (154 lb)   11/30/19 70 8 kg (156 lb)     · With evidence of volume overload, likely in setting of missed dialysis  · Nephrology consult for dialysis needs  · Monitor daily weights, I/O

## 2020-09-20 VITALS
HEIGHT: 66 IN | WEIGHT: 150 LBS | HEART RATE: 75 BPM | TEMPERATURE: 98.1 F | OXYGEN SATURATION: 90 % | SYSTOLIC BLOOD PRESSURE: 144 MMHG | RESPIRATION RATE: 18 BRPM | BODY MASS INDEX: 24.11 KG/M2 | DIASTOLIC BLOOD PRESSURE: 67 MMHG

## 2020-09-20 PROBLEM — E87.70 VOLUME OVERLOAD: Status: RESOLVED | Noted: 2017-11-13 | Resolved: 2020-09-20

## 2020-09-20 PROBLEM — R77.8 ELEVATED TROPONIN: Status: RESOLVED | Noted: 2017-11-13 | Resolved: 2020-09-20

## 2020-09-20 PROBLEM — E87.5 HYPERKALEMIA: Chronic | Status: RESOLVED | Noted: 2017-11-13 | Resolved: 2020-09-20

## 2020-09-20 LAB
ANION GAP SERPL CALCULATED.3IONS-SCNC: 7 MMOL/L (ref 4–13)
BASOPHILS # BLD AUTO: 0.06 THOUSANDS/ΜL (ref 0–0.1)
BASOPHILS NFR BLD AUTO: 1 % (ref 0–1)
BUN SERPL-MCNC: 46 MG/DL (ref 5–25)
CALCIUM SERPL-MCNC: 7.9 MG/DL (ref 8.3–10.1)
CHLORIDE SERPL-SCNC: 99 MMOL/L (ref 100–108)
CO2 SERPL-SCNC: 31 MMOL/L (ref 21–32)
CREAT SERPL-MCNC: 8.01 MG/DL (ref 0.6–1.3)
EOSINOPHIL # BLD AUTO: 0.15 THOUSAND/ΜL (ref 0–0.61)
EOSINOPHIL NFR BLD AUTO: 3 % (ref 0–6)
ERYTHROCYTE [DISTWIDTH] IN BLOOD BY AUTOMATED COUNT: 16.2 % (ref 11.6–15.1)
GFR SERPL CREATININE-BSD FRML MDRD: 8 ML/MIN/1.73SQ M
GLUCOSE SERPL-MCNC: 158 MG/DL (ref 65–140)
HCT VFR BLD AUTO: 29.6 % (ref 36.5–49.3)
HGB BLD-MCNC: 9.3 G/DL (ref 12–17)
IMM GRANULOCYTES # BLD AUTO: 0.02 THOUSAND/UL (ref 0–0.2)
IMM GRANULOCYTES NFR BLD AUTO: 0 % (ref 0–2)
LYMPHOCYTES # BLD AUTO: 0.69 THOUSANDS/ΜL (ref 0.6–4.47)
LYMPHOCYTES NFR BLD AUTO: 13 % (ref 14–44)
MAGNESIUM SERPL-MCNC: 2.6 MG/DL (ref 1.6–2.6)
MCH RBC QN AUTO: 30 PG (ref 26.8–34.3)
MCHC RBC AUTO-ENTMCNC: 31.4 G/DL (ref 31.4–37.4)
MCV RBC AUTO: 96 FL (ref 82–98)
MONOCYTES # BLD AUTO: 0.53 THOUSAND/ΜL (ref 0.17–1.22)
MONOCYTES NFR BLD AUTO: 10 % (ref 4–12)
MRSA NOSE QL CULT: NORMAL
NEUTROPHILS # BLD AUTO: 3.78 THOUSANDS/ΜL (ref 1.85–7.62)
NEUTS SEG NFR BLD AUTO: 73 % (ref 43–75)
NRBC BLD AUTO-RTO: 0 /100 WBCS
PHOSPHATE SERPL-MCNC: 7.1 MG/DL (ref 2.7–4.5)
PLATELET # BLD AUTO: 253 THOUSANDS/UL (ref 149–390)
PMV BLD AUTO: 10 FL (ref 8.9–12.7)
POTASSIUM SERPL-SCNC: 4.5 MMOL/L (ref 3.5–5.3)
RBC # BLD AUTO: 3.1 MILLION/UL (ref 3.88–5.62)
SODIUM SERPL-SCNC: 137 MMOL/L (ref 136–145)
WBC # BLD AUTO: 5.23 THOUSAND/UL (ref 4.31–10.16)

## 2020-09-20 PROCEDURE — NC001 PR NO CHARGE: Performed by: INTERNAL MEDICINE

## 2020-09-20 PROCEDURE — 99238 HOSP IP/OBS DSCHRG MGMT 30/<: CPT | Performed by: INTERNAL MEDICINE

## 2020-09-20 PROCEDURE — 80048 BASIC METABOLIC PNL TOTAL CA: CPT | Performed by: STUDENT IN AN ORGANIZED HEALTH CARE EDUCATION/TRAINING PROGRAM

## 2020-09-20 PROCEDURE — 83735 ASSAY OF MAGNESIUM: CPT | Performed by: STUDENT IN AN ORGANIZED HEALTH CARE EDUCATION/TRAINING PROGRAM

## 2020-09-20 PROCEDURE — 84100 ASSAY OF PHOSPHORUS: CPT | Performed by: STUDENT IN AN ORGANIZED HEALTH CARE EDUCATION/TRAINING PROGRAM

## 2020-09-20 PROCEDURE — 85025 COMPLETE CBC W/AUTO DIFF WBC: CPT | Performed by: STUDENT IN AN ORGANIZED HEALTH CARE EDUCATION/TRAINING PROGRAM

## 2020-09-20 RX ADMIN — NICOTINE 1 PATCH: 14 PATCH TRANSDERMAL at 08:39

## 2020-09-20 RX ADMIN — GABAPENTIN 300 MG: 300 CAPSULE ORAL at 08:38

## 2020-09-20 RX ADMIN — FLUTICASONE PROPIONATE 1 PUFF: 44 AEROSOL, METERED RESPIRATORY (INHALATION) at 08:41

## 2020-09-20 RX ADMIN — CALCIUM ACETATE 667 MG: 667 CAPSULE ORAL at 08:38

## 2020-09-20 RX ADMIN — ACETAMINOPHEN 975 MG: 325 TABLET, FILM COATED ORAL at 05:13

## 2020-09-20 NOTE — ASSESSMENT & PLAN NOTE
· K 6 4 on presentation; EKG without concerning changes, and telemetry thus far without arrhythmia  · Patient received calcium gluconate in ED, will provide 1 time insulin/D50  · Monitor with BMP  · Patient refusing telemetry

## 2020-09-20 NOTE — DISCHARGE INSTRUCTIONS
Please continue to follow-up with your PCP and nephrologist in [place]  Please attend your regularly scheduled dialysis sessions on Monday, Wednesday, and Friday

## 2020-09-20 NOTE — DISCHARGE SUMMARY
INTERNAL MEDICINE RESIDENCY DISCHARGE SUMMARY     Harley Maher   64 y o  male  MRN: [id number]  Room/Bed: [facility] 812/[facility] 65-26     [address] [place] [address]   Encounter: [id number]    Principal Problem:    Volume overload  Active Problems:    Hyperkalemia    Elevated troponin    ESRD on hemodialysis (HCC)    Diastolic heart failure (HCC)    Cocaine use      * Volume overload  Assessment & Plan  · Patient presenting with shortness of breath  Admits to missed dialysis session 09/18/2020 and only partial session 09/16/2020  CXR with evidence of pulmonary congestion  · Likely in setting of missed dialysis sessions and possibly with medication noncompliance contributing  · Will appreciate nephrology evaluation for dialysis needs; patient anuric and volume is managed via dialysis  · Will provide supplemental oxygen as needed to maintain SaO2 88-92%       Cocaine use  Assessment & Plan  · Patient admitting to cocaine use recently to stay awake  · Counseled on need for cessation    Diastolic heart failure ([facility] [address] )  Assessment & Plan  Wt Readings from Last 3 Encounters:   09/18/20 68 kg (150 lb)   12/23/19 69 9 kg (154 lb)   11/30/19 70 8 kg (156 lb)     · With evidence of volume overload, likely in setting of missed dialysis  · Nephrology consult for dialysis needs  · Monitor daily weights, I/O           ESRD on hemodialysis [facility])  Assessment & Plan  · On HD M,W,F; history of noncompliance has previously been reported and patient admits to only partial dialysis session 09/16/2020 and missing dialysis 09/18/2020  · Will appreciate nephrology evaluation    Elevated troponin  Assessment & Plan  · Troponin 0 1 on presentation  Patient denies any chest pain/pressure, and EKG without acute ischemic change  · Suspect type 2 NSTEMI in setting of ESRD and volume overload, however will trend troponin and monitor on telemetry      Hyperkalemia  Assessment & Plan  · K 6 4 on presentation; EKG without concerning changes, and telemetry thus far without arrhythmia  · Patient received calcium gluconate in ED, will provide 1 time insulin/D50  · Monitor with BMP  · Patient refusing telemetry        [address]     37-year-old male with past medical history significant for ESRD on HD MWF, PAF status post ablation not currently on anticoagulation, hypertension, diastolic CHF who presented with dyspnea associated with nonproductive cough  Patient admits to a partial dialysis session 09/16/2020, and missed dialysis session 09/18/2020  He also admitted to recent use of cocaine to stay awake -- patient had driven from [place] to visit his nephew in the area  In the ED, he was found to be hyperkalemic to 6 4 without associated EKG changes, and chest x-ray suspicious for pulmonary vascular congestion  Nephrology was consulted, and patient received HD 09/19/2020 with fluid removal of 3 5 L  Of the time of discharge, patient was afebrile, hemodynamically stable  Potassium today 4 5  Patient was deemed medically stable for discharge who was encouraged to attend his regularly scheduled dialysis sessions in [place]  DISCHARGE INFORMATION       Admitting Provider: Parisa Hernandez MD  Admission Date: 9/18/2020    Discharge Provider: Parisa Hernandez MD  Discharge Date: 9/20/2020    Discharge Disposition: Home/Self Care    Discharge Diagnoses:  Principal Problem:    Volume overload  Active Problems:    Hyperkalemia    Elevated troponin    ESRD on hemodialysis (HCC)    Diastolic heart failure (HCC)    Cocaine use  Resolved Problems:    * No resolved hospital problems  *      Consulting Providers:      Diagnostic & Therapeutic Procedures Performed:  Xr Chest 2 Views    Result Date: 9/19/2020  Impression: Stable cardiomegaly findings consistent with CHF or volume overload  Similar findings were noted by the emergency room physician caring for the patient   Workstation performed: [id number] Code Status: Level 1 - Full Code  Advance Directive & Living Will: <no information>  Power of :    POLST:      Medications:  Current Discharge Medication List        Current Discharge Medication List        Current Discharge Medication List      CONTINUE these medications which have NOT CHANGED    Details   budesonide (PULMICORT) 90 MCG/ACT inhaler Inhale 1 puff      albuterol (VENTOLIN HFA) 90 mcg/act inhaler Inhale 2 puffs every 6 (six) hours as needed      amLODIPine (NORVASC) 10 mg tablet Take 10 mg by mouth daily      calcium acetate (PHOSLO) capsule Take 667 mg by mouth 3 (three) times a day with meals      cinacalcet (SENSIPAR) 30 mg tablet 30 mg      gabapentin (NEURONTIN) 300 mg capsule TK ONE C PO D  Refills: 1      VELPHORO 500 MG CHEW CHEW & SWALLOW 2 TABLETS BY MOUTH THREE TIMES A DAY WITH MEALS  Refills: 11             Allergies:  No Known Allergies    FOLLOW-UP       Discharge Statement:   I spent 30 minutes minutes discharging the patient  This time was spent on the day of discharge  I had direct contact with the patient on the day of discharge  Additional documentation is required if more than 30 minutes were spent on discharge  Portions of the record may have been created with voice recognition software  Occasional wrong word or "sound a like" substitutions may have occurred due to the inherent limitations of voice recognition software    Read the chart carefully and recognize, using context, where substitutions have occurred     ==  Lamonte Hardy MD  [address] [facility]  Internal Medicine Resident PGY-1

## 2020-09-20 NOTE — TREATMENT PLAN
Status post dialysis treatment yesterday with fluid removal 3 5 L  Chart reviewed  Plan for next dialysis treatment on 09/21/2020  Nephrology to follow-up in full on 09/21/2020  Please call with any questions or concerns in the interim    Thanks

## 2020-09-20 NOTE — PROGRESS NOTES
INTERNAL MEDICINE RESIDENCY PROGRESS NOTE     Name: Vinita Poon   Age & Sex: 64 y o  male   MRN: [id number]  Unit/Bed#: [facility] 65-26   Encounter: [id number]  Team: SOD Team A    PATIENT INFORMATION     Name: Vinita Poon   Age & Sex: 64 y o  male   MRN: [id number]  Hospital Stay Days: 1    ASSESSMENT/PLAN     Principal Problem:    Volume overload  Active Problems:    Hyperkalemia    Elevated troponin    ESRD on hemodialysis (HCC)    Diastolic heart failure (HCC)    Cocaine use      * Volume overload  Assessment & Plan  · Patient presenting with shortness of breath  Admits to missed dialysis session 09/18/2020 and only partial session 09/16/2020  CXR with evidence of pulmonary congestion  · Likely in setting of missed dialysis sessions and possibly with medication noncompliance contributing  · Will appreciate nephrology evaluation for dialysis needs; patient anuric and volume is managed via dialysis  · Will provide supplemental oxygen as needed to maintain SaO2 88-92%       Cocaine use  Assessment & Plan  · Patient admitting to cocaine use recently to stay awake  · Counseled on need for cessation    Diastolic heart failure ([facility] [address] )  Assessment & Plan  Wt Readings from Last 3 Encounters:   09/18/20 68 kg (150 lb)   12/23/19 69 9 kg (154 lb)   11/30/19 70 8 kg (156 lb)     · With evidence of volume overload, likely in setting of missed dialysis  · Nephrology consult for dialysis needs  · Monitor daily weights, I/O           ESRD on hemodialysis [facility])  Assessment & Plan  · On HD M,W,F; history of noncompliance has previously been reported and patient admits to only partial dialysis session 09/16/2020 and missing dialysis 09/18/2020  · Will appreciate nephrology evaluation    Elevated troponin  Assessment & Plan  · Troponin 0 1 on presentation    Patient denies any chest pain/pressure, and EKG without acute ischemic change  · Suspect type 2 NSTEMI in setting of ESRD and volume overload, however will trend troponin and monitor on telemetry  Hyperkalemia  Assessment & Plan  · K 6 4 on presentation; EKG without concerning changes, and telemetry thus far without arrhythmia  · Patient received calcium gluconate in ED, will provide 1 time insulin/D50  · Monitor with BMP  · Patient refusing telemetry        Disposition: discharge today pending K on BMP     SUBJECTIVE     Patient seen and examined  No acute events overnight  Patient complaining of mild headache but otherwise feels "OK " Per nursing staff, PCA was unable to draw labs x2 before patient refused  Patient was counseled that a recheck of his potassium was necessary before discharge  PCA/nurse informed and will retry labs  Patient also complains of orthopnea, improved from yesterday  He denies fevers, chills, chest pain, abdominal pain  OBJECTIVE     Vitals:    20 19320 2250 20 01020 08   BP: 166/72 159/70  144/67   BP Location:       Pulse: 78 86  75   Resp:  18  18   Temp: 97 9 °F (36 6 °C) 98 7 °F (37 1 °C)  98 1 °F (36 7 °C)   TempSrc:       SpO2: (!) 88%  95% 90%   Weight:       Height:          Temperature:   Temp (24hrs), Av 9 °F (36 6 °C), Min:97 4 °F (36 3 °C), Max:98 7 °F (37 1 °C)    Temperature: 98 1 °F (36 7 °C)  Intake & Output:  I/O        07 -  0700  07 -  0700  07 -  0700    P  O  118 360     I V  (mL/kg)  500 (7 4)     Total Intake(mL/kg) 118 (1 7) 860 (12 6)     Urine (mL/kg/hr)  0 (0)     Other  3504     Total Output  3504     Net +118 -2644                Weights:   IBW: 63 8 kg    Body mass index is 24 21 kg/m²  Weight (last 2 days)     Date/Time   Weight    20   68 (150)            Physical Exam  Constitutional:       General: He is not in acute distress  Appearance: Normal appearance  HENT:      Head: Normocephalic and atraumatic        Right Ear: External ear normal       Left Ear: External ear normal       Nose: Nose normal       Mouth/Throat:      Mouth: Mucous membranes are moist       Pharynx: Oropharynx is clear  Eyes:      Conjunctiva/sclera: Conjunctivae normal    Neck:      Musculoskeletal: Normal range of motion and neck supple  Cardiovascular:      Rate and Rhythm: Normal rate and regular rhythm  Pulses: Normal pulses  Heart sounds: Normal heart sounds  Pulmonary:      Effort: Pulmonary effort is normal       Breath sounds: Rales (bibasilar, faint) present  Abdominal:      General: Abdomen is flat  Palpations: Abdomen is soft  Tenderness: There is no abdominal tenderness  Musculoskeletal:      Right lower leg: No edema  Left lower leg: No edema  Skin:     General: Skin is warm and dry  Neurological:      Mental Status: He is alert and oriented to person, place, and time  Mental status is at baseline  Psychiatric:         Mood and Affect: Mood normal          Behavior: Behavior normal        LABORATORY DATA     Labs: I have personally reviewed pertinent reports  Results from last 7 days   Lab Units 09/19/20  0452 09/18/20  2236   WBC Thousand/uL 10 36* 10 63*   HEMOGLOBIN g/dL 9 2* 9 0*   HEMATOCRIT % 30 5* 30 1*   PLATELETS Thousands/uL 310 323   NEUTROS PCT %  --  80*   MONOS PCT %  --  10      Results from last 7 days   Lab Units 09/19/20  0841 09/19/20  0525 09/18/20  2236   POTASSIUM mmol/L 5 7* 6 6* 6 4*   CHLORIDE mmol/L 106 104 102   CO2 mmol/L 21 19* 22   BUN mg/dL 108* 106* 96*   CREATININE mg/dL 13 40* 13 00* 12 70*   CALCIUM mg/dL 8 8 8 5 9 2   ALK PHOS U/L  --   --  228*   ALT U/L  --   --  27   AST U/L  --   --  29     Results from last 7 days   Lab Units 09/19/20  0521   MAGNESIUM mg/dL 3 0*     Results from last 7 days   Lab Units 09/19/20  0521   PHOSPHORUS mg/dL 9 4*              Results from last 7 days   Lab Units 09/19/20  0432 09/18/20  2236   TROPONIN I ng/mL 0 10* 0 10*       IMAGING & DIAGNOSTIC TESTING     Radiology Results: I have personally reviewed pertinent reports    Xr Chest 2 Views    Result Date: 9/19/2020  Impression: Stable cardiomegaly findings consistent with CHF or volume overload  Similar findings were noted by the emergency room physician caring for the patient  Workstation performed: [id number]     Other Diagnostic Testing: I have personally reviewed pertinent reports  ACTIVE MEDICATIONS     Current Facility-Administered Medications   Medication Dose Route Frequency    acetaminophen (TYLENOL) tablet 975 mg  975 mg Oral Q6H PRN    albuterol (PROVENTIL HFA,VENTOLIN HFA) inhaler 2 puff  2 puff Inhalation Q6H PRN    calcium acetate (PHOSLO) capsule 667 mg  667 mg Oral TID With Meals    fluticasone (FLOVENT HFA) 44 mcg/act inhaler 1 puff  1 puff Inhalation Q12H [address]    gabapentin (NEURONTIN) capsule 300 mg  300 mg Oral Daily    heparin (porcine) subcutaneous injection 5,000 Units  5,000 Units Subcutaneous Q8H [address]    nicotine (NICODERM CQ) 14 mg/24hr TD 24 hr patch 1 patch  1 patch Transdermal Daily    ondansetron (ZOFRAN) injection 4 mg  4 mg Intravenous Q6H PRN       VTE Pharmacologic Prophylaxis: Heparin  VTE Mechanical Prophylaxis: sequential compression device    Portions of the record may have been created with voice recognition software  Occasional wrong word or "sound a like" substitutions may have occurred due to the inherent limitations of voice recognition software    Read the chart carefully and recognize, using context, where substitutions have occurred   ==  Sharad Galvan MD  [address]  Internal Medicine Residency PGY-1

## 2020-10-06 ENCOUNTER — HOSPITAL ENCOUNTER (EMERGENCY)
Facility: HOSPITAL | Age: 60
Discharge: HOME | End: 2020-10-06
Attending: EMERGENCY MEDICINE
Payer: MEDICARE

## 2020-10-06 VITALS
DIASTOLIC BLOOD PRESSURE: 86 MMHG | HEIGHT: 66 IN | WEIGHT: 137 LBS | SYSTOLIC BLOOD PRESSURE: 195 MMHG | OXYGEN SATURATION: 97 % | RESPIRATION RATE: 16 BRPM | HEART RATE: 94 BPM | TEMPERATURE: 98.6 F | BODY MASS INDEX: 22.02 KG/M2

## 2020-10-06 DIAGNOSIS — F41.9 ANXIETY: ICD-10-CM

## 2020-10-06 DIAGNOSIS — F14.90 COCAINE USE: Primary | ICD-10-CM

## 2020-10-06 LAB
ATRIAL RATE: 83 BPM
GLUCOSE SERPL-MCNC: 128 MG/DL (ref 65–140)
P AXIS: 63 DEGREES
PR INTERVAL: 170 MS
QRS AXIS: 61 DEGREES
QRSD INTERVAL: 92 MS
QT INTERVAL: 388 MS
QTC INTERVAL: 455 MS
T WAVE AXIS: 68 DEGREES
VENTRICULAR RATE: 83 BPM

## 2020-10-06 PROCEDURE — 99284 EMERGENCY DEPT VISIT MOD MDM: CPT | Performed by: EMERGENCY MEDICINE

## 2020-10-06 PROCEDURE — 93005 ELECTROCARDIOGRAM TRACING: CPT

## 2020-10-06 PROCEDURE — 93010 ELECTROCARDIOGRAM REPORT: CPT | Performed by: INTERNAL MEDICINE

## 2020-10-06 PROCEDURE — 99283 EMERGENCY DEPT VISIT LOW MDM: CPT

## 2020-10-06 PROCEDURE — 82948 REAGENT STRIP/BLOOD GLUCOSE: CPT

## 2020-10-06 RX ORDER — HYDROXYZINE HYDROCHLORIDE 25 MG/1
25 TABLET, FILM COATED ORAL ONCE
Status: COMPLETED | OUTPATIENT
Start: 2020-10-06 | End: 2020-10-06

## 2020-10-06 RX ADMIN — HYDROXYZINE HYDROCHLORIDE 25 MG: 25 TABLET, FILM COATED ORAL at 03:46

## 2021-03-24 NOTE — ED NOTES
Dr Destini Olsen at bedside        [facility]  12/27/19 0293
Dr Mccabe Purchase at bedside for patient evaluation        Isai Jenkins RN  12/27/19 9847
Per dialysis, they are ready for patient at this time   Dr Cyndi Huang states it is okay for patient to go to the floor at this time      Petra Faulkner RN  12/27/19 7747
Suki BUENO

## 2021-10-20 NOTE — PHYSICIAN ADVISOR
Current patient class: Inpatient  The patient is currently on Hospital Day: 3 at [facility]      The patient was admitted to the hospital at [phone] on 9/19/20 for the following diagnosis:  Shortness of breath [R06 02]  Hyperkalemia [E87 5]  Breathing difficulty [R06 89]  ESRD needing dialysis ([address] ) [N18 6, Z99 2]  Hypervolemia, unspecified hypervolemia type [E87 70]       There is documentation in the medical record of an expected length of stay of at least 2 midnights  The patient is therefore expected to satisfy the 2 midnight benchmark and given the 2 midnight presumption is appropriate for INPATIENT ADMISSION  Given this expectation of a satisfying stay, CMS instructs us that the patient is most often appropriate for inpatient admission under part A provided medical necessity is documented in the chart  After review of the relevant documentation, labs, vital signs and test results, the patient is appropriate for INPATIENT ADMISSION  Admission to the hospital as an inpatient is a complex decision making process which requires the practitioner to consider the patients presenting complaint, history and physical examination and all relevant testing  With this in mind, in this case, the patient was deemed appropriate for INPATIENT ADMISSION  After review of the documentation and testing available at the time of the admission I concur with this clinical determination of medical necessity  Rationale is as follows: The patient is a 64 yrs old Male who presented to the ED at 9/18/2020  7:35 PM with a chief complaint of Breathing Difficulty (Pt c/o SOB, difficulty breathing, and vomiting  Pt states he missed dialysis today because he is up visiting family with a medical problem    Pt states SOB started this morning, vomiting started two days ago)  57-year-old male with past medical history significant for ESRD on HD MWF, PAF status post ablation not currently on Patient resting quietly in bed. Respirations even and unlabored. On room air. No signs of distress. No needs expressed. To give report to oncoming RN. anticoagulation, hypertension, diastolic CHF who presented with dyspnea associated with nonproductive cough  He was tachypneic to 24  Patient admits to a partial dialysis session 09/16/2020, and missed dialysis session 09/18/2020  He also admitted to recent use of cocaine to stay awake     In the ED, he was found to be hyperkalemic to 6 4 and chest x-ray suspicious for pulmonary vascular congestion  Nephrology was consulted, and patient received HD 09/19/2020 with fluid removal of 3 5 L  His respiratory status and electrolytes improved by the time of discharge        The patients vitals on arrival were   ED Triage Vitals   Temperature Pulse Respirations Blood Pressure SpO2   09/18/20 1939 09/18/20 1939 09/18/20 1939 09/18/20 1939 09/18/20 1939   97 7 °F (36 5 °C) 72 (!) 24 166/80 93 %      Temp Source Heart Rate Source Patient Position - Orthostatic VS BP Location FiO2 (%)   09/18/20 1939 09/18/20 1939 09/19/20 0832 09/18/20 1939 --   Oral Monitor Lying Right arm       Pain Score       09/18/20 1939       No Pain           Past Medical History:   Diagnosis Date    CHF [facility]congestive heart failure) ([facility] [address] )     Dialysis patient ([facility] [address] )     Hypercholesterolemia     Hypertension     Limb alert care status     do not use left arm    Rectal bleeding     Renal disorder      Past Surgical History:   Procedure Laterality Date    AV FISTULA PLACEMENT      left arm    AV NODE ABLATION      CHOLECYSTECTOMY      COLONOSCOPY N/A 4/27/2018    Procedure: COLONOSCOPY;  Surgeon: Angel Chou DO;  [facility]: [facility]; Service: Gastroenterology    EGD AND COLONOSCOPY N/A 5/24/2018    Procedure: EGD AND COLONOSCOPY;  Surgeon: Momo Robin MD;  Location: [facility];   Service: Gastroenterology           Consults have been placed to:   IP CONSULT TO NEPHROLOGY    Vitals:    09/19/20 1939 09/19/20 2250 09/20/20 0100 09/20/20 0827   BP: 166/72 159/70  144/67   BP Location:       Pulse: 78 86  75   Resp:  18  18   Temp: 97 9 °F (36 6 °C) 98 7 °F (37 1 °C)  98 1 °F (36 7 °C)   TempSrc:       SpO2: (!) 88%  95% 90%   Weight:       Height:           Most recent labs:    No results for input(s): WBC, HGB, HCT, PLT, K, NA, CALCIUM, BUN, CREATININE, LIPASE, AMYLASE, INR, TROPONINI, CKTOTAL, AST, ALT, ALKPHOS, BILITOT in the last 72 hours  Scheduled Meds:  Continuous Infusions:No current facility-administered medications for this encounter  PRN Meds:      Surgical procedures (if appropriate):

## 2022-03-08 NOTE — ED PROVIDER NOTES
Advance Care Planning   [facility] ([facility])       Patient unable to express wishes at this time. Prior admission with full code status. Will readdress when mentation improves.     CODE STATUS: FULL    CLAUDY Lao  [facility]                  History  Chief Complaint   Patient presents with    Black or Bloody Stool     pt from home with dark red bloody stools since this AM pt had a procedure last month to treat his GI bleeding recent admit to a [place] hospital for a blood transfusion and was told he is bleeding some where      HPI   55 yo male presenting to ER for evaluation of bloody/black stools  States he noticed blood in the toilet bowl this morning after BM  Patient had loose stools with blood, was unsure if it was bright or dark blood  Patient also with blood with wiping  Patient had prior lower GI bleed from recum, had colonoscopy done 1 month ago  Patient denies any rectal or abdominal pain, no nausea or vomiting  Patient had blood transfusion this past Saturday in hospital in [place], states it hgb was 5-6  Was told to go to ER from nephrologist for anemia  Staes prior to discharge, hgb was 8 something  Patient is not on blood thinners, is not on aspirin  No hematemesis or hemoptysis  PMH: ESRD on dialysis (MWF), HTN   SH: smokes 1 ppd, no drug use, no ETOH      Prior to Admission Medications   Prescriptions Last Dose Informant Patient Reported? Taking?    albuterol (PROVENTIL HFA,VENTOLIN HFA) 90 mcg/act inhaler 2018 at Unknown time  No Yes   Sig: Inhale 2 puffs every 6 (six) hours as needed for wheezing   amLODIPine (NORVASC) 10 mg tablet 2018 at Unknown time  Yes Yes   Sig: Take 10 mg by mouth daily   calcium acetate (PHOSLO) 667 mg capsule 2018 at Unknown time  Yes Yes   Sig: Take 2,001 mg by mouth 3 (three) times a day with meals   cinacalcet (SENSIPAR) 30 mg tablet 2018 at Unknown time  Yes Yes   Si mg   ergocalciferol (VITAMIN D2) 50,000 units 2018 at Unknown time  No Yes   Sig: Take 1 capsule (50,000 Units total) by mouth once a week for 7 doses   gabapentin (NEURONTIN) 100 mg capsule 2018 at Unknown time  Yes Yes   Sig: Take 300 mg by mouth daily     hydrocortisone (ANUSOL-HC, PROCTOSOL HC,) 2 5 % rectal cream 5/21/2018 at Unknown time  No Yes   Sig: Insert into the rectum 4 (four) times a day as needed for hemorrhoids   metoprolol tartrate (LOPRESSOR) 25 mg tablet 5/21/2018 at Unknown time  No Yes   Sig: Take 0 5 tablets (12 5 mg total) by mouth every 12 (twelve) hours   nicotine (NICODERM CQ) 14 mg/24hr TD 24 hr patch 5/21/2018 at Unknown time  No Yes   Sig: Place 1 patch on the skin daily   pantoprazole (PROTONIX) 40 mg tablet 5/21/2018 at Unknown time  No Yes   Sig: Take 1 tablet (40 mg total) by mouth daily in the early morning      Facility-Administered Medications: None       Past Medical History:   Diagnosis Date    [facility] )     Dialysis patient ([place] [address] )     Hypertension     Renal disorder        Past Surgical History:   Procedure Laterality Date    AV NODE ABLATION      CHOLECYSTECTOMY      COLONOSCOPY N/A 4/27/2018    Procedure: COLONOSCOPY;  Surgeon: Araceli Osorio DO;  Location: [facility]; Service: Gastroenterology       History reviewed  No pertinent family history  I have reviewed and agree with the history as documented  Social History   Substance Use Topics    Smoking status: Current Every Day Smoker     Packs/day: 1 00     Types: Cigarettes    Smokeless tobacco: Never Used    Alcohol use Yes      Comment: occasional        Review of Systems    Constitutional: Negative for appetite change, chills and fever  HENT: Negative for congestion, rhinorrhea and sore throat  Eyes: Negative for photophobia, pain and visual disturbance  Respiratory: Negative for cough, chest tightness and shortness of breath  Cardiovascular: Negative for chest pain, palpitations and leg swelling  Gastrointestinal: Negative for abdominal pain, diarrhea, nausea and vomiting  Genitourinary: Negative for dysuria, flank pain and hematuria  Musculoskeletal: Negative for back pain, neck pain and neck stiffness  Skin: Negative for color change, rash and wound  Neurological: Negative for dizziness, numbness and headaches  All other systems reviewed and are negative      Physical Exam  ED Triage Vitals   Temperature Pulse Respirations Blood Pressure SpO2   05/22/18 1532 05/22/18 1532 05/22/18 1532 05/22/18 1532 05/22/18 1532   97 6 °F (36 4 °C) 73 20 159/73 99 %      Temp Source Heart Rate Source Patient Position - Orthostatic VS BP Location FiO2 (%)   05/22/18 1532 05/22/18 1532 05/22/18 1532 05/22/18 1833 --   Tympanic Monitor Sitting Left arm       Pain Score       05/22/18 1532       No Pain           Orthostatic Vital Signs  Vitals:    05/22/18 1532 05/22/18 1833 05/22/18 2028 05/22/18 2325   BP: 159/73 161/77 131/71 (!) 174/81   Pulse: 73 71 103 74   Patient Position - Orthostatic VS: Sitting Lying Lying Sitting       Physical Exam  BP (!) 174/81 (BP Location: Right arm)   Pulse 74   Temp 98 3 °F (36 8 °C) (Oral)   Resp 18   Ht 5' 6" (1 676 m)   Wt 71 7 kg (158 lb)   SpO2 97%   BMI 25 50 kg/m²     General Appearance:  Alert, cooperative, no distress   Head:  Normocephalic, without obvious abnormality, atraumatic   Eyes:  PERRL, conjunctiva/corneas clear, EOM's intact       Nose: Nares normal, septum midline, mucosa normal, no drainage or sinus tenderness   Throat: Lips, mucosa, and tongue normal; teeth and gums normal   Neck: Supple, symmetrical, trachea midline, no adenopathy   Back:   Symmetric, no curvature, ROM normal, no CVA tenderness   Lungs:   Clear to auscultation bilaterally, respirations unlabored   Chest Wall:  No tenderness or deformity   Heart:  Regular rate and rhythm, S1, S2 normal, no murmur, rub or gallop   Abdomen:   Soft, non-tender, bowel sounds active all four quadrants       Rectal:  Normal tone, normal prostate, no masses or tenderness;  guaiac positive stool   Extremities: Extremities normal, atraumatic, no cyanosis or edema   Pulses: 2+ and symmetric   Skin: Skin color, texture, turgor normal, no rashes or lesions Neurologic:      Psychiatric:  Moves all extremities, sensation and strength in tact in all extremities    Normal mood and affect       ED Medications  Medications   albuterol (PROVENTIL HFA,VENTOLIN HFA) inhaler 2 puff (not administered)   amLODIPine (NORVASC) tablet 10 mg (not administered)   calcium acetate (PHOSLO) capsule 2,001 mg (not administered)   cinacalcet (SENSIPAR) tablet 30 mg (not administered)   ergocalciferol (VITAMIN D2) capsule 50,000 Units (50,000 Units Oral Given 5/23/18 0000)   gabapentin (NEURONTIN) capsule 300 mg (not administered)   hydrocortisone (ANUSOL-HC, PROCTOSOL HC)) 2 5 % rectal cream (not administered)   metoprolol tartrate (LOPRESSOR) partial tablet 12 5 mg (12 5 mg Oral Given 5/22/18 2330)   pantoprazole (PROTONIX) EC tablet 40 mg (not administered)   ondansetron (ZOFRAN) injection 4 mg (not administered)   aluminum-magnesium hydroxide-simethicone (MYLANTA) 200-200-20 mg/5 mL oral suspension 5 mL (not administered)   nicotine (NICODERM CQ) 14 mg/24hr TD 24 hr patch 1 patch (not administered)   acetaminophen (TYLENOL) tablet 650 mg (not administered)       Diagnostic Studies  Results Reviewed     Procedure Component Value Units Date/Time    Hemoglobin - Every 4 Hours [[id number]]  (Abnormal) Collected:  05/22/18 2320    Lab Status:  Final result Specimen:  Blood from Arm, Right Updated:  05/22/18 2340     Hemoglobin 8 0 (L) g/dL     Protime-INR [[id number]]  (Abnormal) Collected:  05/22/18 1900    Lab Status:  Final result Specimen:  Blood from Arm, Right Updated:  05/22/18 1931     Protime 16 1 (H) seconds      INR 1 28 (H)    APTT [[id number]]  (Abnormal) Collected:  05/22/18 1900    Lab Status:  Final result Specimen:  Blood from Arm, Right Updated:  05/22/18 1931     PTT 47 (H) seconds     Comprehensive metabolic panel [[id number]]  (Abnormal) Collected:  05/22/18 1900    Lab Status:  Final result Specimen:  Blood from Arm, Right Updated:  05/22/18 1928     Sodium 139 mmol/L Potassium 5 0 mmol/L      Chloride 102 mmol/L      CO2 25 mmol/L      Anion Gap 12 mmol/L      BUN 84 (H) mg/dL      Creatinine 15 20 (H) mg/dL      Glucose 111 mg/dL      Calcium 8 1 (L) mg/dL      AST 19 U/L      ALT 16 U/L      Alkaline Phosphatase 107 U/L      Total Protein 6 6 g/dL      Albumin 3 0 (L) g/dL      Total Bilirubin 0 33 mg/dL      eGFR 4 ml/min/1 73sq m     Narrative:         National Kidney Disease Education Program recommendations are as follows:  GFR calculation is accurate only with a steady state creatinine  Chronic Kidney disease less than 60 ml/min/1 73 sq  meters  Kidney failure less than 15 ml/min/1 73 sq  meters  CBC and differential [[id number]]  (Abnormal) Collected:  05/22/18 1900    Lab Status:  Final result Specimen:  Blood from Arm, Right Updated:  05/22/18 1920     WBC 4 55 Thousand/uL      RBC 2 69 (L) Million/uL      Hemoglobin 7 9 (L) g/dL      Hematocrit 24 1 (L) %      MCV 90 fL      MCH 29 4 pg      MCHC 32 8 g/dL      RDW 17 6 (H) %      MPV 9 4 fL      Platelets 444 Thousands/uL      nRBC 1 /100 WBCs      Neutrophils Relative 56 %      Lymphocytes Relative 26 %      Monocytes Relative 11 %      Eosinophils Relative 6 %      Basophils Relative 1 %      Neutrophils Absolute 2 54 Thousands/µL      Lymphocytes Absolute 1 20 Thousands/µL      Monocytes Absolute 0 50 Thousand/µL      Eosinophils Absolute 0 27 Thousand/µL      Basophils Absolute 0 03 Thousands/µL                  No orders to display         Procedures  Procedures      Phone Consults  ED Phone Contact    ED Course       MDM   Patient with concerns for lower GI bleed  Stool is grossly positive for blood  Will check CBC, INR, type and screen, place IV  Patient is consented for blood  Will need admission for colonoscopy  Patient is ok with getting blood transfusion if needed      CritCare Time    Disposition  Final diagnoses:   Lower GI bleed   Anemia   ESRD (end stage renal disease) ([facility] )     Time reflects when diagnosis was documented in both MDM as applicable and the Disposition within this note     Time User Action Codes Description Comment    5/22/2018  8:29 PM Sylvester Spaulding Add [K92 2] Lower GI bleed     5/22/2018  8:29 PM Sylvester Spaulding Add [D64 9] Anemia     5/22/2018  8:29 PM Sylvseter Spaulding Add [N18 6] ESRD (end stage renal disease) [facility])       ED Disposition     ED Disposition Condition Comment    Admit  Case was discussed with ROSITA and the patient's admission status was agreed to be Admission Status: inpatient status to the service of Dr Marleen Nicholson  Follow-up Information    None         Patient's Medications   Discharge Prescriptions    No medications on file     No discharge procedures on file  ED Provider  Attending physically available and evaluated Taina Cruz I managed the patient along with the ED Attending      Electronically Signed by         Christopher Maloney MD  05/23/18 8974

## 2024-04-25 NOTE — ED ATTENDING ATTESTATION
Héctor Caceres MD, saw and evaluated the patient  I have discussed the patient with the resident/non-physician practitioner and agree with the resident's/non-physician practitioner's findings, Plan of Care, and MDM as documented in the resident's/non-physician practitioner's note, except where noted  All available labs and Radiology studies were reviewed  At this point I agree with the current assessment done in the Emergency Department  I have conducted an independent evaluation of this patient including a focused history of:    Emergency Department Note- Shirlene Geller 66 y o  male MRN: [id number]    Unit/Bed#: ED 01 Encounter: [id number]    Shirlene Geller is a 66 y o  male who presents with   Chief Complaint   Patient presents with    Sore Throat     sore throat for 3 days, started with a cough, worsening, pt states he woke yesterday morning and could not talk  States it is hard to swallow         History of Present Illness   HPI:  Shirlene Geller is a 66 y o  male who presents for evaluation of:  Sore throat  Patient has had a cough for several weeks  Patient developed sore throat over the last few days  Patient denies fevers at home  Patient did not get flu vaccine this year  He has been compliant with HD for his ESRD  Review of Systems   Constitutional: Negative for chills and fever  HENT: Positive for congestion and sore throat  Respiratory: Positive for cough  Negative for shortness of breath  Cardiovascular: Negative for chest pain and palpitations  All other systems reviewed and are negative        Historical Information   Past Medical History:   Diagnosis Date    [facility]est[facility] )     [facility]     Hypertension     Limb alert care status     do not use left arm    Rectal bleeding     Renal disorder      Past Surgical History:   Procedure Laterality Date    AV FISTULA PLACEMENT      left arm    AV NODE ABLATION      CHOLECYSTECTOMY      COLONOSCOPY N/A 4/27/2018    Procedure: COLONOSCOPY;  Surgeon: Castillo Can DO;  Location: [facility]; Service: Gastroenterology    EGD AND COLONOSCOPY N/A 5/24/2018    Procedure: EGD AND COLONOSCOPY;  Surgeon: Dwayne Stroud MD;  Location:  GI LAB; Service: Gastroenterology     Social History   History   Alcohol Use No     History   Drug Use No     History   Smoking Status    Current Every Day Smoker    Packs/day: 1 00    Types: Cigarettes   Smokeless Tobacco    Never Used     Family History: non-contributory    Meds/Allergies   all medications and allergies reviewed  No Known Allergies    Objective   First Vitals:   Blood Pressure: 155/71 (01/06/19 0508)  Pulse: 93 (01/06/19 0508)  Temperature: (!) 97 4 °F (36 3 °C) (01/06/19 0508)  Temp Source: Tympanic (01/06/19 0508)  Respirations: 20 (01/06/19 0508)  Height: 5' 6" (167 6 cm) (01/06/19 0508)  Weight - Scale: 70 8 kg (156 lb) (01/06/19 0508)  SpO2: 93 % (01/06/19 0508)    Current Vitals:   Blood Pressure: 155/71 (01/06/19 0508)  Pulse: 93 (01/06/19 0508)  Temperature: (!) 97 4 °F (36 3 °C) (01/06/19 0508)  Temp Source: Tympanic (01/06/19 0508)  Respirations: 20 (01/06/19 0508)  Height: 5' 6" (167 6 cm) (01/06/19 0508)  Weight - Scale: 70 8 kg (156 lb) (01/06/19 0508)  SpO2: 93 % (01/06/19 0508)    No intake or output data in the 24 hours ending 01/06/19 0528    Invasive Devices     Line            Hemodialysis AV Fistula Left -- days                Physical Exam   Constitutional: He is oriented to person, place, and time  He appears well-developed and well-nourished  HENT:   Head: Normocephalic and atraumatic  Mouth/Throat: Oropharynx is clear and moist  No oropharyngeal exudate  Eyes: Pupils are equal, round, and reactive to light  Conjunctivae are normal    Neck: Normal range of motion  Neck supple  Cardiovascular: Normal rate and regular rhythm  Pulmonary/Chest: Effort normal and breath sounds normal  No respiratory distress  Abdominal: Soft   Bowel sounds are normal    Musculoskeletal: He exhibits no edema  AVF with thrill left upper extremity   Neurological: He is alert and oriented to person, place, and time  Coordination normal    Skin: Skin is warm and dry  Psychiatric: He has a normal mood and affect  His behavior is normal  Judgment and thought content normal    Nursing note and vitals reviewed  Medical Decision Makin  Acute viral pharyngitis    No results found for this or any previous visit (from the past 36 hour(s))  XR chest 2 views    (Results Pending)         Portions of the record may have been created with voice recognition software  Occasional wrong word or "sound a like" substitutions may have occurred due to the inherent limitations of voice recognition software  Read the chart carefully and recognize, using context, where substitutions have occurred  PAST MEDICAL HISTORY:  Hypothyroidism, adult     Iron deficiency anemia during pregnancy

## 2024-04-27 NOTE — PLAN OF CARE
Care Management Discharge Note    Discharge Date: 04/27/2024       Discharge Disposition: Home, Home Care    Discharge Services: None    Discharge DME:  (per treatment team)    Discharge Transportation: family or friend will provide    Private pay costs discussed: Not applicable    Does the patient's insurance plan have a 3 day qualifying hospital stay waiver?  Yes     Which insurance plan 3 day waiver is available? Alternative insurance waiver    Will the waiver be used for post-acute placement? No    PAS Confirmation Code: NA  Patient/family educated on Medicare website which has current facility and service quality ratings: yes    Education Provided on the Discharge Plan: Yes (per treatment team)  Persons Notified of Discharge Plans: patient   Patient/Family in Agreement with the Plan: yes    Handoff Referral Completed: Yes    Additional Information:  SW reviewed chart and discussed updates with MD. Patient discharging home to prior living environment with spouse.  Home PT and OT arranged through [facility].  Family transporting.     MARCELINO Padgett       Problem: DISCHARGE PLANNING - CARE MANAGEMENT  Goal: Discharge to post-acute care or home with appropriate resources  INTERVENTIONS:  - Conduct assessment to determine patient/family and health care team treatment goals, and need for post-acute services based on payer coverage, community resources, and patient preferences, and barriers to discharge  - Address psychosocial, clinical, and financial barriers to discharge as identified in assessment in conjunction with the patient/family and health care team  - Arrange appropriate level of post-acute services according to patient's   needs and preference and payer coverage in collaboration with the physician and health care team  - Communicate with and update the patient/family, physician, and health care team regarding progress on the discharge plan  - Arrange appropriate transportation to post-acute venues  - Discharge home when medically cleared  Outcome: Progressing

## (undated) DEVICE — WORKING LENGTH 235CM, WORKING CHANNEL 2.8MM: Brand: RESOLUTION 360 CLIP